# Patient Record
Sex: FEMALE | Race: WHITE | NOT HISPANIC OR LATINO | Employment: FULL TIME | URBAN - METROPOLITAN AREA
[De-identification: names, ages, dates, MRNs, and addresses within clinical notes are randomized per-mention and may not be internally consistent; named-entity substitution may affect disease eponyms.]

---

## 2017-01-20 ENCOUNTER — GENERIC CONVERSION - ENCOUNTER (OUTPATIENT)
Dept: OTHER | Facility: OTHER | Age: 42
End: 2017-01-20

## 2017-01-20 ENCOUNTER — LAB CONVERSION - ENCOUNTER (OUTPATIENT)
Dept: OTHER | Facility: OTHER | Age: 42
End: 2017-01-20

## 2017-01-20 LAB
INR PPP: 2.3
PROTHROMBIN TIME: 22.9 SEC (ref 9–11.5)

## 2017-01-26 ENCOUNTER — ALLSCRIPTS OFFICE VISIT (OUTPATIENT)
Dept: OTHER | Facility: OTHER | Age: 42
End: 2017-01-26

## 2017-01-26 DIAGNOSIS — I35.9 NONRHEUMATIC AORTIC VALVE DISORDER: ICD-10-CM

## 2017-02-04 ENCOUNTER — LAB CONVERSION - ENCOUNTER (OUTPATIENT)
Dept: OTHER | Facility: OTHER | Age: 42
End: 2017-02-04

## 2017-02-04 LAB
INR PPP: 2.9
PROTHROMBIN TIME: 29.4 SEC (ref 9–11.5)

## 2017-02-06 ENCOUNTER — GENERIC CONVERSION - ENCOUNTER (OUTPATIENT)
Dept: OTHER | Facility: OTHER | Age: 42
End: 2017-02-06

## 2017-02-24 ENCOUNTER — LAB CONVERSION - ENCOUNTER (OUTPATIENT)
Dept: OTHER | Facility: OTHER | Age: 42
End: 2017-02-24

## 2017-02-24 LAB
INR PPP: 2.4
PROTHROMBIN TIME: 24.7 SEC (ref 9–11.5)

## 2017-03-10 ENCOUNTER — GENERIC CONVERSION - ENCOUNTER (OUTPATIENT)
Dept: OTHER | Facility: OTHER | Age: 42
End: 2017-03-10

## 2017-03-10 ENCOUNTER — LAB CONVERSION - ENCOUNTER (OUTPATIENT)
Dept: OTHER | Facility: OTHER | Age: 42
End: 2017-03-10

## 2017-03-10 LAB
INR PPP: 2.9
PROTHROMBIN TIME: 28.8 SEC (ref 9–11.5)

## 2017-03-31 ENCOUNTER — LAB CONVERSION - ENCOUNTER (OUTPATIENT)
Dept: OTHER | Facility: OTHER | Age: 42
End: 2017-03-31

## 2017-03-31 ENCOUNTER — GENERIC CONVERSION - ENCOUNTER (OUTPATIENT)
Dept: OTHER | Facility: OTHER | Age: 42
End: 2017-03-31

## 2017-03-31 LAB
INR PPP: 2.8
PROTHROMBIN TIME: 28.5 SEC (ref 9–11.5)

## 2017-04-07 ENCOUNTER — ALLSCRIPTS OFFICE VISIT (OUTPATIENT)
Dept: OTHER | Facility: OTHER | Age: 42
End: 2017-04-07

## 2017-04-07 DIAGNOSIS — Q25.1 COARCTATION OF AORTA: ICD-10-CM

## 2017-04-07 DIAGNOSIS — I10 ESSENTIAL (PRIMARY) HYPERTENSION: ICD-10-CM

## 2017-04-07 DIAGNOSIS — R94.5 ABNORMAL RESULTS OF LIVER FUNCTION STUDIES: ICD-10-CM

## 2017-04-07 DIAGNOSIS — Z12.31 ENCOUNTER FOR SCREENING MAMMOGRAM FOR MALIGNANT NEOPLASM OF BREAST: ICD-10-CM

## 2017-04-07 DIAGNOSIS — F41.9 ANXIETY DISORDER: ICD-10-CM

## 2017-04-13 ENCOUNTER — HOSPITAL ENCOUNTER (OUTPATIENT)
Dept: NON INVASIVE DIAGNOSTICS | Facility: MEDICAL CENTER | Age: 42
Discharge: HOME | End: 2017-04-13
Payer: COMMERCIAL

## 2017-04-13 DIAGNOSIS — I35.9 NONRHEUMATIC AORTIC VALVE DISORDER: ICD-10-CM

## 2017-04-13 PROCEDURE — 93306 TTE W/DOPPLER COMPLETE: CPT

## 2017-04-21 ENCOUNTER — LAB CONVERSION - ENCOUNTER (OUTPATIENT)
Dept: OTHER | Facility: OTHER | Age: 42
End: 2017-04-21

## 2017-04-21 ENCOUNTER — GENERIC CONVERSION - ENCOUNTER (OUTPATIENT)
Dept: OTHER | Facility: OTHER | Age: 42
End: 2017-04-21

## 2017-04-21 LAB
INR PPP: 2.5
PROTHROMBIN TIME: 25.9 SEC (ref 9–11.5)

## 2017-05-02 ENCOUNTER — LAB CONVERSION - ENCOUNTER (OUTPATIENT)
Dept: OTHER | Facility: OTHER | Age: 42
End: 2017-05-02

## 2017-05-02 LAB
A/G RATIO (HISTORICAL): 1.6 (CALC) (ref 1–2.5)
ALBUMIN SERPL BCP-MCNC: 4 G/DL (ref 3.6–5.1)
ALP SERPL-CCNC: 51 U/L (ref 33–115)
ALT SERPL W P-5'-P-CCNC: 18 U/L (ref 6–29)
AST SERPL W P-5'-P-CCNC: 25 U/L (ref 10–30)
BASOPHILS # BLD AUTO: 0.4 %
BASOPHILS # BLD AUTO: 22 CELLS/UL (ref 0–200)
BILIRUB SERPL-MCNC: 1.3 MG/DL (ref 0.2–1.2)
BUN SERPL-MCNC: 15 MG/DL (ref 7–25)
BUN/CREA RATIO (HISTORICAL): ABNORMAL (CALC) (ref 6–22)
CALCIUM SERPL-MCNC: 8.4 MG/DL (ref 8.6–10.2)
CHLORIDE SERPL-SCNC: 105 MMOL/L (ref 98–110)
CHOLEST SERPL-MCNC: 129 MG/DL (ref 125–200)
CHOLEST/HDLC SERPL: 2.2 (CALC)
CO2 SERPL-SCNC: 27 MMOL/L (ref 20–31)
CREAT SERPL-MCNC: 0.68 MG/DL (ref 0.5–1.1)
DEPRECATED RDW RBC AUTO: 13.6 % (ref 11–15)
EGFR AFRICAN AMERICAN (HISTORICAL): 124 ML/MIN/1.73M2
EGFR-AMERICAN CALC (HISTORICAL): 107 ML/MIN/1.73M2
EOSINOPHIL # BLD AUTO: 151 CELLS/UL (ref 15–500)
EOSINOPHIL # BLD AUTO: 2.7 %
GAMMA GLOBULIN (HISTORICAL): 2.5 G/DL (CALC) (ref 1.9–3.7)
GLUCOSE (HISTORICAL): 78 MG/DL (ref 65–99)
HCT VFR BLD AUTO: 37 % (ref 35–45)
HDLC SERPL-MCNC: 59 MG/DL
HGB BLD-MCNC: 12.2 G/DL (ref 11.7–15.5)
LDL CHOLESTEROL (HISTORICAL): 56 MG/DL (CALC)
LYMPHOCYTES # BLD AUTO: 1333 CELLS/UL (ref 850–3900)
LYMPHOCYTES # BLD AUTO: 23.8 %
MCH RBC QN AUTO: 30 PG (ref 27–33)
MCHC RBC AUTO-ENTMCNC: 33 G/DL (ref 32–36)
MCV RBC AUTO: 90.8 FL (ref 80–100)
MONOCYTES # BLD AUTO: 336 CELLS/UL (ref 200–950)
MONOCYTES (HISTORICAL): 6 %
NEUTROPHILS # BLD AUTO: 3758 CELLS/UL (ref 1500–7800)
NEUTROPHILS # BLD AUTO: 67.1 %
NON-HDL-CHOL (CHOL-HDL) (HISTORICAL): 70 MG/DL (CALC)
PLATELET # BLD AUTO: 244 THOUSAND/UL (ref 140–400)
PMV BLD AUTO: 8.9 FL (ref 7.5–12.5)
POTASSIUM SERPL-SCNC: 3.7 MMOL/L (ref 3.5–5.3)
RBC # BLD AUTO: 4.08 MILLION/UL (ref 3.8–5.1)
SODIUM SERPL-SCNC: 139 MMOL/L (ref 135–146)
TOTAL PROTEIN (HISTORICAL): 6.5 G/DL (ref 6.1–8.1)
TRIGL SERPL-MCNC: 69 MG/DL
TSH SERPL DL<=0.05 MIU/L-ACNC: 1.21 MIU/L
WBC # BLD AUTO: 5.6 THOUSAND/UL (ref 3.8–10.8)

## 2017-05-04 ENCOUNTER — ALLSCRIPTS OFFICE VISIT (OUTPATIENT)
Dept: OTHER | Facility: OTHER | Age: 42
End: 2017-05-04

## 2017-05-09 ENCOUNTER — GENERIC CONVERSION - ENCOUNTER (OUTPATIENT)
Dept: OTHER | Facility: OTHER | Age: 42
End: 2017-05-09

## 2017-05-12 ENCOUNTER — HOSPITAL ENCOUNTER (OUTPATIENT)
Dept: RADIOLOGY | Facility: MEDICAL CENTER | Age: 42
Discharge: HOME | End: 2017-05-12
Payer: COMMERCIAL

## 2017-05-12 DIAGNOSIS — Q25.1 COARCTATION OF AORTA: ICD-10-CM

## 2017-05-12 PROCEDURE — 71275 CT ANGIOGRAPHY CHEST: CPT

## 2017-05-12 RX ADMIN — IOHEXOL 100 ML: 350 INJECTION, SOLUTION INTRAVENOUS at 14:04

## 2017-05-17 ENCOUNTER — ALLSCRIPTS OFFICE VISIT (OUTPATIENT)
Dept: OTHER | Facility: OTHER | Age: 42
End: 2017-05-17
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
## 2017-05-19 ENCOUNTER — GENERIC CONVERSION - ENCOUNTER (OUTPATIENT)
Dept: OTHER | Facility: OTHER | Age: 42
End: 2017-05-19

## 2017-05-19 ENCOUNTER — LAB CONVERSION - ENCOUNTER (OUTPATIENT)
Dept: OTHER | Facility: OTHER | Age: 42
End: 2017-05-19

## 2017-05-19 LAB
INR PPP: 2
PROTHROMBIN TIME: 20.9 SEC (ref 9–11.5)

## 2017-06-03 ENCOUNTER — GENERIC CONVERSION - ENCOUNTER (OUTPATIENT)
Dept: OTHER | Facility: OTHER | Age: 42
End: 2017-06-03

## 2017-06-03 ENCOUNTER — LAB CONVERSION - ENCOUNTER (OUTPATIENT)
Dept: OTHER | Facility: OTHER | Age: 42
End: 2017-06-03

## 2017-06-03 LAB
INR PPP: 2.1
PROTHROMBIN TIME: 21.1 SEC (ref 9–11.5)

## 2017-06-17 ENCOUNTER — LAB CONVERSION - ENCOUNTER (OUTPATIENT)
Dept: OTHER | Facility: OTHER | Age: 42
End: 2017-06-17

## 2017-06-17 ENCOUNTER — GENERIC CONVERSION - ENCOUNTER (OUTPATIENT)
Dept: OTHER | Facility: OTHER | Age: 42
End: 2017-06-17

## 2017-06-17 LAB
INR PPP: 2.5
PROTHROMBIN TIME: 25.8 SEC (ref 9–11.5)

## 2017-06-19 ENCOUNTER — LAB CONVERSION - ENCOUNTER (OUTPATIENT)
Dept: OTHER | Facility: OTHER | Age: 42
End: 2017-06-19

## 2017-06-19 LAB — LYME IGG/IGM AB (HISTORICAL): <0.9 INDEX

## 2017-06-23 ENCOUNTER — GENERIC CONVERSION - ENCOUNTER (OUTPATIENT)
Dept: OTHER | Facility: OTHER | Age: 42
End: 2017-06-23

## 2017-07-02 ENCOUNTER — HOSPITAL ENCOUNTER (EMERGENCY)
Facility: HOSPITAL | Age: 42
Discharge: HOME | End: 2017-07-02
Attending: EMERGENCY MEDICINE | Admitting: EMERGENCY MEDICINE
Payer: COMMERCIAL

## 2017-07-02 ENCOUNTER — APPOINTMENT (EMERGENCY)
Dept: RADIOLOGY | Facility: HOSPITAL | Age: 42
End: 2017-07-02
Payer: COMMERCIAL

## 2017-07-02 VITALS
HEART RATE: 68 BPM | SYSTOLIC BLOOD PRESSURE: 127 MMHG | WEIGHT: 110.45 LBS | OXYGEN SATURATION: 99 % | BODY MASS INDEX: 21.68 KG/M2 | RESPIRATION RATE: 18 BRPM | TEMPERATURE: 98.2 F | HEIGHT: 60 IN | DIASTOLIC BLOOD PRESSURE: 61 MMHG

## 2017-07-02 DIAGNOSIS — R06.00 DYSPNEA: Primary | ICD-10-CM

## 2017-07-02 LAB
ANION GAP SERPL CALCULATED.3IONS-SCNC: 8 MMOL/L (ref 4–13)
APTT PPP: 40 SECONDS (ref 23–35)
BASOPHILS # BLD AUTO: 0.04 THOUSANDS/ΜL (ref 0–0.1)
BASOPHILS NFR BLD AUTO: 1 % (ref 0–1)
BUN SERPL-MCNC: 10 MG/DL (ref 5–25)
CALCIUM SERPL-MCNC: 8.7 MG/DL (ref 8.3–10.1)
CHLORIDE SERPL-SCNC: 103 MMOL/L (ref 100–108)
CO2 SERPL-SCNC: 27 MMOL/L (ref 21–32)
CREAT SERPL-MCNC: 0.63 MG/DL (ref 0.6–1.3)
EOSINOPHIL # BLD AUTO: 0.11 THOUSAND/ΜL (ref 0–0.61)
EOSINOPHIL NFR BLD AUTO: 2 % (ref 0–6)
ERYTHROCYTE [DISTWIDTH] IN BLOOD BY AUTOMATED COUNT: 12.1 % (ref 11.6–15.1)
GFR SERPL CREATININE-BSD FRML MDRD: >60 ML/MIN/1.73SQ M
GLUCOSE SERPL-MCNC: 119 MG/DL (ref 65–140)
HCT VFR BLD AUTO: 37.1 % (ref 34.8–46.1)
HGB BLD-MCNC: 12.5 G/DL (ref 11.5–15.4)
INR PPP: 2.74 (ref 0.86–1.16)
LYMPHOCYTES # BLD AUTO: 1.03 THOUSANDS/ΜL (ref 0.6–4.47)
LYMPHOCYTES NFR BLD AUTO: 15 % (ref 14–44)
MCH RBC QN AUTO: 30.2 PG (ref 26.8–34.3)
MCHC RBC AUTO-ENTMCNC: 33.7 G/DL (ref 31.4–37.4)
MCV RBC AUTO: 90 FL (ref 82–98)
MONOCYTES # BLD AUTO: 0.5 THOUSAND/ΜL (ref 0.17–1.22)
MONOCYTES NFR BLD AUTO: 7 % (ref 4–12)
NEUTROPHILS # BLD AUTO: 5.23 THOUSANDS/ΜL (ref 1.85–7.62)
NEUTS SEG NFR BLD AUTO: 75 % (ref 43–75)
NRBC BLD AUTO-RTO: 0 /100 WBCS
NT-PROBNP SERPL-MCNC: 188 PG/ML
PLATELET # BLD AUTO: 201 THOUSANDS/UL (ref 149–390)
PMV BLD AUTO: 9.5 FL (ref 8.9–12.7)
POTASSIUM SERPL-SCNC: 3.5 MMOL/L (ref 3.5–5.3)
PROTHROMBIN TIME: 29.8 SECONDS (ref 12.1–14.4)
RBC # BLD AUTO: 4.14 MILLION/UL (ref 3.81–5.12)
SODIUM SERPL-SCNC: 138 MMOL/L (ref 136–145)
TROPONIN I SERPL-MCNC: <0.02 NG/ML
WBC # BLD AUTO: 6.93 THOUSAND/UL (ref 4.31–10.16)

## 2017-07-02 PROCEDURE — 83880 ASSAY OF NATRIURETIC PEPTIDE: CPT | Performed by: EMERGENCY MEDICINE

## 2017-07-02 PROCEDURE — 71020 HB CHEST X-RAY 2VW FRONTAL&LATL: CPT

## 2017-07-02 PROCEDURE — 80048 BASIC METABOLIC PNL TOTAL CA: CPT | Performed by: EMERGENCY MEDICINE

## 2017-07-02 PROCEDURE — 94640 AIRWAY INHALATION TREATMENT: CPT

## 2017-07-02 PROCEDURE — 84484 ASSAY OF TROPONIN QUANT: CPT | Performed by: EMERGENCY MEDICINE

## 2017-07-02 PROCEDURE — 36415 COLL VENOUS BLD VENIPUNCTURE: CPT | Performed by: EMERGENCY MEDICINE

## 2017-07-02 PROCEDURE — 99285 EMERGENCY DEPT VISIT HI MDM: CPT

## 2017-07-02 PROCEDURE — 85610 PROTHROMBIN TIME: CPT | Performed by: EMERGENCY MEDICINE

## 2017-07-02 PROCEDURE — 93005 ELECTROCARDIOGRAM TRACING: CPT | Performed by: EMERGENCY MEDICINE

## 2017-07-02 PROCEDURE — 85730 THROMBOPLASTIN TIME PARTIAL: CPT | Performed by: EMERGENCY MEDICINE

## 2017-07-02 PROCEDURE — 85025 COMPLETE CBC W/AUTO DIFF WBC: CPT | Performed by: EMERGENCY MEDICINE

## 2017-07-02 RX ORDER — ALBUTEROL SULFATE 90 UG/1
2 AEROSOL, METERED RESPIRATORY (INHALATION) ONCE
Status: COMPLETED | OUTPATIENT
Start: 2017-07-02 | End: 2017-07-02

## 2017-07-02 RX ORDER — IPRATROPIUM BROMIDE AND ALBUTEROL SULFATE 2.5; .5 MG/3ML; MG/3ML
3 SOLUTION RESPIRATORY (INHALATION)
Status: DISCONTINUED | OUTPATIENT
Start: 2017-07-02 | End: 2017-07-02

## 2017-07-02 RX ORDER — SODIUM CHLORIDE FOR INHALATION 0.9 %
VIAL, NEBULIZER (ML) INHALATION
Status: COMPLETED
Start: 2017-07-02 | End: 2017-07-02

## 2017-07-02 RX ORDER — ASPIRIN 81 MG/1
81 TABLET ORAL DAILY
COMMUNITY

## 2017-07-02 RX ORDER — WARFARIN SODIUM 5 MG/1
5 TABLET ORAL
COMMUNITY
End: 2018-05-21 | Stop reason: SDUPTHER

## 2017-07-02 RX ORDER — SODIUM CHLORIDE FOR INHALATION 0.9 %
3 VIAL, NEBULIZER (ML) INHALATION ONCE
Status: COMPLETED | OUTPATIENT
Start: 2017-07-02 | End: 2017-07-02

## 2017-07-02 RX ORDER — ALBUTEROL SULFATE 90 UG/1
2 AEROSOL, METERED RESPIRATORY (INHALATION) EVERY 6 HOURS PRN
Qty: 1 INHALER | Refills: 0 | Status: SHIPPED | OUTPATIENT
Start: 2017-07-02 | End: 2018-06-05

## 2017-07-02 RX ORDER — IPRATROPIUM BROMIDE AND ALBUTEROL SULFATE 2.5; .5 MG/3ML; MG/3ML
3 SOLUTION RESPIRATORY (INHALATION)
Status: DISCONTINUED | OUTPATIENT
Start: 2017-07-02 | End: 2017-07-02 | Stop reason: HOSPADM

## 2017-07-02 RX ADMIN — IPRATROPIUM BROMIDE AND ALBUTEROL SULFATE 3 ML: .5; 3 SOLUTION RESPIRATORY (INHALATION) at 12:35

## 2017-07-02 RX ADMIN — Medication 3 ML: at 12:35

## 2017-07-02 RX ADMIN — ALBUTEROL SULFATE 2 PUFF: 90 AEROSOL, METERED RESPIRATORY (INHALATION) at 13:33

## 2017-07-02 RX ADMIN — ISODIUM CHLORIDE 3 ML: 0.03 SOLUTION RESPIRATORY (INHALATION) at 12:35

## 2017-07-03 LAB
ATRIAL RATE: 65 BPM
P AXIS: 56 DEGREES
PR INTERVAL: 134 MS
QRS AXIS: 63 DEGREES
QRSD INTERVAL: 84 MS
QT INTERVAL: 400 MS
QTC INTERVAL: 416 MS
T WAVE AXIS: 66 DEGREES
VENTRICULAR RATE: 65 BPM

## 2017-07-11 ENCOUNTER — LAB CONVERSION - ENCOUNTER (OUTPATIENT)
Dept: OTHER | Facility: OTHER | Age: 42
End: 2017-07-11

## 2017-07-11 ENCOUNTER — GENERIC CONVERSION - ENCOUNTER (OUTPATIENT)
Dept: OTHER | Facility: OTHER | Age: 42
End: 2017-07-11

## 2017-07-11 LAB
INR PPP: 3.9
PROTHROMBIN TIME: 39.5 SEC (ref 9–11.5)

## 2017-08-11 ENCOUNTER — LAB CONVERSION - ENCOUNTER (OUTPATIENT)
Dept: OTHER | Facility: OTHER | Age: 42
End: 2017-08-11

## 2017-08-11 ENCOUNTER — GENERIC CONVERSION - ENCOUNTER (OUTPATIENT)
Dept: OTHER | Facility: OTHER | Age: 42
End: 2017-08-11

## 2017-08-11 LAB
INR PPP: 1.9
PROTHROMBIN TIME: 19.1 SEC (ref 9–11.5)

## 2017-08-25 ENCOUNTER — LAB CONVERSION - ENCOUNTER (OUTPATIENT)
Dept: OTHER | Facility: OTHER | Age: 42
End: 2017-08-25

## 2017-08-25 LAB
INR PPP: 3.2
PROTHROMBIN TIME: 32.4 SEC (ref 9–11.5)

## 2017-09-14 ENCOUNTER — GENERIC CONVERSION - ENCOUNTER (OUTPATIENT)
Dept: OTHER | Facility: OTHER | Age: 42
End: 2017-09-14

## 2017-09-15 ENCOUNTER — LAB CONVERSION - ENCOUNTER (OUTPATIENT)
Dept: OTHER | Facility: OTHER | Age: 42
End: 2017-09-15

## 2017-09-15 ENCOUNTER — GENERIC CONVERSION - ENCOUNTER (OUTPATIENT)
Dept: OTHER | Facility: OTHER | Age: 42
End: 2017-09-15

## 2017-09-15 LAB
INR PPP: 2.1
PROTHROMBIN TIME: 21.6 SEC (ref 9–11.5)

## 2017-09-18 ENCOUNTER — GENERIC CONVERSION - ENCOUNTER (OUTPATIENT)
Dept: OTHER | Facility: OTHER | Age: 42
End: 2017-09-18

## 2017-10-06 ENCOUNTER — LAB CONVERSION - ENCOUNTER (OUTPATIENT)
Dept: OTHER | Facility: OTHER | Age: 42
End: 2017-10-06

## 2017-10-06 LAB
INR PPP: 2.3
PROTHROMBIN TIME: 23.5 SEC (ref 9–11.5)

## 2017-10-13 ENCOUNTER — HOSPITAL ENCOUNTER (EMERGENCY)
Facility: HOSPITAL | Age: 42
Discharge: HOME | End: 2017-10-13
Attending: EMERGENCY MEDICINE | Admitting: EMERGENCY MEDICINE
Payer: COMMERCIAL

## 2017-10-13 VITALS
SYSTOLIC BLOOD PRESSURE: 178 MMHG | RESPIRATION RATE: 18 BRPM | HEART RATE: 78 BPM | WEIGHT: 113.76 LBS | BODY MASS INDEX: 22.22 KG/M2 | TEMPERATURE: 98 F | DIASTOLIC BLOOD PRESSURE: 90 MMHG

## 2017-10-13 DIAGNOSIS — T14.8XXA HEMATOMA: Primary | ICD-10-CM

## 2017-10-13 PROCEDURE — 99283 EMERGENCY DEPT VISIT LOW MDM: CPT

## 2017-10-13 RX ORDER — LIDOCAINE HYDROCHLORIDE 10 MG/ML
5 INJECTION, SOLUTION EPIDURAL; INFILTRATION; INTRACAUDAL; PERINEURAL ONCE
Status: COMPLETED | OUTPATIENT
Start: 2017-10-13 | End: 2017-10-13

## 2017-10-13 RX ADMIN — LIDOCAINE HYDROCHLORIDE 5 ML: 10 INJECTION, SOLUTION EPIDURAL; INFILTRATION; INTRACAUDAL; PERINEURAL at 22:07

## 2017-10-14 NOTE — DISCHARGE INSTRUCTIONS
No anti-inflammatories as your on Coumadin  Continue compression  Can remove dressing in 24 hours  Return for any redness swelling signs of infection  Knee Bursitis   WHAT YOU NEED TO KNOW:   Knee bursitis is inflammation of the bursa in your knee  The bursa is a fluid-filled sac that acts as a cushion between a bone and a tendon  A tendon is a cord of strong tissue that connects muscles to bones  DISCHARGE INSTRUCTIONS:   Medicines:   · NSAIDs:  These medicines decrease swelling, pain, and fever  NSAIDs are available without a doctor's order  Ask your healthcare provider which medicine is right for you  Ask how much to take and when to take it  Take as directed  NSAIDs can cause stomach bleeding and kidney problems if not taken correctly  · Antibiotics: These help fight an infection caused by bacteria  You may need antibiotics if your bursitis is caused by infection  · Take your medicine as directed  Contact your healthcare provider if you think your medicine is not helping or if you have side effects  Tell him of her if you are allergic to any medicine  Keep a list of the medicines, vitamins, and herbs you take  Include the amounts, and when and why you take them  Bring the list or the pill bottles to follow-up visits  Carry your medicine list with you in case of an emergency  Manage your symptoms:   · Rest:  Rest your knee as much as possible to decrease pain and swelling  Slowly start to do more each day  Return to your daily activities as directed  · Ice:  Ice helps decrease swelling and pain  Ice may also help prevent tissue damage  Use an ice pack, or put crushed ice in a plastic bag  Cover it with a towel and place it on your knee for 15 to 20 minutes, 3 to 4 times each day, as directed  · Heat:  Heat helps decrease pain and stiffness  Apply heat on the area for 15 to 20 minutes, 3 to 4 times each day, as directed      · Compression:  Healthcare providers may wrap your knee with tape or an elastic bandage to decrease swelling  Loosen the elastic bandage if you start to lose feeling in your toes  · Elevation:  Raise your knee above the level of your heart as often as you can  This will help decrease swelling and pain  Prop your knee on pillows or blankets to keep it elevated comfortably  Physical therapy:  A physical therapist teaches you exercises to help improve movement and strength, and to decrease pain  Prevent another knee injury:   · Stretch, warm up, and cool down:  Always stretch and do warmup and cool-down exercises before and after you exercise  This will help loosen your muscles and decrease stress on your knees  Rest between workouts  · Protect your knees:  Use kneepads when you kneel on a hard surface and when you play sports  Stand and walk around every 20 minutes if you have to kneel for a long period of time  Follow up with your healthcare provider as directed:  Write down your questions so you remember to ask them during your visits  Contact your healthcare provider if:   · Your pain and swelling increase  · Your symptoms do not improve with treatment  · You have a fever  · You have questions or concerns about your condition or care  © 2017 [address] [facility] Information is for End User's use only and may not be sold, redistributed or otherwise used for commercial purposes  All illustrations and images included in CareNotes® are the copyrighted property of [employer]  or [employer]  The above information is an  only  It is not intended as medical advice for individual conditions or treatments  Talk to your doctor, nurse or pharmacist before following any medical regimen to see if it is safe and effective for you

## 2017-10-14 NOTE — ED PROVIDER NOTES
History  Chief Complaint   Patient presents with    Knee Swelling     Pt presents to the ED with knee swelling over the right knee  HPI patient is a 15-year-old female, she is on Coumadin for a valve replacement, she reports pain in her knee 2 weeks ago and has a persistent right patellar hematoma  She reports swelling over the right patella  She denies any pain in the patella, she reports full range of motion and use of the knee  She reports difficult for her to kneel due to the large fluid collection in the hematoma  She denies any distal numbness or weakness  Past medical history of valve replacement  Family history noncontributory  Social history, nonsmoker no history of drug abuse, here with her daughter    Prior to Admission Medications   Prescriptions Last Dose Informant Patient Reported? Taking? albuterol (PROVENTIL HFA,VENTOLIN HFA) 90 mcg/act inhaler   No No   Sig: Inhale 2 puffs every 6 (six) hours as needed for wheezing or shortness of breath   aspirin (ECOTRIN LOW STRENGTH) 81 mg EC tablet   Yes No   Sig: Take 81 mg by mouth daily   metoprolol tartrate (LOPRESSOR) 25 mg tablet   Yes No   Sig: Take 25 mg by mouth every 12 (twelve) hours   warfarin (COUMADIN) 5 mg tablet   Yes No   Sig: Take 5 mg by mouth daily      Facility-Administered Medications: None       Past Medical History:   Diagnosis Date    Asthma     Cardiac disease        Past Surgical History:   Procedure Laterality Date    AORTIC VALVE REPLACEMENT         History reviewed  No pertinent family history  I have reviewed and agree with the history as documented  Social History   Substance Use Topics    Smoking status: Never Smoker    Smokeless tobacco: Not on file    Alcohol use Yes      Comment: socially        Review of Systems   Constitutional: Negative for fever  HENT: Negative for congestion  Eyes: Negative for pain and redness  Respiratory: Negative for cough and shortness of breath      Cardiovascular: Negative for chest pain  Gastrointestinal: Negative for abdominal pain and vomiting  Right knee swelling    Physical Exam  ED Triage Vitals [10/13/17 2151]   Temperature Pulse Respirations Blood Pressure SpO2   98 °F (36 7 °C) 78 18 (!) 178/90 --      Temp Source Heart Rate Source Patient Position - Orthostatic VS BP Location FiO2 (%)   Oral -- Sitting Right arm --      Pain Score       7           Physical Exam   Constitutional: She is oriented to person, place, and time  She appears well-developed and well-nourished  HENT:   Head: Normocephalic  Right Ear: External ear normal    Left Ear: External ear normal    Nose: Nose normal    Mouth/Throat: Oropharynx is clear and moist    Eyes: EOM and lids are normal  Pupils are equal, round, and reactive to light  Neck: Normal range of motion  Neck supple  Pulmonary/Chest: Effort normal  No respiratory distress  Musculoskeletal: Normal range of motion  She exhibits deformity  Right knee shows a large hematoma over the patella, suprapatellar bursa, there is no redness there is no warmth, essentially golf ball-sized  Neurological: She is alert and oriented to person, place, and time  Skin: Skin is warm and dry  Psychiatric: She has a normal mood and affect  Nursing note and vitals reviewed        ED Medications  Medications   lidocaine (PF) (XYLOCAINE-MPF) 1 % injection 5 mL (5 mL Infiltration Given 10/13/17 2207)       Diagnostic Studies  Labs Reviewed - No data to display    No orders to display       Procedures  Incision/Drainage  Date/Time: 10/13/2017 10:10 PM  Performed by: Vanessa Pallas by: Reji Bob     Patient location:  ED  Consent:     Consent obtained:  Verbal    Consent given by:  Patient    Risks discussed:  Bleeding    Alternatives discussed:  No treatment  Universal protocol:     Procedure explained and questions answered to patient or proxy's satisfaction: yes      Patient identity confirmed:  Verbally with patient  Location:     Type:  Hematoma    Size:  Golf ball    Location:  Lower extremity    Lower extremity location:  R leg  Pre-procedure details:     Skin preparation:  Betadine  Anesthesia (see MAR for exact dosages): Anesthesia method:  Local infiltration    Local anesthetic:  Lidocaine 1% w/o epi  Procedure details:     Complexity:  Simple    Incision types:  Stab incision    Approach:  Open    Drainage:  Bloody  Post-procedure details:     Patient tolerance of procedure: Tolerated well, no immediate complications  Comments:      Hematoma was aspirated with a 18 gauge needle, due to the patient being on Coumadin, decompressed and wrapped with an Ace bandage  Phone Contacts  ED Phone Contact    ED Course  ED Course                                MDM medical decision making 61-year-old female, on Coumadin, reports her level, INR is adequate, not excessively anticoagulated, reports that she has a hematoma right knee that has been persistent, discussed treatment options with patient, will resolve with time, she request drainage, anesthetized with 1% lidocaine, aspirated with a 18  Needle  Area was flat on discharge, wrapped with an Ace, discussed treatment and follow-up  CritCare Time    Disposition  Final diagnoses:   Hematoma - Right suprapatellar bursa     ED Disposition     ED Disposition Condition Comment    Discharge  Yinka Balderas discharge to home/self care      Condition at discharge: Good        Follow-up Information     Follow up With Specialties Details Why Katina Bean MD Internal Medicine   [address], [address] [facility] [address] [phone]          Discharge Medication List as of 10/13/2017 10:16 PM      CONTINUE these medications which have NOT CHANGED    Details   albuterol (PROVENTIL HFA,VENTOLIN HFA) 90 mcg/act inhaler Inhale 2 puffs every 6 (six) hours as needed for wheezing or shortness of breath, Starting Sun 7/2/2017, Print aspirin (ECOTRIN LOW STRENGTH) 81 mg EC tablet Take 81 mg by mouth daily, Historical Med      metoprolol tartrate (LOPRESSOR) 25 mg tablet Take 25 mg by mouth every 12 (twelve) hours, Historical Med      warfarin (COUMADIN) 5 mg tablet Take 5 mg by mouth daily, Historical Med           No discharge procedures on file      ED Provider  Electronically Signed by       Cassie Toledo MD  10/13/17 8111

## 2017-10-25 ENCOUNTER — HOSPITAL ENCOUNTER (EMERGENCY)
Facility: HOSPITAL | Age: 42
Discharge: HOME | End: 2017-10-26
Attending: EMERGENCY MEDICINE | Admitting: EMERGENCY MEDICINE
Payer: COMMERCIAL

## 2017-10-25 DIAGNOSIS — Z79.899 MEDICATION TAKEN AT HIGHER DOSE THAN RECOMMENDED: Primary | ICD-10-CM

## 2017-10-25 PROCEDURE — 85610 PROTHROMBIN TIME: CPT | Performed by: EMERGENCY MEDICINE

## 2017-10-25 PROCEDURE — 36415 COLL VENOUS BLD VENIPUNCTURE: CPT | Performed by: EMERGENCY MEDICINE

## 2017-10-26 VITALS
SYSTOLIC BLOOD PRESSURE: 136 MMHG | HEIGHT: 60 IN | RESPIRATION RATE: 18 BRPM | TEMPERATURE: 98 F | BODY MASS INDEX: 21.01 KG/M2 | WEIGHT: 107 LBS | HEART RATE: 71 BPM | DIASTOLIC BLOOD PRESSURE: 60 MMHG | OXYGEN SATURATION: 100 %

## 2017-10-26 LAB
INR PPP: 2.93 (ref 0.86–1.16)
PROTHROMBIN TIME: 31.5 SECONDS (ref 12.1–14.4)

## 2017-10-26 PROCEDURE — 99283 EMERGENCY DEPT VISIT LOW MDM: CPT

## 2017-10-26 NOTE — ED PROVIDER NOTES
History  Chief Complaint   Patient presents with    Medication Problem     Pt thinks she may have taken too much medication  Pt may have doubled her coumadin, aspirin, and metoprolol  Patient is a 75-year-old female with past medical history of asthma, mechanical aortic valve repair on Coumadin, presents to the emergency department for concern that she may have doubled up her home medications by accident  Patient states she went to take her nighttime medications which include Coumadin 5 mg, and metoprolol 25 mg and she noticed that her Thursday medications were missing so she thinks she may have taken a double dose of the metoprolol, Coumadin and possibly her aspirin  She states she normally takes metoprolol 25 mg twice daily, Coumadin 5-7 5 mg daily and aspirin 81 mg daily  She was concerned mostly about the Coumadin and its affect on her INR  She has no symptoms currently and no complaints  She denies any headache, dizziness, lightheadedness or near syncope, fever, chills, URI symptoms, cough, chest pain, palpitations, diaphoresis, shortness of breath, abdominal pain, nausea, vomiting, diarrhea, constipation, blood per rectum or melena, dysuria, frequency, hematuria, extremity weakness or paresthesia or other focal neurologic deficits  History provided by:  Patient   used: No        Prior to Admission Medications   Prescriptions Last Dose Informant Patient Reported? Taking?    albuterol (PROVENTIL HFA,VENTOLIN HFA) 90 mcg/act inhaler   No Yes   Sig: Inhale 2 puffs every 6 (six) hours as needed for wheezing or shortness of breath   aspirin (ECOTRIN LOW STRENGTH) 81 mg EC tablet   Yes Yes   Sig: Take 81 mg by mouth daily   metoprolol tartrate (LOPRESSOR) 25 mg tablet   Yes Yes   Sig: Take 25 mg by mouth every 12 (twelve) hours   warfarin (COUMADIN) 5 mg tablet   Yes Yes   Sig: Take 5 mg by mouth daily      Facility-Administered Medications: None       Past Medical History: Diagnosis Date    Asthma     Cardiac disease        Past Surgical History:   Procedure Laterality Date    AORTIC VALVE REPLACEMENT         History reviewed  No pertinent family history  I have reviewed and agree with the history as documented  Social History   Substance Use Topics    Smoking status: Never Smoker    Smokeless tobacco: Never Used    Alcohol use No        Review of Systems   Constitutional: Negative for chills and fever  HENT: Negative for congestion, ear pain, rhinorrhea and sore throat  Eyes: Negative for pain and visual disturbance  Respiratory: Negative for cough, chest tightness, shortness of breath and wheezing  Cardiovascular: Negative for chest pain and palpitations  Gastrointestinal: Negative for abdominal pain, blood in stool, constipation, diarrhea, nausea and vomiting  Genitourinary: Negative for dysuria, flank pain, frequency, hematuria and vaginal bleeding  Musculoskeletal: Negative for back pain and neck pain  Skin: Negative for color change, pallor, rash and wound  Allergic/Immunologic: Negative for immunocompromised state  Neurological: Negative for dizziness, weakness, light-headedness, numbness and headaches  Hematological: Negative for adenopathy  Bruises/bleeds easily  Psychiatric/Behavioral: Negative for confusion and decreased concentration  All other systems reviewed and are negative        Physical Exam  ED Triage Vitals   Temperature Pulse Respirations Blood Pressure SpO2   10/25/17 2248 10/25/17 2248 10/25/17 2248 10/25/17 2248 10/25/17 2248   98 °F (36 7 °C) 70 16 147/65 99 %      Temp Source Heart Rate Source Patient Position - Orthostatic VS BP Location FiO2 (%)   10/25/17 2248 10/25/17 2248 10/26/17 0021 10/26/17 0021 --   Oral Monitor Sitting Right arm       Pain Score       10/25/17 2248       No Pain         Vitals:    10/25/17 2248 10/26/17 0021   BP: 147/65 136/60   Pulse: 70 71   Resp: 16 18   Temp: 98 °F (36 7 °C)    TempSrc: Oral    SpO2: 99% 100%   Weight: 48 5 kg (107 lb)    Height: 5' (1 524 m)        Physical Exam   Constitutional: She is oriented to person, place, and time  She appears well-developed and well-nourished  No distress  HENT:   Head: Normocephalic and atraumatic  Mouth/Throat: Oropharynx is clear and moist    Eyes: Conjunctivae and EOM are normal  Pupils are equal, round, and reactive to light  Neck: Normal range of motion  Neck supple  No JVD present  Cardiovascular: Normal rate, regular rhythm, normal heart sounds and intact distal pulses  Exam reveals no gallop and no friction rub  No murmur heard  Pulmonary/Chest: Effort normal and breath sounds normal  No respiratory distress  She has no wheezes  She has no rales  Abdominal: Soft  She exhibits no distension  There is no tenderness  There is no rebound and no guarding  Musculoskeletal: Normal range of motion  She exhibits no edema or tenderness  Lymphadenopathy:     She has no cervical adenopathy  Neurological: She is alert and oriented to person, place, and time  No gross motor or sensory deficits  Skin: Skin is warm and dry  No rash noted  She is not diaphoretic  No pallor  Psychiatric: She has a normal mood and affect  Her behavior is normal    Nursing note and vitals reviewed  ED Medications  Medications - No data to display    Diagnostic Studies  Results Reviewed     Procedure Component Value Units Date/Time    Protime-INR [[id number]]  (Abnormal) Collected:  10/25/17 0436    Lab Status:  Final result Specimen:  Blood from Arm, Right Updated:  10/26/17 0017     Protime 31 5 (H) seconds      INR 2 93 (H)                 No orders to display              Procedures  Procedures       Phone Contacts  ED Phone Contact    ED Course  ED Course                                MDM  Number of Diagnoses or Management Options  Diagnosis management comments: Possible accidental medication over-administration    As far as the aspirin, I do not see any harm in taking a total of 162 mg of aspirin if she did double it up  As far as the metoprolol, she has no complaints of lightheadedness, dizziness, palpitations and has a normal stable blood pressure and heart rate  Will check PT/INR to gauge patient's Coumadin level  If significantly elevated, will have her skip her dose tomorrow but if within normal limits, will have her continue her medications as scheduled tomorrow  Amount and/or Complexity of Data Reviewed  Clinical lab tests: ordered and reviewed      CritCare Time    Disposition  Final diagnoses:   Medication taken at higher dose than recommended     Time reflects when diagnosis was documented in both MDM as applicable and the Disposition within this note     Time User Action Codes Description Comment    10/26/2017 12:10 AM Jodean Neighbors E Add [[id number]] Medication taken at higher dose than recommended       ED Disposition     ED Disposition Condition Comment    Discharge  Beatrizguillermina Mcdonoughuel discharge to home/self care  Condition at discharge: Stable        Follow-up Information     Follow up With Specialties Details Why Contact Info    Raffaele Dan MD Internal Medicine Schedule an appointment as soon as possible for a visit  [address] [place] [address]  [phone]          Patient's Medications   Discharge Prescriptions    No medications on file     No discharge procedures on file      ED Provider  Electronically Signed by           Antonio Johnson DO  10/26/17 0021

## 2017-10-26 NOTE — DISCHARGE INSTRUCTIONS
Warfarin (By mouth)   Warfarin (WAR-far-in)  Prevents and treats blood clots  May lower the risk of serious complications after a heart attack  This medicine is a blood thinner  Brand Name(s): Coumadin, Jantoven   There may be other brand names for this medicine  When This Medicine Should Not Be Used: This medicine is not right for everyone  Do not use it if you had an allergic reaction to warfarin, if you are pregnant, or if you have health problems that could cause bleeding  How to Use This Medicine:   Tablet  · Take your medicine as directed  Your dose may need to be changed several times to find what works best for you  · This medicine should come with a Medication Guide  Ask your pharmacist for a copy if you do not have one  · Missed dose: Take a dose as soon as you remember  If it is almost time for your next dose, wait until then and take a regular dose  Do not take extra medicine to make up for a missed dose  · Store the medicine in a closed container at room temperature, away from heat, moisture, and direct light  Drugs and Foods to Avoid:   Ask your doctor or pharmacist before using any other medicine, including over-the-counter medicines, vitamins, and herbal products  · Many medicines and foods can affect how warfarin works and may affect the PT/INR test results   Tell your doctor before you start or stop any medicine, especially the following:   ¨ Ginkgo, echinacea, goldenseal, or Jennifer's wort  ¨ Another blood thinner, including apixaban, argatroban, bivalirudin, cilostazol, clopidogrel, dabigatran, desirudin, dipyridamole, heparin, lepirudin, prasugrel, rivaroxaban, ticlopidine  ¨ Medicine to treat depression or anxiety, including citalopram, desvenlafaxine, duloxetine, escitalopram, fluoxetine, fluvoxamine, milnacipran, paroxetine, sertraline, venlafaxine, vilazodone  ¨ Medicine to treat an infection  ¨ NSAID pain or arthritis medicine, including aspirin, celecoxib, diclofenac, diflunisal, fenoprofen, ibuprofen, indomethacin, ketoprofen, ketorolac, mefenamic acid, naproxen, oxaprozin, piroxicam, sulindac  Check labels for over-the-counter medicines to find out if they contain an NSAID  ¨ Steroid medicine, including dexamethasone, hydrocortisone, methylprednisolone, prednisolone, prednisone  · Warfarin works best if you eat about the same amount of vitamin K every day  Foods high in vitamin K include asparagus, broccoli, brussels sprouts, cabbage, green leafy vegetables, plums, rhubarb, and canola oil  Talk to your doctor before you make changes to your normal diet  · Do not eat grapefruit or drink grapefruit juice while you are using this medicine  Warnings While Using This Medicine:   · It is not safe to take this medicine during pregnancy  It could harm an unborn baby  Tell your doctor right away if you become pregnant  Use an effective form of birth control to keep from getting pregnant during treatment and for at least 1 month after your last dose  · Tell your doctor if you are breastfeeding, or if you have kidney disease, liver disease, diabetes, heart disease, heart failure, high blood pressure, an infection, a stomach ulcer, or protein C deficiency  Also tell your doctor if you had recent surgery or an injury, or a history of stroke, anemia, severe bleeding or bruising, or problems caused by heparin use  · This medicine may cause the following problems:   ¨ Bleeding, which may be life-threatening  ¨ Gangrene (skin or tissue damage)  ¨ Calciphylaxis or calcium uremic arteriolopathy  ¨ Purple toes syndrome  · You must have a PT/INR blood test while you use this medicine to check how well your blood is clotting  Your doctor will use the test results to make sure the medicine is working properly  Keep all appointments for the PT/INR blood tests  · You may bleed or bruise more easily with warfarin   To prevent injury or cuts, do not play rough sports, be careful with sharp objects, and brush and floss your teeth gently  Juanna Polo your nose gently, and do not pick your nose  · Carry an ID card or wear a medical alert bracelet to let emergency caregivers know that you use warfarin  · Tell any doctor or dentist who treats you that you are using this medicine  You may need to stop using this medicine several days before you have surgery or medical tests  · Keep all medicine out of the reach of children  Never share your medicine with anyone  Possible Side Effects While Using This Medicine:   Call your doctor right away if you notice any of these side effects:  · Allergic reaction: Itching or hives, swelling in your face or hands, swelling or tingling in your mouth or throat, chest tightness, trouble breathing  · Bleeding from your gums or nose, coughing up blood, heavy monthly periods  · Bleeding that does not stop, bruising, or weakness  · Dizziness, fainting, lightheadedness  · Pain, brown or black skin, or skin that is cool to the touch  · Purple toes or feet, or new pain in your leg, foot, or toes  · Purplish red, net-like, blotchy spots on the skin  · Red or dark brown urine, or red or black, tarry stools  · Vomiting blood or material that looks like coffee grounds  If you notice other side effects that you think are caused by this medicine, tell your doctor  Call your doctor for medical advice about side effects  You may report side effects to FDA at 4-717-FDA-2577  © 2017 [address] [employer] Information is for End User's use only and may not be sold, redistributed or otherwise used for commercial purposes  The above information is an  only  It is not intended as medical advice for individual conditions or treatments  Talk to your doctor, nurse or pharmacist before following any medical regimen to see if it is safe and effective for you

## 2017-10-27 ENCOUNTER — LAB CONVERSION - ENCOUNTER (OUTPATIENT)
Dept: OTHER | Facility: OTHER | Age: 42
End: 2017-10-27

## 2017-10-27 ENCOUNTER — GENERIC CONVERSION - ENCOUNTER (OUTPATIENT)
Dept: OTHER | Facility: OTHER | Age: 42
End: 2017-10-27

## 2017-10-27 LAB
INR PPP: 2.5
PROTHROMBIN TIME: 25.8 SEC (ref 9–11.5)

## 2017-11-11 ENCOUNTER — LAB CONVERSION - ENCOUNTER (OUTPATIENT)
Dept: OTHER | Facility: OTHER | Age: 42
End: 2017-11-11

## 2017-11-11 ENCOUNTER — GENERIC CONVERSION - ENCOUNTER (OUTPATIENT)
Dept: OTHER | Facility: OTHER | Age: 42
End: 2017-11-11

## 2017-11-11 LAB
INR PPP: 1.6
PROTHROMBIN TIME: 16.3 SEC (ref 9–11.5)

## 2017-11-28 ENCOUNTER — LAB CONVERSION - ENCOUNTER (OUTPATIENT)
Dept: OTHER | Facility: OTHER | Age: 42
End: 2017-11-28

## 2017-11-28 ENCOUNTER — ALLSCRIPTS OFFICE VISIT (OUTPATIENT)
Dept: OTHER | Facility: OTHER | Age: 42
End: 2017-11-28

## 2017-11-28 ENCOUNTER — GENERIC CONVERSION - ENCOUNTER (OUTPATIENT)
Dept: OTHER | Facility: OTHER | Age: 42
End: 2017-11-28

## 2017-11-28 LAB
INR PPP: 2.3
PROTHROMBIN TIME: 23.9 SEC (ref 9–11.5)

## 2017-11-29 NOTE — PROGRESS NOTES
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)    Plan     · Follow Up in 2 Years Evaluation and Treatment  Follow-up  Status: Hold For - Scheduling Requested for: 28Nov2017    Discussion/Summary  Discussion Summary- [facility] Derm:  Assessment #1: Female pattern hair loss  Care Plan:  Possibility of this related to just normal genetic process also could be medication related but also definitely caused by sun damage to hair from repeated hair coloring  Assessment #2: Nevi  Care Plan:  Reviewed the concept of ABCDs and ugly duckling nothing atypical noted on exam   Assessment #3: Screening for dermatologic disorders  Care Plan:  Nothing else of concern noted on complete cutaneous exam followup yearly sun protection stressed  Chief Complaint  Chief Complaint Free Text Note Form: Patient here for a check up  History of Present Illness  HPI: 40-year-old female presents for follow-up secondary to previous history of lots of sun exposure and concerned regarding potential skin cancer  Patient also complaining of hair loss      Review of Systems  Complete Female Dermatology [facility] Patient:  Constitutional: Denies constitutional symptoms  Eyes: Denies eye symptoms  ENT:  denies ear symptoms, nasal symptoms, mouth or throat symptoms  Cardiovascular: Denies cardiovascular symptoms  Respiratory: Denies respiratory symptoms  Gastrointestinal: Denies gastrointestinal symptoms  Musculoskeletal: Denies musculoskeletal symptoms  Integumentary: Denies skin, hair and nail symptoms  Neurological: Denies neurologic symptoms  Psychiatric: Denies psychiatric symptoms  Endocrine: Denies endocrine symptoms  Hematologic/Lymphatic: Denies hematologic symptoms  Active Problems    1  Abdominal pain (789 00) (R10 9)   2  Allergic rhinitis (477 9) (J30 9)   3  Anemia (285 9) (D64 9)   4   Anticoagulant long-term use (V58 61) (Z79 01)   5  Anxiety (300 00) (F41 9)   6  Aortic valve disorder (424 1) (I35 9)   7  Benign familial tremor (333 1) (G25 0)   8  Coarctation of aorta (747 10) (Q25 1)   9  Counseling for sexually transmitted disease (V65 45) (Z70 8)   10  Depression with anxiety (300 4) (F41 8)   11  Dysfunction of Eustachian tube, unspecified laterality (381 81) (H69 80)   12  Elevated liver function tests (790 6) (R79 89)   13  Encounter for gynecological examination without abnormal finding (V72 31) (Z01 419)   14  Encounter for other general counseling or advice on contraception (V25 09) (Z30 09)   15  Encounter for other general counseling or advice on contraception (V25 09) (Z30 09)   16  Encounter for screening for human papillomavirus (HPV) (V73 81) (Z11 51)   17  Encounter for screening mammogram for malignant neoplasm of breast (V76 12)  (Z12 31)   18  Exposure to STD (V01 6) (Z20 2)   19  Gallstone (574 20) (K80 20)   20  Hair loss (704 00) (L65 9)   21  History of Heart Valve Replacement   22  Hemorrhoids, external (455 3) (K64 4)   23  History of aortic valve replacement (V43 3) (Z95 2)   24  Hypertension (401 9) (I10)   25  Leg pain, right (729 5) (M79 604)   26  Liver cyst (573 8) (K76 89)   27  Pigmented nevus (216 9) (D22 9)   28  Post-nasal drip (784 91) (R09 82)   29  Recurrent varicose veins (454 9) (I86 8)   30  Screening for skin condition (V82 0) (Z13 89)   31  Skin tags, anus or rectum (455 9) (K64 4)   32  Spider veins (448 1) (I78 1)   33  Tick bite (919 4,E906 4) (W57 XXXA)   34  Vaginal bleeding between periods (626 6) (N92 0)   35  Visit for screening mammogram (V76 12) (Z12 31)    Past Medical History    1  History of  (637 90) (O03 9)   2  History of Allergic Contact Dermatitis (692 9)   3  History of Anterior Uveal Cysts (364 60)   4  History of Contact dermatitis due to poison ivy (692 6) (L23 7)   5  History of Costochondritis (733 6) (M94 0)   6   History of Dermatitis due to skin contact with other agent (692 89) (L25 8)   7  History of Dermatophytosis, nail (110 1) (B35 1)   8  History of Difficulty breathing (786 09) (R06 89)   9  History of Encounter for screening mammogram for malignant neoplasm of breast (V76 12) (Z12 31)   10  History of Exposure to STD (V01 6) (Z20 2)   11  History of  6   12  History of acute pancreatitis (V12 79) (Z87 19)   13  History of acute pharyngitis (V12 69) (Z87 09)   14  History of anemia (V12 3) (Z86 2)   15  History of aortic valve disorder (V12 59) (Z86 79)   16  History of aortic valve insufficiency (V12 59) (Z86 79)   17  History of atopic dermatitis (V13 3) (Z87 2)   18  History of benign neoplasm of skin (V13 3) (Z87 2)   19  History of chest pain (V13 89) (Z87 898)   20  History of diarrhea (V12 79) (Z87 898)   21  History of head injury (V15 59) (Z87 828)   22  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 09) (Z86 19)   23  History of migraine (V12 49) (Z86 69)   24  History of otitis media (V12 49) (Z86 69)   25  History of pharyngitis (V12 69) (Z87 09)   26  History of strain (V13 59) (Z87 39)   27  History of vacuum extraction assisted delivery (V45 89) (Z98 890)   28  History of vaginal discharge (V13 29) (Z87 42)   29  History of Hyperinsulinism (251 1)   30  History of Inguinal lymphadenopathy (785 6) (R59 0)   31  History of Poison ivy (692 6) (L23 7)   32  History of  (spontaneous vaginal delivery) (650) (O80)   33  History of Varicose veins with inflammation, unspecified laterality (454 1) (I83 10)   34  History of Visit for screening mammogram (V76 12) (Z12 31)  Past Medical History Reviewed- Derm:   The past medical history was reviewed  Surgical History    1  History of Aortic Valve Complications   2  History of Breast Surgery Enlargement Procedure   3  History of Cervical Loop Electrosurgical Excision (LEEP)   4  History of Heart Valve Replacement   5  History of Heart Valve Replacement   6   History of Rhinoplasty   7  History of Surgically Induced  - By Dilation And Evacuation  Surgical History Reviewed [facility]- Derm:   Surgical History reviewed      Family History  Father    1  Family history of Asthma (V17 5)   2  Family history of Coronary Artery Disease (V17 49)   3  Family history of Hypertension (V17 49)  Sister    4  Family history of Crohn's disease with complication, unspecified gastrointestinal tract location  Maternal Grandmother    5  Family history of Breast Cancer (V16 3)  Family History Reviewed- Derm:   Family History was reviewed      Social History     · Denied: History of Coffee   · Contraceptive vaginal ring   · Currently sexually active   · Drinks hard liquor (V49 89) (Z78 9)   · Denied: History of Drug Use   · Lack of exercise (V69 0) (Z72 3)   · Never A Smoker   · Occupation: Homemaker   · Raped   · Social alcohol use (Z78 9)  Social History Reviewed [facility]- Derm: The social history was reviewed      Current Meds   1  Adult Low Dose Aspirin 81 MG TABS; Therapy: (Recorded:92Oce8459) to Recorded   2  Amoxicillin 500 MG Oral Capsule; TAKE 4 CAPSULES BY MOUTH 1 HOUR PRIOR TO PROCEDURE; Therapy: 19RBJ0326 to (Evaluate:96Xjp6588)  Requested for: 35Imp1946; Last Rx:06Zit3563 Ordered   3  Doxycycline Monohydrate 100 MG Oral Capsule; TAKE 1 CAPSULE TWICE DAILY; Therapy: 57WSK1676 to (Evaluate:15Qxb3269)  Requested for: 95PFI9427; Last Rx:18Syg4083 Ordered   4  Fluticasone Propionate 50 MCG/ACT Nasal Suspension; USE 2 SPRAYS IN EACH NOSTRIL ONCE DAILY; Therapy: 91VVY8375 to (Last XX:70WOQ9476)  Requested for: 44QPK9476 Ordered   5  Levocetirizine Dihydrochloride 5 MG Oral Tablet; TAKE 1 TABLET BY MOUTH AT  BEDTIME AS NEEDED; Therapy: 12XAJ0586 to (Last RJ:43WFO6359)  Requested for: 08EMI9123 Ordered   6  Metoprolol Tartrate 25 MG Oral Tablet; take 1 tablet by mouth twice a day; Therapy: 64IKQ5933 to [phone] [id number])  Requested for: 2017; Last Rx:2017 Ordered   7   ProAir HFA 108 (90 Base) MCG/ACT Inhalation Aerosol Solution; USE 2 PUFFS EVERY 6 HOURS AS DIRECTED; Therapy: 42BLT5081 to (Last Mohan Fleming)  Requested for: 30Jun2017 Ordered   8  Rizatriptan Benzoate 10 MG Oral Tablet; TAKE 1 TABLET AT ONSET OF HEADACHE  MAY REPEAT EVERY 2 HOURS AS NEEDED  MAXIMUM 3 TABLETS IN 24 HOURS; Therapy: 30OIV4042 to (Last Rx:93Eoq7609)  Requested for: 60XIS2028 Ordered   9  Warfarin Sodium 5 MG Oral Tablet; TAKE 1 TO 1 1/2 TABLETS ONCE DAILY AS DIRECTED; Therapy: 39QOK1298 to )  Requested for: 43UMA4392; Last Rx:16Xni3900 Ordered  Medication List Reviewed: The medication list was reviewed and updated today  Allergies  1  OxyCODONE HCl TABS    2  Animal dander - Dogs   3  Dust   4  Other   5  Pollen   6  Seasonal    Physical Exam   Constitutional  General appearance: Appears healthy and well developed  Lymphatic  No visible disturbance  Musculoskeletal  Digits and nails: No clubbing, cyanosis or edema  Cutaneous and nail exam normal    Skin  Scalp skin texture and hair distribution: Normal skin texture on scalp, normal hair distribution  Head: Normal turgor, no rashes, no lesions  Neck: Normal turgor, no rashes, no lesions  Chest: Normal turgor, no rashes, no lesions  Abdomen: Normal turgor, no rashes, no lesions  Back: Normal turgor, no rashes, no lesions  Right upper extremity: Normal turgor, no rashes, no lesions  Left upper extremity: Normal turgor, no rashes, no lesions  Right lower extremity: Normal turgor, no rashes, no lesions  Left lower extremity: Normal turgor, no rashes, no lesions  Neuro/Psych  Alert and oriented x 3  Displays comfort and cooperation during encounterl  Affect is normal    Finding normal pigmented lesion lots of sun damaged nothing markedly atypical noted on exam some hair breakage noted on hair pull diffuse thinning noted        Health Management  Health Maintenance   Digital Bilateral Screening Mammogram With CAD; every 1 year; Last 34Tmf5036; Next Due:53Hvg7727; Overdue    Future Appointments    Date/Time Provider Specialty Site   12/04/2017 01:00 PM JOAN Morgan  Obstetrics/Gynecology [facility]   12/26/2017 04:00 PM JOAN Jimenez   Dermatology [facility] [address]       Signatures   Electronically signed by : JOAN Chew ; Nov 28 2017  1:54PM EST                       (Author)

## 2017-12-20 ENCOUNTER — LAB CONVERSION - ENCOUNTER (OUTPATIENT)
Dept: OTHER | Facility: OTHER | Age: 42
End: 2017-12-20

## 2017-12-20 ENCOUNTER — GENERIC CONVERSION - ENCOUNTER (OUTPATIENT)
Dept: OTHER | Facility: OTHER | Age: 42
End: 2017-12-20

## 2017-12-20 LAB
INR PPP: 2.7
PROTHROMBIN TIME: 27.5 SEC (ref 9–11.5)

## 2017-12-30 ENCOUNTER — APPOINTMENT (EMERGENCY)
Dept: RADIOLOGY | Facility: HOSPITAL | Age: 42
End: 2017-12-30
Payer: COMMERCIAL

## 2017-12-30 ENCOUNTER — HOSPITAL ENCOUNTER (EMERGENCY)
Facility: HOSPITAL | Age: 42
Discharge: HOME | End: 2017-12-31
Attending: EMERGENCY MEDICINE | Admitting: EMERGENCY MEDICINE
Payer: COMMERCIAL

## 2017-12-30 VITALS
SYSTOLIC BLOOD PRESSURE: 140 MMHG | DIASTOLIC BLOOD PRESSURE: 64 MMHG | WEIGHT: 112.21 LBS | BODY MASS INDEX: 22.03 KG/M2 | OXYGEN SATURATION: 100 % | TEMPERATURE: 98.2 F | RESPIRATION RATE: 18 BRPM | HEIGHT: 60 IN | HEART RATE: 73 BPM

## 2017-12-30 DIAGNOSIS — M25.561 CHRONIC PAIN OF RIGHT KNEE: Primary | ICD-10-CM

## 2017-12-30 DIAGNOSIS — R79.1 ABNORMAL INR: ICD-10-CM

## 2017-12-30 DIAGNOSIS — G89.29 CHRONIC PAIN OF RIGHT KNEE: Primary | ICD-10-CM

## 2017-12-30 LAB
ALBUMIN SERPL BCP-MCNC: 4.2 G/DL (ref 3.5–5)
ALP SERPL-CCNC: 52 U/L (ref 46–116)
ALT SERPL W P-5'-P-CCNC: 23 U/L (ref 12–78)
ANION GAP SERPL CALCULATED.3IONS-SCNC: 8 MMOL/L (ref 4–13)
APTT PPP: 34 SECONDS (ref 23–35)
AST SERPL W P-5'-P-CCNC: 16 U/L (ref 5–45)
BASOPHILS # BLD AUTO: 0.06 THOUSANDS/ΜL (ref 0–0.1)
BASOPHILS NFR BLD AUTO: 1 % (ref 0–1)
BILIRUB SERPL-MCNC: 1.2 MG/DL (ref 0.2–1)
BUN SERPL-MCNC: 14 MG/DL (ref 5–25)
CALCIUM SERPL-MCNC: 9 MG/DL (ref 8.3–10.1)
CHLORIDE SERPL-SCNC: 102 MMOL/L (ref 100–108)
CLARITY, POC: CLEAR
CO2 SERPL-SCNC: 30 MMOL/L (ref 21–32)
COLOR, POC: YELLOW
CREAT SERPL-MCNC: 0.66 MG/DL (ref 0.6–1.3)
EOSINOPHIL # BLD AUTO: 0.21 THOUSAND/ΜL (ref 0–0.61)
EOSINOPHIL NFR BLD AUTO: 2 % (ref 0–6)
ERYTHROCYTE [DISTWIDTH] IN BLOOD BY AUTOMATED COUNT: 12.1 % (ref 11.6–15.1)
EXT BILIRUBIN, UA: NEGATIVE
EXT BLOOD URINE: NORMAL
EXT GLUCOSE, UA: NEGATIVE
EXT KETONES: NEGATIVE
EXT NITRITE, UA: NEGATIVE
EXT PH, UA: 6
EXT PROTEIN, UA: NEGATIVE
EXT SPECIFIC GRAVITY, UA: 1.01
EXT UROBILINOGEN: 0.2
GFR SERPL CREATININE-BSD FRML MDRD: 108 ML/MIN/1.73SQ M
GLUCOSE SERPL-MCNC: 109 MG/DL (ref 65–140)
GLUCOSE SERPL-MCNC: 91 MG/DL (ref 65–140)
HCT VFR BLD AUTO: 41.1 % (ref 34.8–46.1)
HGB BLD-MCNC: 13.5 G/DL (ref 11.5–15.4)
INR PPP: 1.82 (ref 0.86–1.16)
LYMPHOCYTES # BLD AUTO: 1.81 THOUSANDS/ΜL (ref 0.6–4.47)
LYMPHOCYTES NFR BLD AUTO: 20 % (ref 14–44)
MCH RBC QN AUTO: 29.7 PG (ref 26.8–34.3)
MCHC RBC AUTO-ENTMCNC: 32.8 G/DL (ref 31.4–37.4)
MCV RBC AUTO: 91 FL (ref 82–98)
MONOCYTES # BLD AUTO: 0.84 THOUSAND/ΜL (ref 0.17–1.22)
MONOCYTES NFR BLD AUTO: 9 % (ref 4–12)
NEUTROPHILS # BLD AUTO: 6.17 THOUSANDS/ΜL (ref 1.85–7.62)
NEUTS SEG NFR BLD AUTO: 68 % (ref 43–75)
NRBC BLD AUTO-RTO: 0 /100 WBCS
PLATELET # BLD AUTO: 248 THOUSANDS/UL (ref 149–390)
PMV BLD AUTO: 9.6 FL (ref 8.9–12.7)
POTASSIUM SERPL-SCNC: 4.1 MMOL/L (ref 3.5–5.3)
PROT SERPL-MCNC: 7.7 G/DL (ref 6.4–8.2)
PROTHROMBIN TIME: 21.5 SECONDS (ref 12.1–14.4)
RBC # BLD AUTO: 4.54 MILLION/UL (ref 3.81–5.12)
SODIUM SERPL-SCNC: 140 MMOL/L (ref 136–145)
WBC # BLD AUTO: 9.11 THOUSAND/UL (ref 4.31–10.16)
WBC # BLD EST: NEGATIVE 10*3/UL

## 2017-12-30 PROCEDURE — 73564 X-RAY EXAM KNEE 4 OR MORE: CPT

## 2017-12-30 PROCEDURE — 85730 THROMBOPLASTIN TIME PARTIAL: CPT | Performed by: EMERGENCY MEDICINE

## 2017-12-30 PROCEDURE — 85025 COMPLETE CBC W/AUTO DIFF WBC: CPT | Performed by: EMERGENCY MEDICINE

## 2017-12-30 PROCEDURE — 36415 COLL VENOUS BLD VENIPUNCTURE: CPT | Performed by: EMERGENCY MEDICINE

## 2017-12-30 PROCEDURE — 81002 URINALYSIS NONAUTO W/O SCOPE: CPT | Performed by: EMERGENCY MEDICINE

## 2017-12-30 PROCEDURE — 80053 COMPREHEN METABOLIC PANEL: CPT | Performed by: EMERGENCY MEDICINE

## 2017-12-30 PROCEDURE — 82948 REAGENT STRIP/BLOOD GLUCOSE: CPT

## 2017-12-30 PROCEDURE — 85610 PROTHROMBIN TIME: CPT | Performed by: EMERGENCY MEDICINE

## 2017-12-30 RX ORDER — WARFARIN SODIUM 2 MG/1
2 TABLET ORAL
Status: COMPLETED | OUTPATIENT
Start: 2017-12-30 | End: 2017-12-30

## 2017-12-30 RX ORDER — LEVOCETIRIZINE DIHYDROCHLORIDE 5 MG/1
TABLET, FILM COATED ORAL
COMMUNITY
Start: 2017-05-04 | End: 2018-07-10 | Stop reason: ALTCHOICE

## 2017-12-30 RX ORDER — FLUTICASONE PROPIONATE 50 MCG
SPRAY, SUSPENSION (ML) NASAL
COMMUNITY
Start: 2017-05-04 | End: 2018-06-05

## 2017-12-30 RX ORDER — RIZATRIPTAN BENZOATE 10 MG/1
TABLET ORAL
COMMUNITY
Start: 2014-01-23 | End: 2018-05-14 | Stop reason: SDUPTHER

## 2017-12-30 RX ADMIN — WARFARIN SODIUM 2 MG: 2 TABLET ORAL at 23:50

## 2017-12-31 PROCEDURE — 99283 EMERGENCY DEPT VISIT LOW MDM: CPT

## 2017-12-31 NOTE — DISCHARGE INSTRUCTIONS
Your INR today was 1 8  For given extra dose of 2 mg of Coumadin  Call your PCP on Tuesday for follow-up  Elevated INR   WHAT YOU NEED TO KNOW:   The INR, or International Normalized Ratio, is a measure of how long it takes your blood to clot  A prothrombin time (PT) is a another blood test done to help measure your INR  The higher your PT or INR, the longer your blood takes to clot  An elevated PT or INR means your blood is taking longer to clot than your healthcare provider believes is healthy for you  When your PT or INR is too high, you have an increased risk of bleeding  DISCHARGE INSTRUCTIONS:   Prevent an elevated INR:   · Have your INR measured regularly  You may need to have your INR measured every few days until it is stable, and then only once a month  You may have blood drawn at the office of either your healthcare provider or a specialist  Some people can test their blood at home  · If you take medicine, take it as directed  Contact your healthcare provider or specialist before you take other medicines or supplements, because they may elevate your INR  · Eat the same amount of vitamin K daily  to keep your INR stable  Vitamin K changes how your blood clots and affects your INR  Vitamin K is found in green leafy vegetables, broccoli, grapes, and other foods  Ask for more information about what to eat when you have an elevated INR  · Limit alcohol  Alcohol increases your INR  Ask your healthcare provider or specialist how much alcohol is safe for you  · Do not smoke  If you smoke, it is never too late to quit  Smoking can affect the way your blood clots  Ask for information if you need help quitting  Decrease your risk of bleeding:   · Avoid activities  that may cause bleeding or bruising  · Brush and shave gently  Use a soft toothbrush and an electric razor to avoid bleeding      · Tell your dentist and other healthcare providers  if you take anticoagulant medicine or have a bleeding disorder  Wear medical alert jewelry, or carry a card that gives this information  Ask where you can get these items  Follow up with your healthcare provider or specialist as directed: You may need to return to have more tests  Write down your questions so you remember to ask them during your visits  Contact your healthcare provider or specialist if:   · Your menstrual period is heavier than normal     · You see blood in your urine  · Your bowel movement is bloody or black  · You bruise or bleed more than normal, your gums bleed, or you have frequent nosebleeds  · You have pain or swelling in your joints  · Your fingers or toes turn dark purple  · You have more headaches than normal, or your headaches are different than before  · You have questions or concerns about your care  Seek care immediately or call 911 if:   · You throw up blood, or your vomit looks like coffee grounds  · You have any kind of bleeding that does not stop in 15 minutes  · Your leg feels warm, tender, and painful  It may look swollen and red  · You have any of the following signs of a stroke:      ¨ Numbness or drooping on one side of your face     ¨ Weakness in an arm or leg    ¨ Confusion or difficulty speaking    ¨ Dizziness, a severe headache, or vision loss  © 2017 [employer] Information is for End User's use only and may not be sold, redistributed or otherwise used for commercial purposes  All illustrations and images included in CareNotes® are the copyrighted property of [employer] M , [employer]  or [employer]  The above information is an  only  It is not intended as medical advice for individual conditions or treatments  Talk to your doctor, nurse or pharmacist before following any medical regimen to see if it is safe and effective for you  Knee Pain   WHAT YOU NEED TO KNOW:   Knee pain may start suddenly, or it may be a long-term problem   You may have pain on the side, front, or back of your knee  You may have knee stiffness and swelling  You may hear popping sounds or feel like your knee is giving way or locking up as you walk  You may feel pain when you sit, stand, walk, or climb up and down stairs  Knee pain can be caused by conditions such as obesity, inflammation, or strains or tears in ligaments or tendons  DISCHARGE INSTRUCTIONS:   Follow up with your healthcare provider within 24 hours or as directed: You may need follow-up treatments, such as steroid injections to decrease pain  Write down your questions so you remember to ask them during your visits  Self-care:   · Rest  your knee so it can heal  Limit activities that increase your pain  · Ice  can help reduce swelling  Wrap ice in a towel and put it on your knee for as long and as often as directed  · Compression  with a brace or bandage can help reduce swelling  Use a brace or bandage only as directed  · Elevation  helps decrease pain and swelling  Elevate your knee while you are sitting or lying down  Prop your leg on pillows to keep your knee above the level of your heart  Medicines:   · NSAIDs  help decrease swelling and pain or fever  This medicine is available with or without a doctor's order  NSAIDs can cause stomach bleeding or kidney problems in certain people  If you take blood thinner medicine, always ask your healthcare provider if NSAIDs are safe for you  Always read the medicine label and follow directions  · Acetaminophen  decreases pain and fever  It is available without a doctor's order  Ask how much to take and when to take it  Follow directions  Acetaminophen can cause liver damage if not taken correctly  · Take your medicine as directed  Contact your healthcare provider if you think your medicine is not helping or if you have side effects  Tell him or her if you are allergic to any medicine  Keep a list of the medicines, vitamins, and herbs you take   Include the amounts, and when and why you take them  Bring the list or the pill bottles to follow-up visits  Carry your medicine list with you in case of an emergency  Exercise as directed: You may need to see a physical therapist or do recommended exercises to improve movement and decrease your pain  You may be directed to walk, swim, or ride a bike  Follow your exercise plan exactly as directed to avoid further injury  Contact your healthcare provider if:   · You have questions or concerns about your condition or care  Return to the emergency department if:   · Your pain is worse, even after treatment  · You cannot bend or straighten your leg completely  · The swelling around your knee does not go down even with treatment  · Your knee is painful and hot to the touch  © 2017 [address] [employer]  Information is for End User's use only and may not be sold, redistributed or otherwise used for commercial purposes  All illustrations and images included in CareNotes® are the copyrighted property of A D A M , Inc  or [address]  The above information is an  only  It is not intended as medical advice for individual conditions or treatments  Talk to your doctor, nurse or pharmacist before following any medical regimen to see if it is safe and effective for you    Your INR was 1 82

## 2017-12-31 NOTE — ED PROVIDER NOTES
History  Chief Complaint   Patient presents with    Hypoglycemia - no symptoms     Pt feels she has a low blood sugar  She states that she feels shaky and "not herself", she typically takes a sugary drink when this happens but pt fells that it has been happening more frequently than normal  And "oh yea, and my knee hurts too "    Knee Pain     Patient is a 77-year-old female with a history of aortic valve replacement on Coumadin comes in today with multiple complaints  She is on Coumadin for her aortic valve replacement several years ago  Patient has been out of insurance and has not been able to follow up with PCP  Patient is concerned regarding her INR and wishes to be checked  Patient states she has been compliant with taking her Coumadin  Patient has no chest pain, shortness of breath, or recent falls  Patient did fall onto her right knee several months ago which she came into the ER for evaluation and states that she had drained  Patient has no other traumatic injuries or repeated injuries  And she states that there is something "just there on it  She is able to ambulate and has no pain it just looks body  Priya Jennifer She has no fevers, chills, back pain, urinary retention, loss of bowel or bladder  She has no lumbar radiculopathy  Patient also states she is having "episodes of my sugar going low"  Patient states this is been ongoing for several years and has seen her PCP about it  She states she has had multiple blood test for it and they told her "just to eat"  Patient states she does not eat typically" I eat maybe once a day"          History provided by:  Patient   used: No    Knee Pain   Location:  Knee  Injury: yes    Mechanism of injury: fall    Fall:     Point of impact:  Knees    Entrapped after fall: no    Knee location:  R knee  Pain details:     Quality:  Pressure    Radiates to:  Does not radiate    Severity:  Mild    Onset quality:  Gradual    Timing:  Intermittent Progression:  Waxing and waning  Chronicity:  Recurrent  Dislocation: no    Foreign body present:  No foreign bodies  Tetanus status:  Up to date  Prior injury to area:  No  Relieved by:  None tried  Exacerbated by: when leaning on it  Associated symptoms: no back pain, no decreased ROM, no fatigue, no fever, no itching, no muscle weakness, no neck pain, no numbness, no stiffness, no swelling and no tingling    Risk factors: no concern for non-accidental trauma, no frequent fractures, no known bone disorder and no obesity        Prior to Admission Medications   Prescriptions Last Dose Informant Patient Reported? Taking? albuterol (PROVENTIL HFA,VENTOLIN HFA) 90 mcg/act inhaler   No Yes   Sig: Inhale 2 puffs every 6 (six) hours as needed for wheezing or shortness of breath   aspirin (ECOTRIN LOW STRENGTH) 81 mg EC tablet   Yes Yes   Sig: Take 81 mg by mouth daily   aspirin 81 MG tablet   Yes Yes   Sig: Take by mouth   fluticasone (FLONASE) 50 mcg/act nasal spray   Yes Yes   Sig: into each nostril   levocetirizine (XYZAL) 5 MG tablet   Yes Yes   Sig: Take by mouth   metoprolol tartrate (LOPRESSOR) 25 mg tablet   Yes Yes   Sig: Take 25 mg by mouth every 12 (twelve) hours   rizatriptan (MAXALT) 10 MG tablet   Yes Yes   Sig: Take by mouth   warfarin (COUMADIN) 5 mg tablet   Yes Yes   Sig: Take 5 mg by mouth daily      Facility-Administered Medications: None       Past Medical History:   Diagnosis Date    Asthma     Cardiac disease        Past Surgical History:   Procedure Laterality Date    AORTIC VALVE REPLACEMENT         History reviewed  No pertinent family history  I have reviewed and agree with the history as documented  Social History   Substance Use Topics    Smoking status: Never Smoker    Smokeless tobacco: Never Used    Alcohol use No        Review of Systems   Constitutional: Negative for fatigue and fever  Respiratory: Negative for shortness of breath and wheezing      Cardiovascular: Negative for chest pain and palpitations  Gastrointestinal: Negative for abdominal distention  Musculoskeletal: Negative for back pain, neck pain and stiffness  Skin: Negative for itching  Neurological: Negative for dizziness, tremors, syncope, weakness, light-headedness, numbness and headaches  Physical Exam  ED Triage Vitals [12/30/17 2104]   Temperature Pulse Respirations Blood Pressure SpO2   98 2 °F (36 8 °C) 80 18 155/72 97 %      Temp Source Heart Rate Source Patient Position - Orthostatic VS BP Location FiO2 (%)   Oral Monitor Lying Right arm --      Pain Score       No Pain           Orthostatic Vital Signs  Vitals:    12/30/17 2104 12/30/17 2324   BP: 155/72 140/64   Pulse: 80 73   Patient Position - Orthostatic VS: Lying Lying       Physical Exam   Constitutional: She is oriented to person, place, and time  She appears well-developed and well-nourished  No distress  HENT:   Head: Normocephalic and atraumatic  Mouth/Throat: Oropharynx is clear and moist    Eyes: Conjunctivae and EOM are normal  Pupils are equal, round, and reactive to light  Neck: Normal range of motion  Neck supple  Cardiovascular: Normal rate, regular rhythm and intact distal pulses  No murmur heard  Mechanical valve auscultated   Pulmonary/Chest: Effort normal and breath sounds normal  No stridor  No respiratory distress  Abdominal: Soft  Bowel sounds are normal  She exhibits no distension  There is no tenderness  Musculoskeletal: Normal range of motion  She exhibits no edema  Legs: At the right knee:  Negative valgus and varus at 0 and 30°, negative anterior posterior drawer, negative Lachman's test   Patient is neurovascular intact with cap refill less than 2 seconds  There is no evidence of external trauma to the right lower extremity  Patient has full active range of motion of the right hip and ankle as well That is pain-free      There is noted small mobile circumferential soft mass that is non tender on her patella  Patella mobility is full and painfree  There is no surrounding erythema warmth tenderness or lesions   Neurological: She is alert and oriented to person, place, and time  No cranial nerve deficit  Skin: Skin is warm  Capillary refill takes less than 2 seconds  She is not diaphoretic  Nursing note and vitals reviewed  ED Medications  Medications   warfarin (COUMADIN) tablet 2 mg (2 mg Oral Given 12/30/17 2610)       Diagnostic Studies  Results Reviewed     Procedure Component Value Units Date/Time    POCT urinalysis dipstick [[id number]]  (Normal) Resulted:  12/30/17 2338    Lab Status:  Final result Specimen:  Urine Updated:  12/30/17 2339     Color, UA Yellow     Clarity, UA Clear     EXT Glucose, UA Negative     EXT Bilirubin, UA (Ref: Negative) Negative     EXT Ketones, UA (Ref: Negative) Negative     EXT Spec Grav, UA 1 010     EXT Blood, UA (Ref: Negative) Trace     EXT pH, UA 6 0     EXT Protein, UA (Ref: Negative) Negative     EXT Urobilinogen, UA (Ref: 0 2- 1 0) 0 2     EXT Leukocytes, UA (Ref: Negative) Negative     EXT Nitrite, UA (Ref: Negative) Negative    Comprehensive metabolic panel [[id number]]  (Abnormal) Collected:  12/30/17 2251    Lab Status:  Final result Specimen:  Blood from Arm, Left Updated:  12/30/17 2312     Sodium 140 mmol/L      Potassium 4 1 mmol/L      Chloride 102 mmol/L      CO2 30 mmol/L      Anion Gap 8 mmol/L      BUN 14 mg/dL      Creatinine 0 66 mg/dL      Glucose 91 mg/dL      Calcium 9 0 mg/dL      AST 16 U/L      ALT 23 U/L      Alkaline Phosphatase 52 U/L      Total Protein 7 7 g/dL      Albumin 4 2 g/dL      Total Bilirubin 1 20 (H) mg/dL      eGFR 108 ml/min/1 73sq m     Narrative:         National Kidney Disease Education Program recommendations are as follows:  GFR calculation is accurate only with a steady state creatinine  Chronic Kidney disease less than 60 ml/min/1 73 sq  meters  Kidney failure less than 15 ml/min/1 73 sq  meters  Protime-INR [[id number]]  (Abnormal) Collected:  12/30/17 2251    Lab Status:  Final result Specimen:  Blood from Arm, Left Updated:  12/30/17 2308     Protime 21 5 (H) seconds      INR 1 82 (H)    APTT [[id number]]  (Normal) Collected:  12/30/17 2251    Lab Status:  Final result Specimen:  Blood from Arm, Left Updated:  12/30/17 2308     PTT 34 seconds     Narrative: Therapeutic Heparin Range = 60-90 seconds    CBC and differential [[id number]]  (Normal) Collected:  12/30/17 2251    Lab Status:  Final result Specimen:  Blood from Arm, Left Updated:  12/30/17 2256     WBC 9 11 Thousand/uL      RBC 4 54 Million/uL      Hemoglobin 13 5 g/dL      Hematocrit 41 1 %      MCV 91 fL      MCH 29 7 pg      MCHC 32 8 g/dL      RDW 12 1 %      MPV 9 6 fL      Platelets 730 Thousands/uL      nRBC 0 /100 WBCs      Neutrophils Relative 68 %      Lymphocytes Relative 20 %      Monocytes Relative 9 %      Eosinophils Relative 2 %      Basophils Relative 1 %      Neutrophils Absolute 6 17 Thousands/µL      Lymphocytes Absolute 1 81 Thousands/µL      Monocytes Absolute 0 84 Thousand/µL      Eosinophils Absolute 0 21 Thousand/µL      Basophils Absolute 0 06 Thousands/µL     Fingerstick Glucose (POCT) [[id number]]  (Normal) Collected:  12/30/17 2103    Lab Status:  Final result Updated:  12/30/17 2105     POC Glucose 109 mg/dl                  XR knee 4+ vw right injury   Final Result by Arianna Mcpherson DO (12/31 1134)      No acute osseous abnormality  Workstation performed: [id number]                    Procedures  Procedures       Phone Contacts  ED Phone Contact    ED Course  ED Course                                MDM  Number of Diagnoses or Management Options  Abnormal INR:   Chronic pain of right knee:   Diagnosis management comments: 11:41 PM  Patient with INR of 1 point 8 and is been taking her 7 5 mg  Will give an extra dose of 2 here and need for follow-up with PCP  X-rays without acute pathology    Will refer to ortho   Concern for bursitis  However no infectious etiology or surrounding erythema  Patient updated on need for follow-up with PCP regarding subtherapeutic INR levels which she was given an extra dose here for as well as Orthopedics regarding her knee  Patient has full active range of motion of her knee  This knee pain and bump on her knee could be due to bursitis which is chronic  Amount and/or Complexity of Data Reviewed  Clinical lab tests: ordered and reviewed  Tests in the radiology section of CPT®: ordered and reviewed  Tests in the medicine section of CPT®: ordered and reviewed  Independent visualization of images, tracings, or specimens: (No acute fracture identified on my read)      CritCare Time    Disposition  Final diagnoses:   Chronic pain of right knee   Abnormal INR     Time reflects when diagnosis was documented in both MDM as applicable and the Disposition within this note     Time User Action Codes Description Comment    12/30/2017 11:42 PM Yifan Huang Add [S43 198,  G89 29] Chronic pain of right knee     12/30/2017 11:42 PM Rupinder Arriaga Add [R79 1] Abnormal INR       ED Disposition     ED Disposition Condition Comment    Discharge  Shravan Tatum discharge to home/self care      Condition at discharge: Stable        Follow-up Information     Follow up With Specialties Details Why Contact Info    Jose Jaramillo MD Internal Medicine Schedule an appointment as soon as possible for a visit in 3 days  [address] [place] [address], [address] [place] [address] Orthopedic Surgery   [facility]  [phone]        Discharge Medication List as of 12/30/2017 11:43 PM      CONTINUE these medications which have NOT CHANGED    Details   albuterol (PROVENTIL HFA,VENTOLIN HFA) 90 mcg/act inhaler Inhale 2 puffs every 6 (six) hours as needed for wheezing or shortness of breath, Starting Sun 7/2/2017, Print      aspirin (ECOTRIN LOW STRENGTH) 81 mg EC tablet Take 81 mg by mouth daily, Historical Med      aspirin 81 MG tablet Take by mouth, Historical Med      fluticasone (FLONASE) 50 mcg/act nasal spray into each nostril, Starting Thu 5/4/2017, Historical Med      levocetirizine (XYZAL) 5 MG tablet Take by mouth, Starting Thu 5/4/2017, Historical Med      metoprolol tartrate (LOPRESSOR) 25 mg tablet Take 25 mg by mouth every 12 (twelve) hours, Historical Med      rizatriptan (MAXALT) 10 MG tablet Take by mouth, Starting Thu 1/23/2014, Historical Med      warfarin (COUMADIN) 5 mg tablet Take 5 mg by mouth daily, Historical Med           No discharge procedures on file      ED Provider  Electronically Signed by           Calderon Chowdhury DO  12/31/17 9006

## 2018-01-03 ENCOUNTER — GENERIC CONVERSION - ENCOUNTER (OUTPATIENT)
Dept: OTHER | Facility: OTHER | Age: 43
End: 2018-01-03

## 2018-01-03 ENCOUNTER — LAB CONVERSION - ENCOUNTER (OUTPATIENT)
Dept: OTHER | Facility: OTHER | Age: 43
End: 2018-01-03

## 2018-01-03 ENCOUNTER — ALLSCRIPTS OFFICE VISIT (OUTPATIENT)
Dept: OTHER | Facility: OTHER | Age: 43
End: 2018-01-03

## 2018-01-03 ENCOUNTER — APPOINTMENT (OUTPATIENT)
Dept: LAB | Facility: CLINIC | Age: 43
End: 2018-01-03
Payer: COMMERCIAL

## 2018-01-03 DIAGNOSIS — I10 ESSENTIAL (PRIMARY) HYPERTENSION: ICD-10-CM

## 2018-01-03 DIAGNOSIS — D64.9 ANEMIA: ICD-10-CM

## 2018-01-03 DIAGNOSIS — D53.9 NUTRITIONAL ANEMIA: ICD-10-CM

## 2018-01-03 DIAGNOSIS — L65.9 NONSCARRING HAIR LOSS: ICD-10-CM

## 2018-01-03 DIAGNOSIS — I35.9 NONRHEUMATIC AORTIC VALVE DISORDER: ICD-10-CM

## 2018-01-03 LAB
ALBUMIN SERPL BCP-MCNC: 4 G/DL (ref 3.5–5)
ALP SERPL-CCNC: 46 U/L (ref 46–116)
ALT SERPL W P-5'-P-CCNC: 23 U/L (ref 12–78)
ANION GAP SERPL CALCULATED.3IONS-SCNC: 5 MMOL/L (ref 4–13)
AST SERPL W P-5'-P-CCNC: 22 U/L (ref 5–45)
BASOPHILS # BLD AUTO: 0.03 THOUSANDS/ΜL (ref 0–0.1)
BASOPHILS NFR BLD AUTO: 1 % (ref 0–1)
BILIRUB SERPL-MCNC: 1.39 MG/DL (ref 0.2–1)
BUN SERPL-MCNC: 15 MG/DL (ref 5–25)
CALCIUM SERPL-MCNC: 8.4 MG/DL (ref 8.3–10.1)
CHLORIDE SERPL-SCNC: 104 MMOL/L (ref 100–108)
CHOLEST SERPL-MCNC: 130 MG/DL (ref 50–200)
CO2 SERPL-SCNC: 30 MMOL/L (ref 21–32)
CREAT SERPL-MCNC: 0.61 MG/DL (ref 0.6–1.3)
EOSINOPHIL # BLD AUTO: 0.14 THOUSAND/ΜL (ref 0–0.61)
EOSINOPHIL NFR BLD AUTO: 2 % (ref 0–6)
ERYTHROCYTE [DISTWIDTH] IN BLOOD BY AUTOMATED COUNT: 12.4 % (ref 11.6–15.1)
FERRITIN SERPL-MCNC: 29 NG/ML (ref 8–388)
GFR SERPL CREATININE-BSD FRML MDRD: 111 ML/MIN/1.73SQ M
GLUCOSE SERPL-MCNC: 76 MG/DL (ref 65–140)
HCT VFR BLD AUTO: 35.7 % (ref 34.8–46.1)
HDLC SERPL-MCNC: 66 MG/DL (ref 40–60)
HGB BLD-MCNC: 12 G/DL (ref 11.5–15.4)
INR PPP: 2.2
IRON SERPL-MCNC: 122 UG/DL (ref 50–170)
LDLC SERPL CALC-MCNC: 43 MG/DL (ref 0–100)
LYMPHOCYTES # BLD AUTO: 1.22 THOUSANDS/ΜL (ref 0.6–4.47)
LYMPHOCYTES NFR BLD AUTO: 20 % (ref 14–44)
MCH RBC QN AUTO: 30 PG (ref 26.8–34.3)
MCHC RBC AUTO-ENTMCNC: 33.6 G/DL (ref 31.4–37.4)
MCV RBC AUTO: 89 FL (ref 82–98)
MONOCYTES # BLD AUTO: 0.53 THOUSAND/ΜL (ref 0.17–1.22)
MONOCYTES NFR BLD AUTO: 9 % (ref 4–12)
NEUTROPHILS # BLD AUTO: 4.05 THOUSANDS/ΜL (ref 1.85–7.62)
NEUTS SEG NFR BLD AUTO: 68 % (ref 43–75)
NRBC BLD AUTO-RTO: 0 /100 WBCS
PLATELET # BLD AUTO: 246 THOUSANDS/UL (ref 149–390)
PMV BLD AUTO: 10.3 FL (ref 8.9–12.7)
POTASSIUM SERPL-SCNC: 3.7 MMOL/L (ref 3.5–5.3)
PROT SERPL-MCNC: 7.1 G/DL (ref 6.4–8.2)
PROTHROMBIN TIME: 22.2 SEC (ref 9–11.5)
RBC # BLD AUTO: 4 MILLION/UL (ref 3.81–5.12)
SODIUM SERPL-SCNC: 139 MMOL/L (ref 136–145)
TRIGL SERPL-MCNC: 107 MG/DL
TSH SERPL DL<=0.05 MIU/L-ACNC: 0.74 UIU/ML (ref 0.36–3.74)
VIT B12 SERPL-MCNC: 1345 PG/ML (ref 100–900)
WBC # BLD AUTO: 5.99 THOUSAND/UL (ref 4.31–10.16)

## 2018-01-03 PROCEDURE — 82728 ASSAY OF FERRITIN: CPT

## 2018-01-03 PROCEDURE — 80053 COMPREHEN METABOLIC PANEL: CPT

## 2018-01-03 PROCEDURE — 82607 VITAMIN B-12: CPT

## 2018-01-03 PROCEDURE — 83540 ASSAY OF IRON: CPT

## 2018-01-03 PROCEDURE — 84443 ASSAY THYROID STIM HORMONE: CPT

## 2018-01-03 PROCEDURE — 80061 LIPID PANEL: CPT

## 2018-01-03 PROCEDURE — 85025 COMPLETE CBC W/AUTO DIFF WBC: CPT

## 2018-01-06 ENCOUNTER — GENERIC CONVERSION - ENCOUNTER (OUTPATIENT)
Dept: OTHER | Facility: OTHER | Age: 43
End: 2018-01-06

## 2018-01-09 NOTE — RESULT NOTES
Verified Results  (1) PT WITH INR [id number] 12:42PM Mary Marks     Test Name Result Flag Reference   INR 2 22 H 0 86-1 16   PT 24 3 seconds H 12 0-14 3

## 2018-01-10 NOTE — RESULT NOTES
Verified Results  (1) PT WITH INR [id number] 12:14PM Mary Marks     Test Name Result Flag Reference   INR 2 8 H    Reference Range                     0 9-1 1  Moderate-intensity Warfarin Therapy 2 0-3 0  Higher-intensity Warfarin Therapy   3 0-4 0   PT 28 6 sec H 9 0-11 5   For more information on this test, go to:  [email or web address]

## 2018-01-10 NOTE — RESULT NOTES
Verified Results  (1) PT WITH INR [id number] 01:12PM Mary Marks     Test Name Result Flag Reference   INR 2 5 H    Reference Range                     0 9-1 1  Moderate-intensity Warfarin Therapy 2 0-3 0  Higher-intensity Warfarin Therapy   3 0-4 0   PT 25 8 sec H 9 0-11 5   For more information on this test, go to:  [email or web address]

## 2018-01-10 NOTE — RESULT NOTES
Verified Results  (1) PT WITH INR [id number] 02:02PM Adalberto Gates Order Number: [id number]     Order Number: [id number]     Test Name Result Flag Reference   INR 2 74 H 0 86-1 16   PT 27 6 seconds H 11 8-14 1

## 2018-01-10 NOTE — RESULT NOTES
Verified Results  (1) PT WITH INR [id number] 12:03PM Mary Marks   REPORT COMMENT:  FASTING:UNKNOWN     Test Name Result Flag Reference   INR 1 4 H    Reference Range                     0 9-1 1  Moderate-intensity Warfarin Therapy 2 0-3 0  Higher-intensity Warfarin Therapy   3 0-4 0   PT 14 3 sec H 9 0-11 5   For more information on this test, go to:  [email or web address]

## 2018-01-10 NOTE — RESULT NOTES
Verified Results  (1) PT WITH INR [id number] 12:44PM Mary Marks     Test Name Result Flag Reference   INR 2 5 H    Reference Range                     0 9-1 1  Moderate-intensity Warfarin Therapy 2 0-3 0  Higher-intensity Warfarin Therapy   3 0-4 0   PT 25 8 sec H 9 0-11 5   For more information on this test, go to:  [email or web address]

## 2018-01-10 NOTE — RESULT NOTES
Verified Results  (1) PT WITH INR [id number] 12:19PM Mary Marks     Test Name Result Flag Reference   INR 1 6 H    Reference Range                     0 9-1 1  Moderate-intensity Warfarin Therapy 2 0-3 0  Higher-intensity Warfarin Therapy   3 0-4 0   PT 16 3 sec H 9 0-11 5   For more information on this test, go to:  [email or web address]

## 2018-01-10 NOTE — RESULT NOTES
Verified Results  (1) PT WITH INR [id number] 10:13AM Mary Marks   REPORT COMMENT:  FASTING:UNKNOWN     Test Name Result Flag Reference   INR 2 0 H    Reference Range                     0 9-1 1  Moderate-intensity Warfarin Therapy 2 0-3 0  Higher-intensity Warfarin Therapy   3 0-4 0   PT 20 7 sec H 9 0-11 5   For more information on this test, go to:  [email or web address]

## 2018-01-10 NOTE — RESULT NOTES
Verified Results  (1) PT WITH INR [id number] 12:47PM Mary Marks     Test Name Result Flag Reference   INR 2 1 H    Reference Range                     0 9-1 1  Moderate-intensity Warfarin Therapy 2 0-3 0  Higher-intensity Warfarin Therapy   3 0-4 0   PT 21 6 sec H 9 0-11 5   For more information on this test, go to:  [email or web address]

## 2018-01-11 NOTE — RESULT NOTES
Verified Results  (1) PT WITH INR [id number] 01:08PM Mary Marks   REPORT COMMENT:  FASTING:UNKNOWN     Test Name Result Flag Reference   INR 2 0 H    Reference Range                     0 9-1 1  Moderate-intensity Warfarin Therapy 2 0-3 0  Higher-intensity Warfarin Therapy   3 0-4 0   PT 20 9 sec H 9 0-11 5   For more information on this test, go to:  [email or web address]/[email or web address]

## 2018-01-11 NOTE — RESULT NOTES
Verified Results  (1) PT WITH INR [id number] 01:30PM Mary Marks     Test Name Result Flag Reference   INR 2 10 L 2 500 - 3 500   REPORT NAME: Progress Notes Report  VISIT DATE: 9/18/2017  VISIT TIME: 1:30 PM EDT  PATIENT NAME: Astrid Rosales   MEDICAL RECORD NUMBER: [id number]  YOB: 1975  AGE: 38  REFERRING PHYSICIAN: Mary Marks  SUPERVISING CLINICIAN: Mary Marks  HEALTH CARE PROVIDER: Martin Cyr  PATIENT HOME ADDRESS: [address]  PATIENT HOME PHONE: [phone]  SOCIAL SECURITY NUMBER:   DIAGNOSIS 1: Aortic Valve Replacement / 424 1  DIAGNOSIS 2: Long term (current) use of anticoagulants / Z79 01  INR RANGE: 2 5 - 3 5  INR GOAL: 3  TREATMENT START DATE:   TREATMENT END DATE:   NEXT VISIT:     VISIT RESULTS  ENCOUNTER NUMBER:   TEST LOCATION: [facility]  TEST TYPE: Outside Lab (Venipuncture)  VISIT TYPE:   CURRENT INR: 2 1 PROTIME:   SPECIMEN COL AND RPT DATE: 9/18/2017 1:30 PM  EDT  VITAL SIGNS  PULSE:  BP: / WEIGHT:  HEIGHT:  TEMP:   CURRENT ANTICOAGULANT DOSING SCHEDULE  DOSE SIZE: 5mg    ANTICOAGULANT TYPE: COUMADIN  DOSING REGIMEN  Sun       Mon       Tues      Wed       Thurs     Fri       Sat  Total/Wk  5         5         5         7 5       5         5         5         37 5  PATIENT MEDICATION INSTRUCTION: Yes  PATIENT NUTRITIONAL COUNSELING: No  PATIENT BRUISING INSTRUCTION: No  LAST EDUCATION DATE:   PREVIOUS VISIT INFORMATION  VISITDATE  INRGoal INR   Sun    Mon    Tues   Wed    Thurs  Fri    Sat  Total/wk  9/18/2017   3       2 1   5      5      5      7 5    5      5      5  37 5  8/31/2017   3       3 2   5      5      5      7 5    5      5      5  37 5  8/11/2017   3       1 9   5      5      5      7 5    5      5      5  37 5  7/11/2017   3       3 9   5      5      5      7 5    5      5      5  37 5  ADDITIONAL PREVIOUS VISIT INFORMATION  VISITDATE   PRIMARY RX               DOSE      CrCl  9/18/2017 COUMADIN                 5mg  8/31/2017   COUMADIN                 5mg  8/11/2017   COUMADIN                 5mg  7/11/2017   COUMADIN                 5mg  OTHER CURRENT MEDICATIONS:  COUMADIN  PROGRESS NOTES: Same dose rechk 2 weeks  PATIENT INSTRUCTIONS: Spoke with pt  TEST LOCATION: [facility], , ,   INBOUND LAB DATA:  Lab       Lab Value Col Date                 Rpt Date                 Lab  Reference Range  Electronically signed by: Danelle Mejia on 9/18/2017 1:30 PM EDT

## 2018-01-11 NOTE — PROGRESS NOTES
REPORT NAME: Patient Visit Summary Report   VISIT DATE: 1/11/2016  VISIT TIME: 8:40 AM EST  PATIENT NAME: Geno Cowan   MEDICAL RECORD NUMBER: [id number]  SOCIAL SECURITY NUMBER:   YOB: 1975  AGE: 41  REFERRING PHYSICIAN: Mary Marks  SUPERVISING CLINICIAN: Mary Marks  HEALTH CARE PROFESSIONAL: Kody Barnett   PATIENT HOME ADDRESS: [address], [address] [place] [address]  PATIENT HOME  PHONE: [phone]  DIAGNOSIS 1: Aortic Valve Replacement / 424 1  DIAGNOSIS 2: Long term (current) use of anticoagulants / Z79 01  DIAGNOSIS 3:   DIAGNOSIS 4:   INR RANGE: 2 5 - 3 5  INR GOAL: 3  TREATMENT START DATE:   TREATMENT  END DATE:   NEXT VISIT: 1/18/2016  [facility]    VISIT RESULTS   ENCOUNTER NUMBER:   TEST LOCATION: [facility]  TEST TYPE: Outside Lab (Venipuncture)  VISIT TYPE:   CURRENT INR: 1 05 PROTIME:   SPECIMEN COL AND RPT DATE:  1/11/2016 8:40 AM  EST    VITAL SIGNS  PULSE:  B/P:  WEIGHT:  HEIGHT:  TEMP:     CURRENT ANTICOAGULANT DOSING SCHEDULE  DOSE SIZE: 5mg    ANTICOAGULANT TYPE: COUMADIN  DOSING REGIMEN  Sun       Mon       Tues      Wed       Thurs      Fri       Sat  Total/Wk  7 5       5         5         7 5       5         5         7 5       42 5    PATIENT MEDICATION INSTRUCTION: Yes  PATIENT NUTRITIONAL COUNSELING: No  PATIENT BRUISING INSTRUCTION: No      LAST EDUCATION  DATE:       PREVIOUS VISIT INFORMATION  VISIT DATE  PRIMARY RX               DOSE   INR GOAL  CrCl    INR  1/11/2016   COUMADIN                 5mg        3              1 05     REGIMEN S:7 5   M:5     T:5     W:7 5   T:5     F:5      S:7 5   WK:42 5   1/8/2016    COUMADIN                 5mg        3              11 9     REGIMEN S:0     M:0     T:0     W:0     T:0     F:0     S:0     WK:0      12/21/2015  COUMADIN                 5mg        3              3 1      REGIMEN  S:2 5   M:5     T:5     W:2 5   T:5     F:5     S:2 5   WK:27 5   11/23/2015  COUMADIN 5mg        3              2 2      REGIMEN S:5     M:5     T:5     W:5     T:5     F:5     S:5     WK:35         OTHER CURRENT MEDICATIONS:  COUMADIN      PROGRESS NOTES: Take 10mg tonight  after that 7 5/5/5 schedule  recheck 1  week - per     PATIENT INSTRUCTIONS: Dr Ramiro Mauricio spoke with pt 1/11/2016    TEST LOCATION: [facility]    Electronically signed  by: Irene Martinez  on 1/12/2016 at 8:40 AM EST              Electronically signed by:Mary KNOWLES    Jan 12 2016  9:41AM EST

## 2018-01-11 NOTE — PROGRESS NOTES
REPORT NAME: Patient Visit Summary Report   VISIT DATE: 1/18/2016  VISIT TIME: 4:04 PM EST  PATIENT NAME: Darren Ahuja RECORD NUMBER: [id number]  SOCIAL SECURITY NUMBER:   YOB: 1975  AGE: 41  REFERRING PHYSICIAN: Mary Marks  SUPERVISING CLINICIAN: Mary Marks  HEALTH CARE PROFESSIONAL: Shelly Wyatt   PATIENT HOME ADDRESS: [address]  PATIENT HOME PHONE: [phone]  DIAGNOSIS 1: Aortic Valve Replacement / 424 1  DIAGNOSIS 2: Long term (current) use of anticoagulants / Z79 01  DIAGNOSIS 3:   DIAGNOSIS 4:   INR RANGE: 2 5 - 3 5  INR GOAL: 3  TREATMENT START DATE:   TREATMENT END DATE:   NEXT VISIT: 1/25/2016  [facility]    VISIT RESULTS   ENCOUNTER NUMBER:   TEST LOCATION: [facility]  TEST TYPE: Outside Lab (Venipuncture)  VISIT TYPE:   CURRENT INR: 2 74 PROTIME:   SPECIMEN COL AND RPT DATE: 1/18/2016 4:04 PM  EST    VITAL SIGNS  PULSE:  B/P:  WEIGHT:  HEIGHT:  TEMP:     CURRENT ANTICOAGULANT DOSING SCHEDULE  DOSE SIZE: 5mg    ANTICOAGULANT TYPE: COUMADIN  DOSING REGIMEN  Sun       Mon Tues Wed Thurs Fri       Sat  Total/Wk  7 5       5         5         7 5       5         5         7 5       42 5    PATIENT MEDICATION INSTRUCTION: Yes  PATIENT NUTRITIONAL COUNSELING: No  PATIENT BRUISING INSTRUCTION: No      LAST EDUCATION DATE:       PREVIOUS VISIT INFORMATION  VISITDATE   INR Goal  INR   Sun     Mon     Tues    Wed     Thurs   Fri  Sat     Total/wk  1/18/2016   3         2 74  7 5     5       5       7 5     5       5  7 5     42 5  1/11/2016   3         1 05  7 5     5       5       7 5     5       5  7 5     42 5  1/8/2016    3         11 9  0       0       0       0       0       0  0       0  12/21/2015  3         3 1   2 5     5       5       2 5     5       5  2 5     27 5    ADDITIONAL PREVIOUS VISIT INFORMATION  VISITDATE   PRIMARY RX               DOSE      CrCl  1/18/2016   COUMADIN 5mg                 1/11/2016   COUMADIN                 5mg                 1/8/2016    COUMADIN                 5mg                 12/21/2015  COUMADIN                 5mg                     OTHER CURRENT MEDICATIONS: COUMADIN      PROGRESS NOTES: same dose   recheck 1 week - per     PATIENT INSTRUCTIONS: spoke with patients     TEST LOCATION: [facility]    Electronically signed by: Irma Scott  on 1/18/2016 at 4:04 PM EST

## 2018-01-11 NOTE — RESULT NOTES
Verified Results  (1) PT WITH INR [id number] 10:56AM Mary Marks   REPORT COMMENT:  FASTING:UNKNOWN     Test Name Result Flag Reference   INR 2 2 H    Reference Range                     0 9-1 1  Moderate-intensity Warfarin Therapy 2 0-3 0  Higher-intensity Warfarin Therapy   3 0-4 0   PT 22 2 sec H 9 0-11 5   For more information on this test, go to:  [email or web address]

## 2018-01-12 VITALS
WEIGHT: 109.38 LBS | HEART RATE: 68 BPM | HEIGHT: 60 IN | SYSTOLIC BLOOD PRESSURE: 112 MMHG | TEMPERATURE: 98.5 F | OXYGEN SATURATION: 96 % | BODY MASS INDEX: 21.47 KG/M2 | DIASTOLIC BLOOD PRESSURE: 56 MMHG

## 2018-01-12 NOTE — RESULT NOTES
Verified Results  (1) PT WITH INR [id number] 04:12PM Mary Marks     Test Name Result Flag Reference   INR 1 05  0 86-1 16   PT 13 5 seconds  11 8-14 1

## 2018-01-12 NOTE — RESULT NOTES
Verified Results  (1) PT WITH INR [id number] 01:58PM Mary Marks   REPORT COMMENT:  FASTING:UNKNOWN     Test Name Result Flag Reference   INR 2 4 H    Reference Range                     0 9-1 1  Moderate-intensity Warfarin Therapy 2 0-3 0  Higher-intensity Warfarin Therapy   3 0-4 0   PT 24 7 sec H 9 0-11 5   For more information on this test, go to:  http://[employer]/faq/PNM976

## 2018-01-12 NOTE — MISCELLANEOUS
Message   Recorded as Task   Date: 12/16/2016 01:13 PM, Created By: Cruzito Vargas   Task Name: Care Coordination   Assigned To: Raj Morrow   Regarding Patient: Geno Cowan, Status: Complete   Comment:    Cruzito Vargas - 16 Dec 2016 1:13 PM     TASK CREATED  Please send this patient a certified letter stating her Mirena is in the office and to call and schedule insertion  I am unable to reach her and called multiple times  She never called with her period and it's important she calls with her next one  Task in there in regards to this patient  Thanks  Daisy Chambers - 16 Dec 2016 1:23 PM     TASK IN PROGRESS   Daisy Fiore - 16 Dec 2016 1:24 PM     TASK EDITED  spoke with , but not listed on hippa form    gave me pts new cell number which is [phone]    lm re Karen Brown and for pt tcb for f/u   Lian Gill - 20 Dec 2016 11:18 AM     TASK EDITED  cerified letter sent   Lian Gill - 20 Dec 2016 11:18 AM     TASK COMPLETED        Active Problems    1  Anemia (285 9) (D64 9)   2  Anticoagulant long-term use (V58 61) (Z79 01)   3  Anxiety (300 00) (F41 9)   4  Aortic valve disorder (424 1) (I35 9)   5  Benign familial tremor (333 1) (G25 0)   6  Counseling for sexually transmitted disease (V65 45) (Z70 8)   7  Depression with anxiety (300 4) (F41 8)   8  Dysfunction of eustachian tube, unspecified laterality (381 81) (H69 80)   9  Elevated liver function tests (790 6) (R94 5)   10  Encounter for gynecological examination without abnormal finding (V72 31) (Z01 419)   11  Encounter for other general counseling or advice on contraception (V25 09) (Z30 09)   12  Encounter for other general counseling or advice on contraception (V25 09) (Z30 09)   13  Encounter for screening for human papillomavirus (HPV) (V73 81) (Z11 51)   14  Encounter for screening mammogram for malignant neoplasm of breast (V76 12)    (Z12 31)   15  Exposure to STD (V01 6) (Z20 2)   16  Gallstone (574 20) (K80 20)   17   Hair loss (704 00) (L65 9)   18  History of Heart Valve Replacement   19  Hemorrhoids, external (455 3) (K64 4)   20  History of aortic valve replacement (V43 3) (Z95 2)   21  Hypertension (401 9) (I10)   22  Leg pain, right (729 5) (M79 604)   23  Liver cyst (573 8) (K76 89)   24  Pigmented nevus (216 9) (D22 9)   25  Recurrent varicose veins (454 9) (I86 8)   26  Screening for skin condition (V82 0) (Z13 89)   27  Skin tags, anus or rectum (455 9) (K64 4)   28  Spider veins (448 1) (I78 1)   29  Vaginal bleeding between periods (626 6) (N92 0)   30  Visit for screening mammogram (V76 12) (Z12 31)    Current Meds   1  Adult Low Dose Aspirin 81 MG TABS; Therapy: (Recorded:10Uve3368) to Recorded   2  Enoxaparin Sodium 40 MG/0 4ML Subcutaneous Solution (Lovenox); subcutaneous   every 12 hours; Therapy: 15XGH6720 to (Last Kendell Sprague)  Requested for: 25Oct2016 Ordered   3  Metoprolol Tartrate 25 MG Oral Tablet; Take 1 tablet twice daily; Therapy: 56ODW3909 to (Last Rx:74Yaw2530)  Requested for: 34VGX6717 Ordered   4  ProAir  (90 Base) MCG/ACT Inhalation Aerosol Solution; USE 2 PUFFS EVERY   6 HOURS AS DIRECTED; Therapy: 58QWF1472 to (Last Srini Bonilla)  Requested for: 72AYB8677 Ordered   5  Rizatriptan Benzoate 10 MG Oral Tablet; TAKE 1 TABLET AT ONSET OF HEADACHE  MAY REPEAT EVERY 2 HOURS AS NEEDED  MAXIMUM 3 TABLETS IN 24 HOURS; Therapy: 61NQO2810 to (Last Rx:73Cui1490)  Requested for: 32APM3967 Ordered   6  Warfarin Sodium 5 MG Oral Tablet (Coumadin); take 1 to 1 1/2 tablets by mouth once   daily as directed; Therapy: 83UHU3336 to (Evaluate:57Hdr8007)  Requested for: 86Cxw5366; Last   Rx:31Gjc7414 Ordered    Allergies    1  OxyCODONE HCl TABS    2  Animal dander - Dogs   3  Dust   4  Other   5  Pollen   6  Seasonal    Signatures   Electronically signed by :  Ruy Mcmahon, ; Dec 22 2016 10:33AM EST                       (Author)

## 2018-01-12 NOTE — MISCELLANEOUS
Message   Date: 23 Jan 2016 9:24 AM EST, Recorded By: Diana Cabral For: Mary Marks, Self   Phone: [phone] (Home)   Reason: Medical Complaint   Answering service 1/23/2016 9:24 am   Pt awakened with blood in mouth and gums bleeding, bloody nose  Pt requested not to page the on-call Dr Abiel Vu feels comfortable with Dr Gillis Oppenheim  Message Free Text Note Form: patient has been instructed to do PT/INR right away   May need ENT evaluation for epistaxis        Signatures   Electronically signed by : JOAN Doll ; Jan 25 2016 10:22AM EST                       (Author)

## 2018-01-12 NOTE — RESULT NOTES
Verified Results  (1) PT WITH INR [id number] 12:44PM Mary Marks   REPORT COMMENT:  FASTING:UNKNOWN     Test Name Result Flag Reference   INR 2 8 H    Reference Range                     0 9-1 1  Moderate-intensity Warfarin Therapy 2 0-3 0  Higher-intensity Warfarin Therapy   3 0-4 0   PT 28 5 sec H 9 0-11 5   For more information on this test, go to:  http://[employer]Archives/faq/KID053

## 2018-01-12 NOTE — RESULT NOTES
Verified Results  (1) PT WITH INR [id number] 12:54PM Mary Marks   REPORT COMMENT:  FASTING:UNKNOWN     Test Name Result Flag Reference   INR 2 9 H    Reference Range                     0 9-1 1  Moderate-intensity Warfarin Therapy 2 0-3 0  Higher-intensity Warfarin Therapy   3 0-4 0   PT 28 8 sec H 9 0-11 5   For more information on this test, go to:  http://[employer]/faq/MMW488

## 2018-01-13 VITALS
SYSTOLIC BLOOD PRESSURE: 124 MMHG | TEMPERATURE: 98.6 F | HEART RATE: 72 BPM | WEIGHT: 108.25 LBS | HEIGHT: 60 IN | OXYGEN SATURATION: 97 % | DIASTOLIC BLOOD PRESSURE: 60 MMHG | BODY MASS INDEX: 21.25 KG/M2

## 2018-01-13 VITALS
SYSTOLIC BLOOD PRESSURE: 126 MMHG | DIASTOLIC BLOOD PRESSURE: 72 MMHG | HEIGHT: 60 IN | BODY MASS INDEX: 21.5 KG/M2 | WEIGHT: 109.5 LBS | HEART RATE: 77 BPM

## 2018-01-13 VITALS
SYSTOLIC BLOOD PRESSURE: 120 MMHG | HEART RATE: 78 BPM | OXYGEN SATURATION: 98 % | WEIGHT: 110 LBS | DIASTOLIC BLOOD PRESSURE: 62 MMHG | HEIGHT: 60 IN | BODY MASS INDEX: 21.6 KG/M2

## 2018-01-13 NOTE — RESULT NOTES
Verified Results  (1) PT WITH INR [id number] 01:08PM Mary Marks   REPORT COMMENT:  FASTING:UNKNOWN     Test Name Result Flag Reference   INR 2 5 H    Reference Range                     0 9-1 1  Moderate-intensity Warfarin Therapy 2 0-3 0  Higher-intensity Warfarin Therapy   3 0-4 0   PT 25 9 sec H 9 0-11 5   For more information on this test, go to:  http://[employer]/faq/YGN028

## 2018-01-13 NOTE — RESULT NOTES
Verified Results  (1) PT WITH INR 10Aug2017 12:24PM Mary Marks   REPORT COMMENT:  FASTING:UNKNOWN     Test Name Result Flag Reference   INR 1 9 H    Reference Range                     0 9-1 1  Moderate-intensity Warfarin Therapy 2 0-3 0  Higher-intensity Warfarin Therapy   3 0-4 0   PT 19 1 sec H 9 0-11 5   For more information on this test, go to:  http://[employer]/faq/HBN622

## 2018-01-13 NOTE — RESULT NOTES
Verified Results  (1) PT WITH INR [id number] 03:22PM Mary Marks     Test Name Result Flag Reference   INR 2 30 L 2 500 - 3 500   REPORT NAME: Progress Notes Report  VISIT DATE: 11/28/2017  VISIT TIME: 3:22 PM EST  PATIENT NAME: Yanni Pimentel   MEDICAL RECORD NUMBER: [id number]  YOB: 1975  AGE: 38  REFERRING PHYSICIAN: Mary Marks  SUPERVISING CLINICIAN: Mary Marks  HEALTH CARE PROVIDER: Steven Lombardi  PATIENT HOME ADDRESS: [address]  PATIENT HOME PHONE: [phone]  SOCIAL SECURITY NUMBER:   DIAGNOSIS 1: Aortic Valve Replacement / 424 1  DIAGNOSIS 2: Long term (current) use of anticoagulants / Z79 01  INR RANGE: 2 5 - 3 5  INR GOAL: 3  TREATMENT START DATE:   TREATMENT END DATE:   NEXT VISIT:     VISIT RESULTS  ENCOUNTER NUMBER:   TEST LOCATION: [facility]  TEST TYPE: Outside Lab (Venipuncture)  VISIT TYPE:   CURRENT INR: 2 3 PROTIME:   SPECIMEN COL AND RPT DATE: 11/28/2017 3:22 PM  EST  VITAL SIGNS  PULSE:  BP: / WEIGHT:  HEIGHT:  TEMP:   CURRENT ANTICOAGULANT DOSING SCHEDULE  DOSE SIZE: 5mg    ANTICOAGULANT TYPE: COUMADIN  DOSING REGIMEN  Sun       Mon       Tues      Wed       Thurs     Fri       Sat  Total/Wk  5         5         5         7 5       5         5         5         37 5  PATIENT MEDICATION INSTRUCTION: Yes  PATIENT NUTRITIONAL COUNSELING: No  PATIENT BRUISING INSTRUCTION: No  LAST EDUCATION DATE:   PREVIOUS VISIT INFORMATION  VISITDATE  INRGoal INR   Sun    Mon    Tues   Wed    Thurs  Fri    Sat  Total/wk  11/28/2017  3       2 3   5      5      5      7 5    5      5      5  37 5  11/13/2017  3       1 6   5      5      5      7 5    5      5      5  37 5  10/27/2017  3       2 5   5      5      5      7 5    5      5      5  37 5  9/18/2017   3       2 1   5      5      5      7 5    5      5      5  37 5  ADDITIONAL PREVIOUS VISIT INFORMATION  VISITDATE   PRIMARY RX               DOSE      CrCl  11/28/2017 COUMADIN                 5mg  11/13/2017  COUMADIN                 5mg  10/27/2017  COUMADIN                 5mg  9/18/2017   COUMADIN                 5mg  OTHER CURRENT MEDICATIONS:  COUMADIN  PROGRESS NOTES: Same dose rechk 2 weeks  PATIENT INSTRUCTIONS: LMOM  [place] [facility] [place], , ,   INBOUND LAB DATA:  Lab       Lab Value Col Date                 Rpt Date                 Lab  Reference Range  Electronically signed by: Yina Madsen on 11/28/2017 3:22 PM EST

## 2018-01-14 NOTE — MISCELLANEOUS
Message   Recorded as Task   Date: 10/07/2016 11:41 AM, Created By: System   Task Name: Padmaja Tom Note   Assigned To: Shandra Sosa   Regarding Patient: Alexandria Curtis, Status: Active   Comment:    System - 07 Oct 2016 11:41 AM     Note needs more information       Signatures   Electronically signed by : Kelly Ponce, ; Oct  7 2016 11:42AM EST                       (Author)

## 2018-01-15 NOTE — PROGRESS NOTES
Assessment   1  Gallstone (574 20) (K80 20)  2  Liver cyst (573 8) (I86 40)    Discussion/Summary  Discussion Summary:   1  Liver cyst- check MRI but will check cardiology first if not able to do MRI b/c of mechanical valve- we will follow up u/s in 4 months  2  gallstones- she would like to persue cholecystectomy, I discussed that we wait until after her further imaging  Chief Complaint  Chief Complaint Free Text Note Form: go over labs and u/s      History of Present Illness  HPI: she is feeling well no new physical complaints      Review of Systems  Complete-Female:   Constitutional: No fever, no chills, feels well, no tiredness, no recent weight gain or weight loss  Eyes: No complaints of eye pain, no red eyes, no eyesight problems, no discharge, no dry eyes, no itching of eyes  ENT: no complaints of earache, no loss of hearing, no nose bleeds, no nasal discharge, no sore throat, no hoarseness  Cardiovascular: No complaints of slow heart rate, no fast heart rate, no chest pain, no palpitations, no leg claudication, no lower extremity edema  Respiratory: No complaints of shortness of breath, no wheezing, no cough, no SOB on exertion, no orthopnea, no PND  Gastrointestinal: No complaints of abdominal pain, no constipation, no nausea or vomiting, no diarrhea, no bloody stools  Genitourinary: No complaints of dysuria, no incontinence, no pelvic pain, no dysmenorrhea, no vaginal discharge or bleeding  Musculoskeletal: No complaints of arthralgias, no myalgias, no joint swelling or stiffness, no limb pain or swelling  Integumentary: No complaints of skin rash or lesions, no itching, no skin wounds, no breast pain or lump  Neurological: No complaints of headache, no confusion, no convulsions, no numbness, no dizziness or fainting, no tingling, no limb weakness, no difficulty walking     Psychiatric: Not suicidal, no sleep disturbance, no anxiety or depression, no change in personality, no emotional problems  Endocrine: No complaints of proptosis, no hot flashes, no muscle weakness, no deepening of the voice, no feelings of weakness  Hematologic/Lymphatic: No complaints of swollen glands, no swollen glands in the neck, does not bleed easily, does not bruise easily  Active Problems   1  Anemia (285 9) (D64 9)  2  Anticoagulant long-term use (V58 61) (Z79 01)  3  Anxiety (300 00) (F41 9)  4  Aortic valve disorder (424 1) (I35 9)  5  Benign familial tremor (333 1) (G25 0)  6  Depression with anxiety (300 4) (F41 8)  7  Dysfunction of eustachian tube, unspecified laterality (381 81) (H69 80)  8  Elevated liver function tests (790 6) (R79 89)  9  Hair loss (704 00) (L65 9)  10  History of Heart Valve Replacement  11  Hemorrhoids, external (455 3) (K64 4)  12  History of aortic valve replacement (V43 3) (Z95 2)  13  Hypertension (401 9) (I10)  14  Leg pain, right (729 5) (M79 604)  15  Pigmented nevus (216 9) (D22 9)  16  Recurrent varicose veins (454 9) (I86 8)  17  Screening for skin condition (V82 0) (Z13 89)  18  Skin tags, anus or rectum (455 9) (K64 4)  19  Spider veins (448 1) (I78 1)  20  Visit for screening mammogram (V76 12) (Z12 31)    Past Medical History   1  History of Allergic Contact Dermatitis (692 9)  2  History of Anterior Uveal Cysts (364 60)  3  History of Contact dermatitis due to poison ivy (692 6) (L23 7)  4  History of Costochondritis (733 6) (M94 0)  5  History of Dermatitis due to skin contact with other agent (692 89) (L25 8)  6  History of Dermatophytosis, nail (110 1) (B35 1)  7  History of Difficulty breathing (786 09) (R06 89)  8  History of Encounter for screening mammogram for malignant neoplasm of breast (V76 12) (Z12 31)  9  History of Exposure to STD (V01 6) (Z20 2)  10  History of acute pancreatitis (V12 79) (Z87 19)  11  History of acute pharyngitis (V12 69) (Z87 09)  12  History of allergic rhinitis (V12 69) (Z87 09)  13   History of anemia (V12 3) (Z86 2)  14  History of aortic valve disorder (V12 59) (Z86 79)  15  History of aortic valve insufficiency (V12 59) (Z86 79)  16  History of atopic dermatitis (V13 3) (Z87 2)  17  History of benign neoplasm of skin (V13 3) (Z87 2)  18  History of chest pain (V13 89) (Z87 898)  19  History of diarrhea (V12 79) (Z87 898)  20  History of head injury (V15 59) (Z87 828)  21  History of migraine (V12 49) (Z86 69)  22  History of otitis media (V12 49) (Z86 69)  23  History of pharyngitis (V12 69) (Z87 09)  24  History of pregnancy (V13 29)  25  History of strain (V13 59) (Z87 39)  26  History of vaginal discharge (V13 29) (Z87 42)  27  History of Hyperinsulinism (251 1)  28  History of Inguinal lymphadenopathy (785 6) (R59 9)  29  Normal delivery (650) (O80,Z37 9)  30  History of Poison ivy (692 6) (L23 7)  31  History of Varicose veins with inflammation, unspecified laterality (454 1) (I83 10)  32  History of Visit for screening mammogram (M31 35) (Z12 31)  Active Problems And Past Medical History Reviewed: The active problems and past medical history were reviewed and updated today  Surgical History   1  History of Aortic Valve Complications  2  History of Breast Surgery Enlargement Procedure  3  History of Heart Valve Replacement  4  History of Heart Valve Replacement  5  History of Rhinoplasty  Surgical History Reviewed: The surgical history was reviewed and updated today  Family History   1  Family history of Asthma (V17 5)  2  Family history of Coronary Artery Disease (V17 49)  3  Family history of Hypertension (V17 49)   4  Family history of Breast Cancer (V16 3)  Family History Reviewed: The family history was reviewed and updated today  Social History    · Denied: History of Alcohol Use (History)   · Denied: History of Drug Use   · Living Independently With Spouse   · Never A Smoker   · Occupation: Homemaker   · Denied: History of Smoking Cigarettes  Social History Reviewed:  The social history was reviewed and updated today  Current Meds  1  Amoxicillin 500 MG Oral Capsule; TAKE 4 CAPSULES 1 HOUR PRIOR TO PROCEDURE; Therapy: 04FAV4142 to (Evaluate:57Bni2801)  Requested for: 46Uik6292; Last Rx:61Ayp4310   Ordered  2  Coumadin TABS; Therapy: (Lesley Adrian) to Recorded  3  Hair Vitamins Oral Tablet; TAKE 1 TABLET DAILY; Therapy: 11AST4060 to Recorded  4  Hydrocortisone 2 5 % External Ointment; APPLY SPARINGLY TO THE AFFECTED AREA(S) TWICE   DAILY; Therapy: 85Oul7346 to (Last Rx:26Wjc7516)  Requested for: 58Abm5303 Ordered  5  Hydrocortisone 2 5 % Rectal Cream; APPLY ONCE DAILY AS NEEDED; Therapy: 89Izh1716 to (Last Rx:72Njp1388)  Requested for: 83Jyl5248 Ordered  6  Metoprolol Tartrate 25 MG Oral Tablet; Take 1 tablet twice daily; Therapy: 83WPG3904 to (Last Rx:43Mdw9517)  Requested for: 74Eol5964 Ordered  7  NuvaRing 0 12-0 015 MG/24HR Vaginal Ring; Sig 1 ring in vagina as directed each month; Therapy: 54INQ9816 to (Evaluate:21Apr2016)  Requested for: 33CBL9848; Last Rx:52Chz2588   Ordered  8  ProAir  (90 Base) MCG/ACT Inhalation Aerosol Solution; USE 2 PUFFS EVERY 6 HOURS AS   DIRECTED; Therapy: 17UAD9995 to (Last Caleen Shay)  Requested for: 06AJA7484 Ordered  9  Rizatriptan Benzoate 10 MG Oral Tablet; TAKE 1 TABLET AT ONSET OF HEADACHE  MAY REPEAT   EVERY 2 HOURS AS NEEDED  MAXIMUM 3 TABLETS IN 24 HOURS; Therapy: 40NFW1136 to (Last Rx:17Nov2015)  Requested for: 01RXO0016 Ordered  10  Warfarin Sodium 5 MG Oral Tablet; take 1 to 1 1/2 tablets by mouth once daily as directed; Therapy: 69FIM6448 to (Allean Monday)  Requested for: 24ARJ6327; Last Rx:11Jan2016    Ordered  Medication List Reviewed: The medication list was reviewed and updated today  Allergies   1  OxyCODONE HCl TABS   2  Animal dander - Dogs  3  Dust  4  Other  5  Pollen  6   Seasonal    Vitals  Vital Signs [Data Includes: Current Encounter]    Recorded: 20RLV6869 09:52AM   Heart Rate 78   Systolic 130   Diastolic 70   Height 5 ft    Weight 111 lb    BMI Calculated 21 68   BSA Calculated 1 45   O2 Saturation 98     Physical Exam    Constitutional   General appearance: No acute distress, well appearing and well nourished  Pulmonary   Auscultation of lungs: Clear to auscultation  Cardiovascular   Auscultation of heart: Abnormal   audible click  Results/Data  Encounter Results   (1) PAP SMEAR CONVENTIONAL 58NEO8305 12:00AM      Test Name Result Flag Reference   PAP SMEAR CONV COMMENT 05/21/2015         Health Management  Health Maintenance   Digital Bilateral Screening Mammogram With CAD; every 1 year; Last 91Wmv8049; Next Due:  25Tmm0618; Overdue    Future Appointments    Date/Time Provider Specialty Site   01/21/2016 09:30 AM JOAN Simmons  Cardiology  [facility]   10/11/2016 09:15 AM JOAN Luther   Dermatology [address] [facility]     Signatures   Electronically signed by : Demetri Johnson, [address] [facility] [address]; Jan 18 2016 10:34AM EST                       (Author)    Electronically signed by : Madai Myers DO; Jan 18 2016  1:00PM EST                       (Co-author)    Electronically signed by : Madai Myers DO; Jan 18 2016  1:00PM EST                       (Co-author)

## 2018-01-15 NOTE — RESULT NOTES
Verified Results  (1) PT WITH INR 97Mgo6763 01:31PJOAN Mary Marks     Test Name Result Flag Reference   INR 4 00 H 2 500 - 3 500   REPORT NAME: Patient Visit Summary Report   VISIT DATE: 2/24/2016  VISIT TIME: 1:31 PM EST  PATIENT NAME: Jessica Bates   MEDICAL RECORD NUMBER: [id number]  SOCIAL SECURITY NUMBER:   YOB: 1975  AGE: 41  REFERRING PHYSICIAN: Mary Marks  SUPERVISING CLINICIAN: Mary Marks  HEALTH CARE PROFESSIONAL: Christine Baez   PATIENT HOME ADDRESS: [address]  PATIENT HOME PHONE: [phone]  DIAGNOSIS 1: Aortic Valve Replacement / 424 1  DIAGNOSIS 2: Long term (current) use of anticoagulants / Z79 01  DIAGNOSIS 3:   DIAGNOSIS 4:   INR RANGE: 2 5 - 3 5  INR GOAL: 3  TREATMENT START DATE:   TREATMENT END DATE:   NEXT VISIT: 3/9/2016  [facility]    VISIT RESULTS   ENCOUNTER NUMBER:   TEST LOCATION: [facility] [place]  TEST TYPE: Outside Lab (Venipuncture)  VISIT TYPE:   CURRENT INR: 4 PROTIME:   SPECIMEN COL AND RPT DATE: 2/24/2016 1:31 PM EST    VITAL SIGNS  PULSE:  B/P:  WEIGHT:  HEIGHT:  TEMP:     CURRENT ANTICOAGULANT DOSING SCHEDULE  DOSE SIZE: 5mg    ANTICOAGULANT TYPE: COUMADIN  DOSING REGIMEN  Sun       Mon Tues Wed Thurs Fri       Sat  Total/Wk  5         5         5         5         5         5         5         35    PATIENT MEDICATION INSTRUCTION: Yes  PATIENT NUTRITIONAL COUNSELING: No  PATIENT BRUISING INSTRUCTION: No      LAST EDUCATION DATE:       PREVIOUS VISIT INFORMATION  VISITDATE   INR Goal  INR   Sun     Mon     Tues    Wed     Thurs   Fri  Sat     Total/wk  2/24/2016   3         4     5       5       5       5       5       5  5       35  2/9/2016    3         2 6   5       5       5       7 5     5       5  5       37 5  1/26/2016   3         3 6   5       5       5       7 5     5       5  5       37 5  1/18/2016   3         2 72  7 5     5       5       7 5     5       5  7 5 42  5    ADDITIONAL PREVIOUS VISIT INFORMATION  VISITDATE   PRIMARY RX               DOSE      CrCl  2/24/2016   COUMADIN                 5mg                 2/9/2016    COUMADIN                 5mg                 1/26/2016   COUMADIN                 5mg                 1/18/2016   COUMADIN                 5mg                     OTHER CURRENT MEDICATIONS: COUMADIN      PROGRESS NOTES: hold coumadin for 2 days   5mg after that  recheck 2 weeks -  per     PATIENT INSTRUCTIONS: spoke with pt    TEST LOCATION: [facility]    Electronically signed by: Yvrose Monge  on 2/24/2016 at 1:31 PM EST

## 2018-01-15 NOTE — RESULT NOTES
Verified Results  (1) PT WITH INR [id number] 01:14PM Mary Marks     Test Name Result Flag Reference   INR 2 3 H    Reference Range                     0 9-1 1  Moderate-intensity Warfarin Therapy 2 0-3 0  Higher-intensity Warfarin Therapy   3 0-4 0   PT 22 9 sec H 9 0-11 5   For more information on this test, go to:  http://Kogeto/faq/CDY378

## 2018-01-16 NOTE — RESULT NOTES
Verified Results  (1) PT WITH INR 05Oct2017 01:15PM Mary Marks     Test Name Result Flag Reference   INR 2 3 H    Reference Range                     0 9-1 1  Moderate-intensity Warfarin Therapy 2 0-3 0  Higher-intensity Warfarin Therapy   3 0-4 0   PT 23 5 sec H 9 0-11 5   For more information on this test, go to:  http://Shopow/faq/JIS842

## 2018-01-16 NOTE — RESULT NOTES
Verified Results  (1) PT WITH INR [id number] 12:12PM Mary Marks   REPORT COMMENT:  FASTING:UNKNOWN     Test Name Result Flag Reference   INR 2 3 H    Reference Range                     0 9-1 1  Moderate-intensity Warfarin Therapy 2 0-3 0  Higher-intensity Warfarin Therapy   3 0-4 0   PT 23 9 sec H 9 0-11 5   For more information on this test, go to:  http://QUIQ/faq/UAP764

## 2018-01-16 NOTE — RESULT NOTES
Message   Recorded as Task   Date: 08/04/2016 12:55 PM, Created By: Rashad Gonzalez   Task Name: Medical Complaint Callback   Assigned To: Ayana Champion   Regarding Patient: Eric Lawrence, Status: In Progress   Comment:    Daysi Landin - 04 Aug 2016 12:55 PM     TASK CREATED  Caller: Self; [phone] (Mobile Phone)  [facility]   pt noticed this morning white mucus discharge   pt also has some itching and burning please advise rx [facility] pt @ [address] [place] [address] Aug 2016 1:18 PM     TASK IN PROGRESS   Jc Alejandra - 91 Aug 2016 1:20 PM     TASK EDITED   lmtcb   [facility] Aug 2016 2:41 PM     TASK EDITED   Pt complaining of white dsch and itch  Rx diflucan 150, 2 tabs, sig 1 po stat and 1 in 5 days to r/a  Per pharmacist - ok with coumadin        Signatures   Electronically signed by :  Nettie Mcmahon, ; Aug  4 2016  2:41PM EST                       (Author)

## 2018-01-16 NOTE — RESULT NOTES
Verified Results  (1) PT WITH INR [id number] 01:40PM Mary Marks     Test Name Result Flag Reference   INR 3 9 H    Reference Range                     0 9-1 1  Moderate-intensity Warfarin Therapy 2 0-3 0  Higher-intensity Warfarin Therapy   3 0-4 0   PT 39 5 sec H 9 0-11 5   For more information on this test, go to:  http://Synthonics/faq/[email or web address]

## 2018-01-16 NOTE — RESULT NOTES
Verified Results  (1) PT WITH INR [id number] 01:05PM Mary Marks   REPORT COMMENT:  FASTING:UNKNOWN     Test Name Result Flag Reference   INR 2 9 H    Reference Range                     0 9-1 1  Moderate-intensity Warfarin Therapy 2 0-3 0  Higher-intensity Warfarin Therapy   3 0-4 0   PT 28 8 sec H 9 0-11 5   For more information on this test, go to:  http://Viddler/faq/[email or web address]

## 2018-01-16 NOTE — RESULT NOTES
Verified Results  (1) PT WITH INR [id number] 01:52PM Mary Marks     Test Name Result Flag Reference   INR 2 7 H    Reference Range                     0 9-1 1  Moderate-intensity Warfarin Therapy 2 0-3 0  Higher-intensity Warfarin Therapy   3 0-4 0   PT 26 8 sec H 9 0-11 5   For more information on this test, go to:  [email or web address]

## 2018-01-17 ENCOUNTER — GENERIC CONVERSION - ENCOUNTER (OUTPATIENT)
Dept: OTHER | Facility: OTHER | Age: 43
End: 2018-01-17

## 2018-01-17 NOTE — RESULT NOTES
Verified Results  (1) PT WITH INR [id number] 12:41PM Mary Marks   REPORT COMMENT:  FASTING:UNKNOWN     Test Name Result Flag Reference   INR 2 1 H    Reference Range                     0 9-1 1  Moderate-intensity Warfarin Therapy 2 0-3 0  Higher-intensity Warfarin Therapy   3 0-4 0   PT 21 4 sec H 9 0-11 5   For more information on this test, go to:  http://[employer]/faq/BKN752

## 2018-01-18 ENCOUNTER — GENERIC CONVERSION - ENCOUNTER (OUTPATIENT)
Dept: OTHER | Facility: OTHER | Age: 43
End: 2018-01-18

## 2018-01-18 NOTE — MISCELLANEOUS
Message   Recorded as Task   Date: 12/29/2016 03:10 PM, Created By: Elena Huynh   Task Name: Call Back   Assigned To: [address]   Regarding Patient: Astrid Rosales, Status: In Progress   Comment:    Elena Huynh - 29 Dec 2016 3:10 PM     TASK CREATED  I did reach pt  She will call with her next menses to have the Mirena inserted  I also just noticed on front of her chart - needs abics, etc for med procedures??   Elena Huynh - 29 Dec 2016 3:12 PM     TASK EDITED  She is aware Mirena is in office and is expensive   Shahid,Deepthi - 29 Dec 2016 5:00 PM     TASK IN [address] - 29 Dec 2016 5:02 PM     TASK REASSIGNED: Previously Assigned To Viviana Quinn-    Who would put this patient on antibiotics prior to insertion? Who would order this for her? Just want to make sure she is prepared before I schedule her  Thanks  Shaan Bonnie - 15 Dec 2016 12:29 PM     TASK REPLIED TO: Previously Assigned To [facility],Team  Please ask patient what she usually takes before dental procedures  I would ERx that  If she is unsure, she should call her cardiologist for his/her recommendations  Thanks!!   Daisy Fiore - 30 Dec 2016 4:09 PM     TASK EDITED  pt states she is given amoxicillin prior to procedures        Active Problems    1  Anemia (285 9) (D64 9)   2  Anticoagulant long-term use (V58 61) (Z79 01)   3  Anxiety (300 00) (F41 9)   4  Aortic valve disorder (424 1) (I35 9)   5  Benign familial tremor (333 1) (G25 0)   6  Counseling for sexually transmitted disease (V65 45) (Z70 8)   7  Depression with anxiety (300 4) (F41 8)   8  Dysfunction of eustachian tube, unspecified laterality (381 81) (H69 80)   9  Elevated liver function tests (790 6) (R94 5)   10  Encounter for gynecological examination without abnormal finding (V72 31) (Z01 419)   11  Encounter for other general counseling or advice on contraception (V25 09) (Z30 09)   12   Encounter for other general counseling or advice on contraception (V25 09) (Z30 09)   13  Encounter for screening for human papillomavirus (HPV) (V73 81) (Z11 51)   14  Encounter for screening mammogram for malignant neoplasm of breast (V76 12)    (Z12 31)   15  Exposure to STD (V01 6) (Z20 2)   16  Gallstone (574 20) (K80 20)   17  Hair loss (704 00) (L65 9)   18  History of Heart Valve Replacement   19  Hemorrhoids, external (455 3) (K64 4)   20  History of aortic valve replacement (V43 3) (Z95 2)   21  Hypertension (401 9) (I10)   22  Leg pain, right (729 5) (M79 604)   23  Liver cyst (573 8) (K76 89)   24  Pigmented nevus (216 9) (D22 9)   25  Recurrent varicose veins (454 9) (I86 8)   26  Screening for skin condition (V82 0) (Z13 89)   27  Skin tags, anus or rectum (455 9) (K64 4)   28  Spider veins (448 1) (I78 1)   29  Vaginal bleeding between periods (626 6) (N92 0)   30  Visit for screening mammogram (V76 12) (Z12 31)    Current Meds   1  Adult Low Dose Aspirin 81 MG TABS; Therapy: (Recorded:43Fuh3094) to Recorded   2  Enoxaparin Sodium 40 MG/0 4ML Subcutaneous Solution (Lovenox); subcutaneous   every 12 hours; Therapy: 31JVE6472 to (Last Nick Montse)  Requested for: 25Oct2016 Ordered   3  Metoprolol Tartrate 25 MG Oral Tablet; Take 1 tablet twice daily; Therapy: 32PDR8274 to (Last Rx:45Bwq0154)  Requested for: 72YJK7384 Ordered   4  ProAir  (90 Base) MCG/ACT Inhalation Aerosol Solution; USE 2 PUFFS EVERY   6 HOURS AS DIRECTED; Therapy: 71HBB4611 to (Last Taye Harmony)  Requested for: 09RFY3495 Ordered   5  Rizatriptan Benzoate 10 MG Oral Tablet; TAKE 1 TABLET AT ONSET OF HEADACHE  MAY REPEAT EVERY 2 HOURS AS NEEDED  MAXIMUM 3 TABLETS IN 24 HOURS; Therapy: 76OZE5955 to (Last Rx:17Nov2015)  Requested for: 56BED3067 Ordered   6  Warfarin Sodium 5 MG Oral Tablet (Coumadin); take 1 to 1 1/2 tablets by mouth once   daily as directed;    Therapy: 82STI6256 to (Evaluate:12Ygi3009)  Requested for: 99NKY8936; Last Rx:43Uxy0872 Ordered    Allergies    1  OxyCODONE HCl TABS    2  Animal dander - Dogs   3  Dust   4  Other   5  Pollen   6   Seasonal    Signatures   Electronically signed by : Juli Meredith, ; Dec 30 2016  4:09PM EST                       (Author)

## 2018-01-20 ENCOUNTER — GENERIC CONVERSION - ENCOUNTER (OUTPATIENT)
Dept: OTHER | Facility: OTHER | Age: 43
End: 2018-01-20

## 2018-01-20 ENCOUNTER — LAB CONVERSION - ENCOUNTER (OUTPATIENT)
Dept: OTHER | Facility: OTHER | Age: 43
End: 2018-01-20

## 2018-01-20 LAB
INR PPP: 2.1
PROTHROMBIN TIME: 21.6 SEC (ref 9–11.5)

## 2018-01-23 VITALS
SYSTOLIC BLOOD PRESSURE: 118 MMHG | HEART RATE: 71 BPM | WEIGHT: 111.13 LBS | BODY MASS INDEX: 21.82 KG/M2 | HEIGHT: 60 IN | OXYGEN SATURATION: 98 % | DIASTOLIC BLOOD PRESSURE: 62 MMHG

## 2018-01-23 NOTE — RESULT NOTES
Verified Results  (1) PT WITH INR 02Jan2018 12:41PM Mary Mraks     Test Name Result Flag Reference   INR 2 2 H    Reference Range                     0 9-1 1  Moderate-intensity Warfarin Therapy 2 0-3 0  Higher-intensity Warfarin Therapy   3 0-4 0   PT 22 2 sec H 9 0-11 5   For more information on this test, go to:  http://[employer]/faq/YSX174

## 2018-01-23 NOTE — MISCELLANEOUS
To Whom It may concern:      I am writing this letter on behalf of my patient Jose Armando Patrickaming has a right knee prepatella bursitis  She may continue to work full duty but i would avoid kneeling directly on the knee  If I can be of any further assistance please do not hesitate to contact me           Jayro DRIVER      Electronically signed by:Luiza Hernandez  Jan 8 2018 12:44PM EST

## 2018-01-23 NOTE — MISCELLANEOUS
Message   Recorded as Task   Date: 01/17/2018 02:24 PM, Created By: Andrew Stoddard   Task Name: Care Coordination   Assigned To: Yoli Allan   Regarding Patient: Reny Cruz, Status: Active   CommentLorgarcia Loredo - 17 Jan 2018 2:24 PM     TASK CREATED  this pt wanted a mirena about a year and a half ago  so we did the whole process and got her mirena  it apparently was here in the office  she lost her insurance so never got the IUD inserted and now that she has insurance again is considering it  it is not up here in the [place] office   can you please check if it is in [facility] [place] [place] and if not can we maybe find out what happened to it  it was shipped to our office and she was told it was here  thanks   Yoli Allan - 17 Jan 2018 4:47 PM     TASK EDITED  It is here in [facility] [place] in the med room! Do you need it sent to [place]? Caty Cabral - 18 Jan 2018 7:02 AM     TASK EDITED  Yes please   I will call the pt and let her know it is here  thank for checking for me   Andrew Stoddard - 18 Jan 2018 7:02 AM     TASK REASSIGNED: Previously Assigned To Shantell Royal - 18 Jan 2018 9:30 AM     TASK EDITED  I called the pt to let her know that her mirena will be in the [place] [facility]  pt is to c/b when she would like the insertion to be done        Active Problems    1  Anemia (285 9) (D64 9)   2  Anticoagulant long-term use (V58 61) (Z79 01)   3  Anxiety (300 00) (F41 9)   4  Aortic valve disorder (424 1) (I35 9)   5  Benign familial tremor (333 1) (G25 0)   6  Coarctation of aorta (747 10) (Q25 1)   7  Depression with anxiety (300 4) (F41 8)   8  Dysfunction of Eustachian tube, unspecified laterality (381 81) (H69 80)   9  Elevated liver function tests (790 6) (R79 89)   10  Encounter for gynecological examination without abnormal finding (V72 31) (Z01 419)   11  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)   12  Gallstone (574 20) (K80 20)   13   General counseling and advice for contraceptive management (V25 09) (Z30 09)   14  Hair loss (704 00) (L65 9)   15  History of Heart Valve Replacement   16  Hemorrhoids, external (455 3) (K64 4)   17  History of aortic valve replacement (V43 3) (Z95 2)   18  Hypertension (401 9) (I10)   19  Leg pain, right (729 5) (M79 604)   20  Liver cyst (573 8) (K76 89)   21  Other deficiency anemia (281 8) (D53 9)   22  Pigmented nevus (216 9) (D22 9)   23  Post-nasal drip (784 91) (R09 82)   24  Recurrent varicose veins (454 9) (I86 8)   25  Screening for skin condition (V82 0) (Z13 89)   26  Skin tags, anus or rectum (455 9) (K64 4)   27  Spider veins (448 1) (I78 1)   28  Tick bite (919 4,E906 4) (W57 XXXA)   29  Tuberculosis screening (V74 1) (Z11 1)    Current Meds   1  Adult Low Dose Aspirin 81 MG TABS; Therapy: (Recorded:56Lmj2191) to Recorded   2  Fluticasone Propionate 50 MCG/ACT Nasal Suspension; USE 2 SPRAYS IN EACH   NOSTRIL ONCE DAILY; Therapy: 19XOZ6695 to (Last YH:33QSG4107)  Requested for: 13HQK1733 Ordered   3  Levocetirizine Dihydrochloride 5 MG Oral Tablet (Xyzal); TAKE 1 TABLET BY MOUTH AT    BEDTIME AS NEEDED; Therapy: 02DOX1070 to (Last HF:75AUF9475)  Requested for: 06AVH7960 Ordered   4  Metoprolol Tartrate 25 MG Oral Tablet; take 1 tablet by mouth twice a day; Therapy: 27JNR9856 to (944 12 109)  Requested for: 31Oct2017; Last   Rx:31Oct2017 Ordered   5  Prenatal Plus 27-1 MG Oral Tablet; TAKE 1 TABLET DAILY; Therapy: 88AJV9206 to (Evaluate:12Jan2019)  Requested for: 74XMY8441; Last   Rx:17Jan2018 Ordered   6  ProAir  (90 Base) MCG/ACT Inhalation Aerosol Solution; USE 2 PUFFS EVERY   6 HOURS AS DIRECTED; Therapy: 71AUI6930 to (Last [facility])  Requested for: 30Jun2017 Ordered   7  Warfarin Sodium 5 MG Oral Tablet (Coumadin); TAKE 1 TO 1 1/2 TABLETS ONCE DAILY   AS DIRECTED; Therapy: 31TAT8815 to (CLEXFRRN:24ADE5863)  Requested for: 25QMF2191; Last   Rx:03Oct2017 Ordered    Allergies    1  OxyCODONE HCl TABS    2   Animal dander - Dogs   3  Dust   4  Other   5  Pollen   6   Seasonal    Signatures   Electronically signed by : Denis Favre, ; Jan 18 2018  9:31AM EST                       (Author)

## 2018-01-23 NOTE — RESULT NOTES
Verified Results  (1) PT WITH INR [id number] 12:48PM Mary Marks     Test Name Result Flag Reference   INR 2 7 H    Reference Range                     0 9-1 1  Moderate-intensity Warfarin Therapy 2 0-3 0  Higher-intensity Warfarin Therapy   3 0-4 0   PT 27 5 sec H 9 0-11 5   For more information on this test, go to:  http://[employer]/faq/ERS004
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
## 2018-01-24 VITALS
SYSTOLIC BLOOD PRESSURE: 106 MMHG | BODY MASS INDEX: 21.4 KG/M2 | DIASTOLIC BLOOD PRESSURE: 62 MMHG | WEIGHT: 109 LBS | HEIGHT: 60 IN

## 2018-01-24 NOTE — RESULT NOTES
Verified Results  (1) PT WITH INR [id number] 12:46PM Mary Marks     Test Name Result Flag Reference   INR 2 1 H    Reference Range                     0 9-1 1  Moderate-intensity Warfarin Therapy 2 0-3 0  Higher-intensity Warfarin Therapy   3 0-4 0   PT 21 6 sec H 9 0-11 5   For more information on this test, go to:  http://[employer]/faq/VZO657

## 2018-01-30 ENCOUNTER — TELEPHONE (OUTPATIENT)
Dept: CARDIOLOGY CLINIC | Facility: CLINIC | Age: 43
End: 2018-01-30

## 2018-02-05 ENCOUNTER — TELEPHONE (OUTPATIENT)
Dept: CARDIOLOGY CLINIC | Facility: CLINIC | Age: 43
End: 2018-02-05

## 2018-02-05 DIAGNOSIS — I35.9 AORTIC VALVE DISORDER: Primary | ICD-10-CM

## 2018-02-05 DIAGNOSIS — Z95.2 H/O MECHANICAL AORTIC VALVE REPLACEMENT: Primary | ICD-10-CM

## 2018-02-05 RX ORDER — AMOXICILLIN 500 MG/1
500 TABLET, FILM COATED ORAL
Qty: 4 TABLET | Refills: 5 | Status: SHIPPED | OUTPATIENT
Start: 2018-02-05 | End: 2018-02-06

## 2018-02-05 NOTE — TELEPHONE ENCOUNTER
I see a prev encounter on this that was not send back to me on friday, I set the amoxicillin for pended fill to Dr Shannan Singh

## 2018-02-06 ENCOUNTER — TELEPHONE (OUTPATIENT)
Dept: CARDIOLOGY CLINIC | Facility: CLINIC | Age: 43
End: 2018-02-06

## 2018-02-06 NOTE — TELEPHONE ENCOUNTER
PT NEEDS CALL BACK ASAP  DIRECTIONS READ TO TAKE ONE AMOXICILLIN BUT PT USUALLY TAKES ALL FOUR  PT IS LEAVING IN LESS THEN AN HOUR FOR DENTIST   PLEASE CALL BACK ASAP

## 2018-02-06 NOTE — TELEPHONE ENCOUNTER
Spoke with patient letting her know to take all four tabs one hour prior, that the script was mistaken

## 2018-02-09 ENCOUNTER — OFFICE VISIT (OUTPATIENT)
Dept: CARDIOLOGY CLINIC | Facility: CLINIC | Age: 43
End: 2018-02-09
Payer: COMMERCIAL

## 2018-02-09 VITALS
SYSTOLIC BLOOD PRESSURE: 110 MMHG | HEART RATE: 76 BPM | DIASTOLIC BLOOD PRESSURE: 64 MMHG | BODY MASS INDEX: 21.48 KG/M2 | HEIGHT: 60 IN | WEIGHT: 109.4 LBS | OXYGEN SATURATION: 98 %

## 2018-02-09 DIAGNOSIS — I35.9 AORTIC VALVE DISORDER: Primary | ICD-10-CM

## 2018-02-09 DIAGNOSIS — Z79.01 ANTICOAGULATED: ICD-10-CM

## 2018-02-09 PROCEDURE — 99213 OFFICE O/P EST LOW 20 MIN: CPT | Performed by: INTERNAL MEDICINE

## 2018-02-09 RX ORDER — AMOXICILLIN 500 MG/1
TABLET, FILM COATED ORAL
Qty: 4 TABLET | Refills: 5 | Status: SHIPPED | OUTPATIENT
Start: 2018-02-09 | End: 2018-07-09

## 2018-02-09 NOTE — PROGRESS NOTES
Cardiology Follow Up    Yesika Son  1975  [id number]  [id number]  [facility] [place] [id number]    1  Aortic valve disorder     2  Anticoagulated       Chief Complaint   Patient presents with    Follow-up     Chief Complaint   Patient presents with    Follow-up       Interval History:  Patient presents for follow-up visit  Patient denies any history of chest pain  No history of shortness of breath out of the ordinary  No history of leg edema orthopnea PND no history of presyncope syncope no history of bleeding issues  Patient is on anticoagulation for history of prosthetic mechanical aortic valve replacement  Patient Active Problem List   Diagnosis    Aortic valve disorder    Anticoagulated     Past Medical History:   Diagnosis Date    Asthma     Cardiac disease      Social History     Social History    Marital status: /Civil Union     Spouse name: N/A    Number of children: N/A    Years of education: N/A     Occupational History    Not on file  Social History Main Topics    Smoking status: Never Smoker    Smokeless tobacco: Never Used    Alcohol use No    Drug use: No    Sexual activity: Not on file     Other Topics Concern    Not on file     Social History Narrative    No narrative on file      No family history on file    Past Surgical History:   Procedure Laterality Date    AORTIC VALVE REPLACEMENT         Current Outpatient Prescriptions:     aspirin (ECOTRIN LOW STRENGTH) 81 mg EC tablet, Take 81 mg by mouth daily, Disp: , Rfl:     metoprolol tartrate (LOPRESSOR) 25 mg tablet, Take 25 mg by mouth every 12 (twelve) hours, Disp: , Rfl:     warfarin (COUMADIN) 5 mg tablet, Take 5 mg by mouth daily, Disp: , Rfl:     albuterol (PROVENTIL HFA,VENTOLIN HFA) 90 mcg/act inhaler, Inhale 2 puffs every 6 (six) hours as needed for wheezing or shortness of breath, Disp: 1 Inhaler, Rfl: 0    amoxicillin (AMOXIL) 500 MG tablet, 4 tabs 1 hour prior to dental procedures, Disp: 4 tablet, Rfl: 5    aspirin 81 MG tablet, Take by mouth, Disp: , Rfl:     fluticasone (FLONASE) 50 mcg/act nasal spray, into each nostril, Disp: , Rfl:     levocetirizine (XYZAL) 5 MG tablet, Take by mouth, Disp: , Rfl:     rizatriptan (MAXALT) 10 MG tablet, Take by mouth, Disp: , Rfl:   Allergies   Allergen Reactions    Dog Epithelium Allergy Skin Test     Oxycodone Vomiting    Pollen Extract        Labs:  Lab Conversion - Encounter on 01/20/2018   Component Date Value    INR 01/19/2018 2 1*    Protime 01/19/2018 21 6*   Lab Conversion - Encounter on 01/03/2018   Component Date Value    INR 01/02/2018 2 2*    Protime 01/02/2018 22 2*   Appointment on 01/03/2018   Component Date Value    WBC 01/03/2018 5 99     RBC 01/03/2018 4 00     Hemoglobin 01/03/2018 12 0     Hematocrit 01/03/2018 35 7     MCV 01/03/2018 89     MCH 01/03/2018 30 0     MCHC 01/03/2018 33 6     RDW 01/03/2018 12 4     MPV 01/03/2018 10 3     Platelets 52/00/5155 246     nRBC 01/03/2018 0     Neutrophils Relative 01/03/2018 68     Lymphocytes Relative 01/03/2018 20     Monocytes Relative 01/03/2018 9     Eosinophils Relative 01/03/2018 2     Basophils Relative 01/03/2018 1     Neutrophils Absolute 01/03/2018 4 05     Lymphocytes Absolute 01/03/2018 1 22     Monocytes Absolute 01/03/2018 0 53     Eosinophils Absolute 01/03/2018 0 14     Basophils Absolute 01/03/2018 0 03     Sodium 01/03/2018 139     Potassium 01/03/2018 3 7     Chloride 01/03/2018 104     CO2 01/03/2018 30     Anion Gap 01/03/2018 5     BUN 01/03/2018 15     Creatinine 01/03/2018 0 61     Glucose 01/03/2018 76     Calcium 01/03/2018 8 4     AST 01/03/2018 22     ALT 01/03/2018 23     Alkaline Phosphatase 01/03/2018 46     Total Protein 01/03/2018 7 1     Albumin 01/03/2018 4 0     Total Bilirubin 01/03/2018 1 39*    eGFR 01/03/2018 111  Cholesterol 01/03/2018 130     Triglycerides 01/03/2018 107     HDL, Direct 01/03/2018 66*    LDL Calculated 01/03/2018 43     TSH 3RD GENERATON 01/03/2018 0 740     Iron 01/03/2018 122     Ferritin 01/03/2018 29     Vitamin B-12 01/03/2018 1345*   Admission on 12/30/2017, Discharged on 12/31/2017   Component Date Value    POC Glucose 12/30/2017 109     WBC 12/30/2017 9 11     RBC 12/30/2017 4 54     Hemoglobin 12/30/2017 13 5     Hematocrit 12/30/2017 41 1     MCV 12/30/2017 91     MCH 12/30/2017 29 7     MCHC 12/30/2017 32 8     RDW 12/30/2017 12 1     MPV 12/30/2017 9 6     Platelets 67/85/2451 248     nRBC 12/30/2017 0     Neutrophils Relative 12/30/2017 68     Lymphocytes Relative 12/30/2017 20     Monocytes Relative 12/30/2017 9     Eosinophils Relative 12/30/2017 2     Basophils Relative 12/30/2017 1     Neutrophils Absolute 12/30/2017 6 17     Lymphocytes Absolute 12/30/2017 1 81     Monocytes Absolute 12/30/2017 0 84     Eosinophils Absolute 12/30/2017 0 21     Basophils Absolute 12/30/2017 0 06     Protime 12/30/2017 21 5*    INR 12/30/2017 1 82*    PTT 12/30/2017 34     Sodium 12/30/2017 140     Potassium 12/30/2017 4 1     Chloride 12/30/2017 102     CO2 12/30/2017 30     Anion Gap 12/30/2017 8     BUN 12/30/2017 14     Creatinine 12/30/2017 0 66     Glucose 12/30/2017 91     Calcium 12/30/2017 9 0     AST 12/30/2017 16     ALT 12/30/2017 23     Alkaline Phosphatase 12/30/2017 52     Total Protein 12/30/2017 7 7     Albumin 12/30/2017 4 2     Total Bilirubin 12/30/2017 1 20*    eGFR 12/30/2017 108     Color, UA 12/30/2017 Yellow     Clarity, UA 12/30/2017 Clear     EXT Glucose, UA 12/30/2017 Negative     EXT Bilirubin, UA (Ref: * 12/30/2017 Negative     EXT Ketones, UA (Ref: Ne* 12/30/2017 Negative     EXT Spec Grav, UA 12/30/2017 1 010     EXT Blood, UA (Ref: Nega* 12/30/2017 Trace     EXT pH, UA 12/30/2017 6 0     EXT Protein, UA (Ref: Ne* 12/30/2017 Negative     EXT Urobilinogen, UA (Re* 12/30/2017 0 2     EXT Leukocytes, UA (Ref:* 12/30/2017 Negative     EXT Nitrite, UA (Ref: Ne* 12/30/2017 Negative    Lab Conversion - Encounter on 12/20/2017   Component Date Value    INR 12/19/2017 2 7*    Protime 12/19/2017 27 5*     Imaging: No results found  Review of Systems:  Review of Systems   REVIEW OF SYSTEMS:  Constitutional:  Denies fever or chills   Eyes:  Denies change in visual acuity   HENT:  Denies nasal congestion or sore throat   Respiratory:  Denies cough or shortness of breath   Cardiovascular:  Denies chest pain or edema   GI:  Denies abdominal pain, nausea, vomiting, bloody stools or diarrhea   :  Denies dysuria, frequency, difficulty in micturition and nocturia  Musculoskeletal:  Denies back pain or joint pain   Neurologic:  Denies headache, focal weakness or sensory changes   Endocrine:  Denies polyuria or polydipsia   Lymphatic:  Denies swollen glands   Psychiatric:   anxiety     Physical Exam:  Physical Exam   PHYSICAL EXAM:  General:  Patient is not in acute distress   Head: Normocephalic, Atraumatic  HEENT:  Both pupils normal-size atraumatic, normocephalic, nonicteric  Neck:  JVP not raised  Trachea central  No carotid bruit  Respiratory:  normal breath sounds no crackles  no rhonchi  Cardiovascular:  Regular rate and rhythm no S3 no murmurs  Heart sounds consistent with prosthetic mechanical  valve  GI:  Abdomen soft nontender  No organomegaly  Lymphatic:  No cervical or inguinal lymphadenopathy  Neurologic:  Patient is awake alert, oriented   Grossly nonfocal    Discussion/Summary:  Patient overall doing well from a cardiovascular standpoint  Continue to follow PT INR for anticoagulation  Symptoms to watch out from cardiac standpoint which would indicate the need for further cardiac evaluation discussed with patient  Previous cardiovascular studies reviewed with the patient  Antibiotic prophylaxis to continue  Patient had a few questions which were answered  Follow-up in 6 months

## 2018-02-12 RX ORDER — AMOXICILLIN 500 MG/1
CAPSULE ORAL
Qty: 4 CAPSULE | Refills: 0 | Status: SHIPPED | OUTPATIENT
Start: 2018-02-12 | End: 2018-02-28

## 2018-02-14 ENCOUNTER — TELEPHONE (OUTPATIENT)
Dept: CARDIOLOGY CLINIC | Facility: CLINIC | Age: 43
End: 2018-02-14

## 2018-02-14 LAB
INR PPP: 2.3
PROTHROMBIN TIME: 24 SEC (ref 9–11.5)

## 2018-02-23 ENCOUNTER — TELEPHONE (OUTPATIENT)
Dept: CARDIOLOGY CLINIC | Facility: CLINIC | Age: 43
End: 2018-02-23

## 2018-02-23 DIAGNOSIS — I35.9 AORTIC VALVE DISORDER: Primary | ICD-10-CM

## 2018-02-23 NOTE — TELEPHONE ENCOUNTER
PT NEEDS AN ORDER FOR BW INR SENT TO [facility]  FAX#[phone]   PT IS ON HER LUNCH CAN YOU PLEASE SEND ASA

## 2018-02-24 LAB
INR PPP: 1.6
PROTHROMBIN TIME: 16.5 SEC (ref 9–11.5)

## 2018-02-26 ENCOUNTER — ANTICOAG VISIT (OUTPATIENT)
Dept: CARDIOLOGY CLINIC | Facility: CLINIC | Age: 43
End: 2018-02-26

## 2018-02-26 DIAGNOSIS — I35.9 AORTIC VALVE DISORDER: Primary | ICD-10-CM

## 2018-03-05 ENCOUNTER — TELEPHONE (OUTPATIENT)
Dept: CARDIOLOGY CLINIC | Facility: CLINIC | Age: 43
End: 2018-03-05

## 2018-03-05 DIAGNOSIS — I35.9 AORTIC VALVE DISORDER: Primary | ICD-10-CM

## 2018-03-05 DIAGNOSIS — I35.9 AORTIC VALVULAR DISORDER: ICD-10-CM

## 2018-03-05 NOTE — TELEPHONE ENCOUNTER
Spoke with pt and explained below  Message, she said she'll get it done today and would like to see if alere will be covered with her insurance   I informed her i'll fill out the form today and they'll contact her, she stated her understanding-MS

## 2018-03-05 NOTE — TELEPHONE ENCOUNTER
PT WANTS INFO ON HOME INR TESTING AND ALSO PT HAS BEEN BLEEDING FROM HER GUMS FOR THE PAST 4 OR 5 DAYS  PT ALSO WANTS TO KNOW IF AN ORDER WAS SENT TO [facility] FOR HER INR TESTING  PT IS GOING TO [facility] TODAY  AND WANTS TO KNOW IF YOU CAN PUT AN ORDER In  PLEASE CALL PT TO DISCUSS ABOVE   Ian Vega

## 2018-03-05 NOTE — TELEPHONE ENCOUNTER
Lm that I did send the order to [facility] on 2/23 when she requested it, order put in today for our lab as a standing order   Please advise about bleeding in gums, ty-MS

## 2018-03-05 NOTE — TELEPHONE ENCOUNTER
PT CALLED BACK AND SAID SHE IS NOT COMING TO LAB    PT WILL GO TO [facility] TODAY AT [facility]

## 2018-03-05 NOTE — TELEPHONE ENCOUNTER
Order has already been sent to [address] [place] [address] when she requested it last time-MS

## 2018-03-06 ENCOUNTER — ANTICOAG VISIT (OUTPATIENT)
Dept: CARDIOLOGY CLINIC | Facility: CLINIC | Age: 43
End: 2018-03-06

## 2018-03-06 LAB
INR PPP: 1.8
PROTHROMBIN TIME: 18 SEC (ref 9–11.5)

## 2018-03-06 NOTE — PROGRESS NOTES
Spoke with patient telling her to take 7 5 mg for 3 days, then start 5/5/7 5 and recheck INR in 2 weeks

## 2018-03-14 ENCOUNTER — TELEPHONE (OUTPATIENT)
Dept: CARDIOLOGY CLINIC | Facility: CLINIC | Age: 43
End: 2018-03-14

## 2018-03-14 NOTE — TELEPHONE ENCOUNTER
Spoke with pt and went over her coumadin instructions, she was a little confused when she received instructions last week because she was driving and did not write it down   Said she'll get her inr checked today to be sure it's ok-MS

## 2018-03-15 ENCOUNTER — APPOINTMENT (OUTPATIENT)
Dept: LAB | Facility: CLINIC | Age: 43
End: 2018-03-15
Payer: COMMERCIAL

## 2018-03-15 LAB
INR PPP: 2.84 (ref 0.86–1.16)
PROTHROMBIN TIME: 30.2 SECONDS (ref 12.1–14.4)

## 2018-03-15 PROCEDURE — 36415 COLL VENOUS BLD VENIPUNCTURE: CPT

## 2018-03-15 PROCEDURE — 85610 PROTHROMBIN TIME: CPT

## 2018-03-16 ENCOUNTER — TELEPHONE (OUTPATIENT)
Dept: CARDIOLOGY CLINIC | Facility: CLINIC | Age: 43
End: 2018-03-16

## 2018-03-16 ENCOUNTER — ANTICOAG VISIT (OUTPATIENT)
Dept: CARDIOLOGY CLINIC | Facility: CLINIC | Age: 43
End: 2018-03-16

## 2018-03-20 ENCOUNTER — TELEPHONE (OUTPATIENT)
Dept: CARDIOLOGY CLINIC | Facility: CLINIC | Age: 43
End: 2018-03-20

## 2018-03-20 NOTE — TELEPHONE ENCOUNTER
Form received requesting pts last note and 3-6 mo inr results, faxed over requested documents to Yanet Garces at [phone] with a confirmation-MS

## 2018-03-20 NOTE — TELEPHONE ENCOUNTER
ELLY MOJICA [facility] SAID PT INSURANCE IS REQUESTING 3 TO 6 MONTHS OF INR'S, PROGRESS NOTES AND HISTORY AND PHYSICAL  [address] FAXED THIS REQUEST 03/15   PLEASE CALL ELLY TO CONFIRM YOU RECEIVED REQUEST @ [phone] EXT [phone]

## 2018-03-23 ENCOUNTER — TELEPHONE (OUTPATIENT)
Dept: CARDIOLOGY CLINIC | Facility: CLINIC | Age: 43
End: 2018-03-23

## 2018-03-23 NOTE — TELEPHONE ENCOUNTER
It depends on her range  Her Inr range has to be between 2 and 3   If she has nose bleeds which are recurrent and INR is in range, she should see ENT

## 2018-03-23 NOTE — TELEPHONE ENCOUNTER
Patient called and wanted a call back, patient stated every since her dosage changed for her coumadin she has been waking up with a bloody nose or a mouth full of blood  PT would like a cb # on file ok to call

## 2018-04-04 ENCOUNTER — ANTICOAG VISIT (OUTPATIENT)
Dept: CARDIOLOGY CLINIC | Facility: CLINIC | Age: 43
End: 2018-04-04

## 2018-04-04 LAB
INR PPP: 2.3
PROTHROMBIN TIME: 23.6 SEC (ref 9–11.5)

## 2018-04-24 ENCOUNTER — TELEPHONE (OUTPATIENT)
Dept: CARDIOLOGY CLINIC | Facility: CLINIC | Age: 43
End: 2018-04-24

## 2018-04-24 ENCOUNTER — ANTICOAG VISIT (OUTPATIENT)
Dept: CARDIOLOGY CLINIC | Facility: CLINIC | Age: 43
End: 2018-04-24

## 2018-04-24 LAB
INR PPP: 2
PROTHROMBIN TIME: 20.6 SEC (ref 9–11.5)

## 2018-04-24 NOTE — TELEPHONE ENCOUNTER
PT WANTS TO KNOW IF SHE CAN SEE A DR FOR HER VEINS  PT SAID HER LEGS REALLY HURT AND SHE IS ON HER FEET ALL DAY AT WORK   PLEASE CALL PT TO DISCUSS

## 2018-05-04 ENCOUNTER — ANTICOAG VISIT (OUTPATIENT)
Dept: CARDIOLOGY CLINIC | Facility: CLINIC | Age: 43
End: 2018-05-04

## 2018-05-04 DIAGNOSIS — I35.9 AORTIC VALVE DISORDER: ICD-10-CM

## 2018-05-04 LAB
INR PPP: 2.1
INR PPP: 2.1 (ref 0.86–1.16)
PROTHROMBIN TIME: 21.8 SEC (ref 9–11.5)

## 2018-05-09 DIAGNOSIS — Z92.89 PERSONAL HISTORY OF OTHER MEDICAL TREATMENT (CODE): ICD-10-CM

## 2018-05-10 ENCOUNTER — HOSPITAL ENCOUNTER (OUTPATIENT)
Dept: NON INVASIVE DIAGNOSTICS | Facility: MEDICAL CENTER | Age: 43
Discharge: HOME | End: 2018-05-10
Payer: COMMERCIAL

## 2018-05-10 DIAGNOSIS — I35.9 AORTIC VALVE DISORDER: ICD-10-CM

## 2018-05-10 PROCEDURE — 93306 TTE W/DOPPLER COMPLETE: CPT | Performed by: INTERNAL MEDICINE

## 2018-05-10 PROCEDURE — 93306 TTE W/DOPPLER COMPLETE: CPT

## 2018-05-11 ENCOUNTER — TELEPHONE (OUTPATIENT)
Dept: CARDIOLOGY CLINIC | Facility: CLINIC | Age: 43
End: 2018-05-11

## 2018-05-11 NOTE — TELEPHONE ENCOUNTER
----- Message from Conchita Nicole MD sent at 5/10/2018  7:32 PM EDT -----  Call echo overall good   Normally functioning prosthetic mechanical AV

## 2018-05-14 DIAGNOSIS — G43.809 OTHER MIGRAINE WITHOUT STATUS MIGRAINOSUS, NOT INTRACTABLE: Primary | ICD-10-CM

## 2018-05-14 DIAGNOSIS — G43.909 MIGRAINE WITHOUT STATUS MIGRAINOSUS, NOT INTRACTABLE, UNSPECIFIED MIGRAINE TYPE: Primary | ICD-10-CM

## 2018-05-14 RX ORDER — RIZATRIPTAN BENZOATE 10 MG/1
10 TABLET ORAL ONCE AS NEEDED
Qty: 30 TABLET | Refills: 0 | Status: SHIPPED | OUTPATIENT
Start: 2018-05-14 | End: 2018-06-05

## 2018-05-14 RX ORDER — RIZATRIPTAN BENZOATE 10 MG/1
TABLET ORAL
Qty: 6 TABLET | Refills: 0 | Status: SHIPPED | OUTPATIENT
Start: 2018-05-14 | End: 2018-07-10 | Stop reason: ALTCHOICE

## 2018-05-14 NOTE — TELEPHONE ENCOUNTER
Pt cld asking to have her RIZATRIPTAN called in to [facility] in [place]  She is totally out and feels a migraine coming on

## 2018-05-21 DIAGNOSIS — I35.9 AORTIC VALVE DISORDER: Primary | ICD-10-CM

## 2018-05-21 RX ORDER — WARFARIN SODIUM 5 MG/1
TABLET ORAL
Qty: 90 TABLET | Refills: 3 | Status: SHIPPED | OUTPATIENT
Start: 2018-05-21 | End: 2019-01-31 | Stop reason: SDUPTHER

## 2018-05-25 ENCOUNTER — ANTICOAG VISIT (OUTPATIENT)
Dept: CARDIOLOGY CLINIC | Facility: CLINIC | Age: 43
End: 2018-05-25

## 2018-05-25 DIAGNOSIS — I35.9 AORTIC VALVE DISORDER: ICD-10-CM

## 2018-05-25 LAB
INR PPP: 2.5
PROTHROMBIN TIME: 25.9 SEC (ref 9–11.5)

## 2018-06-04 ENCOUNTER — TELEPHONE (OUTPATIENT)
Dept: INTERNAL MEDICINE CLINIC | Facility: CLINIC | Age: 43
End: 2018-06-04

## 2018-06-04 NOTE — TELEPHONE ENCOUNTER
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

## 2018-06-05 ENCOUNTER — OFFICE VISIT (OUTPATIENT)
Dept: INTERNAL MEDICINE CLINIC | Facility: CLINIC | Age: 43
End: 2018-06-05
Payer: COMMERCIAL

## 2018-06-05 ENCOUNTER — TELEPHONE (OUTPATIENT)
Dept: INTERNAL MEDICINE CLINIC | Facility: CLINIC | Age: 43
End: 2018-06-05

## 2018-06-05 VITALS
OXYGEN SATURATION: 98 % | HEIGHT: 60 IN | WEIGHT: 110 LBS | SYSTOLIC BLOOD PRESSURE: 122 MMHG | HEART RATE: 79 BPM | BODY MASS INDEX: 21.6 KG/M2 | DIASTOLIC BLOOD PRESSURE: 62 MMHG

## 2018-06-05 DIAGNOSIS — I35.9 AORTIC VALVE DISORDER: Primary | ICD-10-CM

## 2018-06-05 DIAGNOSIS — L23.7 POISON IVY: ICD-10-CM

## 2018-06-05 PROCEDURE — 99213 OFFICE O/P EST LOW 20 MIN: CPT | Performed by: INTERNAL MEDICINE

## 2018-06-05 RX ORDER — PREDNISONE 10 MG/1
TABLET ORAL
Qty: 50 TABLET | Refills: 0 | Status: SHIPPED | OUTPATIENT
Start: 2018-06-05 | End: 2018-07-10 | Stop reason: ALTCHOICE

## 2018-06-05 NOTE — PROGRESS NOTES
Assessment/Plan:    There are no Patient Instructions on file for this visit  Diagnoses and all orders for this visit:    Aortic valve disorder    Poison ivy    Other orders  -     Prenatal Vit-Fe Fumarate-FA (PRENATAL VITAMIN AND MINERAL PO); Take by mouth         Subjective:      Patient ID: Sharol Bosworth is a 38 y o  female    Patient was gardening and was exposed to poison ivy last week and the poison ivy continues to get worse on bilateral arms and flank  She has responded well to prednisone in the past           Current Outpatient Prescriptions:     amoxicillin (AMOXIL) 500 MG tablet, 4 tabs 1 hour prior to dental procedures, Disp: 4 tablet, Rfl: 5    aspirin (ECOTRIN LOW STRENGTH) 81 mg EC tablet, Take 81 mg by mouth daily, Disp: , Rfl:     levocetirizine (XYZAL) 5 MG tablet, Take by mouth, Disp: , Rfl:     metoprolol tartrate (LOPRESSOR) 25 mg tablet, Take 25 mg by mouth every 12 (twelve) hours, Disp: , Rfl:     Prenatal Vit-Fe Fumarate-FA (PRENATAL VITAMIN AND MINERAL PO), Take by mouth, Disp: , Rfl:     rizatriptan (MAXALT) 10 MG tablet, take 1 tablet by mouth AT ONSET OF HEADACHE  MAY REPEAT EVERY 2 HOURS AS NEEDED   MAXIMUM 3 TABLETS IN 24 HOURS, Disp: 6 tablet, Rfl: 0    warfarin (COUMADIN) 5 mg tablet, take 1 to 1 1/2 tablets by mouth once daily as directed, Disp: 90 tablet, Rfl: 3    Recent Results (from the past 1008 hour(s))   Protime-INR    Collection Time: 05/03/18 10:59 AM   Result Value Ref Range    INR 2 1 (H)     SL AMB PROTHROMBIN TIME 21 8 (H) 9 0 - 11 5 sec   Protime-INR    Collection Time: 05/04/18 12:00 AM   Result Value Ref Range    INR 2 10 (A) 0 86 - 1 16   Protime-INR    Collection Time: 05/24/18  1:56 PM   Result Value Ref Range    INR 2 5 (H)     SL AMB PROTHROMBIN TIME 25 9 (H) 9 0 - 11 5 sec       The following portions of the patient's history were reviewed and updated as appropriate: allergies, current medications, past family history, past medical history, past social history, past surgical history and problem list      Review of Systems   Skin: Positive for rash  Objective:      Vitals:    06/05/18 1422   BP: 122/62   Pulse: 79   SpO2: 98%          Physical Exam   Constitutional: She appears well-developed and well-nourished  Skin: Rash noted     Erythematous base papular rash with small vesicles consistent with contact dermatitis

## 2018-06-05 NOTE — LETTER
I started patient on a prednisone taper for poison ivy today and advised that she check her INR later this week  She is due at that time anyway just so you are aware if there is a bump in her  INR the prednisone may be the culprit

## 2018-06-05 NOTE — PATIENT INSTRUCTIONS
Rash consistent with poison ivy -risks, benefits and side effects of prednisone discussed including increasing INR, increased risk for DVT, bone fracture and sepsis within 1 month following this course  Patient was started 50 mg daily until rash improves and then taper by 10 mg each day, 40, 30, 20, 10 mg then stop  She will to check her INR in 3-4 days and I will contact her cardiologist letting him know of this medication change

## 2018-06-06 ENCOUNTER — TELEPHONE (OUTPATIENT)
Dept: CARDIOLOGY CLINIC | Facility: CLINIC | Age: 43
End: 2018-06-06

## 2018-06-06 ENCOUNTER — ANTICOAG VISIT (OUTPATIENT)
Dept: CARDIOLOGY CLINIC | Facility: CLINIC | Age: 43
End: 2018-06-06

## 2018-06-06 DIAGNOSIS — I35.9 AORTIC VALVE DISORDER: ICD-10-CM

## 2018-06-06 LAB — INR PPP: 3 (ref 0.86–1.17)

## 2018-06-06 NOTE — TELEPHONE ENCOUNTER
S/w pt, sd recheck in 2 weeks (6-20-18) verbally understood  Pt wanted you to be aware that she is currently on Prednisone for Poison ivy

## 2018-06-12 ENCOUNTER — TELEPHONE (OUTPATIENT)
Dept: CARDIOLOGY CLINIC | Facility: CLINIC | Age: 43
End: 2018-06-12

## 2018-06-26 ENCOUNTER — TELEPHONE (OUTPATIENT)
Dept: CARDIOLOGY CLINIC | Facility: CLINIC | Age: 43
End: 2018-06-26

## 2018-06-26 ENCOUNTER — ANTICOAG VISIT (OUTPATIENT)
Dept: CARDIOLOGY CLINIC | Facility: CLINIC | Age: 43
End: 2018-06-26

## 2018-06-26 DIAGNOSIS — I35.9 AORTIC VALVE DISORDER: ICD-10-CM

## 2018-06-26 LAB — INR PPP: 2 (ref 0.86–1.17)

## 2018-07-05 ENCOUNTER — OFFICE VISIT (OUTPATIENT)
Dept: INTERNAL MEDICINE CLINIC | Facility: CLINIC | Age: 43
End: 2018-07-05
Payer: COMMERCIAL

## 2018-07-05 VITALS
RESPIRATION RATE: 12 BRPM | SYSTOLIC BLOOD PRESSURE: 124 MMHG | DIASTOLIC BLOOD PRESSURE: 64 MMHG | TEMPERATURE: 98 F | WEIGHT: 111.6 LBS | HEART RATE: 60 BPM | BODY MASS INDEX: 21.91 KG/M2 | HEIGHT: 60 IN

## 2018-07-05 DIAGNOSIS — B07.0 PLANTAR WART: Primary | ICD-10-CM

## 2018-07-05 PROCEDURE — 99213 OFFICE O/P EST LOW 20 MIN: CPT | Performed by: PHYSICIAN ASSISTANT

## 2018-07-05 RX ORDER — PNV,CALCIUM 72/IRON/FOLIC ACID 27 MG-1 MG
TABLET ORAL
Refills: 0 | COMMUNITY
Start: 2018-06-17 | End: 2018-07-05 | Stop reason: CLARIF

## 2018-07-05 NOTE — PROGRESS NOTES
Assessment/Plan:    Lesion c/w plantar's wart/right foot-  Referral to podiatry  Advised xray of foot to r/o FB, but pt refused saying it was a waste of time and money  No problem-specific Assessment & Plan notes found for this encounter  Diagnoses and all orders for this visit:    Plantar wart  -     Ambulatory referral to Podiatry; Future    Other orders  -     Discontinue: Prenatal Vit-Fe Fumarate-FA (PREPLUS) 27-1 MG TABS;           Subjective:      Patient ID: Wagner Aggarwal is a 37 y o  female  Pt here with foot pain on the bottom, right foot  She's had it for a few months  She says it feels like she stepped on something and something is "in there", but she is pretty sure that's not what happened  It is over the ball of the foot in line with the 3rd-4th toe  It hurts to walk on it due to the pressure on the ball of her foot  She says she rarely goes anywhere barefooted  No sore or wound  No drainage  The following portions of the patient's history were reviewed and updated as appropriate: allergies, current medications, past family history, past medical history, past social history, past surgical history and problem list     Review of Systems   Constitutional: Negative for chills and fever  Skin: Positive for rash and wound  Objective:      /64 (BP Location: Left arm, Patient Position: Sitting)   Pulse 60   Temp 98 °F (36 7 °C) (Oral)   Resp 12   Ht 5' (1 524 m)   Wt 50 6 kg (111 lb 9 6 oz)   BMI 21 80 kg/m²          Physical Exam   Constitutional: She appears well-developed and well-nourished  Cardiovascular: Normal rate and regular rhythm      Musculoskeletal:        Feet:    Where indicated is a very small ulceration, flesh colored, slightly hard to touch, non erythematous, no drainage, c/w plantars wart

## 2018-07-09 ENCOUNTER — TELEPHONE (OUTPATIENT)
Dept: CARDIOLOGY CLINIC | Facility: CLINIC | Age: 43
End: 2018-07-09

## 2018-07-09 NOTE — TELEPHONE ENCOUNTER
Pt called and stated her INR was at a 4 5 today  She states that her nose has begun to bleed and has not stopped  Please advise

## 2018-07-09 NOTE — TELEPHONE ENCOUNTER
S/w pt   She understood she is to stop coumadin for 3 days and recheck labs on Thursday per Dr Jorge Mary

## 2018-07-10 ENCOUNTER — CONSULT (OUTPATIENT)
Dept: VASCULAR SURGERY | Facility: CLINIC | Age: 43
End: 2018-07-10
Payer: COMMERCIAL

## 2018-07-10 VITALS
HEIGHT: 60 IN | HEART RATE: 70 BPM | DIASTOLIC BLOOD PRESSURE: 72 MMHG | BODY MASS INDEX: 21.79 KG/M2 | WEIGHT: 111 LBS | SYSTOLIC BLOOD PRESSURE: 136 MMHG | RESPIRATION RATE: 16 BRPM | TEMPERATURE: 98.4 F

## 2018-07-10 DIAGNOSIS — I83.813 VARICOSE VEINS OF LOWER EXTREMITY WITH PAIN, BILATERAL: Primary | ICD-10-CM

## 2018-07-10 PROCEDURE — 99244 OFF/OP CNSLTJ NEW/EST MOD 40: CPT | Performed by: NURSE PRACTITIONER

## 2018-07-10 NOTE — PATIENT INSTRUCTIONS
Symptomatic varicose veins to bilateral lower extremities    History of vein stripping/lasering  -We will check a venous reflux assessment to further evaluate your pain  -Wear knee high compression stockings daily during waking hours to help manage your symptoms  -Other things we discussed that may help your symptoms:  Periodic elevation, regular physical activity and maintenance of a healthy weight  -Followup after ultrasound with Dr Chris Prader for further recommendations
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
## 2018-07-12 ENCOUNTER — TELEPHONE (OUTPATIENT)
Dept: CARDIOLOGY CLINIC | Facility: CLINIC | Age: 43
End: 2018-07-12

## 2018-07-12 ENCOUNTER — ANTICOAG VISIT (OUTPATIENT)
Dept: CARDIOLOGY CLINIC | Facility: CLINIC | Age: 43
End: 2018-07-12

## 2018-07-12 DIAGNOSIS — I35.9 AORTIC VALVE DISORDER: ICD-10-CM

## 2018-07-12 LAB — INR PPP: 1.1 (ref 0.86–1.17)

## 2018-07-12 NOTE — TELEPHONE ENCOUNTER
S/w pt she verbally understood per Dr  [facility] she is to take 10mg today, 7 5mg for 2 days then 5/5/7 5mgs and recheck in 10 days

## 2018-07-12 NOTE — PROGRESS NOTES
S/w pt she verbally understood per Dr Lazaro Melissa she is to take 10mg today, 7 5mg for 2 days then 5/5/7 5mgs and recheck in 10 days

## 2018-07-12 NOTE — TELEPHONE ENCOUNTER
todays INR 1 1  Last INR 2 0  Aortic valve disorder  5 mg, then 5 mg, then 7 5 mg repeating every 3 days

## 2018-07-23 ENCOUNTER — TELEPHONE (OUTPATIENT)
Dept: CARDIOLOGY CLINIC | Facility: CLINIC | Age: 43
End: 2018-07-23

## 2018-07-23 ENCOUNTER — ANTICOAG VISIT (OUTPATIENT)
Dept: CARDIOLOGY CLINIC | Facility: CLINIC | Age: 43
End: 2018-07-23

## 2018-07-23 DIAGNOSIS — I35.9 AORTIC VALVE DISORDER: ICD-10-CM

## 2018-07-23 LAB — INR PPP: 2.3 (ref 0.86–1.17)

## 2018-08-06 ENCOUNTER — TELEPHONE (OUTPATIENT)
Dept: CARDIOLOGY CLINIC | Facility: CLINIC | Age: 43
End: 2018-08-06

## 2018-08-06 ENCOUNTER — ANTICOAG VISIT (OUTPATIENT)
Dept: CARDIOLOGY CLINIC | Facility: CLINIC | Age: 43
End: 2018-08-06

## 2018-08-06 DIAGNOSIS — I35.9 AORTIC VALVE DISORDER: ICD-10-CM

## 2018-08-06 LAB — INR PPP: 4.7 (ref 0.86–1.17)

## 2018-08-15 ENCOUNTER — ANTICOAG VISIT (OUTPATIENT)
Dept: CARDIOLOGY CLINIC | Facility: CLINIC | Age: 43
End: 2018-08-15

## 2018-08-15 DIAGNOSIS — I35.9 AORTIC VALVE DISORDER: ICD-10-CM

## 2018-08-15 LAB — INR PPP: 1.9 (ref 0.86–1.17)

## 2018-08-21 ENCOUNTER — TELEPHONE (OUTPATIENT)
Dept: OBGYN CLINIC | Age: 43
End: 2018-08-21

## 2018-08-21 ENCOUNTER — TELEPHONE (OUTPATIENT)
Dept: CARDIOLOGY CLINIC | Facility: CLINIC | Age: 43
End: 2018-08-21

## 2018-08-21 ENCOUNTER — HOSPITAL ENCOUNTER (EMERGENCY)
Facility: HOSPITAL | Age: 43
Discharge: HOME | End: 2018-08-21
Attending: EMERGENCY MEDICINE | Admitting: EMERGENCY MEDICINE
Payer: COMMERCIAL

## 2018-08-21 ENCOUNTER — APPOINTMENT (EMERGENCY)
Dept: RADIOLOGY | Facility: HOSPITAL | Age: 43
End: 2018-08-21
Payer: COMMERCIAL

## 2018-08-21 ENCOUNTER — ANTICOAG VISIT (OUTPATIENT)
Dept: CARDIOLOGY CLINIC | Facility: CLINIC | Age: 43
End: 2018-08-21

## 2018-08-21 VITALS
HEART RATE: 63 BPM | WEIGHT: 236.33 LBS | TEMPERATURE: 98.2 F | SYSTOLIC BLOOD PRESSURE: 146 MMHG | DIASTOLIC BLOOD PRESSURE: 70 MMHG | BODY MASS INDEX: 46.16 KG/M2 | OXYGEN SATURATION: 99 % | RESPIRATION RATE: 18 BRPM

## 2018-08-21 DIAGNOSIS — R07.9 CHEST PAIN: Primary | ICD-10-CM

## 2018-08-21 DIAGNOSIS — I35.9 AORTIC VALVE DISORDER: ICD-10-CM

## 2018-08-21 LAB
ALBUMIN SERPL BCP-MCNC: 3.9 G/DL (ref 3.5–5)
ALP SERPL-CCNC: 38 U/L (ref 46–116)
ALT SERPL W P-5'-P-CCNC: 26 U/L (ref 12–78)
ANION GAP SERPL CALCULATED.3IONS-SCNC: 4 MMOL/L (ref 4–13)
AST SERPL W P-5'-P-CCNC: 21 U/L (ref 5–45)
ATRIAL RATE: 52 BPM
ATRIAL RATE: 60 BPM
BASOPHILS # BLD AUTO: 0.05 THOUSANDS/ΜL (ref 0–0.1)
BASOPHILS NFR BLD AUTO: 1 % (ref 0–1)
BILIRUB SERPL-MCNC: 1.9 MG/DL (ref 0.2–1)
BUN SERPL-MCNC: 12 MG/DL (ref 5–25)
CALCIUM SERPL-MCNC: 8.6 MG/DL (ref 8.3–10.1)
CHLORIDE SERPL-SCNC: 103 MMOL/L (ref 100–108)
CO2 SERPL-SCNC: 30 MMOL/L (ref 21–32)
CREAT SERPL-MCNC: 0.66 MG/DL (ref 0.6–1.3)
DEPRECATED D DIMER PPP: <270 NG/ML (FEU) (ref 0–424)
EOSINOPHIL # BLD AUTO: 0.08 THOUSAND/ΜL (ref 0–0.61)
EOSINOPHIL NFR BLD AUTO: 2 % (ref 0–6)
ERYTHROCYTE [DISTWIDTH] IN BLOOD BY AUTOMATED COUNT: 11.8 % (ref 11.6–15.1)
EXT PREG TEST URINE: NEGATIVE
GFR SERPL CREATININE-BSD FRML MDRD: 107 ML/MIN/1.73SQ M
GLUCOSE SERPL-MCNC: 62 MG/DL (ref 65–140)
HCT VFR BLD AUTO: 37.1 % (ref 34.8–46.1)
HGB BLD-MCNC: 12.7 G/DL (ref 11.5–15.4)
HOLD SPECIMEN: NORMAL
IMM GRANULOCYTES # BLD AUTO: 0.01 THOUSAND/UL (ref 0–0.2)
IMM GRANULOCYTES NFR BLD AUTO: 0 % (ref 0–2)
INR PPP: 2.75 (ref 0.86–1.17)
INR PPP: 2.8 (ref 0.86–1.17)
LYMPHOCYTES # BLD AUTO: 0.98 THOUSANDS/ΜL (ref 0.6–4.47)
LYMPHOCYTES NFR BLD AUTO: 19 % (ref 14–44)
MCH RBC QN AUTO: 31.4 PG (ref 26.8–34.3)
MCHC RBC AUTO-ENTMCNC: 34.2 G/DL (ref 31.4–37.4)
MCV RBC AUTO: 92 FL (ref 82–98)
MONOCYTES # BLD AUTO: 0.54 THOUSAND/ΜL (ref 0.17–1.22)
MONOCYTES NFR BLD AUTO: 10 % (ref 4–12)
NEUTROPHILS # BLD AUTO: 3.65 THOUSANDS/ΜL (ref 1.85–7.62)
NEUTS SEG NFR BLD AUTO: 68 % (ref 43–75)
NRBC BLD AUTO-RTO: 0 /100 WBCS
NT-PROBNP SERPL-MCNC: 307 PG/ML
P AXIS: 78 DEGREES
P AXIS: 83 DEGREES
PLATELET # BLD AUTO: 214 THOUSANDS/UL (ref 149–390)
PMV BLD AUTO: 9.8 FL (ref 8.9–12.7)
POTASSIUM SERPL-SCNC: 3.2 MMOL/L (ref 3.5–5.3)
PR INTERVAL: 136 MS
PR INTERVAL: 140 MS
PROT SERPL-MCNC: 7.3 G/DL (ref 6.4–8.2)
PROTHROMBIN TIME: 28.7 SECONDS (ref 11.8–14.2)
QRS AXIS: 84 DEGREES
QRS AXIS: 84 DEGREES
QRSD INTERVAL: 90 MS
QRSD INTERVAL: 92 MS
QT INTERVAL: 426 MS
QT INTERVAL: 460 MS
QTC INTERVAL: 426 MS
QTC INTERVAL: 427 MS
RBC # BLD AUTO: 4.05 MILLION/UL (ref 3.81–5.12)
SODIUM SERPL-SCNC: 137 MMOL/L (ref 136–145)
T WAVE AXIS: 72 DEGREES
T WAVE AXIS: 79 DEGREES
TROPONIN I SERPL-MCNC: <0.02 NG/ML
TROPONIN I SERPL-MCNC: <0.02 NG/ML
TSH SERPL DL<=0.05 MIU/L-ACNC: 0.54 UIU/ML (ref 0.36–3.74)
VENTRICULAR RATE: 52 BPM
VENTRICULAR RATE: 60 BPM
WBC # BLD AUTO: 5.31 THOUSAND/UL (ref 4.31–10.16)

## 2018-08-21 PROCEDURE — 96374 THER/PROPH/DIAG INJ IV PUSH: CPT

## 2018-08-21 PROCEDURE — 93005 ELECTROCARDIOGRAM TRACING: CPT

## 2018-08-21 PROCEDURE — 36415 COLL VENOUS BLD VENIPUNCTURE: CPT | Performed by: EMERGENCY MEDICINE

## 2018-08-21 PROCEDURE — 71046 X-RAY EXAM CHEST 2 VIEWS: CPT

## 2018-08-21 PROCEDURE — 80053 COMPREHEN METABOLIC PANEL: CPT | Performed by: EMERGENCY MEDICINE

## 2018-08-21 PROCEDURE — 84443 ASSAY THYROID STIM HORMONE: CPT | Performed by: EMERGENCY MEDICINE

## 2018-08-21 PROCEDURE — 85379 FIBRIN DEGRADATION QUANT: CPT | Performed by: EMERGENCY MEDICINE

## 2018-08-21 PROCEDURE — 85610 PROTHROMBIN TIME: CPT | Performed by: EMERGENCY MEDICINE

## 2018-08-21 PROCEDURE — 85025 COMPLETE CBC W/AUTO DIFF WBC: CPT | Performed by: EMERGENCY MEDICINE

## 2018-08-21 PROCEDURE — 84484 ASSAY OF TROPONIN QUANT: CPT | Performed by: EMERGENCY MEDICINE

## 2018-08-21 PROCEDURE — 99285 EMERGENCY DEPT VISIT HI MDM: CPT

## 2018-08-21 PROCEDURE — 81025 URINE PREGNANCY TEST: CPT | Performed by: EMERGENCY MEDICINE

## 2018-08-21 PROCEDURE — 93010 ELECTROCARDIOGRAM REPORT: CPT | Performed by: INTERNAL MEDICINE

## 2018-08-21 PROCEDURE — 83880 ASSAY OF NATRIURETIC PEPTIDE: CPT | Performed by: EMERGENCY MEDICINE

## 2018-08-21 RX ORDER — MULTIVIT-MIN/IRON FUM/FOLIC AC 7.5 MG-4
1 TABLET ORAL DAILY
COMMUNITY
End: 2020-04-16 | Stop reason: SDUPTHER

## 2018-08-21 RX ORDER — KETOROLAC TROMETHAMINE 30 MG/ML
30 INJECTION, SOLUTION INTRAMUSCULAR; INTRAVENOUS ONCE
Status: COMPLETED | OUTPATIENT
Start: 2018-08-21 | End: 2018-08-21

## 2018-08-21 RX ADMIN — KETOROLAC TROMETHAMINE 30 MG: 30 INJECTION, SOLUTION INTRAMUSCULAR at 10:03

## 2018-08-21 NOTE — ED PROVIDER NOTES
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  Currently, the patient is lying in bed, in distress only during the time of IV being started but otherwise in no pain, not diaphoretic, has skin which is warm and dry, is otherwise well-appearing  The patient's physical exam is normal   The patient will be evaluated with a differential diagnosis to include but not be limited to acute coronary syndrome, STEMI, pneumonia  Currently the patient is perc negative  History provided by:  Patient   used: No    Chest Pain   Pain location:  L chest  Pain radiates to:  Does not radiate  Pain radiates to the back: no    Pain severity:  Mild  Duration:  12 hours  Timing:  Constant  Progression:  Unchanged  Context: not breathing, not eating, no intercourse, not lifting, no stress and no trauma        Prior to Admission Medications   Prescriptions Last Dose Informant Patient Reported? Taking?    BIOTIN 5000 PO   Yes Yes   Sig: Take 20,000 mcg by mouth daily   Multiple Vitamins-Minerals (MULTIVITAMIN WITH MINERALS) tablet   Yes Yes   Sig: Take 1 tablet by mouth daily   aspirin (ECOTRIN LOW STRENGTH) 81 mg EC tablet   Yes No   Sig: Take 81 mg by mouth daily   metoprolol tartrate (LOPRESSOR) 25 mg tablet   Yes No   Sig: Take 25 mg by mouth every 12 (twelve) hours   warfarin (COUMADIN) 5 mg tablet   No No   Sig: take 1 to 1 1/2 tablets by mouth once daily as directed      Facility-Administered Medications: None       Past Medical History:   Diagnosis Date    Allergic contact dermatitis     Allergic rhinitis     resolved 2018    Anemia     Aortic valve disorder     Aortic valve insufficiency     Asthma     Benign neoplasm of skin     Cardiac disease     Costochondritis     Hyperinsulinism     Inguinal lymphadenopathy     resolved 2015    Migraine     resolved 2015    Varicose 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     unspecified laterality       Past Surgical History:   Procedure Laterality Date    AORTIC VALVE REPLACEMENT      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, replaced with mechanical aortic valve  and root replacement at [facility] dr sawyer   [place]      [address]   [address]  [address]    bovine, with redo and mechanical valve replacement and root 2012 dr sawyer at [facility] last assessed 08/15/2016    CERVICAL BIOPSY  W/ LOOP ELECTRODE EXCISION      INDUCED       surgically by dilation and evacuation    RHINOPLASTY         Family History   Problem Relation Age of Onset    Asthma Father     Coronary artery disease Father     Hypertension Father     Crohn's disease Sister         with compliction unspecified gastrointestinal tract location    Breast cancer Maternal Grandmother      I have reviewed and agree with the history as documented  Social History   Substance Use Topics    Smoking status: Never Smoker    Smokeless tobacco: Never Used    Alcohol use No      Comment: drinks hard liquor, social per allscripts        Review of Systems   Cardiovascular: Positive for chest pain  All other systems reviewed and are negative  Physical Exam  Physical Exam   Constitutional: She is oriented to person, place, and time  She appears well-developed and well-nourished  HENT:   Head: Normocephalic and atraumatic     Right Ear: External ear normal    Left Ear: External ear normal    Eyes: Conjunctivae and EOM are normal    Neck: No JVD present  No tracheal deviation present  No thyromegaly present  Cardiovascular: Normal rate  Pulmonary/Chest: Effort normal and breath sounds normal  No stridor  Abdominal: Soft  She exhibits no distension and no mass  There is no tenderness  There is no guarding  No hernia  Musculoskeletal: Normal range of motion  She exhibits no edema, tenderness or deformity  Lymphadenopathy:     She has no cervical adenopathy  Neurological: She is alert and oriented to person, place, and time  Skin: Skin is warm  No rash noted  No erythema  No pallor  Psychiatric: She has a normal mood and affect  Her behavior is normal    Nursing note and vitals reviewed        Vital Signs  ED Triage Vitals   Temperature Pulse Respirations Blood Pressure SpO2   08/21/18 0923 08/21/18 0923 08/21/18 0923 08/21/18 0923 08/21/18 0923   98 2 °F (36 8 °C) 63 18 141/69 99 %      Temp Source Heart Rate Source Patient Position - Orthostatic VS BP Location FiO2 (%)   08/21/18 0923 08/21/18 0923 08/21/18 0923 08/21/18 0923 --   Oral Monitor Lying Right arm       Pain Score       08/21/18 0947       2           Vitals:    08/21/18 1215 08/21/18 1230 08/21/18 1300 08/21/18 1336   BP: 124/60 126/59  146/70   Pulse: 66 60 55 63   Patient Position - Orthostatic VS:           Visual Acuity      ED Medications  Medications   ketorolac (TORADOL) injection 30 mg (30 mg Intravenous Given 8/21/18 1003)       Diagnostic Studies  Results Reviewed     Procedure Component Value Units Date/Time    Troponin I [[id number]]  (Normal) Collected:  08/21/18 1246    Lab Status:  Final result Specimen:  Blood from Arm, Right Updated:  08/21/18 1311     Troponin I <0 02 ng/mL     Troponin I [[id number]]  (Normal) Collected:  08/21/18 0936    Lab Status:  Final result Specimen:  Blood Updated:  08/21/18 1225     Troponin I <0 02 ng/mL     TSH, 3rd generation with Free T4 reflex [[id number]]  (Normal) Collected:  08/21/18 0936    Lab Status:  Final result Specimen:  Blood from Arm, Left Updated:  08/21/18 1012     TSH 3RD GENERATON 0 540 uIU/mL     Narrative:         Patients undergoing fluorescein dye angiography may retain small amounts of fluorescein in the body for 48-72 hours post procedure  Samples containing fluorescein can produce falsely depressed TSH values  If the patient had this procedure,a specimen should be resubmitted post fluorescein clearance  The recommended reference ranges for TSH during pregnancy are as follows:  First trimester 0 1 to 2 5 uIU/mL  Second trimester  0 2 to 3 0 uIU/mL  Third trimester 0 3 to 3 0 uIU/m      BNP [[id number]]  (Abnormal) Collected:  08/21/18 0936    Lab Status:  Final result Specimen:  Blood from Arm, Left Updated:  08/21/18 1012     NT-proBNP 307 (H) pg/mL     Comprehensive metabolic panel [[id number]]  (Abnormal) Collected:  08/21/18 0936    Lab Status:  Final result Specimen:  Blood from Arm, Left Updated:  08/21/18 1006     Sodium 137 mmol/L      Potassium 3 2 (L) mmol/L      Chloride 103 mmol/L      CO2 30 mmol/L      Anion Gap 4 mmol/L      BUN 12 mg/dL      Creatinine 0 66 mg/dL      Glucose 62 (L) mg/dL      Calcium 8 6 mg/dL      AST 21 U/L      ALT 26 U/L      Alkaline Phosphatase 38 (L) U/L      Total Protein 7 3 g/dL      Albumin 3 9 g/dL      Total Bilirubin 1 90 (H) mg/dL      eGFR 107 ml/min/1 73sq m     Narrative:         National Kidney Disease Education Program recommendations are as follows:  GFR calculation is accurate only with a steady state creatinine  Chronic Kidney disease less than 60 ml/min/1 73 sq  meters  Kidney failure less than 15 ml/min/1 73 sq  meters      D-dimer, quantitative [[id number]]  (Normal) Collected:  08/21/18 0936    Lab Status:  Final result Specimen:  Blood from Arm, Left Updated:  08/21/18 1001     D-Dimer, Quant <270 ng/ml (FEU)     Protime-INR [[id number]]  (Abnormal) Collected:  08/21/18 0936    Lab Status:  Final result Specimen:  Blood from Arm, Left Updated:  08/21/18 0958     Protime 28 7 (H) seconds      INR 2 75 (H)    CBC and differential [[id number]] Collected:  08/21/18 0936    Lab Status:  Final result Specimen:  Blood from Arm, Left Updated:  08/21/18 0945     WBC 5 31 Thousand/uL      RBC 4 05 Million/uL      Hemoglobin 12 7 g/dL      Hematocrit 37 1 %      MCV 92 fL      MCH 31 4 pg      MCHC 34 2 g/dL      RDW 11 8 %      MPV 9 8 fL      Platelets 249 Thousands/uL      nRBC 0 /100 WBCs      Neutrophils Relative 68 %      Immat GRANS % 0 %      Lymphocytes Relative 19 %      Monocytes Relative 10 %      Eosinophils Relative 2 %      Basophils Relative 1 %      Neutrophils Absolute 3 65 Thousands/µL      Immature Grans Absolute 0 01 Thousand/uL      Lymphocytes Absolute 0 98 Thousands/µL      Monocytes Absolute 0 54 Thousand/µL      Eosinophils Absolute 0 08 Thousand/µL      Basophils Absolute 0 05 Thousands/µL     POCT pregnancy, urine [[id number]]  (Normal) Resulted:  08/21/18 0945    Lab Status:  Final result Updated:  08/21/18 0945     EXT PREG TEST UR (Ref: Negative) negative                 XR chest 2 views   Final Result by Dev Muniz MD (08/21 1038)      No acute cardiopulmonary disease              Workstation performed: [id number]                    Procedures  Procedures       Phone Contacts  ED Phone Contact    ED Course                               MDM  Number of Diagnoses or Management Options  Chest pain: new and requires workup     Amount and/or Complexity of Data Reviewed  Clinical lab tests: ordered and reviewed  Tests in the radiology section of CPT®: ordered and reviewed  Decide to obtain previous medical records or to obtain history from someone other than the patient: yes  Review and summarize past medical records: yes    Patient Progress  Patient progress: stable    CritCare Time    Disposition  Final diagnoses:   Chest pain     Time reflects when diagnosis was documented in both MDM as applicable and the Disposition within this note     Time User Action Codes Description Comment    8/21/2018  1:24 PM Danica Richmond Add [R07 9] Chest pain       ED Disposition     ED Disposition Condition Comment    Discharge  Rui Churchill discharge to home/self care  Condition at discharge: Good        Follow-up Information     Follow up With Specialties Details Why Katina Obrien 124, MD Internal Medicine, [address] [facility]   [address]  [place], [place] [address]  [phone]            Discharge Medication List as of 8/21/2018  1:25 PM      CONTINUE these medications which have NOT CHANGED    Details   aspirin (ECOTRIN LOW STRENGTH) 81 mg EC tablet Take 81 mg by mouth daily, Historical Med      BIOTIN 5000 PO Take 20,000 mcg by mouth daily, Historical Med      metoprolol tartrate (LOPRESSOR) 25 mg tablet Take 25 mg by mouth every 12 (twelve) hours, Historical Med      Multiple Vitamins-Minerals (MULTIVITAMIN WITH MINERALS) tablet Take 1 tablet by mouth daily, Historical Med      warfarin (COUMADIN) 5 mg tablet take 1 to 1 1/2 tablets by mouth once daily as directed, Normal           No discharge procedures on file      ED Provider  Electronically Signed by           Shahrzad Hernandez DO  08/22/18 2540

## 2018-08-21 NOTE — DISCHARGE INSTRUCTIONS

## 2018-08-21 NOTE — TELEPHONE ENCOUNTER
Call transferred to me, pt is complaining of left side chest tightness, no SOB, spontaneous pain with taking deep breaths  Pt stated that her eyes are bloodshot so she took her INR this morning  Pt asked to be seen by a physician today to at least listen to her heart  Dr Raymond Espinal advised that pt go to the ER, s/w pt verbally understood

## 2018-08-27 ENCOUNTER — APPOINTMENT (EMERGENCY)
Dept: CT IMAGING | Facility: HOSPITAL | Age: 43
End: 2018-08-27
Payer: COMMERCIAL

## 2018-08-27 ENCOUNTER — HOSPITAL ENCOUNTER (EMERGENCY)
Facility: HOSPITAL | Age: 43
Discharge: HOME | End: 2018-08-27
Attending: EMERGENCY MEDICINE | Admitting: EMERGENCY MEDICINE
Payer: COMMERCIAL

## 2018-08-27 ENCOUNTER — APPOINTMENT (EMERGENCY)
Dept: RADIOLOGY | Facility: HOSPITAL | Age: 43
End: 2018-08-27
Payer: COMMERCIAL

## 2018-08-27 VITALS
OXYGEN SATURATION: 100 % | HEART RATE: 72 BPM | DIASTOLIC BLOOD PRESSURE: 60 MMHG | SYSTOLIC BLOOD PRESSURE: 132 MMHG | TEMPERATURE: 98.5 F | WEIGHT: 110.67 LBS | RESPIRATION RATE: 18 BRPM | HEIGHT: 60 IN | BODY MASS INDEX: 21.73 KG/M2

## 2018-08-27 DIAGNOSIS — M25.512 LEFT SHOULDER PAIN: ICD-10-CM

## 2018-08-27 DIAGNOSIS — R07.89 CHEST TIGHTNESS: Primary | ICD-10-CM

## 2018-08-27 DIAGNOSIS — M54.6 LEFT-SIDED THORACIC BACK PAIN: ICD-10-CM

## 2018-08-27 LAB
ALBUMIN SERPL BCP-MCNC: 3.8 G/DL (ref 3.5–5)
ALP SERPL-CCNC: 35 U/L (ref 46–116)
ALT SERPL W P-5'-P-CCNC: 29 U/L (ref 12–78)
ANION GAP SERPL CALCULATED.3IONS-SCNC: 6 MMOL/L (ref 4–13)
APTT PPP: 40 SECONDS (ref 24–36)
AST SERPL W P-5'-P-CCNC: 23 U/L (ref 5–45)
ATRIAL RATE: 61 BPM
ATRIAL RATE: 64 BPM
BASOPHILS # BLD AUTO: 0.03 THOUSANDS/ΜL (ref 0–0.1)
BASOPHILS NFR BLD AUTO: 1 % (ref 0–1)
BILIRUB SERPL-MCNC: 1.8 MG/DL (ref 0.2–1)
BILIRUB UR QL STRIP: NEGATIVE
BUN SERPL-MCNC: 8 MG/DL (ref 5–25)
CALCIUM SERPL-MCNC: 8.6 MG/DL (ref 8.3–10.1)
CHLORIDE SERPL-SCNC: 106 MMOL/L (ref 100–108)
CLARITY UR: CLEAR
CO2 SERPL-SCNC: 31 MMOL/L (ref 21–32)
COLOR UR: YELLOW
CREAT SERPL-MCNC: 0.62 MG/DL (ref 0.6–1.3)
EOSINOPHIL # BLD AUTO: 0.1 THOUSAND/ΜL (ref 0–0.61)
EOSINOPHIL NFR BLD AUTO: 2 % (ref 0–6)
ERYTHROCYTE [DISTWIDTH] IN BLOOD BY AUTOMATED COUNT: 11.7 % (ref 11.6–15.1)
EXT PREG TEST URINE: NEGATIVE
GFR SERPL CREATININE-BSD FRML MDRD: 109 ML/MIN/1.73SQ M
GLUCOSE SERPL-MCNC: 81 MG/DL (ref 65–140)
GLUCOSE UR STRIP-MCNC: NEGATIVE MG/DL
HCT VFR BLD AUTO: 34.4 % (ref 34.8–46.1)
HGB BLD-MCNC: 11.8 G/DL (ref 11.5–15.4)
HGB UR QL STRIP.AUTO: NEGATIVE
IMM GRANULOCYTES # BLD AUTO: 0.02 THOUSAND/UL (ref 0–0.2)
IMM GRANULOCYTES NFR BLD AUTO: 0 % (ref 0–2)
INR PPP: 2.43 (ref 0.86–1.17)
KETONES UR STRIP-MCNC: NEGATIVE MG/DL
LEUKOCYTE ESTERASE UR QL STRIP: NEGATIVE
LYMPHOCYTES # BLD AUTO: 1.55 THOUSANDS/ΜL (ref 0.6–4.47)
LYMPHOCYTES NFR BLD AUTO: 25 % (ref 14–44)
MAGNESIUM SERPL-MCNC: 2.1 MG/DL (ref 1.6–2.6)
MCH RBC QN AUTO: 31.2 PG (ref 26.8–34.3)
MCHC RBC AUTO-ENTMCNC: 34.3 G/DL (ref 31.4–37.4)
MCV RBC AUTO: 91 FL (ref 82–98)
MONOCYTES # BLD AUTO: 0.52 THOUSAND/ΜL (ref 0.17–1.22)
MONOCYTES NFR BLD AUTO: 8 % (ref 4–12)
NEUTROPHILS # BLD AUTO: 3.99 THOUSANDS/ΜL (ref 1.85–7.62)
NEUTS SEG NFR BLD AUTO: 64 % (ref 43–75)
NITRITE UR QL STRIP: NEGATIVE
NRBC BLD AUTO-RTO: 0 /100 WBCS
P AXIS: 63 DEGREES
P AXIS: 72 DEGREES
PH UR STRIP.AUTO: 6 [PH] (ref 4.5–8)
PLATELET # BLD AUTO: 195 THOUSANDS/UL (ref 149–390)
PMV BLD AUTO: 9.4 FL (ref 8.9–12.7)
POTASSIUM SERPL-SCNC: 3.2 MMOL/L (ref 3.5–5.3)
PR INTERVAL: 112 MS
PR INTERVAL: 142 MS
PROT SERPL-MCNC: 6.6 G/DL (ref 6.4–8.2)
PROT UR STRIP-MCNC: NEGATIVE MG/DL
PROTHROMBIN TIME: 26.1 SECONDS (ref 11.8–14.2)
QRS AXIS: 81 DEGREES
QRS AXIS: 82 DEGREES
QRSD INTERVAL: 84 MS
QRSD INTERVAL: 86 MS
QT INTERVAL: 440 MS
QT INTERVAL: 446 MS
QTC INTERVAL: 448 MS
QTC INTERVAL: 453 MS
RBC # BLD AUTO: 3.78 MILLION/UL (ref 3.81–5.12)
SODIUM SERPL-SCNC: 143 MMOL/L (ref 136–145)
SP GR UR STRIP.AUTO: 1.01 (ref 1–1.03)
T WAVE AXIS: 74 DEGREES
T WAVE AXIS: 76 DEGREES
TROPONIN I SERPL-MCNC: <0.02 NG/ML
UROBILINOGEN UR QL STRIP.AUTO: 0.2 E.U./DL
VENTRICULAR RATE: 61 BPM
VENTRICULAR RATE: 64 BPM
WBC # BLD AUTO: 6.21 THOUSAND/UL (ref 4.31–10.16)

## 2018-08-27 PROCEDURE — 85610 PROTHROMBIN TIME: CPT | Performed by: EMERGENCY MEDICINE

## 2018-08-27 PROCEDURE — 96374 THER/PROPH/DIAG INJ IV PUSH: CPT

## 2018-08-27 PROCEDURE — 80053 COMPREHEN METABOLIC PANEL: CPT | Performed by: EMERGENCY MEDICINE

## 2018-08-27 PROCEDURE — 84484 ASSAY OF TROPONIN QUANT: CPT | Performed by: EMERGENCY MEDICINE

## 2018-08-27 PROCEDURE — 81025 URINE PREGNANCY TEST: CPT | Performed by: EMERGENCY MEDICINE

## 2018-08-27 PROCEDURE — 73030 X-RAY EXAM OF SHOULDER: CPT

## 2018-08-27 PROCEDURE — 83735 ASSAY OF MAGNESIUM: CPT | Performed by: EMERGENCY MEDICINE

## 2018-08-27 PROCEDURE — 85730 THROMBOPLASTIN TIME PARTIAL: CPT | Performed by: EMERGENCY MEDICINE

## 2018-08-27 PROCEDURE — 93010 ELECTROCARDIOGRAM REPORT: CPT | Performed by: INTERNAL MEDICINE

## 2018-08-27 PROCEDURE — 99284 EMERGENCY DEPT VISIT MOD MDM: CPT

## 2018-08-27 PROCEDURE — 81003 URINALYSIS AUTO W/O SCOPE: CPT | Performed by: EMERGENCY MEDICINE

## 2018-08-27 PROCEDURE — 71275 CT ANGIOGRAPHY CHEST: CPT

## 2018-08-27 PROCEDURE — 36415 COLL VENOUS BLD VENIPUNCTURE: CPT | Performed by: EMERGENCY MEDICINE

## 2018-08-27 PROCEDURE — 93005 ELECTROCARDIOGRAM TRACING: CPT

## 2018-08-27 PROCEDURE — 85025 COMPLETE CBC W/AUTO DIFF WBC: CPT | Performed by: EMERGENCY MEDICINE

## 2018-08-27 RX ORDER — LIDOCAINE 50 MG/G
1 PATCH TOPICAL DAILY PRN
Qty: 6 PATCH | Refills: 0 | Status: SHIPPED | OUTPATIENT
Start: 2018-08-27 | End: 2019-02-14

## 2018-08-27 RX ORDER — LIDOCAINE 50 MG/G
1 PATCH TOPICAL ONCE
Status: DISCONTINUED | OUTPATIENT
Start: 2018-08-27 | End: 2018-08-27 | Stop reason: HOSPADM

## 2018-08-27 RX ORDER — KETOROLAC TROMETHAMINE 30 MG/ML
15 INJECTION, SOLUTION INTRAMUSCULAR; INTRAVENOUS ONCE
Status: COMPLETED | OUTPATIENT
Start: 2018-08-27 | End: 2018-08-27

## 2018-08-27 RX ORDER — POTASSIUM CHLORIDE 20 MEQ/1
40 TABLET, EXTENDED RELEASE ORAL ONCE
Status: COMPLETED | OUTPATIENT
Start: 2018-08-27 | End: 2018-08-27

## 2018-08-27 RX ADMIN — KETOROLAC TROMETHAMINE 15 MG: 30 INJECTION, SOLUTION INTRAMUSCULAR at 01:53

## 2018-08-27 RX ADMIN — LIDOCAINE 1 PATCH: 50 PATCH TOPICAL at 01:53

## 2018-08-27 RX ADMIN — POTASSIUM CHLORIDE 40 MEQ: 1500 TABLET, EXTENDED RELEASE ORAL at 03:28

## 2018-08-27 RX ADMIN — IOHEXOL 85 ML: 350 INJECTION, SOLUTION INTRAVENOUS at 02:13

## 2018-08-27 NOTE — DISCHARGE INSTRUCTIONS
Chest Pain   WHAT YOU NEED TO KNOW:   Chest pain can be caused by a range of conditions, from not serious to life-threatening  Chest pain can be a symptom of a digestive problem, such as acid reflux or a stomach ulcer  An anxiety attack or a strong emotion, such as anger, can also cause chest pain  Infection, inflammation, or a fracture in the bones or cartilage in your chest can cause pain or discomfort  Sometimes chest pain or pressure is caused by poor blood flow to your heart (angina)  Chest pain may also be caused by life-threatening conditions such as a heart attack or blood clot in your lungs  DISCHARGE INSTRUCTIONS:   Call 911 if:   · You have any of the following signs of a heart attack:      ¨ Squeezing, pressure, or pain in your chest that lasts longer than 5 minutes or returns    ¨ Discomfort or pain in your back, neck, jaw, stomach, or arm     ¨ Trouble breathing    ¨ Nausea or vomiting    ¨ Lightheadedness or a sudden cold sweat, especially with chest pain or trouble breathing    Seek care immediately if:   · You have chest discomfort that gets worse, even with medicine  · You cough or vomit blood  · Your bowel movements are black or bloody  · You cannot stop vomiting, or it hurts to swallow  Contact your healthcare provider if:   · You have questions or concerns about your condition or care  Medicines:   · Medicines  may be given to treat the cause of your chest pain  Examples include pain medicine, anxiety medicine, or medicines to increase blood flow to your heart  · Do not take certain medicines without asking your healthcare provider first   These include NSAIDs, herbal or vitamin supplements, or hormones (estrogen or progestin)  · Take your medicine as directed  Contact your healthcare provider if you think your medicine is not helping or if you have side effects  Tell him or her if you are allergic to any medicine   Keep a list of the medicines, vitamins, and herbs you take  Include the amounts, and when and why you take them  Bring the list or the pill bottles to follow-up visits  Carry your medicine list with you in case of an emergency  Follow up with your healthcare provider within 72 hours, or as directed: You may need to return for more tests to find the cause of your chest pain  You may be referred to a specialist, such as a cardiologist or gastroenterologist  Write down your questions so you remember to ask them during your visits  Healthy living tips: The following are general healthy guidelines  If your chest pain is caused by a heart problem, your healthcare provider will give you specific guidelines to follow  · Do not smoke  Nicotine and other chemicals in cigarettes and cigars can cause lung and heart damage  Ask your healthcare provider for information if you currently smoke and need help to quit  E-cigarettes or smokeless tobacco still contain nicotine  Talk to your healthcare provider before you use these products  · Eat a variety of healthy, low-fat foods  Healthy foods include fruits, vegetables, whole-grain breads, low-fat dairy products, beans, lean meats, and fish  Ask for more information about a heart healthy diet  · Ask about activity  Your healthcare provider will tell you which activities to limit or avoid  Ask when you can drive, return to work, and have sex  Ask about the best exercise plan for you  · Maintain a healthy weight  Ask your healthcare provider how much you should weigh  Ask him or her to help you create a weight loss plan if you are overweight  © 2017 [employer] Information is for End User's use only and may not be sold, redistributed or otherwise used for commercial purposes  All illustrations and images included in CareNotes® are the copyrighted property of [employer]  or [employer]  The above information is an  only   It is not intended as medical advice for individual conditions or treatments  Talk to your doctor, nurse or pharmacist before following any medical regimen to see if it is safe and effective for you  Noncardiac Chest Pain   WHAT YOU NEED TO KNOW:   Noncardiac chest pain is pain or discomfort in your chest not caused by a heart problem  It may be caused by acid reflux, nerve or muscle problems within your esophagus, or chest wall, muscle, or rib pain  It may also be caused by panic attacks, anxiety, or depression  DISCHARGE INSTRUCTIONS:   Seek care immediately if:   · You have severe chest pain  Contact your healthcare provider if:   · Your chest pain does not get better, even with treatment  · You have questions or concerns about your condition or care  Medicines:   · Medicines  may be given to treat the cause of your chest pain  You may be given medicines to decrease pain, relieve anxiety, decrease acid reflux, or relax muscles in your esophagus  · Take your medicine as directed  Contact your healthcare provider if you think your medicine is not helping or if you have side effects  Tell him or her if you are allergic to any medicine  Keep a list of the medicines, vitamins, and herbs you take  Include the amounts, and when and why you take them  Bring the list or the pill bottles to follow-up visits  Carry your medicine list with you in case of an emergency  Cognitive therapy:  Cognitive therapy may be helpful if you have panic attacks, anxiety, or depression  It can help you change how you react to situations that tend to trigger your chest pain  Follow up with your healthcare provider as directed:  Write down your questions so you remember to ask them during your visits  © 2017 [employer]  Information is for End User's use only and may not be sold, redistributed or otherwise used for commercial purposes   All illustrations and images included in CareNotes® are the copyrighted property of A D A M [employer]  or [employer]  The above information is an  only  It is not intended as medical advice for individual conditions or treatments  Talk to your doctor, nurse or pharmacist before following any medical regimen to see if it is safe and effective for you  Thoracic Pain   WHAT YOU NEED TO KNOW:   Thoracic pain is discomfort in any area between your neck and your abdomen  Thoracic pain may be caused by health conditions that affect your gastrointestinal system, lungs, bones, or muscles  It can also be caused by trauma, panic attacks, or anxiety related to stress  DISCHARGE INSTRUCTIONS:   Return to the emergency department if:   · You have a period of thoracic pain that lasts longer than 5 minutes  · Your thoracic pain gets worse  · You have a history of angina (pressure or squeezing chest pain) and your usual medicine does not work  · You have thoracic pain with shortness of breath, sweating, dizziness, vomiting, or nausea  · You have thoracic pain that spreads to your arm, neck, back, jaw, or stomach  Contact your healthcare provider or specialist if:   · Your thoracic pain is not relieved by resting, heat, or medicines  · You have questions or concerns about your condition or care  Medicines: You may need any of the following:  · Acetaminophen  decreases pain  It is available without a prescription  Ask how much to take and how often to take it  Follow directions  Acetaminophen can cause liver damage if not taken correctly  · NSAIDs , such as ibuprofen, help decrease swelling, pain, and fever  This medicine is available with or without a doctor's order  NSAIDs can cause stomach bleeding or kidney problems in certain people  If you take blood thinner medicine, always ask if NSAIDs are safe for you  Always read the medicine label and follow directions  Do not give these medicines to children under 10months of age without direction from your child's healthcare provider  · Take your medicine as directed  Contact your healthcare provider if you think your medicine is not helping or if you have side effects  Tell him of her if you are allergic to any medicine  Keep a list of the medicines, vitamins, and herbs you take  Include the amounts, and when and why you take them  Bring the list or the pill bottles to follow-up visits  Carry your medicine list with you in case of an emergency  Follow up with your healthcare provider or specialist as directed:  Write down your questions so you remember to ask them during your visits  Self-care:   · Apply heat  to the area  Heat helps decrease pain and muscle spasms  Apply heat on the area for 20 to 30 minutes every 2 hours for as many days as directed  · Limit physical activity that causes pain  Rest as needed  Ask your healthcare provider how long you should limit activity  © 2017 [employer] Information is for End User's use only and may not be sold, redistributed or otherwise used for commercial purposes  All illustrations and images included in CareNotes® are the copyrighted property of A D A M , Inc  or [employer]  The above information is an  only  It is not intended as medical advice for individual conditions or treatments  Talk to your doctor, nurse or pharmacist before following any medical regimen to see if it is safe and effective for you  Shoulder Pain   WHAT YOU NEED TO KNOW:   Shoulder pain is a common problem and can affect your daily activities  Pain can be caused by a problem within your shoulder  Shoulder pain may also be caused by pain that spreads to your shoulder from another part of your body  DISCHARGE INSTRUCTIONS:   Return to the emergency department if:   · You have severe pain  · You cannot move your arm or shoulder  · You have numbness or tingling in your shoulder or arm    Contact your healthcare provider if:   · Your pain gets worse or does not go away with treatment  · You have trouble moving your arm or shoulder  · You have questions or concerns about your condition or care  Medicines: You may need any of the following:  · Acetaminophen  decreases pain and fever  It is available without a doctor's order  Ask how much to take and how often to take it  Follow directions  Acetaminophen can cause liver damage if not taken correctly  · NSAIDs , such as ibuprofen, help decrease swelling, pain, and fever  This medicine is available with or without a doctor's order  NSAIDs can cause stomach bleeding or kidney problems in certain people  If you take blood thinner medicine, always ask your healthcare provider if NSAIDs are safe for you  Always read the medicine label and follow directions  · Take your medicine as directed  Contact your healthcare provider if you think your medicine is not helping or if you have side effects  Tell him of her if you are allergic to any medicine  Keep a list of the medicines, vitamins, and herbs you take  Include the amounts, and when and why you take them  Bring the list or the pill bottles to follow-up visits  Carry your medicine list with you in case of an emergency  Follow up with your healthcare provider or orthopedist as directed:  Write down your questions so you remember to ask them during your visits  Manage your symptoms:   · Apply ice  on your shoulder for 20 to 30 minutes every 2 hours or as directed  Use an ice pack, or put crushed ice in a plastic bag  Cover it with a towel  Ice helps prevent tissue damage and decreases swelling and pain  · Apply heat if ice does not help your symptoms  Apply heat on your shoulder for 20 to 30 minutes every 2 hours for as many days as directed  Heat helps decrease pain and muscle spasms  · Go to physical or occupational therapy as directed  A physical therapist teaches you exercises to help improve movement and strength, and to decrease pain   An occupational therapist teaches you skills to help with your daily activities  Prevent shoulder pain:   · Stretch and strengthen your shoulder  Use proper technique during exercises and sports  · Limit activities as directed  Try to avoid repeated overhead movements  © 2017 [employer] Information is for End User's use only and may not be sold, redistributed or otherwise used for commercial purposes  All illustrations and images included in CareNotes® are the copyrighted property of A D A M [employer]  or [employer]  The above information is an  only  It is not intended as medical advice for individual conditions or treatments  Talk to your doctor, nurse or pharmacist before following any medical regimen to see if it is safe and effective for you

## 2018-08-27 NOTE — ED PROVIDER NOTES
History  Chief Complaint   Patient presents with    Shoulder Pain     Pt c/o shoulder pain that radiates in her back that alternates with chest tighness that started last tuesday and was seen here, pt states pain has not gotten better     Patient is a 43-year-old female with past medical and surgical history significant for aortic valve insufficiency status post aortic valve replacement currently on Coumadin, asthma, presents to the emergency department for left upper chest tightness as well as pain in the left posterior shoulder/upper back  Patient states for the past 1 week she has had this pain intermittently  She was evaluated here on 08/21 for this and had a negative cardiac workup including 2-troponins  She states her pain has never really improved and has been getting progressively worse  She states she feels like a tightness as well as an ache in her back  She states it is worse when she takes a deep breath as well as when she moves her left arm or shoulder  She denies any decreased range of motion of the left shoulder or arm  Denies any paresthesias or weakness in the upper extremities  Denies any recent injury, strenuous activity or heavy lifting  She denies associated fever, chills, headache, dizziness or near syncope, recent URI symptoms, cough, hemoptysis, palpitations, dyspnea, abdominal pain, nausea, vomiting, diarrhea, constipation, blood per rectum or melena, dysuria, change in urinary frequency, hematuria, flank pain, skin rash or color change, extremity swelling or pain, lateralizing extremity weakness or paresthesia or other focal neurologic deficits  She does have a cardiologist and PCP however has yet to follow up with them in regards to this pain          History provided by:  Patient and medical records   used: No    Shoulder Pain   Associated symptoms: back pain    Associated symptoms: no fever and no neck pain        Prior to Admission Medications Prescriptions Last Dose Informant Patient Reported? Taking? BIOTIN 5000 PO   Yes No   Sig: Take 20,000 mcg by mouth daily   Multiple Vitamins-Minerals (MULTIVITAMIN WITH MINERALS) tablet   Yes No   Sig: Take 1 tablet by mouth daily   aspirin (ECOTRIN LOW STRENGTH) 81 mg EC tablet   Yes No   Sig: Take 81 mg by mouth daily   metoprolol tartrate (LOPRESSOR) 25 mg tablet   Yes No   Sig: Take 25 mg by mouth every 12 (twelve) hours   warfarin (COUMADIN) 5 mg tablet   No No   Sig: take 1 to 1 1/2 tablets by mouth once daily as directed      Facility-Administered Medications: None       Past Medical History:   Diagnosis Date    Allergic contact dermatitis     Allergic rhinitis     resolved 2018    Anemia     Aortic valve disorder     Aortic valve insufficiency     Asthma     Benign neoplasm of skin     Cardiac disease     Costochondritis     Hyperinsulinism     Inguinal lymphadenopathy     resolved 2015    Migraine     resolved 2015    Varicose 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     unspecified laterality       Past Surgical History:   Procedure Laterality Date    AORTIC VALVE REPLACEMENT      complications 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, replaced with mechanical aortic valve  and root replacement at [facility] dr singer Andrews [facility]      enlargement   [facility]  2011    bovine, with redo and mechanical valve replacement and root 2012 dr sawyer at [facility] last assessed 08/15/2016    CERVICAL BIOPSY  W/ LOOP ELECTRODE EXCISION      INDUCED       surgically by dilation and evacuation    RHINOPLASTY         Family History   Problem Relation Age of Onset    Asthma Father     Coronary artery disease Father     Hypertension Father     Crohn's disease Sister         with compliction unspecified gastrointestinal tract location    Breast cancer Maternal Grandmother      I have reviewed and agree with the history as documented      Social History Substance Use Topics    Smoking status: Never Smoker    Smokeless tobacco: Never Used    Alcohol use No      Comment: drinks hard liquor, social per allscripts        Review of Systems   Constitutional: Negative for chills, diaphoresis and fever  HENT: Negative for congestion, ear pain, rhinorrhea and sore throat  Eyes: Negative for pain and visual disturbance  Respiratory: Negative for cough, chest tightness, shortness of breath and wheezing  Cardiovascular: Positive for chest pain  Negative for palpitations and leg swelling  Gastrointestinal: Negative for abdominal pain, blood in stool, constipation, diarrhea, nausea and vomiting  Genitourinary: Negative for dysuria, flank pain, frequency, hematuria and vaginal discharge  Musculoskeletal: Positive for arthralgias and back pain  Negative for neck pain and neck stiffness  + Left posterior shoulder pain  Skin: Negative for color change, pallor and rash  Allergic/Immunologic: Negative for immunocompromised state  Neurological: Negative for dizziness, syncope, weakness, light-headedness, numbness and headaches  Hematological: Negative for adenopathy  Bruises/bleeds easily  Psychiatric/Behavioral: Negative for confusion and decreased concentration  All other systems reviewed and are negative  Physical Exam  Physical Exam   Constitutional: She is oriented to person, place, and time  She appears well-developed and well-nourished  No distress  HENT:   Head: Normocephalic and atraumatic  Mouth/Throat: Oropharynx is clear and moist    Eyes: Conjunctivae and EOM are normal  Pupils are equal, round, and reactive to light  Neck: Normal range of motion  Neck supple  No JVD present  Cardiovascular: Normal rate, regular rhythm and intact distal pulses  Exam reveals no gallop and no friction rub  Murmur heard  Pulmonary/Chest: Effort normal and breath sounds normal  No respiratory distress  She has no wheezes   She has no rales  She exhibits no tenderness  Abdominal: Soft  Bowel sounds are normal  She exhibits no distension  There is no tenderness  There is no rebound and no guarding  Musculoskeletal: Normal range of motion  She exhibits tenderness  She exhibits no edema  No midline C/T/L-spine tenderness  Mild left posterior shoulder/upper back tenderness  No significant muscle hypertonicity  Full range of motion of left shoulder and upper extremity  No gross motor or sensory deficits left upper extremity  Lymphadenopathy:     She has no cervical adenopathy  Neurological: She is alert and oriented to person, place, and time  No cranial nerve deficit  No gross motor or sensory deficits  5/5 strength throughout  Skin: Skin is warm and dry  No rash noted  She is not diaphoretic  No erythema  No pallor  Psychiatric: She has a normal mood and affect  Her behavior is normal    Nursing note and vitals reviewed        Vital Signs  ED Triage Vitals [08/27/18 0035]   Temperature Pulse Respirations Blood Pressure SpO2   98 5 °F (36 9 °C) 64 18 132/67 99 %      Temp Source Heart Rate Source Patient Position - Orthostatic VS BP Location FiO2 (%)   Oral Monitor Sitting Right arm --      Pain Score       9         Vitals:    08/27/18 0035 08/27/18 0330   BP: 132/67 132/60   BP Location: Right arm Right arm   Pulse: 64 72   Resp: 18 18   Temp: 98 5 °F (36 9 °C)    TempSrc: Oral    SpO2: 99% 100%   Weight: 50 2 kg (110 lb 10 7 oz)    Height: 5' (1 524 m)      Visual Acuity      ED Medications  Medications   lidocaine (LIDODERM) 5 % patch 1 patch (1 patch Transdermal Medication Applied 8/27/18 0153)   ketorolac (TORADOL) injection 15 mg (15 mg Intravenous Given 8/27/18 0153)   iohexol (OMNIPAQUE) 350 MG/ML injection (MULTI-DOSE) 85 mL (85 mL Intravenous Given 8/27/18 0213)   potassium chloride (K-DUR,KLOR-CON) CR tablet 40 mEq (40 mEq Oral Given 8/27/18 0328)       Diagnostic Studies  Results Reviewed     Procedure Component Value Units Date/Time    UA w Reflex to Microscopic w Reflex to Culture [[id number]] Collected:  08/27/18 0108    Lab Status:  Final result Specimen:  Urine from Urine, Clean Catch Updated:  08/27/18 0155     Color, UA Yellow     Clarity, UA Clear     Specific Gravity, UA 1 010     pH, UA 6 0     Leukocytes, UA Negative     Nitrite, UA Negative     Protein, UA Negative mg/dl      Glucose, UA Negative mg/dl      Ketones, UA Negative mg/dl      Urobilinogen, UA 0 2 E U /dl      Bilirubin, UA Negative     Blood, UA Negative    Troponin I [[id number]]  (Normal) Collected:  08/27/18 0126    Lab Status:  Final result Specimen:  Blood from Arm, Left Updated:  08/27/18 0154     Troponin I <0 02 ng/mL     Comprehensive metabolic panel [[id number]]  (Abnormal) Collected:  08/27/18 0126    Lab Status:  Final result Specimen:  Blood from Arm, Left Updated:  08/27/18 0149     Sodium 143 mmol/L      Potassium 3 2 (L) mmol/L      Chloride 106 mmol/L      CO2 31 mmol/L      Anion Gap 6 mmol/L      BUN 8 mg/dL      Creatinine 0 62 mg/dL      Glucose 81 mg/dL      Calcium 8 6 mg/dL      AST 23 U/L      ALT 29 U/L      Alkaline Phosphatase 35 (L) U/L      Total Protein 6 6 g/dL      Albumin 3 8 g/dL      Total Bilirubin 1 80 (H) mg/dL      eGFR 109 ml/min/1 73sq m     Narrative:         National Kidney Disease Education Program recommendations are as follows:  GFR calculation is accurate only with a steady state creatinine  Chronic Kidney disease less than 60 ml/min/1 73 sq  meters  Kidney failure less than 15 ml/min/1 73 sq  meters      Magnesium [[id number]]  (Normal) Collected:  08/27/18 0126    Lab Status:  Final result Specimen:  Blood from Arm, Left Updated:  08/27/18 0149     Magnesium 2 1 mg/dL     Protime-INR [[id number]]  (Abnormal) Collected:  08/27/18 0126    Lab Status:  Final result Specimen:  Blood from Arm, Left Updated:  08/27/18 0149     Protime 26 1 (H) seconds      INR 2 43 (H)    APTT [[id number]]  (Abnormal) Collected:  08/27/18 0126    Lab Status:  Final result Specimen:  Blood from Arm, Left Updated:  08/27/18 0149     PTT 40 (H) seconds     CBC and differential [[id number]]  (Abnormal) Collected:  08/27/18 0126    Lab Status:  Final result Specimen:  Blood from Arm, Left Updated:  08/27/18 0133     WBC 6 21 Thousand/uL      RBC 3 78 (L) Million/uL      Hemoglobin 11 8 g/dL      Hematocrit 34 4 (L) %      MCV 91 fL      MCH 31 2 pg      MCHC 34 3 g/dL      RDW 11 7 %      MPV 9 4 fL      Platelets 827 Thousands/uL      nRBC 0 /100 WBCs      Neutrophils Relative 64 %      Immat GRANS % 0 %      Lymphocytes Relative 25 %      Monocytes Relative 8 %      Eosinophils Relative 2 %      Basophils Relative 1 %      Neutrophils Absolute 3 99 Thousands/µL      Immature Grans Absolute 0 02 Thousand/uL      Lymphocytes Absolute 1 55 Thousands/µL      Monocytes Absolute 0 52 Thousand/µL      Eosinophils Absolute 0 10 Thousand/µL      Basophils Absolute 0 03 Thousands/µL     POCT pregnancy, urine [[id number]]  (Normal) Resulted:  08/27/18 0115    Lab Status:  Final result Updated:  08/27/18 0115     EXT PREG TEST UR (Ref: Negative) negative                 CTA ED chest PE study   Final Result by Amberly Smart MD (08/27 0301)      1  Motion limited examination of the upper lung fields  No evidence of pulmonary embolism  2   Stable ascending thoracic aortic graft repair and findings of aortic coarctation  3   Partially visualized cholelithiasis  Workstation performed: [id number]         XR shoulder 2+ views LEFT   ED Interpretation by Barbara Bermudez DO (08/27 0256)   No acute osseous abnormality                   Procedures  ECG 12 Lead Documentation  Date/Time: 8/27/2018 3:04 AM  Performed by: Danielle Myrick  Authorized by: Danielle Myrick     ECG reviewed by me, the ED Provider: yes    Patient location:  ED  Previous ECG:     Previous ECG:  Compared to current    Comparison ECG info:  8-21-18    Similarity:  No change  Interpretation: Interpretation: normal    Rate:     ECG rate:  61    ECG rate assessment: normal    Rhythm:     Rhythm: sinus rhythm    Ectopy:     Ectopy: none    QRS:     QRS axis:  Normal    QRS intervals:  Normal  Conduction:     Conduction: normal    ST segments:     ST segments:  Normal  T waves:     T waves: normal             Phone Contacts  ED Phone Contact    ED Course  ED Course as of Aug 27 0335   Mon Aug 27, 2018   2538 Patient updated of normal workup thus far  Patient was sleeping comfortably and she thinks the pain has improved  Advised her that she should follow up with her doctors  Discussed ED return parameters  MDM  Number of Diagnoses or Management Options  Diagnosis management comments: 56-year-old female presents with left upper chest pain described as a tightness as well as pain in the left posterior shoulder/upper back for the past 1 week  She had a negative cardiac workup on 08/21  Most likely this is musculoskeletal in origin, possible rotator cuff tendinitis or strain versus trapezius strain  Differential also includes angina, acute coronary syndrome, pulmonary embolism, pleurisy secondary to pneumonia  Will repeat cardiac workup however I do not feel she needs 2 troponins in the ED  Will obtain a CTA of the chest to rule out PE  Will give Toradol and Lidoderm patch for symptomatic relief  If workup unremarkable, will discharge to follow up with PCP and Cardiology         Amount and/or Complexity of Data Reviewed  Clinical lab tests: ordered and reviewed  Tests in the radiology section of CPT®: ordered and reviewed  Tests in the medicine section of CPT®: ordered and reviewed  Independent visualization of images, tracings, or specimens: yes      CritCare Time    Disposition  Final diagnoses:   Chest tightness   Left-sided thoracic back pain   Left shoulder pain     Time reflects when diagnosis was documented in both MDM as applicable and the Disposition within this note     Time User Action Codes Description Comment    8/27/2018  3:33 AM Carole Lópezjen Add [R07 89] Chest tightness     8/27/2018  3:34 AM Abimael BOLAÑOS Add [M54 6] Left-sided thoracic back pain     8/27/2018  3:34 AM Abimael BOLAÑOS Add [M25 512] Left shoulder pain       ED Disposition     ED Disposition Condition Comment    Discharge  Jennifer Stock discharge to home/self care  Condition at discharge: Stable        Follow-up Information     Follow up With Specialties Details Why Contact Info Additional Information    Yonatan Hunter MD Internal Medicine, Palliative Care Schedule an appointment as soon as possible for a visit  [address]       Cardiologist  Schedule an appointment as soon as possible for a visit       [address] [facility] Emergency Department Emergency Medicine Go to If symptoms worsen [address] [facility] [address] ED, [address] [place], [place], [address]          Patient's Medications   Discharge Prescriptions    LIDOCAINE (LIDODERM) 5 %    Place 1 patch on the skin daily as needed for mild pain or moderate pain Remove & Discard patch within 12 hours or as directed by MD       Start Date: 8/27/2018 End Date: --       Order Dose: 1 patch       Quantity: 6 patch    Refills: 0     No discharge procedures on file      ED Provider  Electronically Signed by           Trina Brooks DO  08/27/18 1489

## 2018-08-28 DIAGNOSIS — I10 HYPERTENSION, ESSENTIAL: Primary | ICD-10-CM

## 2018-09-03 ENCOUNTER — APPOINTMENT (EMERGENCY)
Dept: RADIOLOGY | Facility: HOSPITAL | Age: 43
End: 2018-09-03
Payer: COMMERCIAL

## 2018-09-03 ENCOUNTER — HOSPITAL ENCOUNTER (EMERGENCY)
Facility: HOSPITAL | Age: 43
Discharge: HOME | End: 2018-09-03
Attending: EMERGENCY MEDICINE | Admitting: EMERGENCY MEDICINE
Payer: COMMERCIAL

## 2018-09-03 VITALS
HEART RATE: 68 BPM | BODY MASS INDEX: 21.44 KG/M2 | DIASTOLIC BLOOD PRESSURE: 70 MMHG | RESPIRATION RATE: 16 BRPM | OXYGEN SATURATION: 99 % | TEMPERATURE: 98.6 F | WEIGHT: 109.79 LBS | SYSTOLIC BLOOD PRESSURE: 157 MMHG

## 2018-09-03 DIAGNOSIS — R04.2 SPITTING UP BLOOD: Primary | ICD-10-CM

## 2018-09-03 LAB
APTT PPP: 40 SECONDS (ref 24–36)
BASOPHILS # BLD AUTO: 0.04 THOUSANDS/ΜL (ref 0–0.1)
BASOPHILS NFR BLD AUTO: 1 % (ref 0–1)
EOSINOPHIL # BLD AUTO: 0.06 THOUSAND/ΜL (ref 0–0.61)
EOSINOPHIL NFR BLD AUTO: 1 % (ref 0–6)
ERYTHROCYTE [DISTWIDTH] IN BLOOD BY AUTOMATED COUNT: 11.9 % (ref 11.6–15.1)
HCT VFR BLD AUTO: 34.4 % (ref 34.8–46.1)
HGB BLD-MCNC: 11.9 G/DL (ref 11.5–15.4)
IMM GRANULOCYTES # BLD AUTO: 0.02 THOUSAND/UL (ref 0–0.2)
IMM GRANULOCYTES NFR BLD AUTO: 0 % (ref 0–2)
INR PPP: 3.06 (ref 0.86–1.17)
LYMPHOCYTES # BLD AUTO: 1.3 THOUSANDS/ΜL (ref 0.6–4.47)
LYMPHOCYTES NFR BLD AUTO: 21 % (ref 14–44)
MCH RBC QN AUTO: 31.6 PG (ref 26.8–34.3)
MCHC RBC AUTO-ENTMCNC: 34.6 G/DL (ref 31.4–37.4)
MCV RBC AUTO: 91 FL (ref 82–98)
MONOCYTES # BLD AUTO: 0.51 THOUSAND/ΜL (ref 0.17–1.22)
MONOCYTES NFR BLD AUTO: 8 % (ref 4–12)
NEUTROPHILS # BLD AUTO: 4.23 THOUSANDS/ΜL (ref 1.85–7.62)
NEUTS SEG NFR BLD AUTO: 69 % (ref 43–75)
NRBC BLD AUTO-RTO: 0 /100 WBCS
PLATELET # BLD AUTO: 203 THOUSANDS/UL (ref 149–390)
PMV BLD AUTO: 9.6 FL (ref 8.9–12.7)
PROTHROMBIN TIME: 31.2 SECONDS (ref 11.8–14.2)
RBC # BLD AUTO: 3.77 MILLION/UL (ref 3.81–5.12)
WBC # BLD AUTO: 6.16 THOUSAND/UL (ref 4.31–10.16)

## 2018-09-03 PROCEDURE — 71046 X-RAY EXAM CHEST 2 VIEWS: CPT

## 2018-09-03 PROCEDURE — 99283 EMERGENCY DEPT VISIT LOW MDM: CPT

## 2018-09-03 PROCEDURE — 36415 COLL VENOUS BLD VENIPUNCTURE: CPT | Performed by: EMERGENCY MEDICINE

## 2018-09-03 PROCEDURE — 85730 THROMBOPLASTIN TIME PARTIAL: CPT | Performed by: EMERGENCY MEDICINE

## 2018-09-03 PROCEDURE — 85610 PROTHROMBIN TIME: CPT | Performed by: EMERGENCY MEDICINE

## 2018-09-03 PROCEDURE — 85025 COMPLETE CBC W/AUTO DIFF WBC: CPT | Performed by: EMERGENCY MEDICINE

## 2018-09-04 ENCOUNTER — TELEPHONE (OUTPATIENT)
Dept: CARDIOLOGY CLINIC | Facility: CLINIC | Age: 43
End: 2018-09-04

## 2018-09-04 NOTE — TELEPHONE ENCOUNTER
PT' CALLED TO REPORT HER PT INR TAKEN AT [facility] 3599 AT 3 06  PT WOULD LIKE A CB WITH RESULTS

## 2018-09-04 NOTE — DISCHARGE INSTRUCTIONS
Follow up with primary doctor and pulmonology as needed  Return to ED for severe bleeding or trouble breathing or swallowing

## 2018-09-04 NOTE — ED PROVIDER NOTES
History  Chief Complaint   Patient presents with    Bleeding/Bruising     Patient reports blood coming up into mouth  Patient is on coumadin  64-year-old female with past medical history of his aortic valve replacement on Coumadin presents with spitting up blood  She states that she works as a  and was working out this evening when she started feeling a sensation in her throat and then started spitting up small amounts of bright red blood  Denies new chest pain, shortness of breath  She was seen in the ED 8/27 for chest pain and had a negative workup including negative stuck CTA PE  She denies any leg swelling, history of DVT/PE, estrogen use, smoking  Denies any nausea or vomiting  No dark stools  Has not had similar symptoms in the past   No pain  Prior to Admission Medications   Prescriptions Last Dose Informant Patient Reported? Taking?    BIOTIN 5000 PO   Yes No   Sig: Take 20,000 mcg by mouth daily   Multiple Vitamins-Minerals (MULTIVITAMIN WITH MINERALS) tablet   Yes No   Sig: Take 1 tablet by mouth daily   aspirin (ECOTRIN LOW STRENGTH) 81 mg EC tablet   Yes No   Sig: Take 81 mg by mouth daily   lidocaine (LIDODERM) 5 %   No No   Sig: Place 1 patch on the skin daily as needed for mild pain or moderate pain Remove & Discard patch within 12 hours or as directed by MD   metoprolol tartrate (LOPRESSOR) 25 mg tablet   No No   Sig: take 1 tablet by mouth twice a day   warfarin (COUMADIN) 5 mg tablet   No No   Sig: take 1 to 1 1/2 tablets by mouth once daily as directed      Facility-Administered Medications: None       Past Medical History:   Diagnosis Date    Allergic contact dermatitis     Allergic rhinitis     resolved 01/17/2018    Anemia     Aortic valve disorder     Aortic valve insufficiency     Asthma     Benign neoplasm of skin     Cardiac disease     Costochondritis     Hyperinsulinism     Inguinal lymphadenopathy     resolved 08/27/2015    Migraine resolved 2015    Varicose 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     unspecified laterality       Past Surgical History:   Procedure Laterality Date    AORTIC VALVE REPLACEMENT      complications 7203 failure of bovine valve, replaced with mechanical aortic valve  and root [facility] at [facility] dr sawyer   Neil Biscoe      enlargement    CARDIAC VALVE REPLACEMENT  2011    bovine, with redo and mechanical valve replacement and root 2012 dr sawyer at [facility] last assessed 08/15/2016    CERVICAL BIOPSY  W/ LOOP ELECTRODE EXCISION      INDUCED       surgically by dilation and evacuation    RHINOPLASTY         Family History   Problem Relation Age of Onset    Asthma Father     Coronary artery disease Father     Hypertension Father     Crohn's disease Sister         with compliction unspecified gastrointestinal tract location    Breast cancer Maternal Grandmother      I have reviewed and agree with the history as documented  Social History   Substance Use Topics    Smoking status: Never Smoker    Smokeless tobacco: Never Used    Alcohol use No      Comment: drinks hard liquor, social per allscripts        Review of Systems   Constitutional: Negative for chills and fever  HENT: Negative for dental problem and ear pain  Eyes: Negative for pain and redness  Respiratory: Negative for cough and shortness of breath  Cardiovascular: Negative for chest pain and palpitations  Gastrointestinal: Negative for abdominal pain and nausea  Endocrine: Negative for polydipsia and polyphagia  Genitourinary: Negative for dysuria and frequency  Musculoskeletal: Negative for arthralgias and joint swelling  Skin: Negative for color change and rash  Neurological: Negative for dizziness and headaches  Hematological: Bruises/bleeds easily  Spitting up blood   Psychiatric/Behavioral: Negative for behavioral problems and confusion     All other systems reviewed and are negative  Physical Exam  Physical Exam   Constitutional: She is oriented to person, place, and time  She appears well-developed and well-nourished  No distress  HENT:   Head: Atraumatic  Right Ear: External ear normal    Left Ear: External ear normal    Nose: Nose normal    Eyes: Conjunctivae and EOM are normal  Pupils are equal, round, and reactive to light  Neck: Normal range of motion  Neck supple  No JVD present  Cardiovascular: Normal rate, regular rhythm and normal heart sounds  No murmur heard  Pulmonary/Chest: Effort normal and breath sounds normal  No respiratory distress  She has no wheezes  Abdominal: Soft  Bowel sounds are normal  She exhibits no distension  There is no tenderness  Musculoskeletal: Normal range of motion  She exhibits no edema  Neurological: She is alert and oriented to person, place, and time  No cranial nerve deficit  Skin: Skin is warm and dry  Capillary refill takes less than 2 seconds  She is not diaphoretic  Psychiatric: She has a normal mood and affect  Her behavior is normal    Nursing note and vitals reviewed        Vital Signs  ED Triage Vitals [09/03/18 1955]   Temperature Pulse Respirations Blood Pressure SpO2   98 6 °F (37 °C) 84 16 153/70 97 %      Temp Source Heart Rate Source Patient Position - Orthostatic VS BP Location FiO2 (%)   Oral Monitor Sitting Right arm --      Pain Score       4           Vitals:    09/03/18 1955   BP: 153/70   Pulse: 84   Patient Position - Orthostatic VS: Sitting       Visual Acuity      ED Medications  Medications - No data to display    Diagnostic Studies  Results Reviewed     Procedure Component Value Units Date/Time    Protime-INR [[id number]]  (Abnormal) Collected:  09/03/18 2032    Lab Status:  Final result Specimen:  Blood from Arm, Left Updated:  09/03/18 2107     Protime 31 2 (H) seconds      INR 3 06 (H)    APTT [[id number]]  (Abnormal) Collected:  09/03/18 2032    Lab Status:  Final result Specimen:  Blood from Arm, Left Updated:  09/03/18 2107     PTT 40 (H) seconds     CBC and differential [[id number]]  (Abnormal) Collected:  09/03/18 2032    Lab Status:  Final result Specimen:  Blood from Arm, Left Updated:  09/03/18 2044     WBC 6 16 Thousand/uL      RBC 3 77 (L) Million/uL      Hemoglobin 11 9 g/dL      Hematocrit 34 4 (L) %      MCV 91 fL      MCH 31 6 pg      MCHC 34 6 g/dL      RDW 11 9 %      MPV 9 6 fL      Platelets 243 Thousands/uL      nRBC 0 /100 WBCs      Neutrophils Relative 69 %      Immat GRANS % 0 %      Lymphocytes Relative 21 %      Monocytes Relative 8 %      Eosinophils Relative 1 %      Basophils Relative 1 %      Neutrophils Absolute 4 23 Thousands/µL      Immature Grans Absolute 0 02 Thousand/uL      Lymphocytes Absolute 1 30 Thousands/µL      Monocytes Absolute 0 51 Thousand/µL      Eosinophils Absolute 0 06 Thousand/µL      Basophils Absolute 0 04 Thousands/µL                  XR chest 2 views    (Results Pending)              Procedures  Procedures       Phone Contacts  ED Phone Contact    ED Course                               MDM  Number of Diagnoses or Management Options  Spitting up blood:   Diagnosis management comments: 38 yo F presents with spitting up small amount of blood after eating dorito chip tonight  Bleeding resolved while in ED  INR 3, no anemia, CXR unremarkable  HD stable  Appears well  No SOB  Recent CTA PE negative, doubt PE  Pulmonology follow up for possible bronch if continues, if severe return to ED  CritCare Time    Disposition  Final diagnoses:   Spitting up blood     Time reflects when diagnosis was documented in both MDM as applicable and the Disposition within this note     Time User Action Codes Description Comment    9/3/2018  9:26 PM Caleb Negro Add [R04 2] Spitting up blood       ED Disposition     ED Disposition Condition Comment    Discharge  Rui Churchill discharge to home/self care      Condition at discharge: Good        Follow-up Information     Follow up With Specialties Details Why Contact Info    Kady Quijano MD Internal Medicine, Palliative Care  As needed [address], [address] [facility] Pulmonology Call as needed for continued symptoms, may need bronchoscopy [place] [address] [id number] [phone]          Patient's Medications   Discharge Prescriptions    No medications on file     No discharge procedures on file      ED Provider  Electronically Signed by           Anastasia Penn MD  09/03/18 6459

## 2018-09-05 ENCOUNTER — ANTICOAG VISIT (OUTPATIENT)
Dept: CARDIOLOGY CLINIC | Facility: CLINIC | Age: 43
End: 2018-09-05

## 2018-09-05 DIAGNOSIS — I35.9 AORTIC VALVE DISORDER: ICD-10-CM

## 2018-09-08 ENCOUNTER — OFFICE VISIT (OUTPATIENT)
Dept: INTERNAL MEDICINE CLINIC | Facility: CLINIC | Age: 43
End: 2018-09-08
Payer: COMMERCIAL

## 2018-09-08 VITALS
SYSTOLIC BLOOD PRESSURE: 120 MMHG | HEART RATE: 56 BPM | DIASTOLIC BLOOD PRESSURE: 78 MMHG | OXYGEN SATURATION: 98 % | TEMPERATURE: 98.3 F | BODY MASS INDEX: 20.58 KG/M2 | WEIGHT: 105.4 LBS

## 2018-09-08 DIAGNOSIS — I35.9 AORTIC VALVE DISORDER: Primary | ICD-10-CM

## 2018-09-08 DIAGNOSIS — M79.671 RIGHT FOOT PAIN: ICD-10-CM

## 2018-09-08 PROCEDURE — 99214 OFFICE O/P EST MOD 30 MIN: CPT | Performed by: PHYSICIAN ASSISTANT

## 2018-09-08 NOTE — PROGRESS NOTES
Assessment/Plan:   I reviewed all of her ER data labs and x-ray  Funny taste in her mouth  She will see a dentist I recommend Pepcid AC  Intermittent pain in her right 5th 4th and 3rd toes  She will see a foot doctor  I reviewed all of her recent labs her potassium was slightly low she will eat bananas  No more hemoptysis or coughing she was reassured her physical exam is unremarkable continue regular follow-up       Diagnoses and all orders for this visit:    Aortic valve disorder    Right foot pain        No problem-specific Assessment & Plan notes found for this encounter  Subjective:      Patient ID: Lonnie Oppenheim is a 37 y o  female  She is here for follow-up is seen in the emergency room twice in the past 3 weeks  The 1st for shoulder and chest pain the 2nd for coughing and hemoptysis lab testing chest x-ray CTA chest were all negative  Her coughing hemoptysis have resolved  However she complains of a funny taste in her mouth and intermittent shooting pain in her right foot 3rd 4th and 5th toes she is currently asymptomatic  No shortness of breath chest pain palpitations dizziness fever or cough      Cough   Pertinent negatives include no chest pain, chills, ear pain, eye redness, fever, headaches, myalgias, postnasal drip, rash, rhinorrhea, sore throat, shortness of breath or wheezing  Toe Pain    Pertinent negatives include no numbness  The following portions of the patient's history were reviewed and updated as appropriate:   She has a past medical history of Allergic contact dermatitis; Allergic rhinitis; Anemia; Aortic valve disorder; Aortic valve insufficiency; Asthma; Benign neoplasm of skin; Cardiac disease; Costochondritis; Hyperinsulinism; Inguinal lymphadenopathy; Migraine; and Varicose 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  ,   does not have any pertinent problems on file  ,   has a past surgical history that includes Aortic valve replacement; AUGMENTATION BREAST; Cervical biopsy w/ loop electrode excision; Cardiac valve replacement (2011); Rhinoplasty; and Induced   ,  family history includes Asthma in her father; Breast cancer in her maternal grandmother; Coronary artery disease in her father; Crohn's disease in her sister; Hypertension in her father  ,   reports that she has never smoked  She has never used smokeless tobacco  She reports that she does not drink alcohol or use drugs  ,  is allergic to dog epithelium allergy skin test; oxycodone; and pollen extract     Current Outpatient Prescriptions   Medication Sig Dispense Refill    aspirin (ECOTRIN LOW STRENGTH) 81 mg EC tablet Take 81 mg by mouth daily      BIOTIN 5000 PO Take 20,000 mcg by mouth daily      metoprolol tartrate (LOPRESSOR) 25 mg tablet take 1 tablet by mouth twice a day 180 tablet 3    Multiple Vitamins-Minerals (MULTIVITAMIN WITH MINERALS) tablet Take 1 tablet by mouth daily      warfarin (COUMADIN) 5 mg tablet take 1 to 1 1/2 tablets by mouth once daily as directed 90 tablet 3    lidocaine (LIDODERM) 5 % Place 1 patch on the skin daily as needed for mild pain or moderate pain Remove & Discard patch within 12 hours or as directed by MD (Patient not taking: Reported on 2018 ) 6 patch 0     No current facility-administered medications for this visit  Review of Systems   Constitutional: Negative for activity change, appetite change, chills, diaphoresis, fatigue, fever and unexpected weight change  HENT: Negative for congestion, dental problem, drooling, ear discharge, ear pain, facial swelling, hearing loss, nosebleeds, postnasal drip, rhinorrhea, sinus pain, sinus pressure, sneezing, sore throat, tinnitus, trouble swallowing and voice change  Eyes: Negative for photophobia, pain, discharge, redness, itching and visual disturbance  Respiratory: Positive for cough  Negative for apnea, choking, chest tightness, shortness of breath, wheezing and stridor  Cardiovascular: Negative for chest pain, palpitations and leg swelling  Gastrointestinal: Negative for abdominal distention, abdominal pain, anal bleeding, blood in stool, constipation, diarrhea, nausea, rectal pain and vomiting  Endocrine: Negative for cold intolerance, heat intolerance, polydipsia, polyphagia and polyuria  Genitourinary: Negative for decreased urine volume, difficulty urinating, dysuria, enuresis, flank pain, frequency, genital sores, hematuria and urgency  Musculoskeletal: Positive for arthralgias  Negative for back pain, gait problem, joint swelling, myalgias, neck pain and neck stiffness  Skin: Negative for color change, pallor, rash and wound  Neurological: Negative for dizziness, tremors, seizures, syncope, facial asymmetry, speech difficulty, weakness, light-headedness, numbness and headaches  Hematological: Negative for adenopathy  Does not bruise/bleed easily  Psychiatric/Behavioral: Negative for agitation, behavioral problems, confusion, decreased concentration, dysphoric mood, hallucinations, self-injury, sleep disturbance and suicidal ideas  The patient is not nervous/anxious and is not hyperactive  Objective:  Vitals:    09/08/18 1051   BP: 120/78   BP Location: Left arm   Patient Position: Sitting   Cuff Size: Standard   Pulse: 56   Temp: 98 3 °F (36 8 °C)   SpO2: 98%   Weight: 47 8 kg (105 lb 6 4 oz)     Body mass index is 20 58 kg/m²  Physical Exam   Constitutional: She is oriented to person, place, and time  She appears well-developed  HENT:   Head: Normocephalic  Right Ear: External ear normal    Left Ear: External ear normal    Mouth/Throat: Oropharynx is clear and moist    Eyes: Conjunctivae are normal  Pupils are equal, round, and reactive to light  Neck: Neck supple  No thyromegaly present  Cardiovascular: Normal rate, regular rhythm and intact distal pulses  Murmur (Prosthetic valve sounds) heard    Pulmonary/Chest: Effort normal and breath sounds normal    Abdominal: Soft  Bowel sounds are normal    Musculoskeletal: Normal range of motion  Neurological: She is alert and oriented to person, place, and time  She has normal reflexes  Skin: Skin is warm and dry

## 2018-09-11 ENCOUNTER — TELEPHONE (OUTPATIENT)
Dept: INTERNAL MEDICINE CLINIC | Facility: CLINIC | Age: 43
End: 2018-09-11

## 2018-09-11 DIAGNOSIS — I35.9 AORTIC VALVE DISORDER: ICD-10-CM

## 2018-09-11 DIAGNOSIS — Z95.2 MITRAL VALVE REPLACED: Primary | ICD-10-CM

## 2018-09-11 RX ORDER — AMOXICILLIN 500 MG/1
TABLET, FILM COATED ORAL
Qty: 4 TABLET | Refills: 0 | Status: SHIPPED | OUTPATIENT
Start: 2018-09-11 | End: 2018-09-12

## 2018-09-11 NOTE — TELEPHONE ENCOUNTER
PT  [facility]   HAS DENTAL  APPT  ON  Thursday  PT  SAID  SHE  NEEDS  ANTIBODIC BEFORE  HER  VISIT [address] PT  [phone]

## 2018-09-14 ENCOUNTER — TELEPHONE (OUTPATIENT)
Dept: OBGYN CLINIC | Facility: CLINIC | Age: 43
End: 2018-09-14

## 2018-09-14 ENCOUNTER — OFFICE VISIT (OUTPATIENT)
Dept: INTERNAL MEDICINE CLINIC | Facility: CLINIC | Age: 43
End: 2018-09-14
Payer: COMMERCIAL

## 2018-09-14 VITALS
DIASTOLIC BLOOD PRESSURE: 60 MMHG | HEIGHT: 60 IN | HEART RATE: 76 BPM | RESPIRATION RATE: 14 BRPM | SYSTOLIC BLOOD PRESSURE: 120 MMHG | WEIGHT: 103.4 LBS | BODY MASS INDEX: 20.3 KG/M2

## 2018-09-14 DIAGNOSIS — S92.909S CLOSED FRACTURE OF FOOT, UNSPECIFIED LATERALITY, SEQUELA: ICD-10-CM

## 2018-09-14 DIAGNOSIS — Z00.00 HEALTH CARE MAINTENANCE: Primary | ICD-10-CM

## 2018-09-14 DIAGNOSIS — R05.9 COUGH: Primary | ICD-10-CM

## 2018-09-14 PROCEDURE — 3008F BODY MASS INDEX DOCD: CPT | Performed by: NURSE PRACTITIONER

## 2018-09-14 PROCEDURE — 99213 OFFICE O/P EST LOW 20 MIN: CPT | Performed by: NURSE PRACTITIONER

## 2018-09-14 RX ORDER — VITAMIN A ACETATE, BETA CAROTENE, ASCORBIC ACID, CHOLECALCIFEROL, .ALPHA.-TOCOPHEROL ACETATE, DL-, THIAMINE MONONITRATE, RIBOFLAVIN, NIACINAMIDE, PYRIDOXINE HYDROCHLORIDE, FOLIC ACID, CYANOCOBALAMIN, CALCIUM CARBONATE, FERROUS FUMARATE, ZINC OXIDE, CUPRIC OXIDE 3080; 12; 120; 400; 1; 1.84; 3; 20; 22; 920; 25; 200; 27; 10; 2 [IU]/1; UG/1; MG/1; [IU]/1; MG/1; MG/1; MG/1; MG/1; MG/1; [IU]/1; MG/1; MG/1; MG/1; MG/1; MG/1
TABLET, FILM COATED ORAL
Qty: 90 EACH | Refills: 1 | Status: SHIPPED | OUTPATIENT
Start: 2018-09-14 | End: 2019-02-14

## 2018-09-14 NOTE — PROGRESS NOTES
Assessment/Plan:    Patient Instructions   Bad taste in mouth likely post nasal drip recommend claritin daily  Likely allergy in nature       Foot fracture following with podiatry    Gallstones asymptomatic continue to monitor         Diagnoses and all orders for this visit:    Cough    Closed fracture of foot, unspecified laterality, sequela         Subjective:      Patient ID: Trip Veronica is a 37 y o  female    HPI      Current Outpatient Prescriptions:     aspirin (ECOTRIN LOW STRENGTH) 81 mg EC tablet, Take 81 mg by mouth daily, Disp: , Rfl:     BIOTIN 5000 PO, Take 20,000 mcg by mouth daily, Disp: , Rfl:     metoprolol tartrate (LOPRESSOR) 25 mg tablet, take 1 tablet by mouth twice a day, Disp: 180 tablet, Rfl: 3    Multiple Vitamins-Minerals (MULTIVITAMIN WITH MINERALS) tablet, Take 1 tablet by mouth daily, Disp: , Rfl:     warfarin (COUMADIN) 5 mg tablet, take 1 to 1 1/2 tablets by mouth once daily as directed, Disp: 90 tablet, Rfl: 3    lidocaine (LIDODERM) 5 %, Place 1 patch on the skin daily as needed for mild pain or moderate pain Remove & Discard patch within 12 hours or as directed by MD (Patient not taking: Reported on 9/8/2018 ), Disp: 6 patch, Rfl: 0    Recent Results (from the past 1008 hour(s))   Protime-INR    Collection Time: 08/06/18 12:00 AM   Result Value Ref Range    INR 4 70 (A) 0 86 - 1 17   Protime-INR    Collection Time: 08/15/18 12:00 AM   Result Value Ref Range    INR 1 90 (A) 0 86 - 1 17   Protime-INR    Collection Time: 08/21/18 12:00 AM   Result Value Ref Range    INR 2 80 (A) 0 86 - 1 17   ECG 12 lead    Collection Time: 08/21/18  9:30 AM   Result Value Ref Range    Ventricular Rate 60 BPM    Atrial Rate 60 BPM    ND Interval 140 ms    QRSD Interval 92 ms    QT Interval 426 ms    QTC Interval 426 ms    P Axis 78 degrees    QRS Axis 84 degrees    T Wave Axis 72 degrees   CBC and differential    Collection Time: 08/21/18  9:36 AM   Result Value Ref Range    WBC 5 31 4 31 - 10 16 Thousand/uL    RBC 4 05 3 81 - 5 12 Million/uL    Hemoglobin 12 7 11 5 - 15 4 g/dL    Hematocrit 37 1 34 8 - 46 1 %    MCV 92 82 - 98 fL    MCH 31 4 26 8 - 34 3 pg    MCHC 34 2 31 4 - 37 4 g/dL    RDW 11 8 11 6 - 15 1 %    MPV 9 8 8 9 - 12 7 fL    Platelets 313 758 - 568 Thousands/uL    nRBC 0 /100 WBCs    Neutrophils Relative 68 43 - 75 %    Immat GRANS % 0 0 - 2 %    Lymphocytes Relative 19 14 - 44 %    Monocytes Relative 10 4 - 12 %    Eosinophils Relative 2 0 - 6 %    Basophils Relative 1 0 - 1 %    Neutrophils Absolute 3 65 1 85 - 7 62 Thousands/µL    Immature Grans Absolute 0 01 0 00 - 0 20 Thousand/uL    Lymphocytes Absolute 0 98 0 60 - 4 47 Thousands/µL    Monocytes Absolute 0 54 0 17 - 1 22 Thousand/µL    Eosinophils Absolute 0 08 0 00 - 0 61 Thousand/µL    Basophils Absolute 0 05 0 00 - 0 10 Thousands/µL   Comprehensive metabolic panel    Collection Time: 08/21/18  9:36 AM   Result Value Ref Range    Sodium 137 136 - 145 mmol/L    Potassium 3 2 (L) 3 5 - 5 3 mmol/L    Chloride 103 100 - 108 mmol/L    CO2 30 21 - 32 mmol/L    ANION GAP 4 4 - 13 mmol/L    BUN 12 5 - 25 mg/dL    Creatinine 0 66 0 60 - 1 30 mg/dL    Glucose 62 (L) 65 - 140 mg/dL    Calcium 8 6 8 3 - 10 1 mg/dL    AST 21 5 - 45 U/L    ALT 26 12 - 78 U/L    Alkaline Phosphatase 38 (L) 46 - 116 U/L    Total Protein 7 3 6 4 - 8 2 g/dL    Albumin 3 9 3 5 - 5 0 g/dL    Total Bilirubin 1 90 (H) 0 20 - 1 00 mg/dL    eGFR 107 ml/min/1 73sq m   Protime-INR    Collection Time: 08/21/18  9:36 AM   Result Value Ref Range    Protime 28 7 (H) 11 8 - 14 2 seconds    INR 2 75 (H) 0 86 - 1 17   TSH, 3rd generation with Free T4 reflex    Collection Time: 08/21/18  9:36 AM   Result Value Ref Range    TSH 3RD GENERATON 0 540 0 358 - 3 740 uIU/mL   D-dimer, quantitative    Collection Time: 08/21/18  9:36 AM   Result Value Ref Range    D-Dimer, Quant <270 0 - 424 ng/ml (FEU)   BNP    Collection Time: 08/21/18  9:36 AM   Result Value Ref Range    NT-proBNP 307 (H) <125 pg/mL   Troponin I    Collection Time: 08/21/18  9:36 AM   Result Value Ref Range    Troponin I <0 02 <=0 04 ng/mL   Green Top 4 ml on hold    Collection Time: 08/21/18  9:40 AM   Result Value Ref Range    Extra Tube hold for addons    POCT pregnancy, urine    Collection Time: 08/21/18  9:45 AM   Result Value Ref Range    EXT PREG TEST UR (Ref: Negative) negative    ECG 12 lead    Collection Time: 08/21/18 12:37 PM   Result Value Ref Range    Ventricular Rate 52 BPM    Atrial Rate 52 BPM    AL Interval 136 ms    QRSD Interval 90 ms    QT Interval 460 ms    QTC Interval 427 ms    P Axis 83 degrees    QRS Axis 84 degrees    T Wave Axis 79 degrees   Troponin I    Collection Time: 08/21/18 12:46 PM   Result Value Ref Range    Troponin I <0 02 <=0 04 ng/mL   UA w Reflex to Microscopic w Reflex to Culture    Collection Time: 08/27/18  1:08 AM   Result Value Ref Range    Color, UA Yellow     Clarity, UA Clear     Specific Plover, UA 1 010 1 003 - 1 030    pH, UA 6 0 4 5 - 8 0    Leukocytes, UA Negative Negative    Nitrite, UA Negative Negative    Protein, UA Negative Negative mg/dl    Glucose, UA Negative Negative mg/dl    Ketones, UA Negative Negative mg/dl    Urobilinogen, UA 0 2 0 2, 1 0 E U /dl E U /dl    Bilirubin, UA Negative Negative    Blood, UA Negative Negative   POCT pregnancy, urine    Collection Time: 08/27/18  1:15 AM   Result Value Ref Range    EXT PREG TEST UR (Ref: Negative) negative    ECG 12 lead    Collection Time: 08/27/18  1:17 AM   Result Value Ref Range    Ventricular Rate 64 BPM    Atrial Rate 64 BPM    AL Interval 142 ms    QRSD Interval 84 ms    QT Interval 440 ms    QTC Interval 453 ms    P [place] 72 degrees    QRS Axis 81 degrees    T Wave Axis 74 degrees   ECG 12 lead    Collection Time: 08/27/18  1:20 AM   Result Value Ref Range    Ventricular Rate 61 BPM    Atrial Rate 61 BPM    AL Interval 112 ms    QRSD Interval 86 ms    QT Interval 446 ms    QTC Interval 448 ms    P Axis 63 degrees QRS Axis 82 degrees    T Wave Axis 76 degrees   Comprehensive metabolic panel    Collection Time: 08/27/18  1:26 AM   Result Value Ref Range    Sodium 143 136 - 145 mmol/L    Potassium 3 2 (L) 3 5 - 5 3 mmol/L    Chloride 106 100 - 108 mmol/L    CO2 31 21 - 32 mmol/L    ANION GAP 6 4 - 13 mmol/L    BUN 8 5 - 25 mg/dL    Creatinine 0 62 0 60 - 1 30 mg/dL    Glucose 81 65 - 140 mg/dL    Calcium 8 6 8 3 - 10 1 mg/dL    AST 23 5 - 45 U/L    ALT 29 12 - 78 U/L    Alkaline Phosphatase 35 (L) 46 - 116 U/L    Total Protein 6 6 6 4 - 8 2 g/dL    Albumin 3 8 3 5 - 5 0 g/dL    Total Bilirubin 1 80 (H) 0 20 - 1 00 mg/dL    eGFR 109 ml/min/1 73sq m   CBC and differential    Collection Time: 08/27/18  1:26 AM   Result Value Ref Range    WBC 6 21 4 31 - 10 16 Thousand/uL    RBC 3 78 (L) 3 81 - 5 12 Million/uL    Hemoglobin 11 8 11 5 - 15 4 g/dL    Hematocrit 34 4 (L) 34 8 - 46 1 %    MCV 91 82 - 98 fL    MCH 31 2 26 8 - 34 3 pg    MCHC 34 3 31 4 - 37 4 g/dL    RDW 11 7 11 6 - 15 1 %    MPV 9 4 8 9 - 12 7 fL    Platelets 864 161 - 833 Thousands/uL    nRBC 0 /100 WBCs    Neutrophils Relative 64 43 - 75 %    Immat GRANS % 0 0 - 2 %    Lymphocytes Relative 25 14 - 44 %    Monocytes Relative 8 4 - 12 %    Eosinophils Relative 2 0 - 6 %    Basophils Relative 1 0 - 1 %    Neutrophils Absolute 3 99 1 85 - 7 62 Thousands/µL    Immature Grans Absolute 0 02 0 00 - 0 20 Thousand/uL    Lymphocytes Absolute 1 55 0 60 - 4 47 Thousands/µL    Monocytes Absolute 0 52 0 17 - 1 22 Thousand/µL    Eosinophils Absolute 0 10 0 00 - 0 61 Thousand/µL    Basophils Absolute 0 03 0 00 - 0 10 Thousands/µL   Protime-INR    Collection Time: 08/27/18  1:26 AM   Result Value Ref Range    Protime 26 1 (H) 11 8 - 14 2 seconds    INR 2 43 (H) 0 86 - 1 17   APTT    Collection Time: 08/27/18  1:26 AM   Result Value Ref Range    PTT 40 (H) 24 - 36 seconds   Magnesium    Collection Time: 08/27/18  1:26 AM   Result Value Ref Range    Magnesium 2 1 1 6 - 2 6 mg/dL Troponin I    Collection Time: 08/27/18  1:26 AM   Result Value Ref Range    Troponin I <0 02 <=0 04 ng/mL   CBC and differential    Collection Time: 09/03/18  8:32 PM   Result Value Ref Range    WBC 6 16 4 31 - 10 16 Thousand/uL    RBC 3 77 (L) 3 81 - 5 12 Million/uL    Hemoglobin 11 9 11 5 - 15 4 g/dL    Hematocrit 34 4 (L) 34 8 - 46 1 %    MCV 91 82 - 98 fL    MCH 31 6 26 8 - 34 3 pg    MCHC 34 6 31 4 - 37 4 g/dL    RDW 11 9 11 6 - 15 1 %    MPV 9 6 8 9 - 12 7 fL    Platelets 261 580 - 432 Thousands/uL    nRBC 0 /100 WBCs    Neutrophils Relative 69 43 - 75 %    Immat GRANS % 0 0 - 2 %    Lymphocytes Relative 21 14 - 44 %    Monocytes Relative 8 4 - 12 %    Eosinophils Relative 1 0 - 6 %    Basophils Relative 1 0 - 1 %    Neutrophils Absolute 4 23 1 85 - 7 62 Thousands/µL    Immature Grans Absolute 0 02 0 00 - 0 20 Thousand/uL    Lymphocytes Absolute 1 30 0 60 - 4 47 Thousands/µL    Monocytes Absolute 0 51 0 17 - 1 22 Thousand/µL    Eosinophils Absolute 0 06 0 00 - 0 61 Thousand/µL    Basophils Absolute 0 04 0 00 - 0 10 Thousands/µL   Protime-INR    Collection Time: 09/03/18  8:32 PM   Result Value Ref Range    Protime 31 2 (H) 11 8 - 14 2 seconds    INR 3 06 (H) 0 86 - 1 17   APTT    Collection Time: 09/03/18  8:32 PM   Result Value Ref Range    PTT 40 (H) 24 - 36 seconds       The following portions of the patient's history were reviewed and updated as appropriate: allergies, current medications, past family history, past medical history, past social history, past surgical history and problem list      Review of Systems   Constitutional: Negative for appetite change, chills, diaphoresis, fatigue, fever and unexpected weight change  HENT: Positive for postnasal drip  Negative for sneezing  Eyes: Negative for visual disturbance  Respiratory: Negative for chest tightness and shortness of breath  Cardiovascular: Negative for chest pain, palpitations and leg swelling     Gastrointestinal: Negative for abdominal pain and blood in stool  Endocrine: Negative for cold intolerance, heat intolerance, polydipsia, polyphagia and polyuria  Genitourinary: Negative for difficulty urinating, dysuria, frequency and urgency  Musculoskeletal: Negative for arthralgias and myalgias  Foot pain   Skin: Negative for rash and wound  Neurological: Negative for dizziness, weakness, light-headedness and headaches  Hematological: Negative for adenopathy  Psychiatric/Behavioral: Negative for confusion, dysphoric mood and sleep disturbance  The patient is not nervous/anxious  Objective:      /60   Pulse 76   Resp 14   Ht 5' (1 524 m)   Wt 46 9 kg (103 lb 6 4 oz)   LMP 08/21/2018   BMI 20 19 kg/m²        Physical Exam   Constitutional: She is oriented to person, place, and time  She appears well-developed  No distress  HENT:   Head: Normocephalic and atraumatic  Nose: Nose normal    Mouth/Throat: Oropharynx is clear and moist    cobblestoning   Eyes: Conjunctivae and EOM are normal  Pupils are equal, round, and reactive to light  Neck: Normal range of motion  Neck supple  No JVD present  No tracheal deviation present  No thyromegaly present  Cardiovascular: Normal rate, regular rhythm and intact distal pulses  Exam reveals no gallop and no friction rub  Murmur heard  click   Pulmonary/Chest: Effort normal and breath sounds normal  No respiratory distress  She has no wheezes  She has no rales  Abdominal: Soft  Bowel sounds are normal  She exhibits no distension  There is no tenderness  Musculoskeletal: Normal range of motion  She exhibits no edema  Boot to foot   Lymphadenopathy:     She has no cervical adenopathy  Neurological: She is alert and oriented to person, place, and time  No cranial nerve deficit  Skin: Skin is warm and dry  No rash noted  She is not diaphoretic  Psychiatric: She has a normal mood and affect   Her behavior is normal  Judgment and thought content normal

## 2018-09-14 NOTE — PATIENT INSTRUCTIONS
Bad taste in mouth likely post nasal drip recommend claritin daily  Likely allergy in nature       Foot fracture following with podiatry    Gallstones asymptomatic continue to monitor

## 2018-09-18 ENCOUNTER — TELEPHONE (OUTPATIENT)
Dept: CARDIOLOGY CLINIC | Facility: CLINIC | Age: 43
End: 2018-09-18

## 2018-09-18 ENCOUNTER — ANTICOAG VISIT (OUTPATIENT)
Dept: CARDIOLOGY CLINIC | Facility: CLINIC | Age: 43
End: 2018-09-18

## 2018-09-18 DIAGNOSIS — I35.9 AORTIC VALVE DISORDER: ICD-10-CM

## 2018-09-18 LAB — INR PPP: 3 (ref 0.86–1.17)

## 2018-10-03 ENCOUNTER — TELEPHONE (OUTPATIENT)
Dept: CARDIOLOGY CLINIC | Facility: CLINIC | Age: 43
End: 2018-10-03

## 2018-10-03 ENCOUNTER — ANTICOAG VISIT (OUTPATIENT)
Dept: CARDIOLOGY CLINIC | Facility: CLINIC | Age: 43
End: 2018-10-03

## 2018-10-03 DIAGNOSIS — I35.9 AORTIC VALVE DISORDER: ICD-10-CM

## 2018-10-03 LAB — INR PPP: 2.6 (ref 0.86–1.17)

## 2018-10-04 ENCOUNTER — OFFICE VISIT (OUTPATIENT)
Dept: INTERNAL MEDICINE CLINIC | Facility: CLINIC | Age: 43
End: 2018-10-04
Payer: COMMERCIAL

## 2018-10-04 VITALS
DIASTOLIC BLOOD PRESSURE: 76 MMHG | OXYGEN SATURATION: 96 % | WEIGHT: 104.2 LBS | HEIGHT: 60 IN | BODY MASS INDEX: 20.46 KG/M2 | HEART RATE: 66 BPM | SYSTOLIC BLOOD PRESSURE: 124 MMHG

## 2018-10-04 DIAGNOSIS — T85.49XS: ICD-10-CM

## 2018-10-04 DIAGNOSIS — R22.0 LIP SWELLING: Primary | ICD-10-CM

## 2018-10-04 PROCEDURE — 99213 OFFICE O/P EST LOW 20 MIN: CPT | Performed by: NURSE PRACTITIONER

## 2018-10-04 NOTE — PROGRESS NOTES
Assessment/Plan:    Patient Instructions   intemittent swelling to upper lip- question food allergy- we will check food allergy  symptoms in relationship to what you are eating    Change in right breast implant concerned for leak or rupture- check MRI and refer to plastics         Diagnoses and all orders for this visit:    Lip swelling  -     Food Allergy Profile; Future  -     Basic metabolic panel; Future    Deflation of breast implant, sequela  -     MRI breast right w wo contrast; Future  -     Ambulatory referral to Plastic Surgery; Future         Subjective:      Patient ID: Yesika Solares is a 37 y o  female    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  And the  have been making a variety of foods, every so often her right upper lip will swell but only last 1-2 hours  Over the last several weeks she has notices a shift in her right implant, it is laying lower than left, no known trauma   No fevers, chills or pain            Current Outpatient Prescriptions:     aspirin (ECOTRIN LOW STRENGTH) 81 mg EC tablet, Take 81 mg by mouth daily, Disp: , Rfl:     BIOTIN 5000 PO, Take 20,000 mcg by mouth daily, Disp: , Rfl:     metoprolol tartrate (LOPRESSOR) 25 mg tablet, take 1 tablet by mouth twice a day, Disp: 180 tablet, Rfl: 3    Multiple Vitamins-Minerals (MULTIVITAMIN WITH MINERALS) tablet, Take 1 tablet by mouth daily, Disp: , Rfl:     Prenatal Vit-Fe Fumarate-FA (PRENATAL PLUS) 27-1 MG TABS, Take one tab daily, Disp: 90 each, Rfl: 1    warfarin (COUMADIN) 5 mg tablet, take 1 to 1 1/2 tablets by mouth once daily as directed, Disp: 90 tablet, Rfl: 3    lidocaine (LIDODERM) 5 %, Place 1 patch on the skin daily as needed for mild pain or moderate pain Remove & Discard patch within 12 hours or as directed by MD (Patient not taking: Reported on 9/8/2018 ), Disp: 6 patch, Rfl: 0    Recent Results (from the past 1008 hour(s))   UA w Reflex to Microscopic w Reflex to Culture    Collection Time: 08/27/18  1:08 AM   Result Value Ref Range    Color, UA Yellow     Clarity, UA Clear     Specific [place], UA 1 010 1 003 - 1 030    pH, UA 6 0 4 5 - 8 0    Leukocytes, UA Negative Negative    Nitrite, UA Negative Negative    Protein, UA Negative Negative mg/dl    Glucose, UA Negative Negative mg/dl    Ketones, UA Negative Negative mg/dl    Urobilinogen, UA 0 2 0 2, 1 0 E U /dl E U /dl    Bilirubin, UA Negative Negative    Blood, UA Negative Negative   POCT pregnancy, urine    Collection Time: 08/27/18  1:15 AM   Result Value Ref Range    EXT PREG TEST UR (Ref: Negative) negative    ECG 12 lead    Collection Time: 08/27/18  1:17 AM   Result Value Ref Range    Ventricular Rate 64 BPM    Atrial Rate 64 BPM    NC Interval 142 ms    QRSD Interval 84 ms    QT Interval 440 ms    QTC Interval 453 ms    P [place] 72 degrees    QRS Axis 81 degrees    T Wave Axis 74 degrees   ECG 12 lead    Collection Time: 08/27/18  1:20 AM   Result Value Ref Range    Ventricular Rate 61 BPM    Atrial Rate 61 BPM    NC Interval 112 ms    QRSD Interval 86 ms    QT Interval 446 ms    QTC Interval 448 ms    P [place] 63 degrees    QRS Axis 82 degrees    T Wave Axis 76 degrees   Comprehensive metabolic panel    Collection Time: 08/27/18  1:26 AM   Result Value Ref Range    Sodium 143 136 - 145 mmol/L    Potassium 3 2 (L) 3 5 - 5 3 mmol/L    Chloride 106 100 - 108 mmol/L    CO2 31 21 - 32 mmol/L    ANION GAP 6 4 - 13 mmol/L    BUN 8 5 - 25 mg/dL    Creatinine 0 62 0 60 - 1 30 mg/dL    Glucose 81 65 - 140 mg/dL    Calcium 8 6 8 3 - 10 1 mg/dL    AST 23 5 - 45 U/L    ALT 29 12 - 78 U/L    Alkaline Phosphatase 35 (L) 46 - 116 U/L    Total Protein 6 6 6 4 - 8 2 g/dL    Albumin 3 8 3 5 - 5 0 g/dL    Total Bilirubin 1 80 (H) 0 20 - 1 00 mg/dL    eGFR 109 ml/min/1 73sq m   CBC and differential    Collection Time: 08/27/18  1:26 AM   Result Value Ref Range    WBC 6 21 4 31 - 10 16 Thousand/uL    RBC 3 78 (L) 3 81 - 5 12 Million/uL    Hemoglobin 11 8 11 5 - 15 4 g/dL    Hematocrit 34 4 (L) 34 8 - 46 1 %    MCV 91 82 - 98 fL    MCH 31 2 26 8 - 34 3 pg    MCHC 34 3 31 4 - 37 4 g/dL    RDW 11 7 11 6 - 15 1 %    MPV 9 4 8 9 - 12 7 fL    Platelets 151 309 - 675 Thousands/uL    nRBC 0 /100 WBCs    Neutrophils Relative 64 43 - 75 %    Immat GRANS % 0 0 - 2 %    Lymphocytes Relative 25 14 - 44 %    Monocytes Relative 8 4 - 12 %    Eosinophils Relative 2 0 - 6 %    Basophils Relative 1 0 - 1 %    Neutrophils Absolute 3 99 1 85 - 7 62 Thousands/µL    Immature Grans Absolute 0 02 0 00 - 0 20 Thousand/uL    Lymphocytes Absolute 1 55 0 60 - 4 47 Thousands/µL    Monocytes Absolute 0 52 0 17 - 1 22 Thousand/µL    Eosinophils Absolute 0 10 0 00 - 0 61 Thousand/µL    Basophils Absolute 0 03 0 00 - 0 10 Thousands/µL   Protime-INR    Collection Time: 08/27/18  1:26 AM   Result Value Ref Range    Protime 26 1 (H) 11 8 - 14 2 seconds    INR 2 43 (H) 0 86 - 1 17   APTT    Collection Time: 08/27/18  1:26 AM   Result Value Ref Range    PTT 40 (H) 24 - 36 seconds   Magnesium    Collection Time: 08/27/18  1:26 AM   Result Value Ref Range    Magnesium 2 1 1 6 - 2 6 mg/dL   Troponin I    Collection Time: 08/27/18  1:26 AM   Result Value Ref Range    Troponin I <0 02 <=0 04 ng/mL   CBC and differential    Collection Time: 09/03/18  8:32 PM   Result Value Ref Range    WBC 6 16 4 31 - 10 16 Thousand/uL    RBC 3 77 (L) 3 81 - 5 12 Million/uL    Hemoglobin 11 9 11 5 - 15 4 g/dL    Hematocrit 34 4 (L) 34 8 - 46 1 %    MCV 91 82 - 98 fL    MCH 31 6 26 8 - 34 3 pg    MCHC 34 6 31 4 - 37 4 g/dL    RDW 11 9 11 6 - 15 1 %    MPV 9 6 8 9 - 12 7 fL    Platelets 941 894 - 922 Thousands/uL    nRBC 0 /100 WBCs    Neutrophils Relative 69 43 - 75 %    Immat GRANS % 0 0 - 2 %    Lymphocytes Relative 21 14 - 44 %    Monocytes Relative 8 4 - 12 %    Eosinophils Relative 1 0 - 6 %    Basophils Relative 1 0 - 1 %    Neutrophils Absolute 4 23 1 85 - 7 62 Thousands/µL    Immature Grans Absolute 0 02 0 00 - 0 20 Thousand/uL    Lymphocytes Absolute 1 30 0 60 - 4 47 Thousands/µL    Monocytes Absolute 0 51 0 17 - 1 22 Thousand/µL    Eosinophils Absolute 0 06 0 00 - 0 61 Thousand/µL    Basophils Absolute 0 04 0 00 - 0 10 Thousands/µL   Protime-INR    Collection Time: 09/03/18  8:32 PM   Result Value Ref Range    Protime 31 2 (H) 11 8 - 14 2 seconds    INR 3 06 (H) 0 86 - 1 17   APTT    Collection Time: 09/03/18  8:32 PM   Result Value Ref Range    PTT 40 (H) 24 - 36 seconds   Protime-INR    Collection Time: 09/18/18 12:00 AM   Result Value Ref Range    INR 3 00 (A) 0 86 - 1 17   Protime-INR    Collection Time: 10/03/18 12:00 AM   Result Value Ref Range    INR 2 60 (A) 0 86 - 1 17       The following portions of the patient's history were reviewed and updated as appropriate: allergies, current medications, past family history, past medical history, past social history, past surgical history and problem list      Review of Systems   Constitutional: Negative for appetite change, chills, diaphoresis, fatigue, fever and unexpected weight change  HENT: Negative for postnasal drip and sneezing  Eyes: Negative for visual disturbance  Respiratory: Negative for chest tightness and shortness of breath  Cardiovascular: Negative for chest pain, palpitations and leg swelling  Gastrointestinal: Negative for abdominal pain and blood in stool  Endocrine: Negative for cold intolerance, heat intolerance, polydipsia, polyphagia and polyuria  Genitourinary: Negative for difficulty urinating, dysuria, frequency and urgency  Musculoskeletal: Negative for arthralgias and myalgias  Skin: Negative for rash and wound  Change in right breast   Neurological: Negative for dizziness, weakness, light-headedness and headaches  Hematological: Negative for adenopathy  Psychiatric/Behavioral: Negative for confusion, dysphoric mood and sleep disturbance  The patient is not nervous/anxious            Objective:      /76 (BP Location: Left arm, Patient Position: Sitting)   Pulse 66   Ht 5' (1 524 m)   Wt 47 3 kg (104 lb 3 2 oz)   SpO2 96%   BMI 20 35 kg/m²        Physical Exam   Constitutional: She is oriented to person, place, and time  She appears well-developed  No distress  HENT:   Head: Normocephalic and atraumatic  Nose: Nose normal    Mouth/Throat: Oropharynx is clear and moist    Eyes: Pupils are equal, round, and reactive to light  Conjunctivae and EOM are normal    Neck: Normal range of motion  Neck supple  No JVD present  No tracheal deviation present  No thyromegaly present  Cardiovascular: Normal rate, regular rhythm and intact distal pulses  Exam reveals no gallop and no friction rub  Murmur heard  click   Pulmonary/Chest: Effort normal and breath sounds normal  No respiratory distress  She has no wheezes  She has no rales  Right breast lay ~ 1 5 cm lower than left and appears less full   Abdominal: Soft  Bowel sounds are normal  She exhibits no distension  There is no tenderness  Musculoskeletal: Normal range of motion  She exhibits no edema  Boot to foot   Lymphadenopathy:     She has no cervical adenopathy  Neurological: She is alert and oriented to person, place, and time  No cranial nerve deficit  Skin: Skin is warm and dry  No rash noted  She is not diaphoretic  Psychiatric: She has a normal mood and affect   Her behavior is normal  Judgment and thought content normal

## 2018-10-04 NOTE — PATIENT INSTRUCTIONS
intemittent swelling to upper lip- question food allergy- we will check food allergy  symptoms in relationship to what you are eating    Change in right breast implant concerned for leak or rupture- check MRI and refer to plastics

## 2018-10-05 ENCOUNTER — TELEPHONE (OUTPATIENT)
Dept: CARDIOLOGY CLINIC | Facility: CLINIC | Age: 43
End: 2018-10-05

## 2018-10-05 ENCOUNTER — TELEPHONE (OUTPATIENT)
Dept: INTERNAL MEDICINE CLINIC | Facility: CLINIC | Age: 43
End: 2018-10-05

## 2018-10-05 PROBLEM — Z00.00 ENCOUNTER FOR PREVENTIVE HEALTH EXAMINATION: Status: ACTIVE | Noted: 2018-10-05

## 2018-10-05 LAB
ALMOND IGE QN: <0.1 KU/L
BUN SERPL-MCNC: 14 MG/DL (ref 7–25)
BUN/CREAT SERPL: NORMAL (CALC) (ref 6–22)
CALCIUM SERPL-MCNC: 9 MG/DL (ref 8.6–10.2)
CASHEW NUT IGE QN: <0.1 KU/L
CHLORIDE SERPL-SCNC: 102 MMOL/L (ref 98–110)
CO2 SERPL-SCNC: 31 MMOL/L (ref 20–32)
CODFISH IGE QN: <0.1 KU/L
CREAT SERPL-MCNC: 0.7 MG/DL (ref 0.5–1.1)
DEPRECATED ALMOND IGE RAST QL: 0
DEPRECATED CASHEW NUT IGE RAST QL: 0
DEPRECATED CODFISH IGE RAST QL: 0
DEPRECATED EGG WHITE IGE RAST QL: 1
DEPRECATED HAZELNUT IGE RAST QL: 2
DEPRECATED MILK IGE RAST QL: 3
DEPRECATED PEANUT IGE RAST QL: 0
DEPRECATED SALMON IGE RAST QL: 0
DEPRECATED SCALLOP IGE RAST QL: 0
DEPRECATED SESAME SEED IGE RAST QL: 0
DEPRECATED SHRIMP IGE RAST QL: ABNORMAL
DEPRECATED SOYBEAN IGE RAST QL: 0
DEPRECATED TUNA IGE RAST QL: 0
DEPRECATED WALNUT IGE RAST QL: 0
DEPRECATED WHEAT IGE RAST QL: ABNORMAL
EGG WHITE IGE QN: 0.48 KU/L
GLUCOSE SERPL-MCNC: 82 MG/DL (ref 65–99)
HAZELNUT IGE QN: 3.38 KU/L
MILK IGE QN: 4.13 KU/L
PEANUT IGE QN: <0.1 KU/L
POTASSIUM SERPL-SCNC: 4 MMOL/L (ref 3.5–5.3)
SALMON IGE QN: <0.1 KU/L
SCALLOP IGE QN: <0.1 KU/L
SESAME SEED IGE QN: <0.1 KU/L
SHRIMP IGE QN: 0.12 KU/L
SL AMB EGFR AFRICAN AMERICAN: 121 ML/MIN/1.73M2
SL AMB EGFR NON AFRICAN AMERICAN: 105 ML/MIN/1.73M2
SODIUM SERPL-SCNC: 137 MMOL/L (ref 135–146)
SOYBEAN IGE QN: <0.1 KU/L
TUNA IGE QN: <0.1 KU/L
WALNUT IGE QN: <0.1 KU/L
WHEAT IGE QN: 0.17 KU/L

## 2018-10-05 NOTE — TELEPHONE ENCOUNTER
Called pt's home # and no answer  Phone kept ringing and unable to leave message  Will call again later

## 2018-10-05 NOTE — TELEPHONE ENCOUNTER
----- Message from Zenia Umana sent at 10/5/2018  5:06 PM EDT -----  Please go thru all the red results with pt, these are things she is allergic too,, she should avoid milk and hazelnuts products that seems to be the worst

## 2018-10-05 NOTE — TELEPHONE ENCOUNTER
S/w pt and she is unsure if she missed a day of her medications or if she did take them  Pt takes Metoprolol tartrate 25 mg, Coumadin 5 mg, and aspirin 81 mg  Pt would like to know should she have her blood taken again for inr or continue present regimen of 5mg/7 5mg/5mg alternating? If she did miss a day and how to resume medication?

## 2018-10-05 NOTE — TELEPHONE ENCOUNTER
Do not double up on medications if she missed  Stay with the current regimen  Check INR early next week

## 2018-10-05 NOTE — TELEPHONE ENCOUNTER
PT FORGOT TO TAKE ALL OF HER MEDS YESTERDAY, INCLUDING COUMADIN  PT WANTS TO KNOW WHAT SHE SHOULD DO   PLEASE CALL PT

## 2018-10-05 NOTE — PROGRESS NOTES
Please go thru all the red results with pt, these are things she is allergic too,, she should avoid milk and hazelnuts products that seems to be the worst

## 2018-10-17 ENCOUNTER — ANTICOAG VISIT (OUTPATIENT)
Dept: CARDIOLOGY CLINIC | Facility: CLINIC | Age: 43
End: 2018-10-17

## 2018-10-17 ENCOUNTER — TELEPHONE (OUTPATIENT)
Dept: CARDIOLOGY CLINIC | Facility: CLINIC | Age: 43
End: 2018-10-17

## 2018-10-17 DIAGNOSIS — I35.9 AORTIC VALVE DISORDER: ICD-10-CM

## 2018-10-17 LAB — INR PPP: 2.3 (ref 0.86–1.17)

## 2018-10-22 ENCOUNTER — CONVERSION ENCOUNTER (OUTPATIENT)
Dept: ULTRASOUND IMAGING | Facility: HOSPITAL | Age: 43
End: 2018-10-22

## 2018-10-22 ENCOUNTER — HOSPITAL ENCOUNTER (OUTPATIENT)
Dept: MRI IMAGING | Facility: HOSPITAL | Age: 43
Discharge: HOME | End: 2018-10-22

## 2018-10-22 DIAGNOSIS — T85.49XS: ICD-10-CM

## 2018-10-25 ENCOUNTER — TELEPHONE (OUTPATIENT)
Dept: INTERNAL MEDICINE CLINIC | Facility: CLINIC | Age: 43
End: 2018-10-25

## 2018-10-25 ENCOUNTER — APPOINTMENT (OUTPATIENT)
Dept: PLASTIC SURGERY | Facility: CLINIC | Age: 43
End: 2018-10-25
Payer: SELF-PAY

## 2018-10-25 VITALS
BODY MASS INDEX: 20.03 KG/M2 | HEIGHT: 60 IN | OXYGEN SATURATION: 100 % | DIASTOLIC BLOOD PRESSURE: 66 MMHG | HEART RATE: 67 BPM | TEMPERATURE: 98.3 F | RESPIRATION RATE: 20 BRPM | SYSTOLIC BLOOD PRESSURE: 117 MMHG | WEIGHT: 102 LBS

## 2018-10-25 PROCEDURE — 99201 OFFICE OUTPATIENT NEW 10 MINUTES: CPT | Mod: NC

## 2018-10-25 RX ORDER — METOPROLOL SUCCINATE 25 MG/1
25 TABLET, EXTENDED RELEASE ORAL
Refills: 0 | Status: ACTIVE | COMMUNITY

## 2018-10-25 RX ORDER — WARFARIN SODIUM 5 MG/1
5 TABLET ORAL
Refills: 0 | Status: ACTIVE | COMMUNITY

## 2018-10-25 RX ORDER — ASPIRIN 81 MG/1
81 TABLET, COATED ORAL
Refills: 0 | Status: ACTIVE | COMMUNITY

## 2018-10-25 RX ORDER — PRENATAL VIT 40/IRON/FOLIC/DHA 27-0.8-25
27-0.8-25 CAPSULE ORAL
Refills: 0 | Status: ACTIVE | COMMUNITY

## 2018-10-25 NOTE — TELEPHONE ENCOUNTER
Pt would like like call back from vivek does not want to speak with a nurse she wanted to talk to her regarding her recent visit with [address] [place] [address] surgeon and he said due to her meidcal history he does not want to do sx on her but wants to speak with vivek

## 2018-11-06 ENCOUNTER — TELEPHONE (OUTPATIENT)
Dept: CARDIOLOGY CLINIC | Facility: CLINIC | Age: 43
End: 2018-11-06

## 2018-11-06 ENCOUNTER — ANTICOAG VISIT (OUTPATIENT)
Dept: CARDIOLOGY CLINIC | Facility: CLINIC | Age: 43
End: 2018-11-06

## 2018-11-06 DIAGNOSIS — I35.9 AORTIC VALVE DISORDER: ICD-10-CM

## 2018-11-06 LAB — INR PPP: 1.9 (ref 0.86–1.17)

## 2018-11-27 ENCOUNTER — OFFICE VISIT (OUTPATIENT)
Dept: PLASTIC SURGERY | Facility: CLINIC | Age: 43
End: 2018-11-27
Payer: COMMERCIAL

## 2018-11-27 ENCOUNTER — ANTICOAG VISIT (OUTPATIENT)
Dept: CARDIOLOGY CLINIC | Facility: CLINIC | Age: 43
End: 2018-11-27

## 2018-11-27 ENCOUNTER — TELEPHONE (OUTPATIENT)
Dept: CARDIOLOGY CLINIC | Facility: CLINIC | Age: 43
End: 2018-11-27

## 2018-11-27 VITALS — BODY MASS INDEX: 20.62 KG/M2 | WEIGHT: 105 LBS | HEIGHT: 60 IN

## 2018-11-27 DIAGNOSIS — I35.9 AORTIC VALVE DISORDER: ICD-10-CM

## 2018-11-27 DIAGNOSIS — T85.44XD CAPSULAR CONTRACTURE OF BREAST IMPLANT, SUBSEQUENT ENCOUNTER: Primary | ICD-10-CM

## 2018-11-27 DIAGNOSIS — T85.49XS: ICD-10-CM

## 2018-11-27 PROBLEM — T85.44XA CAPSULAR CONTRACTURE OF BREAST IMPLANT: Status: ACTIVE | Noted: 2018-11-27

## 2018-11-27 LAB — INR PPP: 1.8 (ref 0.86–1.17)

## 2018-11-27 PROCEDURE — 99204 OFFICE O/P NEW MOD 45 MIN: CPT | Performed by: PLASTIC SURGERY

## 2018-11-27 NOTE — PROGRESS NOTES
Assessment/Plan   Diagnoses and all orders for this visit:    Capsular contracture of breast implant, subsequent encounter    Deflation of breast implant, sequela  -     Ambulatory referral to Plastic Surgery      Ms Justen Stack is a 37 y o  F with symptomatic left capsular contracture  Patient also has asymmetric breast with right breast descent and slightly less volume, no clear evidence of implant deflation but a possibility  I believe she would benefit from bilateral removal of intact implants, left partial vs total capsulectomy, exchange to silicone implants and possible lower pole reinforcement with ADM  The procedure was discussed at length with the patient during her visit today  The operative details and expected post-operative course were outlined  I also explained that potential complications included, but were not limited to: change or loss in sensation of the nipple and surrounding nipple/areola complex that may be transient or permanent, scars that will be visible on cut surfaces of the breast, wound separation, infection, implant infection, capsular contracture recurrence, breast asymmetry, increased or decreased pigmentation of the skin and/or nipple-areola complex, pain, blood loss, hematoma, seroma, contour deformity, and fat necrosis  The patient expressed a reasonable understanding of the procedure and possible complications  Photos were taken at today's visit  We will plan to see Ms Justen Stack back for a pre-operative visit once her surgery is scheduled to review the details of her operation, answer further questions, and place operative markings  Patient has history of heart valve replacement and on anticoagulation and will need her cardiologist of PCP to clear for surgery and to hold anticoagulation for procedure  I spent 45 minutes in face to face time with this patient, and over half of this time was spent in discussing surgical options and educating the patient        Ken Loredo Chey [address]   [address]   [facility] [place] [address]   Office: [phone]          Subjective   Patient ID: Michael Dejesus is a 37 y o  female  Vitals:     Mrs Yobani Negro is a very pleasant 37years old female who presents for evaluation of her breast  As per the patient she had bilateral breast augmentation in 2006 with [place] 420 ml high profile implants  She describes that did not feel right from the beginning  Currently she complaints that feels the right implant is deflated and more "droppy" and the left is feels hard  Patient would like to see options to improve her symptoms and would like to be same size  She wears 32 DDD   Last mammogram was in earlier 2018 with no malignancy concerns  She has history of heart valve replacement in 2011 and then exchanged in 2012 to a mechanical valve for which she is on aspirin and coumadin  The following portions of the patient's history were reviewed and updated as appropriate: allergies, current medications, past family history, past medical history, past social history, past surgical history and problem list     Review of Systems   Constitutional: Negative  HENT: Negative  Eyes: Negative  Respiratory: Negative  Cardiovascular: Negative  Gastrointestinal: Negative  Endocrine: Negative  Genitourinary: Negative  Musculoskeletal: Negative  Allergic/Immunologic: Negative  Neurological: Negative  Hematological: Bruises/bleeds easily  Psychiatric/Behavioral: Negative  Objective   Physical Exam   Constitutional  She appears well-developed  Eyes  Pupils are equal, round, and reactive to light  Cardiovascular: Normal rate  Pulmonary/Chest  Effort normal      Psychiatric  She has a normal mood and affect   Her behavior is normal      Breast  Breast Measurements:   Right Left   A: Sternal notch to nipple distance (cm) 18 5 18   B: Midsternum to nipple distance (cm)     C: Midclavicle to nipple distance (cm)     D: Nipple to inframammary fold (cm) 12 5 11   E: Base width (cm) 10 10 5   F: Inframammary distance (cm) 17     Right Left   Areolar diameter (cm) 4 5 4   Nipple diameter (cm)     Superior tissue pinch test (cm)     Breast ptosis (grade 0-3) 0 0             Her breasts are asymmetrical  Her left breast is larger than the right  Her breasts have tight envelope compliance  Her breasts have compromised tissue coverage

## 2018-12-17 ENCOUNTER — TELEPHONE (OUTPATIENT)
Dept: INTERNAL MEDICINE CLINIC | Facility: CLINIC | Age: 43
End: 2018-12-17

## 2018-12-17 NOTE — TELEPHONE ENCOUNTER
I wouldn't eat shrimp b/c she can have a major allergic reaction but I can't say if she will every be able to eat it again  She should see an allergist he can answer that   Dr Jennifer Tompkins is up the road

## 2018-12-17 NOTE — TELEPHONE ENCOUNTER
Was told she was allergic to shrimp  Does this mean she can never have shrimp again or any other seafood  Please advise? ???

## 2018-12-20 ENCOUNTER — TELEPHONE (OUTPATIENT)
Dept: INTERNAL MEDICINE CLINIC | Facility: CLINIC | Age: 43
End: 2018-12-20

## 2018-12-20 ENCOUNTER — OFFICE VISIT (OUTPATIENT)
Dept: INTERNAL MEDICINE CLINIC | Facility: CLINIC | Age: 43
End: 2018-12-20
Payer: COMMERCIAL

## 2018-12-20 ENCOUNTER — LAB (OUTPATIENT)
Dept: LAB | Facility: CLINIC | Age: 43
End: 2018-12-20
Payer: COMMERCIAL

## 2018-12-20 VITALS
DIASTOLIC BLOOD PRESSURE: 70 MMHG | HEIGHT: 60 IN | HEART RATE: 71 BPM | OXYGEN SATURATION: 99 % | SYSTOLIC BLOOD PRESSURE: 124 MMHG | WEIGHT: 111.6 LBS | BODY MASS INDEX: 21.91 KG/M2

## 2018-12-20 DIAGNOSIS — R53.83 OTHER FATIGUE: ICD-10-CM

## 2018-12-20 DIAGNOSIS — R53.83 OTHER FATIGUE: Primary | ICD-10-CM

## 2018-12-20 DIAGNOSIS — R22.0 LIP SWELLING: ICD-10-CM

## 2018-12-20 DIAGNOSIS — N91.2 AMENORRHEA: ICD-10-CM

## 2018-12-20 DIAGNOSIS — Z79.01 ANTICOAGULATED: ICD-10-CM

## 2018-12-20 DIAGNOSIS — E16.2 HYPOGLYCEMIA: ICD-10-CM

## 2018-12-20 LAB
ALBUMIN SERPL BCP-MCNC: 4 G/DL (ref 3.5–5)
ALP SERPL-CCNC: 39 U/L (ref 46–116)
ALT SERPL W P-5'-P-CCNC: 34 U/L (ref 12–78)
ANION GAP SERPL CALCULATED.3IONS-SCNC: 7 MMOL/L (ref 4–13)
AST SERPL W P-5'-P-CCNC: 21 U/L (ref 5–45)
BASOPHILS # BLD AUTO: 0.05 THOUSANDS/ΜL (ref 0–0.1)
BASOPHILS NFR BLD AUTO: 1 % (ref 0–1)
BILIRUB DIRECT SERPL-MCNC: 0.3 MG/DL (ref 0–0.2)
BILIRUB SERPL-MCNC: 1.16 MG/DL (ref 0.2–1)
BUN SERPL-MCNC: 20 MG/DL (ref 5–25)
CALCIUM SERPL-MCNC: 8.7 MG/DL (ref 8.3–10.1)
CHLORIDE SERPL-SCNC: 103 MMOL/L (ref 100–108)
CO2 SERPL-SCNC: 25 MMOL/L (ref 21–32)
CREAT SERPL-MCNC: 0.88 MG/DL (ref 0.6–1.3)
EOSINOPHIL # BLD AUTO: 0.14 THOUSAND/ΜL (ref 0–0.61)
EOSINOPHIL NFR BLD AUTO: 2 % (ref 0–6)
ERYTHROCYTE [DISTWIDTH] IN BLOOD BY AUTOMATED COUNT: 11.9 % (ref 11.6–15.1)
GFR SERPL CREATININE-BSD FRML MDRD: 80 ML/MIN/1.73SQ M
GLUCOSE SERPL-MCNC: 77 MG/DL (ref 65–140)
HCG SERPL QL: NEGATIVE
HCT VFR BLD AUTO: 42.7 % (ref 34.8–46.1)
HGB BLD-MCNC: 14.1 G/DL (ref 11.5–15.4)
IMM GRANULOCYTES # BLD AUTO: 0.02 THOUSAND/UL (ref 0–0.2)
IMM GRANULOCYTES NFR BLD AUTO: 0 % (ref 0–2)
LYMPHOCYTES # BLD AUTO: 1.35 THOUSANDS/ΜL (ref 0.6–4.47)
LYMPHOCYTES NFR BLD AUTO: 16 % (ref 14–44)
MCH RBC QN AUTO: 31.3 PG (ref 26.8–34.3)
MCHC RBC AUTO-ENTMCNC: 33 G/DL (ref 31.4–37.4)
MCV RBC AUTO: 95 FL (ref 82–98)
MONOCYTES # BLD AUTO: 0.54 THOUSAND/ΜL (ref 0.17–1.22)
MONOCYTES NFR BLD AUTO: 7 % (ref 4–12)
NEUTROPHILS # BLD AUTO: 6.14 THOUSANDS/ΜL (ref 1.85–7.62)
NEUTS SEG NFR BLD AUTO: 74 % (ref 43–75)
NRBC BLD AUTO-RTO: 0 /100 WBCS
PLATELET # BLD AUTO: 227 THOUSANDS/UL (ref 149–390)
PMV BLD AUTO: 10.6 FL (ref 8.9–12.7)
POTASSIUM SERPL-SCNC: 4.1 MMOL/L (ref 3.5–5.3)
PROT SERPL-MCNC: 7.4 G/DL (ref 6.4–8.2)
RBC # BLD AUTO: 4.51 MILLION/UL (ref 3.81–5.12)
SODIUM SERPL-SCNC: 135 MMOL/L (ref 136–145)
WBC # BLD AUTO: 8.24 THOUSAND/UL (ref 4.31–10.16)

## 2018-12-20 PROCEDURE — 99214 OFFICE O/P EST MOD 30 MIN: CPT | Performed by: NURSE PRACTITIONER

## 2018-12-20 PROCEDURE — 83036 HEMOGLOBIN GLYCOSYLATED A1C: CPT

## 2018-12-20 PROCEDURE — 84703 CHORIONIC GONADOTROPIN ASSAY: CPT

## 2018-12-20 PROCEDURE — 80048 BASIC METABOLIC PNL TOTAL CA: CPT

## 2018-12-20 PROCEDURE — 1036F TOBACCO NON-USER: CPT | Performed by: NURSE PRACTITIONER

## 2018-12-20 PROCEDURE — 3008F BODY MASS INDEX DOCD: CPT | Performed by: NURSE PRACTITIONER

## 2018-12-20 PROCEDURE — 36415 COLL VENOUS BLD VENIPUNCTURE: CPT

## 2018-12-20 PROCEDURE — 85025 COMPLETE CBC W/AUTO DIFF WBC: CPT

## 2018-12-20 PROCEDURE — 80076 HEPATIC FUNCTION PANEL: CPT

## 2018-12-20 NOTE — PROGRESS NOTES
Assessment/Plan:    Patient Instructions   Intermittent shakes and feeling lightheaded she has concern for low sugar check labs as noted eat small frequent meals including carbohydrates and proteins    Multiple food allergies refer to allergist    Vaginal spotting she has been sexually active without condoms periods have been normal she is anticoagulated we will check PT INR today in addition to pregnancy test and recommend she follow up with gynecology         Diagnoses and all orders for this visit:    Other fatigue  -     CBC and differential; Future  -     Basic metabolic panel; Future  -     Hepatic function panel; Future    Hypoglycemia  -     Hemoglobin A1C; Future    Anticoagulated  -     Protime-INR; Future    Amenorrhea  -     Pregnancy Test (HCG Qualitative); Future         Subjective:      Patient ID: Jenifer Galvin is a 37 y o  female    For about 2 weeks patient has been having intermittent episodes of shakiness and feeling lightheaded if she eats she feels better  She states she generally eats small frequent meals including carbs and proteins    She has been sexually active without condoms she had some vaginal spotting she has concern for pregnancy  Last PT INR was 2 weeks ago          Current Outpatient Prescriptions:     aspirin (ECOTRIN LOW STRENGTH) 81 mg EC tablet, Take 81 mg by mouth daily, Disp: , Rfl:     BIOTIN 5000 PO, Take 20,000 mcg by mouth daily, Disp: , Rfl:     metoprolol tartrate (LOPRESSOR) 25 mg tablet, take 1 tablet by mouth twice a day, Disp: 180 tablet, Rfl: 3    Multiple Vitamins-Minerals (MULTIVITAMIN WITH MINERALS) tablet, Take 1 tablet by mouth daily, Disp: , Rfl:     Prenatal Vit-Fe Fumarate-FA (PRENATAL PLUS) 27-1 MG TABS, Take one tab daily, Disp: 90 each, Rfl: 1    warfarin (COUMADIN) 5 mg tablet, take 1 to 1 1/2 tablets by mouth once daily as directed, Disp: 90 tablet, Rfl: 3    lidocaine (LIDODERM) 5 %, Place 1 patch on the skin daily as needed for mild pain or moderate pain Remove & Discard patch within 12 hours or as directed by MD (Patient not taking: Reported on 12/20/2018 ), Disp: 6 patch, Rfl: 0    Recent Results (from the past 1008 hour(s))   Protime-INR    Collection Time: 11/27/18 12:00 AM   Result Value Ref Range    INR 1 80 (A) 0 86 - 1 17       The following portions of the patient's history were reviewed and updated as appropriate: allergies, current medications, past family history, past medical history, past social history, past surgical history and problem list      Review of Systems   Constitutional: Negative for appetite change, chills, diaphoresis, fatigue, fever and unexpected weight change  HENT: Negative for postnasal drip and sneezing  Eyes: Negative for visual disturbance  Respiratory: Negative for chest tightness and shortness of breath  Cardiovascular: Negative for chest pain, palpitations and leg swelling  Gastrointestinal: Negative for abdominal pain and blood in stool  Endocrine: Negative for cold intolerance, heat intolerance, polydipsia, polyphagia and polyuria  Genitourinary: Positive for vaginal bleeding  Negative for difficulty urinating, dysuria, frequency and urgency  Musculoskeletal: Negative for arthralgias and myalgias  Skin: Negative for rash and wound  Neurological: Positive for tremors  Negative for weakness, light-headedness and headaches  Hematological: Negative for adenopathy  Psychiatric/Behavioral: Negative for confusion, dysphoric mood and sleep disturbance  The patient is not nervous/anxious  Objective:      /70 (BP Location: Left arm, Patient Position: Sitting)   Pulse 71   Ht 5' (1 524 m)   Wt 50 6 kg (111 lb 9 6 oz)   SpO2 99%   BMI 21 80 kg/m²        Physical Exam   Constitutional: She is oriented to person, place, and time  She appears well-developed  No distress  HENT:   Head: Normocephalic and atraumatic     Nose: Nose normal    Mouth/Throat: Oropharynx is clear and moist    Eyes: Pupils are equal, round, and reactive to light  Conjunctivae and EOM are normal    Neck: Normal range of motion  Neck supple  No JVD present  No tracheal deviation present  No thyromegaly present  Cardiovascular: Normal rate, regular rhythm and intact distal pulses  Exam reveals no gallop and no friction rub  Murmur heard  Pulmonary/Chest: Effort normal and breath sounds normal  No respiratory distress  She has no wheezes  She has no rales  Abdominal: Soft  Bowel sounds are normal  She exhibits no distension  There is no tenderness  Musculoskeletal: Normal range of motion  She exhibits no edema  Lymphadenopathy:     She has no cervical adenopathy  Neurological: She is alert and oriented to person, place, and time  No cranial nerve deficit  Skin: Skin is warm and dry  No rash noted  She is not diaphoretic  Psychiatric: She has a normal mood and affect   Her behavior is normal  Judgment and thought content normal

## 2018-12-20 NOTE — PATIENT INSTRUCTIONS
Intermittent shakes and feeling lightheaded she has concern for low sugar check labs as noted eat small frequent meals including carbohydrates and proteins    Multiple food allergies refer to allergist    Vaginal spotting she has been sexually active without condoms periods have been normal she is anticoagulated we will check PT INR today in addition to pregnancy test and recommend she follow up with gynecology

## 2018-12-20 NOTE — TELEPHONE ENCOUNTER
Pt is coming in at 4:00 to see Christiano Murillo she wants to get a pregnancy test she is going to have her daughter with her and doesn't want her to say anyhting

## 2018-12-21 ENCOUNTER — TELEPHONE (OUTPATIENT)
Dept: INTERNAL MEDICINE CLINIC | Facility: CLINIC | Age: 43
End: 2018-12-21

## 2018-12-21 ENCOUNTER — ANTICOAG VISIT (OUTPATIENT)
Dept: CARDIOLOGY CLINIC | Facility: CLINIC | Age: 43
End: 2018-12-21

## 2018-12-21 DIAGNOSIS — I35.9 AORTIC VALVE DISORDER: ICD-10-CM

## 2018-12-21 LAB
EST. AVERAGE GLUCOSE BLD GHB EST-MCNC: 103 MG/DL
HBA1C MFR BLD: 5.2 % (ref 4.2–6.3)

## 2018-12-21 NOTE — TELEPHONE ENCOUNTER
----- Message from Jayro Bernstein, [address] sent at 12/21/2018  8:11 AM EST -----  All her labs normal  Sugar normal and pregnancy was negative   Her inr was low I sent it to dr Renard Bowser

## 2018-12-21 NOTE — PROGRESS NOTES
All her labs normal  Sugar normal and pregnancy was negative   Her inr was low I sent it to dr Ebenezer Marcus

## 2018-12-26 ENCOUNTER — TELEPHONE (OUTPATIENT)
Dept: CARDIOLOGY CLINIC | Facility: CLINIC | Age: 43
End: 2018-12-26

## 2018-12-26 LAB — INR PPP: 2.4 (ref 0.86–1.17)

## 2018-12-27 ENCOUNTER — ANTICOAG VISIT (OUTPATIENT)
Dept: CARDIOLOGY CLINIC | Facility: CLINIC | Age: 43
End: 2018-12-27

## 2018-12-27 DIAGNOSIS — I35.9 AORTIC VALVE DISORDER: ICD-10-CM

## 2019-01-09 ENCOUNTER — TELEPHONE (OUTPATIENT)
Dept: CARDIOLOGY CLINIC | Facility: CLINIC | Age: 44
End: 2019-01-09

## 2019-01-09 ENCOUNTER — ANTICOAG VISIT (OUTPATIENT)
Dept: CARDIOLOGY CLINIC | Facility: CLINIC | Age: 44
End: 2019-01-09

## 2019-01-09 DIAGNOSIS — I35.9 AORTIC VALVE DISORDER: ICD-10-CM

## 2019-01-09 LAB — INR PPP: 2.4 (ref 0.86–1.17)

## 2019-01-24 ENCOUNTER — ANTICOAG VISIT (OUTPATIENT)
Dept: CARDIOLOGY CLINIC | Facility: CLINIC | Age: 44
End: 2019-01-24

## 2019-01-24 DIAGNOSIS — I35.9 AORTIC VALVE DISORDER: ICD-10-CM

## 2019-01-24 LAB — INR PPP: 2.9 (ref 0.86–1.17)

## 2019-01-29 ENCOUNTER — ANTICOAG VISIT (OUTPATIENT)
Dept: CARDIOLOGY CLINIC | Facility: CLINIC | Age: 44
End: 2019-01-29

## 2019-01-29 DIAGNOSIS — I35.9 AORTIC VALVE DISORDER: ICD-10-CM

## 2019-01-29 LAB — INR PPP: 2.7 (ref 0.86–1.17)

## 2019-01-31 DIAGNOSIS — I35.9 AORTIC VALVE DISORDER: ICD-10-CM

## 2019-01-31 RX ORDER — WARFARIN SODIUM 5 MG/1
5 TABLET ORAL
Qty: 90 TABLET | Refills: 3 | Status: SHIPPED | OUTPATIENT
Start: 2019-01-31 | End: 2019-11-14 | Stop reason: SDUPTHER

## 2019-02-04 ENCOUNTER — APPOINTMENT (OUTPATIENT)
Dept: LAB | Facility: CLINIC | Age: 44
End: 2019-02-04
Payer: COMMERCIAL

## 2019-02-04 ENCOUNTER — ANTICOAG VISIT (OUTPATIENT)
Dept: CARDIOLOGY CLINIC | Facility: CLINIC | Age: 44
End: 2019-02-04

## 2019-02-04 ENCOUNTER — TELEPHONE (OUTPATIENT)
Dept: OBGYN CLINIC | Facility: MEDICAL CENTER | Age: 44
End: 2019-02-04

## 2019-02-04 DIAGNOSIS — I35.9 AORTIC VALVE DISORDER: ICD-10-CM

## 2019-02-04 DIAGNOSIS — R39.9 UTI SYMPTOMS: Primary | ICD-10-CM

## 2019-02-04 NOTE — TELEPHONE ENCOUNTER
Dear Kole Fill:    Pt called office today c/o urinary symptoms  Pt denies UTI history  Pt reports that she has been having urinary frequency and vulvar irritation since condom broke while having sex approximately 1 week ago  Pt further reports vulvar soreness and irritation  Pt denies fever, burning during urination, cloudy/blood in urine, lower pelvic pressure, vaginal discharge, vaginal bleeding, and vaginal itching  Pt states she knows she is not pregnant at this time  Pt states she is allergic to oxycodone  Pt is aware that she may receive response from you by tomorrow  Pt saw you at office visit on 1/17/18 (yearly)  Does Pt need an office visit and/or Rx? Should UA & UC tests be ordered? Please advise  Thank you!     Fran Ayon MA

## 2019-02-05 ENCOUNTER — APPOINTMENT (OUTPATIENT)
Dept: LAB | Facility: HOSPITAL | Age: 44
End: 2019-02-05
Payer: COMMERCIAL

## 2019-02-05 ENCOUNTER — TELEPHONE (OUTPATIENT)
Dept: CARDIOLOGY CLINIC | Facility: CLINIC | Age: 44
End: 2019-02-05

## 2019-02-05 DIAGNOSIS — R35.0 URINARY FREQUENCY: Primary | ICD-10-CM

## 2019-02-05 DIAGNOSIS — R39.9 UTI SYMPTOMS: ICD-10-CM

## 2019-02-05 LAB
BACTERIA UR QL AUTO: ABNORMAL /HPF
BILIRUB UR QL STRIP: NEGATIVE
CLARITY UR: CLEAR
COLOR UR: YELLOW
GLUCOSE UR STRIP-MCNC: NEGATIVE MG/DL
HGB UR QL STRIP.AUTO: ABNORMAL
KETONES UR STRIP-MCNC: NEGATIVE MG/DL
LEUKOCYTE ESTERASE UR QL STRIP: NEGATIVE
NITRITE UR QL STRIP: NEGATIVE
NON-SQ EPI CELLS URNS QL MICRO: ABNORMAL /HPF
PH UR STRIP.AUTO: 6 [PH] (ref 4.5–8)
PROT UR STRIP-MCNC: NEGATIVE MG/DL
RBC #/AREA URNS AUTO: ABNORMAL /HPF
SP GR UR STRIP.AUTO: 1.02 (ref 1–1.03)
UROBILINOGEN UR QL STRIP.AUTO: 0.2 E.U./DL
WBC #/AREA URNS AUTO: ABNORMAL /HPF

## 2019-02-05 PROCEDURE — 87147 CULTURE TYPE IMMUNOLOGIC: CPT

## 2019-02-05 PROCEDURE — 81001 URINALYSIS AUTO W/SCOPE: CPT

## 2019-02-05 PROCEDURE — 87086 URINE CULTURE/COLONY COUNT: CPT

## 2019-02-05 RX ORDER — NITROFURANTOIN 25; 75 MG/1; MG/1
100 CAPSULE ORAL 2 TIMES DAILY
Qty: 14 CAPSULE | Refills: 0 | Status: SHIPPED | OUTPATIENT
Start: 2019-02-05 | End: 2019-02-06 | Stop reason: ALTCHOICE

## 2019-02-05 NOTE — TELEPHONE ENCOUNTER
Please advise that she submits a urine specimen for UA/C&S at the outpatient lab then starts Cipro and pushes fluids  I will send the rx to her pharmacy  If she has any concerns about STI exposure given broken condom please offer gc/chl testing also off urine if she wishes  Please offer an appt for routine annual gyn exam for any time

## 2019-02-05 NOTE — TELEPHONE ENCOUNTER
LMOM today @ [phone] for Pt to call back office regarding UTI symptoms addressed by Esta Kanner  UA & UC lab orders completed in EPIC

## 2019-02-05 NOTE — TELEPHONE ENCOUNTER
Pt would like to know if she can start Folic Acid? She said her  said it would promote hair growth  If she can start it, what dose should she start out with? Thanks!

## 2019-02-05 NOTE — TELEPHONE ENCOUNTER
LINDAART     Spoke with Pt today via phone call  Pt informed that her reported symptoms were addressed by [facility]  Pt instructed to complete UA & UC lab tests (Pt states she uses [facility] lab facilities) before taking prescribed medication so as not to affect urine specimen  Pt further informed that  Rx for Macrobid 100 mg capsule 14 capsules 0 refills (take 1 capsule PO BID for 7 days) was electronically forwarded to pharmacy in EHR by [facility]  Pt instructed to finish prescribed medication completely and to push fluids by mouth in order to "flush out" urinary system  Pt states "she is not interested in STI testing at this time, she trusts current partner "  Pt scheduled for yearly exam with NEISHA Velázquez on 2/14/19 @ 1:40 pm ([place] office), instructed to arrive by 1:25 pm   Reiterated to Pt that if her symptoms worsen or do not improve to contact office

## 2019-02-06 ENCOUNTER — TELEPHONE (OUTPATIENT)
Dept: OBGYN CLINIC | Facility: CLINIC | Age: 44
End: 2019-02-06

## 2019-02-06 DIAGNOSIS — R30.0 DYSURIA: Primary | ICD-10-CM

## 2019-02-06 LAB
BACTERIA UR CULT: ABNORMAL
BACTERIA UR CULT: ABNORMAL

## 2019-02-06 RX ORDER — AMOXICILLIN 500 MG/1
500 TABLET, FILM COATED ORAL 2 TIMES DAILY
Qty: 14 TABLET | Refills: 0 | Status: SHIPPED | OUTPATIENT
Start: 2019-02-06 | End: 2019-02-14 | Stop reason: ALTCHOICE

## 2019-02-06 NOTE — TELEPHONE ENCOUNTER
----- Message from Tanner Daugherty, [address] sent at 2/6/2019  4:38 PM EST -----  Urine culture with moderate count of GBS   I would recommend that she is treated given symptomatic   The macrobid will not address this so I will eRx amoxicillin for her   F/u as needed if sx are not improved

## 2019-02-14 ENCOUNTER — ANNUAL EXAM (OUTPATIENT)
Dept: OBGYN CLINIC | Facility: MEDICAL CENTER | Age: 44
End: 2019-02-14
Payer: COMMERCIAL

## 2019-02-14 ENCOUNTER — TELEPHONE (OUTPATIENT)
Dept: CARDIOLOGY CLINIC | Facility: CLINIC | Age: 44
End: 2019-02-14

## 2019-02-14 VITALS
HEIGHT: 60 IN | BODY MASS INDEX: 21.13 KG/M2 | SYSTOLIC BLOOD PRESSURE: 108 MMHG | DIASTOLIC BLOOD PRESSURE: 64 MMHG | WEIGHT: 107.6 LBS

## 2019-02-14 DIAGNOSIS — Z12.39 ENCOUNTER FOR SCREENING FOR MALIGNANT NEOPLASM OF BREAST: ICD-10-CM

## 2019-02-14 DIAGNOSIS — Z30.09 ENCOUNTER FOR GENERAL COUNSELING AND ADVICE ON CONTRACEPTIVE MANAGEMENT: ICD-10-CM

## 2019-02-14 DIAGNOSIS — Z11.51 ENCOUNTER FOR SCREENING FOR HUMAN PAPILLOMAVIRUS (HPV): ICD-10-CM

## 2019-02-14 DIAGNOSIS — Z12.4 CERVICAL CANCER SCREENING: ICD-10-CM

## 2019-02-14 DIAGNOSIS — Z01.419 ENCOUNTER FOR GYNECOLOGICAL EXAMINATION (GENERAL) (ROUTINE) WITHOUT ABNORMAL FINDINGS: Primary | ICD-10-CM

## 2019-02-14 PROCEDURE — G0145 SCR C/V CYTO,THINLAYER,RESCR: HCPCS | Performed by: NURSE PRACTITIONER

## 2019-02-14 PROCEDURE — 99396 PREV VISIT EST AGE 40-64: CPT | Performed by: NURSE PRACTITIONER

## 2019-02-14 PROCEDURE — 87624 HPV HI-RISK TYP POOLED RSLT: CPT | Performed by: NURSE PRACTITIONER

## 2019-02-14 RX ORDER — RIZATRIPTAN BENZOATE 10 MG/1
TABLET ORAL
Refills: 0 | COMMUNITY
Start: 2019-02-13 | End: 2019-07-20 | Stop reason: SDUPTHER

## 2019-02-14 NOTE — ASSESSMENT & PLAN NOTE
Use of reliable contraception was strongly encouraged, as previously discussed at last year's annual  We reviewed risks of preg for both the patient and potential fetus in the context of her medical hx  Discussed progesterone only or hormone free options; reviewed risks/benefits of each  The patient expresses interest in depo provera  She will contact her Cardiologist to confirm that they are in agreement with plan, then she will call for order and to schedule injection appt

## 2019-02-14 NOTE — TELEPHONE ENCOUNTER
PT WANTS TO KNOW IF ITS SAFE FOR HER TO TAKE DEPO-PROVERA? PT IS AWARE THAT DR DEMPSEY IS OUT OF THE OFFICE UNTIL Tuesday [address] 69

## 2019-02-14 NOTE — ASSESSMENT & PLAN NOTE
Normal findings on routine gyn exam  Advised monthly SBE, annual CBE and annual screening mammo  Reviewed ASCCP guidelines and recommended pap with cotesting q3 yrs for this low risk patient; this was collected today  STI testing was offered and the patient does agree to gc/chl; she declines serum studies  Diet/activity recommendations reviewed  RTO in one year or sooner PRN

## 2019-02-15 LAB
HPV HR 12 DNA CVX QL NAA+PROBE: NEGATIVE
HPV16 DNA CVX QL NAA+PROBE: NEGATIVE
HPV18 DNA CVX QL NAA+PROBE: NEGATIVE

## 2019-02-15 NOTE — PROGRESS NOTES
Assessment/Plan:    Encounter for gynecological examination (general) (routine) without abnormal findings  Normal findings on routine gyn exam  Advised monthly SBE, annual CBE and annual screening mammo  Reviewed ASCCP guidelines and recommended pap with cotesting q3 yrs for this low risk patient; this was collected today  STI testing was offered and the patient does agree to gc/chl; she declines serum studies  Diet/activity recommendations reviewed  RTO in one year or sooner PRN  Encounter for general counseling and advice on contraceptive management  Use of reliable contraception was strongly encouraged, as previously discussed at last year's annual  We reviewed risks of preg for both the patient and potential fetus in the context of her medical hx  Discussed progesterone only or hormone free options; reviewed risks/benefits of each  The patient expresses interest in depo provera  She will contact her Cardiologist to confirm that they are in agreement with plan, then she will call for order and to schedule injection appt  Diagnoses and all orders for this visit:    Encounter for gynecological examination (general) (routine) without abnormal findings    Encounter for screening for malignant neoplasm of breast  -     Mammo screening bilateral w 3d & cad; Future    Cervical cancer screening  -     Liquid-based pap, screening  -     HPV High Risk; Future  -     HPV High Risk    Encounter for screening for human papillomavirus (HPV)  -     Liquid-based pap, screening  -     HPV High Risk; Future  -     HPV High Risk    Encounter for general counseling and advice on contraceptive management    Other orders  -     rizatriptan (MAXALT) 10 MG tablet; take 1 tablet by mouth ONCE AS NEEDED FOR MIGRAINE FOR UP TO 1 DOSE          Subjective:      Patient ID: Brenda Marrero is a 37 y o  female  This patient presents for routine annual gyn exam    New relationship  Not using contra   Previously on Nuvaring and cannot recall why this was d/c'd  Extensive cardiac hx  Maintained on Coumadin  She denies acute gyn complaints  Menses are regular and light to mod  She denies pelvic pain, breast concerns, abnormal discharge, bowel/bladder dysfunction, depression/anxiety  Requesting STI testing           The following portions of the patient's history were reviewed and updated as appropriate: allergies, current medications, past family history, past medical history, past social history, past surgical history and problem list     Review of Systems   Constitutional: Negative  HENT: Negative  Eyes: Negative  Respiratory: Negative  Cardiovascular: Negative  Gastrointestinal: Negative  Endocrine: Negative  Genitourinary: Negative  Musculoskeletal: Negative  Skin: Negative  Allergic/Immunologic: Negative  Neurological: Negative  Hematological: Negative  Psychiatric/Behavioral: Negative  Objective:      /64 (BP Location: Left arm, Cuff Size: Standard)   Ht 5' (1 524 m)   Wt 48 8 kg (107 lb 9 6 oz)   LMP 02/10/2019   BMI 21 01 kg/m²          Physical Exam   Constitutional: She is oriented to person, place, and time  She appears well-developed and well-nourished  HENT:   Head: Normocephalic and atraumatic  Eyes: Pupils are equal, round, and reactive to light  EOM are normal    Neck: Normal range of motion  Neck supple  No thyromegaly present  Cardiovascular: Normal rate, regular rhythm and normal heart sounds  Pulmonary/Chest: Effort normal and breath sounds normal  No respiratory distress  She has no wheezes  She has no rales  She exhibits no mass, no tenderness and no deformity  Right breast exhibits no inverted nipple, no mass, no nipple discharge, no skin change and no tenderness  Left breast exhibits no inverted nipple, no mass, no nipple discharge, no skin change and no tenderness  No breast tenderness or discharge  Breasts are symmetrical        Abdominal: Soft   She exhibits no distension and no mass  There is no splenomegaly or hepatomegaly  There is no tenderness  There is no rebound and no guarding  Genitourinary: Rectum normal, vagina normal and uterus normal  No breast tenderness or discharge  No labial fusion  There is no rash, tenderness, lesion or injury on the right labia  There is no rash, tenderness, lesion or injury on the left labia  Cervix exhibits no motion tenderness, no discharge and no friability  Right adnexum displays no mass, no tenderness and no fullness  Left adnexum displays no mass, no tenderness and no fullness  No erythema, tenderness or bleeding in the vagina  No foreign body in the vagina  No vaginal discharge found  Musculoskeletal: Normal range of motion  Lymphadenopathy:     She has no cervical adenopathy  She has no axillary adenopathy  Neurological: She is alert and oriented to person, place, and time  No cranial nerve deficit  Skin: Skin is warm and dry  No rash noted  No cyanosis  Nails show no clubbing  Psychiatric: She has a normal mood and affect   Her speech is normal and behavior is normal  Judgment and thought content normal  Cognition and memory are normal

## 2019-02-18 LAB
LAB AP GYN PRIMARY INTERPRETATION: NORMAL
LAB AP LMP: NORMAL
Lab: NORMAL

## 2019-02-19 ENCOUNTER — TELEPHONE (OUTPATIENT)
Dept: OBGYN CLINIC | Facility: CLINIC | Age: 44
End: 2019-02-19

## 2019-02-19 DIAGNOSIS — Z30.42 DEPO-PROVERA CONTRACEPTIVE STATUS: Primary | ICD-10-CM

## 2019-02-19 RX ORDER — MEDROXYPROGESTERONE ACETATE 150 MG/ML
150 INJECTION, SUSPENSION INTRAMUSCULAR
Qty: 1 ML | Refills: 4 | Status: SHIPPED | OUTPATIENT
Start: 2019-02-19 | End: 2019-03-13 | Stop reason: CLARIF

## 2019-02-19 NOTE — TELEPHONE ENCOUNTER
Rx sent for Depo provera  Please contact patient to schedule appt for injection during next menses   Advised she will have UPT prior to injection so she should come with full bladder   Recommend condoms for contraception until then

## 2019-02-19 NOTE — TELEPHONE ENCOUNTER
Called pt back - explained to her that the depo was sent to her pharmacy  To call back when she has her next menses to schedule an appointment  Explained to her that she will have an UPT done at that appointment and to use condoms for contraception till then  Patient understands

## 2019-02-20 ENCOUNTER — TELEPHONE (OUTPATIENT)
Dept: OBGYN CLINIC | Facility: CLINIC | Age: 44
End: 2019-02-20

## 2019-02-20 NOTE — TELEPHONE ENCOUNTER
----- Message from NEISHA Mccord sent at 2/20/2019  1:02 PM EST -----  Normal pap and neg HPV   Please notify patient

## 2019-02-21 ENCOUNTER — TELEPHONE (OUTPATIENT)
Dept: CARDIOLOGY CLINIC | Facility: CLINIC | Age: 44
End: 2019-02-21

## 2019-02-21 DIAGNOSIS — Z79.01 LONG TERM (CURRENT) USE OF ANTICOAGULANTS: Primary | ICD-10-CM

## 2019-02-21 DIAGNOSIS — I35.9 AORTIC VALVE DISORDER: ICD-10-CM

## 2019-02-21 NOTE — TELEPHONE ENCOUNTER
PT WANTS TO KNOW IF SHE IS DUE FOR HER INR BW  PT ALSO WOULD LIKE FOR YOU TO FAX INR ORDER TO [facility] IN [place]  PT DIDN'T HAVE A NUMBER   Ian Vega

## 2019-02-22 ENCOUNTER — ANTICOAG VISIT (OUTPATIENT)
Dept: CARDIOLOGY CLINIC | Facility: CLINIC | Age: 44
End: 2019-02-22

## 2019-02-22 ENCOUNTER — APPOINTMENT (OUTPATIENT)
Dept: LAB | Facility: HOSPITAL | Age: 44
End: 2019-02-22
Attending: INTERNAL MEDICINE
Payer: COMMERCIAL

## 2019-02-22 DIAGNOSIS — I35.9 AORTIC VALVE DISORDER: ICD-10-CM

## 2019-02-22 DIAGNOSIS — I35.9 AORTIC VALVE DISEASE: Primary | ICD-10-CM

## 2019-02-22 LAB
INR PPP: 2.9 (ref 0.86–1.17)
PROTHROMBIN TIME: 29.9 SECONDS (ref 11.8–14.2)

## 2019-02-22 PROCEDURE — 36415 COLL VENOUS BLD VENIPUNCTURE: CPT

## 2019-02-22 PROCEDURE — 85610 PROTHROMBIN TIME: CPT

## 2019-02-28 ENCOUNTER — TELEPHONE (OUTPATIENT)
Dept: INTERNAL MEDICINE CLINIC | Facility: CLINIC | Age: 44
End: 2019-02-28

## 2019-02-28 ENCOUNTER — TELEPHONE (OUTPATIENT)
Dept: CARDIOLOGY CLINIC | Facility: CLINIC | Age: 44
End: 2019-02-28

## 2019-02-28 ENCOUNTER — OFFICE VISIT (OUTPATIENT)
Dept: INTERNAL MEDICINE CLINIC | Facility: CLINIC | Age: 44
End: 2019-02-28
Payer: COMMERCIAL

## 2019-02-28 ENCOUNTER — ANTICOAG VISIT (OUTPATIENT)
Dept: CARDIOLOGY CLINIC | Facility: CLINIC | Age: 44
End: 2019-02-28

## 2019-02-28 ENCOUNTER — HOSPITAL ENCOUNTER (EMERGENCY)
Facility: HOSPITAL | Age: 44
Discharge: HOME | End: 2019-02-28
Attending: EMERGENCY MEDICINE | Admitting: EMERGENCY MEDICINE
Payer: COMMERCIAL

## 2019-02-28 VITALS
BODY MASS INDEX: 21.64 KG/M2 | SYSTOLIC BLOOD PRESSURE: 118 MMHG | HEIGHT: 60 IN | HEART RATE: 68 BPM | RESPIRATION RATE: 16 BRPM | WEIGHT: 110.2 LBS | DIASTOLIC BLOOD PRESSURE: 78 MMHG

## 2019-02-28 VITALS
HEART RATE: 61 BPM | OXYGEN SATURATION: 100 % | WEIGHT: 110 LBS | DIASTOLIC BLOOD PRESSURE: 63 MMHG | BODY MASS INDEX: 21.6 KG/M2 | SYSTOLIC BLOOD PRESSURE: 138 MMHG | HEIGHT: 60 IN | RESPIRATION RATE: 18 BRPM | TEMPERATURE: 97.6 F

## 2019-02-28 DIAGNOSIS — M79.661 RIGHT CALF PAIN: Primary | ICD-10-CM

## 2019-02-28 DIAGNOSIS — Z79.01 ANTICOAGULATED: Primary | ICD-10-CM

## 2019-02-28 DIAGNOSIS — R04.0 BLEEDING NOSE: ICD-10-CM

## 2019-02-28 DIAGNOSIS — Z79.01 ANTICOAGULATED: ICD-10-CM

## 2019-02-28 DIAGNOSIS — I35.9 AORTIC VALVE DISORDER: ICD-10-CM

## 2019-02-28 DIAGNOSIS — R04.0 ACUTE ANTERIOR EPISTAXIS: Primary | ICD-10-CM

## 2019-02-28 LAB
BASOPHILS # BLD AUTO: 0.04 THOUSANDS/ΜL (ref 0–0.1)
BASOPHILS NFR BLD AUTO: 1 % (ref 0–1)
EOSINOPHIL # BLD AUTO: 0.11 THOUSAND/ΜL (ref 0–0.61)
EOSINOPHIL NFR BLD AUTO: 2 % (ref 0–6)
ERYTHROCYTE [DISTWIDTH] IN BLOOD BY AUTOMATED COUNT: 12 % (ref 11.6–15.1)
HCT VFR BLD AUTO: 38.4 % (ref 34.8–46.1)
HGB BLD-MCNC: 12.8 G/DL (ref 11.5–15.4)
IMM GRANULOCYTES # BLD AUTO: 0.01 THOUSAND/UL (ref 0–0.2)
IMM GRANULOCYTES NFR BLD AUTO: 0 % (ref 0–2)
INR PPP: 3.24 (ref 0.86–1.17)
LYMPHOCYTES # BLD AUTO: 0.99 THOUSANDS/ΜL (ref 0.6–4.47)
LYMPHOCYTES NFR BLD AUTO: 17 % (ref 14–44)
MCH RBC QN AUTO: 31.1 PG (ref 26.8–34.3)
MCHC RBC AUTO-ENTMCNC: 33.3 G/DL (ref 31.4–37.4)
MCV RBC AUTO: 93 FL (ref 82–98)
MONOCYTES # BLD AUTO: 0.4 THOUSAND/ΜL (ref 0.17–1.22)
MONOCYTES NFR BLD AUTO: 7 % (ref 4–12)
NEUTROPHILS # BLD AUTO: 4.18 THOUSANDS/ΜL (ref 1.85–7.62)
NEUTS SEG NFR BLD AUTO: 73 % (ref 43–75)
NRBC BLD AUTO-RTO: 0 /100 WBCS
PLATELET # BLD AUTO: 203 THOUSANDS/UL (ref 149–390)
PMV BLD AUTO: 9.7 FL (ref 8.9–12.7)
PROTHROMBIN TIME: 32.6 SECONDS (ref 11.8–14.2)
RBC # BLD AUTO: 4.12 MILLION/UL (ref 3.81–5.12)
WBC # BLD AUTO: 5.73 THOUSAND/UL (ref 4.31–10.16)

## 2019-02-28 PROCEDURE — 99213 OFFICE O/P EST LOW 20 MIN: CPT | Performed by: NURSE PRACTITIONER

## 2019-02-28 PROCEDURE — 36415 COLL VENOUS BLD VENIPUNCTURE: CPT | Performed by: EMERGENCY MEDICINE

## 2019-02-28 PROCEDURE — 3008F BODY MASS INDEX DOCD: CPT | Performed by: NURSE PRACTITIONER

## 2019-02-28 PROCEDURE — 85025 COMPLETE CBC W/AUTO DIFF WBC: CPT | Performed by: EMERGENCY MEDICINE

## 2019-02-28 PROCEDURE — 85610 PROTHROMBIN TIME: CPT | Performed by: EMERGENCY MEDICINE

## 2019-02-28 PROCEDURE — 99283 EMERGENCY DEPT VISIT LOW MDM: CPT

## 2019-02-28 RX ADMIN — SILVER NITRATE APPLICATORS 1 APPLICATOR: 25; 75 STICK TOPICAL at 13:41

## 2019-02-28 RX ADMIN — PHENYLEPHRINE HYDROCHLORIDE 2 SPRAY: 0.25 SPRAY NASAL at 13:14

## 2019-02-28 NOTE — PROGRESS NOTES
Assessment/Plan:    Patient Instructions   Epistaxis, patient cannot recall the last time she had a Coumadin check  She has constant oozing I recommend she go to the emergency room for cauterization    Valve replacement on Coumadin I stressed that she needs needs to tell the ER that she is on Coumadin to check INR and have it for to Dr Pinon             Diagnoses and all orders for this visit:    Anticoagulated    Bleeding nose         Subjective:      Patient ID: Morena Dillon is a 37 y o  female    Patient woke up this morning with nose bleed on the left  It has just been losing since  She does not know when or what her last PT INR were  She also complains of sore throat, postnasal drip and hoarseness of voice    She also states that she cut her finger this morning and blood more than usual        Current Outpatient Medications:     aspirin (ECOTRIN LOW STRENGTH) 81 mg EC tablet, Take 81 mg by mouth daily, Disp: , Rfl:     BIOTIN 5000 PO, Take 20,000 mcg by mouth daily, Disp: , Rfl:     metoprolol tartrate (LOPRESSOR) 25 mg tablet, take 1 tablet by mouth twice a day, Disp: 180 tablet, Rfl: 3    Multiple Vitamins-Minerals (MULTIVITAMIN WITH MINERALS) tablet, Take 1 tablet by mouth daily, Disp: , Rfl:     rizatriptan (MAXALT) 10 MG tablet, take 1 tablet by mouth ONCE AS NEEDED FOR MIGRAINE FOR UP TO 1 DOSE, Disp: , Rfl: 0    warfarin (COUMADIN) 5 mg tablet, Take 1 tablet (5 mg total) by mouth daily As directed based on INR, Disp: 90 tablet, Rfl: 3    medroxyPROGESTERone (DEPO-PROVERA) 150 mg/mL injection, Inject 1 mL (150 mg total) into a muscle every 3 (three) months (Patient not taking: Reported on 2/28/2019), Disp: 1 mL, Rfl: 4    Recent Results (from the past 1008 hour(s))   Protime-INR    Collection Time: 01/24/19 12:00 AM   Result Value Ref Range    INR 2 90 (A) 0 86 - 1 17   Protime-INR    Collection Time: 01/29/19 12:00 AM   Result Value Ref Range    INR 2 70 (A) 0 86 - 1 17   Protime-INR    Collection Time: 02/04/19 12:03 PM   Result Value Ref Range    Protime 30 3 (H) 11 8 - 14 2 seconds    INR 2 89 (H) 0 86 - 1 17   Urinalysis with microscopic    Collection Time: 02/05/19  9:54 AM   Result Value Ref Range    Clarity, UA Clear     Color, UA Yellow     Specific [place], UA 1 020 1 003 - 1 030    pH, UA 6 0 4 5 - 8 0    Glucose, UA Negative Negative mg/dl    Ketones, UA Negative Negative mg/dl    Blood, UA Trace-Intact (A) Negative    Protein, UA Negative Negative mg/dl    Nitrite, UA Negative Negative    Bilirubin, UA Negative Negative    Urobilinogen, UA 0 2 0 2, 1 0 E U /dl E U /dl    Leukocytes, UA Negative Negative    WBC, UA None Seen None Seen, 0-5, 5-55, 5-65 /hpf    RBC, UA 1-2 (A) None Seen, 0-5 /hpf    Bacteria, UA Occasional None Seen, Occasional /hpf    Epithelial Cells Occasional None Seen, Occasional /hpf   Urine culture    Collection Time: 02/05/19  9:54 AM   Result Value Ref Range    Urine Culture (A)      40,000-49,000 cfu/ml Beta Hemolytic Streptococcus Group B    Urine Culture [id number] cfu/ml     Liquid-based pap, screening    Collection Time: 02/14/19  2:39 PM   Result Value Ref Range    Case Report       Gynecologic Cytology Report                       Case: [id number]                                  Authorizing Provider:  NEISHA Ortiz       Collected:           02/14/2019 1439              Ordering Location:     [facility]      Received:            02/14/2019 [facility]                                     [facility]                                                                          First Screen:          RAY Perdue                                                       Specimen:    LIQUID-BASED PAP, SCREENING, Cervix, Endocervical                                          Primary Interpretation Negative for intraepithelial lesion or malignancy     Specimen Adequacy Satisfactory for evaluation  Endocervical/transformation zone component present  Additional Information       [facility] FDA approved ,  and ThinPrep Imaging Duo System are utilized with strict adherence to the 's instruction manual to prepare gynecologic and non-gynecologic cytology specimens for the production of ThinPrep slides as well as for gynecologic ThinPrep imaging  These processes have been validated by our laboratory and/or by the   The Pap test is not a diagnostic procedure and should not be used as the sole means to detect cervical cancer  It is only a screening procedure to aid in the detection of cervical cancer and its precursors  Both false-negative and false-positive results have been experienced  Your patient's test result should be interpreted in this context together with the history and clinical findings  LMP 2/10/2019    HPV High Risk    Collection Time: 02/14/19  2:39 PM   Result Value Ref Range    HPV Other HR Negative Negative    HPV16 Negative Negative    HPV18 Negative Negative   Protime-INR    Collection Time: 02/22/19 10:55 AM   Result Value Ref Range    Protime 29 9 (H) 11 8 - 14 2 seconds    INR 2 90 (H) 0 86 - 1 17       The following portions of the patient's history were reviewed and updated as appropriate: allergies, current medications, past family history, past medical history, past social history, past surgical history and problem list      Review of Systems   Constitutional: Negative for appetite change, chills, diaphoresis, fatigue, fever and unexpected weight change  HENT: Positive for postnasal drip  Negative for sneezing  Nose bleed   Eyes: Negative for visual disturbance  Respiratory: Negative for chest tightness and shortness of breath  Cardiovascular: Negative for chest pain, palpitations and leg swelling  Gastrointestinal: Negative for abdominal pain and blood in stool     Endocrine: Negative for cold intolerance, heat intolerance, polydipsia, polyphagia and polyuria  Genitourinary: Negative for difficulty urinating, dysuria, frequency and urgency  Musculoskeletal: Negative for arthralgias and myalgias  Skin: Negative for rash and wound  Neurological: Negative for dizziness, weakness, light-headedness and headaches  Hematological: Negative for adenopathy  Psychiatric/Behavioral: Negative for confusion, dysphoric mood and sleep disturbance  The patient is not nervous/anxious  Objective:      /78 (BP Location: Left arm, Patient Position: Sitting, Cuff Size: Standard)   Pulse 68   Resp 16   Ht 5' (1 524 m)   Wt 50 kg (110 lb 3 2 oz)   LMP 02/10/2019   BMI 21 52 kg/m²        Physical Exam   Constitutional: She is oriented to person, place, and time  She appears well-developed  No distress  HENT:   Head: Normocephalic and atraumatic  Oozing noted from left [place]   Eyes: Pupils are equal, round, and reactive to light  Conjunctivae and EOM are normal    Neck: Normal range of motion  Neck supple  No JVD present  No tracheal deviation present  No thyromegaly present  Cardiovascular: Normal rate, regular rhythm and intact distal pulses  Exam reveals no gallop and no friction rub  Murmur heard  Pulmonary/Chest: Effort normal and breath sounds normal  No respiratory distress  She has no wheezes  She has no rales  Abdominal: Soft  Bowel sounds are normal  She exhibits no distension  There is no tenderness  Musculoskeletal: Normal range of motion  She exhibits no edema  Lymphadenopathy:     She has no cervical adenopathy  Neurological: She is alert and oriented to person, place, and time  No cranial nerve deficit  Skin: Skin is warm and dry  No rash noted  She is not diaphoretic  Psychiatric: She has a normal mood and affect   Her behavior is normal  Judgment and thought content normal

## 2019-02-28 NOTE — PATIENT INSTRUCTIONS
Epistaxis, patient cannot recall the last time she had a Coumadin check    She has constant oozing I recommend she go to the emergency room for cauterization    Valve replacement on Coumadin I stressed that she needs needs to tell the ER that she is on Coumadin to check INR and have it for to Dr Gilberto Warner

## 2019-02-28 NOTE — DISCHARGE INSTRUCTIONS
Can hold Coumadin for one day  Lean forward and pinch for 5 full minutes if you start to bleed again  Follow up with your  provider

## 2019-02-28 NOTE — ED PROVIDER NOTES
History  Chief Complaint   Patient presents with    Nose Bleed     Pt reports spontaneous nose bleed that began yesterday, stopped, started again today  Pt is currently on Coumadin  PCP suggesting coming here to check INR  HPI 18-year-old female with sudden onset of spontaneous bleeding from her left nostril yesterday, reports intermittent bleeding since then  Reports also cut of finger apparently had some excessive bleeding  Seen by her provider referred to the emergency department  Patient was advised to have an INR to determine if her Coumadin level was high  Patient was told she may need cautery  Patient has no bleeding on arrival here to the emergency department  She reports occasionally there is some oozing when she blows that left nostril  She denies any trauma  Past medical history mechanical aortic valve on Coumadin  Family history noncontributory  Social history, employed, nonsmoker    Prior to Admission Medications   Prescriptions Last Dose Informant Patient Reported? Taking?    BIOTIN 5000 PO  Self Yes No   Sig: Take 20,000 mcg by mouth daily   Multiple Vitamins-Minerals (MULTIVITAMIN WITH MINERALS) tablet  Self Yes No   Sig: Take 1 tablet by mouth daily   aspirin (ECOTRIN LOW STRENGTH) 81 mg EC tablet  Self Yes No   Sig: Take 81 mg by mouth daily   medroxyPROGESTERone (DEPO-PROVERA) 150 mg/mL injection  Self No No   Sig: Inject 1 mL (150 mg total) into a muscle every 3 (three) months   Patient not taking: Reported on 2/28/2019   metoprolol tartrate (LOPRESSOR) 25 mg tablet  Self No No   Sig: take 1 tablet by mouth twice a day   rizatriptan (MAXALT) 10 MG tablet  Self Yes No   Sig: take 1 tablet by mouth ONCE AS NEEDED FOR MIGRAINE FOR UP TO 1 DOSE   warfarin (COUMADIN) 5 mg tablet  Self No No   Sig: Take 1 tablet (5 mg total) by mouth daily As directed based on INR      Facility-Administered Medications: None       Past Medical History:   Diagnosis Date    Abnormal Pap smear of cervix     Allergic contact dermatitis     Allergic rhinitis     resolved 2018    Aortic valve disorder     Aortic valve insufficiency     Asthma     Benign neoplasm of skin     Cardiac disease     Costochondritis     Hyperinsulinism     Inguinal lymphadenopathy     resolved 2015    Migraine     resolved 2015    Varicose 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     unspecified laterality       Past Surgical History:   Procedure Laterality Date    AORTIC VALVE REPLACEMENT      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, replaced with mechanical aortic valve  and root replacement at [facility] dr sawyer   [address] [facility]      enlargement    CARDIAC VALVE REPLACEMENT  2011    bovine, with redo and mechanical valve replacement and root 2012 dr sawyer at [facility] last assessed 08/15/2016    CERVICAL BIOPSY  W/ LOOP ELECTRODE EXCISION      COLPOSCOPY      INDUCED       surgically by dilation and evacuation    RHINOPLASTY         Family History   Problem Relation Age of Onset    Asthma Father     Coronary artery disease Father     Hypertension Father     No Known Problems Sister     Breast cancer Maternal Grandmother     No Known Problems Mother     No Known Problems Daughter     No Known Problems Son     Diabetes Maternal Grandfather     No Known Problems Paternal Grandmother     Other Paternal Grandfather         Susanne Fever    No Known Problems Sister     No Known Problems Sister     No Known Problems Daughter     No Known Problems Son     No Known Problems Son      I have reviewed and agree with the history as documented  Social History     Tobacco Use    Smoking status: Never Smoker    Smokeless tobacco: Never Used   Substance Use Topics    Alcohol use: Yes     Frequency: 2-4 times a month     Comment: drinks hard liquor, social per allscripts    Drug use: No        Review of Systems   Constitutional: Negative for fever  HENT: Positive for nosebleeds  Negative for congestion  Eyes: Negative for pain and redness  Respiratory: Negative for cough and shortness of breath  Cardiovascular: Negative for chest pain  Gastrointestinal: Negative for abdominal pain and vomiting  Physical Exam  Physical Exam   Constitutional: She is oriented to person, place, and time  She appears well-developed and well-nourished  HENT:   Head: Normocephalic  Right Ear: External ear normal    Left Ear: External ear normal    Nose: Nose normal    Mouth/Throat: Oropharynx is clear and moist    There was no active bleeding occasionally the patient would wipe her nose and there would be a small amount of blood  Using head lamp I examined the patient's left ear there was no sign of active bleeding  I placed a Donnell-Synephrine pledget in the nose and there was minimal bleeding  Eyes: Pupils are equal, round, and reactive to light  EOM and lids are normal    Neck: Normal range of motion  Neck supple  Pulmonary/Chest: Effort normal  No respiratory distress  Musculoskeletal: Normal range of motion  She exhibits no deformity  Neurological: She is alert and oriented to person, place, and time  Skin: Skin is warm and dry  Psychiatric: She has a normal mood and affect  Nursing note and vitals reviewed        Vital Signs  ED Triage Vitals [02/28/19 1234]   Temperature Pulse Respirations Blood Pressure SpO2   97 6 °F (36 4 °C) 61 18 138/63 100 %      Temp Source Heart Rate Source Patient Position - Orthostatic VS BP Location FiO2 (%)   Oral Monitor Sitting Left arm --      Pain Score       No Pain           Vitals:    02/28/19 1234   BP: 138/63   Pulse: 61   Patient Position - Orthostatic VS: Sitting       Visual Acuity      ED Medications  Medications   phenylephrine (DONNELL-SYNEPHRINE) 0 25 % nasal spray 2 spray (2 sprays Each Nare Given 2/28/19 1314)   silver nitrate-potassium nitrate (ARZOL SILVER NITRATE) 13-86 % applicator 1 applicator (1 applicator Topical Given 2/28/19 1341)       Diagnostic Studies  Results Reviewed     Procedure Component Value Units Date/Time    Protime-INR [[id number]]  (Abnormal) Collected:  02/28/19 1313    Lab Status:  Final result Specimen:  Blood from Arm, Left Updated:  02/28/19 1336     Protime 32 6 seconds      INR 3 24    CBC and differential [[id number]] Collected:  02/28/19 1313    Lab Status:  Final result Specimen:  Blood from Arm, Left Updated:  02/28/19 1320     WBC 5 73 Thousand/uL      RBC 4 12 Million/uL      Hemoglobin 12 8 g/dL      Hematocrit 38 4 %      MCV 93 fL      MCH 31 1 pg      MCHC 33 3 g/dL      RDW 12 0 %      MPV 9 7 fL      Platelets 796 Thousands/uL      nRBC 0 /100 WBCs      Neutrophils Relative 73 %      Immat GRANS % 0 %      Lymphocytes Relative 17 %      Monocytes Relative 7 %      Eosinophils Relative 2 %      Basophils Relative 1 %      Neutrophils Absolute 4 18 Thousands/µL      Immature Grans Absolute 0 01 Thousand/uL      Lymphocytes Absolute 0 99 Thousands/µL      Monocytes Absolute 0 40 Thousand/µL      Eosinophils Absolute 0 11 Thousand/µL      Basophils Absolute 0 04 Thousands/µL                  No orders to display              Procedures  Epistaxis Mgmt  Date/Time: 2/28/2019 1:52 PM  Performed by: Brook Champion MD  Authorized by: Brook Champion MD     Patient location:  ED  Consent:     Consent obtained:  Verbal    Consent given by:  Patient    Risks discussed:  Bleeding and infection    Alternatives discussed:  No treatment and observation  Universal protocol:     Patient identity confirmed:  Verbally with patient  Anesthesia (see MAR for exact dosages): Anesthesia method:  None  Procedure details:     Treatment site:  L anterior    Hemostasis method:  Silver nitrate    Approach:  External    Spec Headlamp used: Yes      Treatment complexity:  Limited  Post-procedure details:     Patient tolerance of procedure:   Tolerated well, no immediate complications  Comments:      Scant amount of bleeding following cautery, no active bleeding seen comma           Phone Contacts  ED Phone Contact    ED Course          while waiting for her laboratory testing the patient reported some oozing, I re-examined her with a headlight and cauterized a small area which seem like it could be the source of the bleeding  There was no active bleeding  Patient did not need to hold pressure but we discussed holding pressure for 5 minutes if the bleeding would recur with her head forward  No sign of active bleeding at this time  laboratory testing showed normal CBC, normal white count no sign of inflammation, normal hemoglobin of 12 8 no sign of excessive bleeding, normal platelets  INR was 3 24 patient reports this is high for her we discussed that normally mechanical valve patient has run between 2 5 and 3 5  We discussed holding Coumadin for 1 dose  We discussed follow-up lab work  The patient will call her primary provider  [facility] medical decision making 12-year-old female presents emergency department with some left-sided epistaxis, nose was examined, I did cauterize a small area in the nose which I felt might be the source of bleeding  There was no active bleeding while in the emergency department  Patient is on Coumadin for mechanical valve, INR was slightly high, we discussed holding her Coumadin and following up with her provider for repeat testing  We discussed indications to return      Disposition  Final diagnoses:   Acute anterior epistaxis   Anticoagulated     Time reflects when diagnosis was documented in both MDM as applicable and the Disposition within this note     Time User Action Codes Description Comment    2/28/2019  1:47 PM Alfred Navas [R04 0] Acute anterior epistaxis     2/28/2019  1:48 PM Alfred Navas [Z79 01] Anticoagulated       ED Disposition     ED Disposition Condition Date/Time Comment    Discharge Stable u Feb 28, 2019  1:47 PM Israel Lovelace discharge to home/self care             Follow-up Information     Follow up With Specialties Details Why Contact Info    Desiree Euceda MD Otolaryngology   [address] [place] [address]            Discharge Medication List as of 2/28/2019  1:51 PM      CONTINUE these medications which have NOT CHANGED    Details   aspirin (ECOTRIN LOW STRENGTH) 81 mg EC tablet Take 81 mg by mouth daily, Historical Med      BIOTIN 5000 PO Take 20,000 mcg by mouth daily, Historical Med      medroxyPROGESTERone (DEPO-PROVERA) 150 mg/mL injection Inject 1 mL (150 mg total) into a muscle every 3 (three) months, Starting Tue 2/19/2019, Normal      metoprolol tartrate (LOPRESSOR) 25 mg tablet take 1 tablet by mouth twice a day, Normal      Multiple Vitamins-Minerals (MULTIVITAMIN WITH MINERALS) tablet Take 1 tablet by mouth daily, Historical Med      rizatriptan (MAXALT) 10 MG tablet take 1 tablet by mouth ONCE AS NEEDED FOR MIGRAINE FOR UP TO 1 DOSE, Historical Med      warfarin (COUMADIN) 5 mg tablet Take 1 tablet (5 mg total) by mouth daily As directed based on INR, Starting u 1/31/2019, Normal           No discharge procedures on file      ED Provider  Electronically Signed by           Korin Curtis MD  02/28/19 1682

## 2019-03-01 ENCOUNTER — HOSPITAL ENCOUNTER (OUTPATIENT)
Dept: NON INVASIVE DIAGNOSTICS | Facility: CLINIC | Age: 44
Discharge: HOME | End: 2019-03-01
Payer: COMMERCIAL

## 2019-03-01 DIAGNOSIS — M79.661 RIGHT CALF PAIN: Primary | ICD-10-CM

## 2019-03-01 DIAGNOSIS — M79.661 RIGHT CALF PAIN: ICD-10-CM

## 2019-03-01 PROCEDURE — 93971 EXTREMITY STUDY: CPT | Performed by: SURGERY

## 2019-03-01 PROCEDURE — 93971 EXTREMITY STUDY: CPT

## 2019-03-01 NOTE — TELEPHONE ENCOUNTER
I thought she was going to mention it to them so they could eval it   I will order a stat u/s of the leg
Rosa Elena Dejesus from [facility] cardiology called in regards to pt's appt earlier with [facility]  Pt mentioned a possible cyst on her leg but since she went to the ER, she was unsure what [facility] advises  Pt says it is very painful       Please call pt   [phone]
STAT SCHEDULED FOR TODAY AT 12PM AT [facility] NOTIFIED
10/15/18 1338   BP: 110/60   203#  5'10\"    General:  Comfortable. No distress. Eyes:  No scleral icterus  Ears:  Normal  Nose:  Normal  Mouth:  Mucous membranes moist  Respiratory: Lungs CTA. No accessory muscle use. Heart:  Regular rhythm  Abdomen:  Soft. Non tender. Non distended. No evident hernias. Musculoskeletal:  No abnormal movements. ROM extremities normal.  Skin:  No rashes. Lesion    Location:   Large area of fatty tissue that is prominent and hanging in suprapubic area   Pigmented:   No       Crusting absent             Neurologic:  No focal deficits. Psychiatric:  AAA. O x 3.            ASSESSMENT:  1. Pannus, abdominal            PLAN:  Patient agrees that this is not a hernia and is fatty tissue. Contact information given for plastic surgeon to evaluate and see if any options for this patient.

## 2019-03-02 ENCOUNTER — TELEPHONE (OUTPATIENT)
Dept: INTERNAL MEDICINE CLINIC | Facility: CLINIC | Age: 44
End: 2019-03-02

## 2019-03-02 NOTE — TELEPHONE ENCOUNTER
PT  YUKI   WANTS  TO KNOW  WHAT  SHE  CAN  TAKE OVER  THE  COUNTER       RUNNING  NOSE   AND  BACK  OF  HER  THROAT  HURTS  COUGHING  ALL  NIGHT

## 2019-03-08 ENCOUNTER — ANTICOAG VISIT (OUTPATIENT)
Dept: CARDIOLOGY CLINIC | Facility: CLINIC | Age: 44
End: 2019-03-08

## 2019-03-08 ENCOUNTER — TELEPHONE (OUTPATIENT)
Dept: INTERNAL MEDICINE CLINIC | Facility: CLINIC | Age: 44
End: 2019-03-08

## 2019-03-08 DIAGNOSIS — I35.9 AORTIC VALVE DISORDER: ICD-10-CM

## 2019-03-08 LAB — INR PPP: 2.6 (ref 0.86–1.17)

## 2019-03-08 NOTE — TELEPHONE ENCOUNTER
Pt was seen last week, Jeanette Escoto told her to take Claritin  Pt is still very sick, cough kept her up all night  A lot of phlem  Can Leticia call something in or does she need an appt      [phone]

## 2019-03-12 ENCOUNTER — TELEPHONE (OUTPATIENT)
Dept: INTERNAL MEDICINE CLINIC | Facility: CLINIC | Age: 44
End: 2019-03-12

## 2019-03-12 NOTE — TELEPHONE ENCOUNTER
Patient called wanting to ask Osmin Veliz a question,    She has a bit of a sore throat, no fever, cough, spitting out yellow phlegm    She was here 2/28 with a bad cough  Feels like it never went away    She would like to know if she should be seen or let it run its course?   Also, wants to know if she is contagious    Patient call back # [phone]

## 2019-03-13 ENCOUNTER — OFFICE VISIT (OUTPATIENT)
Dept: INTERNAL MEDICINE CLINIC | Facility: CLINIC | Age: 44
End: 2019-03-13
Payer: COMMERCIAL

## 2019-03-13 VITALS
HEART RATE: 70 BPM | OXYGEN SATURATION: 99 % | DIASTOLIC BLOOD PRESSURE: 70 MMHG | BODY MASS INDEX: 21.6 KG/M2 | WEIGHT: 110.6 LBS | SYSTOLIC BLOOD PRESSURE: 128 MMHG | TEMPERATURE: 98 F

## 2019-03-13 DIAGNOSIS — R49.0 HOARSENESS: ICD-10-CM

## 2019-03-13 DIAGNOSIS — J06.9 VIRAL UPPER RESPIRATORY TRACT INFECTION: Primary | ICD-10-CM

## 2019-03-13 PROCEDURE — 99213 OFFICE O/P EST LOW 20 MIN: CPT | Performed by: PHYSICIAN ASSISTANT

## 2019-03-13 NOTE — PROGRESS NOTES
Assessment/Plan:  Intermittent hoarseness stuffy nose coughing for 2 weeks  Physical exam is unremarkable except for the hoarseness  She will take Allegra-D and extra-strength Tylenol 2 pills 4 times a day she will call back if she develops worsening cough fever chest pain shortness of breath or ear problems       Diagnoses and all orders for this visit:    Viral upper respiratory tract infection    Hoarseness        No problem-specific Assessment & Plan notes found for this encounter  Subjective:      Patient ID: Bran Viveros is a 37 y o  female  She has had cold symptoms for 2 weeks with a scratchy throat cough of occasional yellowish phlegm in the last 2 days she has developed hoarseness that is intermittent no problems with her ears though she has had a previous history of a perforated ear drum  No facial pain  Having some postnasal drip but no yellowish drainage from her nose  History of aortic valve replacement on Coumadin  She is feeling pretty well appetite is good she is working every day no shortness of breath fever chills aching  Cough   Pertinent negatives include no chest pain, chills, ear pain, eye redness, fever, headaches, myalgias, postnasal drip, rash, rhinorrhea, sore throat, shortness of breath or wheezing  The following portions of the patient's history were reviewed and updated as appropriate:   She has a past medical history of Abnormal Pap smear of cervix, Allergic contact dermatitis, Allergic rhinitis, Aortic valve disorder, Aortic valve insufficiency, Asthma, Benign neoplasm of skin, Cardiac disease, Costochondritis, Hyperinsulinism, Inguinal lymphadenopathy, Migraine, and Varicose 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  ,  does not have any pertinent problems on file  ,   has a past surgical history that includes Aortic valve replacement; AUGMENTATION BREAST; Cervical biopsy w/ loop electrode excision; Cardiac valve replacement (03/2011);  Rhinoplasty; Induced ; and Colposcopy  ,  family history includes Asthma in her father; Breast cancer in her maternal grandmother; Coronary artery disease in her father; Diabetes in her maternal grandfather; Hypertension in her father; No Known Problems in her daughter, daughter, mother, paternal grandmother, sister, sister, sister, son, son, and son; Other in her paternal grandfather  ,   reports that she has never smoked  She has never used smokeless tobacco  She reports that she drinks alcohol  She reports that she does not use drugs  ,  is allergic to dog epithelium allergy skin test; oxycodone; pollen extract; and shrimp flavor     Current Outpatient Medications   Medication Sig Dispense Refill    aspirin (ECOTRIN LOW STRENGTH) 81 mg EC tablet Take 81 mg by mouth daily      BIOTIN 5000 PO Take 20,000 mcg by mouth daily      metoprolol tartrate (LOPRESSOR) 25 mg tablet take 1 tablet by mouth twice a day 180 tablet 3    Multiple Vitamins-Minerals (MULTIVITAMIN WITH MINERALS) tablet Take 1 tablet by mouth daily      rizatriptan (MAXALT) 10 MG tablet take 1 tablet by mouth ONCE AS NEEDED FOR MIGRAINE FOR UP TO 1 DOSE  0    warfarin (COUMADIN) 5 mg tablet Take 1 tablet (5 mg total) by mouth daily As directed based on INR 90 tablet 3     No current facility-administered medications for this visit  Review of Systems   Constitutional: Negative for activity change, appetite change, chills, diaphoresis, fatigue, fever and unexpected weight change  HENT: Positive for voice change  Negative for congestion, dental problem, drooling, ear discharge, ear pain, facial swelling, hearing loss, nosebleeds, postnasal drip, rhinorrhea, sinus pressure, sinus pain, sneezing, sore throat, tinnitus and trouble swallowing  Eyes: Negative for photophobia, pain, discharge, redness, itching and visual disturbance  Respiratory: Positive for cough   Negative for apnea, choking, chest tightness, shortness of breath, wheezing and stridor  Cardiovascular: Negative for chest pain, palpitations and leg swelling  Gastrointestinal: Negative for abdominal distention, abdominal pain, anal bleeding, blood in stool, constipation, diarrhea, nausea, rectal pain and vomiting  Endocrine: Negative for cold intolerance, heat intolerance, polydipsia, polyphagia and polyuria  Genitourinary: Negative for decreased urine volume, difficulty urinating, dysuria, enuresis, flank pain, frequency, genital sores, hematuria and urgency  Musculoskeletal: Negative for arthralgias, back pain, gait problem, joint swelling, myalgias, neck pain and neck stiffness  Skin: Negative for color change, pallor, rash and wound  Neurological: Negative for dizziness, tremors, seizures, syncope, facial asymmetry, speech difficulty, weakness, light-headedness, numbness and headaches  Hematological: Negative for adenopathy  Does not bruise/bleed easily  Psychiatric/Behavioral: Negative for agitation, behavioral problems, confusion, decreased concentration, dysphoric mood, hallucinations, self-injury, sleep disturbance and suicidal ideas  The patient is not nervous/anxious and is not hyperactive  Objective:  Vitals:    03/13/19 1119   BP: 128/70   BP Location: Left arm   Patient Position: Sitting   Pulse: 70   Temp: 98 °F (36 7 °C)   TempSrc: Oral   SpO2: 99%   Weight: 50 2 kg (110 lb 9 6 oz)     Body mass index is 21 6 kg/m²  Physical Exam   Constitutional: She is oriented to person, place, and time  She appears well-developed  HENT:   Head: Normocephalic  Right Ear: External ear normal    Left Ear: External ear normal    Mouth/Throat: Oropharynx is clear and moist    Hoarseness noted scarred left ear drum hearing intact   Eyes: Pupils are equal, round, and reactive to light  Conjunctivae are normal    Neck: Neck supple  No thyromegaly present  Cardiovascular: Normal rate, regular rhythm, normal heart sounds and intact distal pulses     Prosthetic valve sounds   Pulmonary/Chest: Effort normal and breath sounds normal  No stridor  No respiratory distress  She has no wheezes  She has no rales  She exhibits no tenderness  Abdominal: Soft  Bowel sounds are normal    Musculoskeletal: Normal range of motion  She exhibits no edema  Lymphadenopathy:     She has no cervical adenopathy  Neurological: She is alert and oriented to person, place, and time  She has normal reflexes  Skin: Skin is warm and dry

## 2019-03-14 ENCOUNTER — OFFICE VISIT (OUTPATIENT)
Dept: OBGYN CLINIC | Facility: MEDICAL CENTER | Age: 44
End: 2019-03-14
Payer: COMMERCIAL

## 2019-03-14 VITALS — BODY MASS INDEX: 21.09 KG/M2 | WEIGHT: 108 LBS | SYSTOLIC BLOOD PRESSURE: 106 MMHG | DIASTOLIC BLOOD PRESSURE: 64 MMHG

## 2019-03-14 DIAGNOSIS — Z30.42 ENCOUNTER FOR DEPO-PROVERA CONTRACEPTION: Primary | ICD-10-CM

## 2019-03-14 LAB — SL AMB POCT URINE HCG: NEGATIVE

## 2019-03-14 PROCEDURE — 81025 URINE PREGNANCY TEST: CPT | Performed by: PHYSICIAN ASSISTANT

## 2019-03-14 PROCEDURE — 96372 THER/PROPH/DIAG INJ SC/IM: CPT | Performed by: PHYSICIAN ASSISTANT

## 2019-03-14 RX ORDER — MEDROXYPROGESTERONE ACETATE 150 MG/ML
150 INJECTION, SUSPENSION INTRAMUSCULAR ONCE
Status: COMPLETED | OUTPATIENT
Start: 2019-03-14 | End: 2019-03-14

## 2019-03-14 RX ADMIN — MEDROXYPROGESTERONE ACETATE 150 MG: 150 INJECTION, SUSPENSION INTRAMUSCULAR at 16:46

## 2019-03-14 NOTE — PROGRESS NOTES
Pt here for depo provera, menses started last night, unprotected intercourse last week  Negative UPT, injection given IM in left buttock, as per NEISHA Mari  Tolerated well, no complications noted  Pt scheduled next appt for approx 12 weeks

## 2019-03-15 ENCOUNTER — HOSPITAL ENCOUNTER (EMERGENCY)
Facility: HOSPITAL | Age: 44
Discharge: HOME | End: 2019-03-15
Attending: EMERGENCY MEDICINE | Admitting: EMERGENCY MEDICINE
Payer: COMMERCIAL

## 2019-03-15 VITALS
OXYGEN SATURATION: 96 % | DIASTOLIC BLOOD PRESSURE: 63 MMHG | TEMPERATURE: 98.2 F | SYSTOLIC BLOOD PRESSURE: 136 MMHG | RESPIRATION RATE: 18 BRPM | HEART RATE: 83 BPM

## 2019-03-15 DIAGNOSIS — Z00.00 PERIODIC HEALTH ASSESSMENT, GENERAL SCREENING, ADULT: Primary | ICD-10-CM

## 2019-03-15 DIAGNOSIS — R04.0 LEFT-SIDED EPISTAXIS: Primary | ICD-10-CM

## 2019-03-15 LAB
APTT PPP: 61 SECONDS (ref 26–38)
BASOPHILS # BLD AUTO: 0.04 THOUSANDS/ΜL (ref 0–0.1)
BASOPHILS NFR BLD AUTO: 0 % (ref 0–1)
EOSINOPHIL # BLD AUTO: 0.19 THOUSAND/ΜL (ref 0–0.61)
EOSINOPHIL NFR BLD AUTO: 2 % (ref 0–6)
ERYTHROCYTE [DISTWIDTH] IN BLOOD BY AUTOMATED COUNT: 11.9 % (ref 11.6–15.1)
HCT VFR BLD AUTO: 32.6 % (ref 34.8–46.1)
HGB BLD-MCNC: 11.1 G/DL (ref 11.5–15.4)
IMM GRANULOCYTES # BLD AUTO: 0.02 THOUSAND/UL (ref 0–0.2)
IMM GRANULOCYTES NFR BLD AUTO: 0 % (ref 0–2)
INR PPP: 3.04 (ref 0.86–1.17)
LYMPHOCYTES # BLD AUTO: 1.04 THOUSANDS/ΜL (ref 0.6–4.47)
LYMPHOCYTES NFR BLD AUTO: 11 % (ref 14–44)
MCH RBC QN AUTO: 31.2 PG (ref 26.8–34.3)
MCHC RBC AUTO-ENTMCNC: 34 G/DL (ref 31.4–37.4)
MCV RBC AUTO: 92 FL (ref 82–98)
MONOCYTES # BLD AUTO: 0.78 THOUSAND/ΜL (ref 0.17–1.22)
MONOCYTES NFR BLD AUTO: 8 % (ref 4–12)
NEUTROPHILS # BLD AUTO: 7.55 THOUSANDS/ΜL (ref 1.85–7.62)
NEUTS SEG NFR BLD AUTO: 79 % (ref 43–75)
NRBC BLD AUTO-RTO: 0 /100 WBCS
PLATELET # BLD AUTO: 203 THOUSANDS/UL (ref 149–390)
PMV BLD AUTO: 9.4 FL (ref 8.9–12.7)
PROTHROMBIN TIME: 31 SECONDS (ref 11.8–14.2)
RBC # BLD AUTO: 3.56 MILLION/UL (ref 3.81–5.12)
WBC # BLD AUTO: 9.62 THOUSAND/UL (ref 4.31–10.16)

## 2019-03-15 PROCEDURE — 85610 PROTHROMBIN TIME: CPT | Performed by: PHYSICIAN ASSISTANT

## 2019-03-15 PROCEDURE — 85730 THROMBOPLASTIN TIME PARTIAL: CPT | Performed by: PHYSICIAN ASSISTANT

## 2019-03-15 PROCEDURE — 36415 COLL VENOUS BLD VENIPUNCTURE: CPT | Performed by: PHYSICIAN ASSISTANT

## 2019-03-15 PROCEDURE — 99283 EMERGENCY DEPT VISIT LOW MDM: CPT

## 2019-03-15 PROCEDURE — 85025 COMPLETE CBC W/AUTO DIFF WBC: CPT | Performed by: PHYSICIAN ASSISTANT

## 2019-03-15 RX ORDER — OXYMETAZOLINE HYDROCHLORIDE 0.05 G/100ML
2 SPRAY NASAL ONCE
Status: COMPLETED | OUTPATIENT
Start: 2019-03-15 | End: 2019-03-15

## 2019-03-15 RX ORDER — PNV,CALCIUM 72/IRON/FOLIC ACID 27 MG-1 MG
TABLET ORAL
Qty: 90 TABLET | Refills: 0 | Status: SHIPPED | OUTPATIENT
Start: 2019-03-15 | End: 2019-06-19 | Stop reason: SDUPTHER

## 2019-03-15 RX ORDER — TRANEXAMIC ACID 100 MG/ML
500 INJECTION, SOLUTION INTRAVENOUS ONCE
Status: DISCONTINUED | OUTPATIENT
Start: 2019-03-15 | End: 2019-03-15

## 2019-03-15 RX ADMIN — OXYMETAZOLINE HCL 2 SPRAY: 0.05 SPRAY NASAL at 02:16

## 2019-03-15 NOTE — DISCHARGE INSTRUCTIONS
Use Afrin as needed  Follow-up with ENT  Follow-up with PCP  Follow up with emergency department symptoms persist or exacerbate

## 2019-03-15 NOTE — ED PROVIDER NOTES
History  Chief Complaint   Patient presents with    Nose Bleed     pt presents with a nose bleed starting tonight  says she got her first shot of new birth control today  bleeding controlled at this time    Patient is a 78-year-old female with history of mechanical valve replacement, rhinoplasty, and allergic rhinitis that presents emergency department with resolved left nasal nosebleed that started at 1 a m  Patient stated that left nasal nosebleed had continued trickling for 1 hour  Patient denies associated symptomatology  Patient is currently on Coumadin daily and daily aspirin 81 mg  Patient states that her left nasal nosebleed had began spontaneously and lasted for 5 minutes before stopping  Patient was seen 02/28/2019 for acute anterior epistaxis and was delivered Afrin and nasal cautery and was to discharged home care with follow-up to PCP  Patient affirms palliative factors of mechanical nose pinching and denies provocative factors  Patient denies not effective treatment  Patient denies fevers, chills, and vomiting  Patient denies diarrhea, constipation, urinary symptoms  Patient denies headaches, tinnitus, dizziness, meningeal, and vertiginous symptoms  Patient denies numbness, tingling, and loss of power  Patient denies sick contacts and recent travel  Patient denies recent falls and recent trauma  Patient denies chest pain, shortness of breath, and abdominal pain  Patient is not in acute distress        History provided by:  Parent   used: No    Nose Bleed   Location:  L nare  Severity:  Mild  Duration:  1 hour  Timing:  Sporadic  Progression:  Resolved  Chronicity:  Recurrent  Context: anticoagulants    Context: not aspirin use, not BiPAP, not bleeding disorder, not CPAP, not drug use, not elevation change, not foreign body, not home oxygen, not hypertension, not nose picking, not recent infection, not thrombocytopenia, not trauma and not weather change    Associated symptoms: no cough, no dizziness, no fever and no headaches        Prior to Admission Medications   Prescriptions Last Dose Informant Patient Reported? Taking?    BIOTIN 5000 PO  Self Yes No   Sig: Take 20,000 mcg by mouth daily   Multiple Vitamins-Minerals (MULTIVITAMIN WITH MINERALS) tablet  Self Yes No   Sig: Take 1 tablet by mouth daily   aspirin (ECOTRIN LOW STRENGTH) 81 mg EC tablet  Self Yes No   Sig: Take 81 mg by mouth daily   metoprolol tartrate (LOPRESSOR) 25 mg tablet  Self No No   Sig: take 1 tablet by mouth twice a day   rizatriptan (MAXALT) 10 MG tablet  Self Yes No   Sig: take 1 tablet by mouth ONCE AS NEEDED FOR MIGRAINE FOR UP TO 1 DOSE   warfarin (COUMADIN) 5 mg tablet  Self No No   Sig: Take 1 tablet (5 mg total) by mouth daily As directed based on INR      Facility-Administered Medications Last Administration Doses Remaining   medroxyPROGESTERone (DEPO-PROVERA) IM injection 150 mg 3/14/2019  4:46 PM 0          Past Medical History:   Diagnosis Date    Abnormal Pap smear of cervix     Allergic contact dermatitis     Allergic rhinitis     resolved 2018    Aortic valve disorder     Aortic valve insufficiency     Asthma     Benign neoplasm of skin     Cardiac disease     Costochondritis     Hyperinsulinism     Inguinal lymphadenopathy     resolved 2015    Migraine     resolved 2015    Varicose 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     unspecified laterality       Past Surgical History:   Procedure Laterality Date    AORTIC VALVE REPLACEMENT      complications 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 dr sawyer   [address]  2011    bovine, with redo and mechanical valve replacement and root 2012 dr sawyer at [facility] last assessed 08/15/2016    CERVICAL BIOPSY  W/ LOOP ELECTRODE EXCISION      COLPOSCOPY      INDUCED       surgically by dilation and evacuation    RHINOPLASTY         Family History   Problem Relation Age of Onset    Asthma Father     Coronary artery disease Father     Hypertension Father     No Known Problems Sister     Breast cancer Maternal Grandmother     No Known Problems Mother     No Known Problems Daughter     No Known Problems Son     Diabetes Maternal Grandfather     No Known Problems Paternal Grandmother     Other Paternal Grandfather         Susanne Fever    No Known Problems Sister     No Known Problems Sister     No Known Problems Daughter     No Known Problems Son     No Known Problems Son      I have reviewed and agree with the history as documented  Social History     Tobacco Use    Smoking status: Never Smoker    Smokeless tobacco: Never Used   Substance Use Topics    Alcohol use: Yes     Frequency: 2-4 times a month     Comment: drinks hard liquor, social per allscripts    Drug use: No        Review of Systems   Constitutional: Negative for chills and fever  HENT: Positive for nosebleeds  Respiratory: Negative for cough, chest tightness and shortness of breath  Cardiovascular: Negative for chest pain and palpitations  Gastrointestinal: Negative for abdominal pain, constipation, diarrhea, nausea and vomiting  Genitourinary: Negative for difficulty urinating, dysuria and frequency  Musculoskeletal: Negative for back pain and gait problem  Skin: Negative for pallor and rash  Allergic/Immunologic: Negative for environmental allergies and food allergies  Neurological: Negative for dizziness and headaches  Psychiatric/Behavioral: Negative for confusion  Physical Exam  Physical Exam   Constitutional: She is oriented to person, place, and time  She appears well-developed and well-nourished  She is active and cooperative  Non-toxic appearance  She does not have a sickly appearance  She does not appear ill  No distress  HENT:   Head: Normocephalic and atraumatic  Right Ear: Hearing, tympanic membrane, external ear and ear canal normal  No drainage, swelling or tenderness  No mastoid tenderness  No decreased hearing is noted  Left Ear: Hearing, tympanic membrane, external ear and ear canal normal  No drainage, swelling or tenderness  No mastoid tenderness  No decreased hearing is noted  Nose: No nasal septal hematoma  Epistaxis is observed  Right sinus exhibits no maxillary sinus tenderness and no frontal sinus tenderness  Left sinus exhibits no maxillary sinus tenderness and no frontal sinus tenderness  Mouth/Throat: Uvula is midline, oropharynx is clear and moist and mucous membranes are normal    Anterior nasal formed clot with no left nare obstruction  patient has no oropharyngeal bleeding noted   Eyes: Pupils are equal, round, and reactive to light  Conjunctivae, EOM and lids are normal  Right eye exhibits no discharge  Left eye exhibits no discharge  Neck: Trachea normal, normal range of motion, full passive range of motion without pain and phonation normal  Neck supple  No JVD present  No tracheal tenderness, no spinous process tenderness and no muscular tenderness present  No neck rigidity  No tracheal deviation and normal range of motion present  Cardiovascular: Normal rate, regular rhythm, normal heart sounds, intact distal pulses and normal pulses  Pulses:       Radial pulses are 2+ on the right side, and 2+ on the left side  Posterior tibial pulses are 2+ on the right side, and 2+ on the left side  Pulmonary/Chest: Effort normal and breath sounds normal  No stridor  She has no decreased breath sounds  She has no wheezes  She has no rhonchi  She has no rales  She exhibits no tenderness, no bony tenderness and no crepitus  Abdominal: Soft  Bowel sounds are normal  She exhibits no distension  There is no tenderness  There is no rigidity, no rebound, no guarding and no CVA tenderness  Musculoskeletal: Normal range of motion  Lymphadenopathy:        Head (right side): No submental, no submandibular, no tonsillar, no preauricular, no posterior auricular and no occipital adenopathy present  Head (left side): No submental, no submandibular, no tonsillar, no preauricular, no posterior auricular and no occipital adenopathy present  She has no cervical adenopathy  Right cervical: No superficial cervical, no deep cervical and no posterior cervical adenopathy present  Left cervical: No superficial cervical, no deep cervical and no posterior cervical adenopathy present  Neurological: She is alert and oriented to person, place, and time  She has normal strength and normal reflexes  No sensory deficit  GCS eye subscore is 4  GCS verbal subscore is 5  GCS motor subscore is 6  Reflex Scores:       Patellar reflexes are 2+ on the right side and 2+ on the left side  Skin: Skin is warm and intact  Capillary refill takes less than 2 seconds  She is not diaphoretic  Psychiatric: She has a normal mood and affect  Her speech is normal and behavior is normal  Judgment and thought content normal  Cognition and memory are normal    Nursing note and vitals reviewed        Vital Signs  ED Triage Vitals [03/15/19 0157]   Temperature Pulse Respirations Blood Pressure SpO2   98 2 °F (36 8 °C) 83 18 136/63 96 %      Temp Source Heart Rate Source Patient Position - Orthostatic VS BP Location FiO2 (%)   Oral -- -- -- --      Pain Score       --           Vitals:    03/15/19 0157   BP: 136/63   Pulse: 83       qSOFA     Row Name 03/15/19 0157                Altered mental status GCS < 15  --        Respiratory Rate > / =93  0        Systolic BP < / =543  0        Q Sofa Score  0              Visual Acuity      ED Medications  Medications   oxymetazoline (AFRIN) 0 05 % nasal spray 2 spray (2 sprays Each Nare Given 3/15/19 0216)       Diagnostic Studies  Results Reviewed     Procedure Component Value Units Date/Time    Protime-INR [[id number]]  (Abnormal) Collected:  03/15/19 0226    Lab Status:  Final result Specimen:  Blood from Arm, Right Updated:  03/15/19 0243     Protime 31 0 seconds      INR 3 04    APTT [[id number]]  (Abnormal) Collected:  03/15/19 0226    Lab Status:  Final result Specimen:  Blood from Arm, Right Updated:  03/15/19 0243     PTT 61 seconds     CBC and differential [[id number]]  (Abnormal) Collected:  03/15/19 0226    Lab Status:  Final result Specimen:  Blood from Arm, Right Updated:  03/15/19 0231     WBC 9 62 Thousand/uL      RBC 3 56 Million/uL      Hemoglobin 11 1 g/dL      Hematocrit 32 6 %      MCV 92 fL      MCH 31 2 pg      MCHC 34 0 g/dL      RDW 11 9 %      MPV 9 4 fL      Platelets 869 Thousands/uL      nRBC 0 /100 WBCs      Neutrophils Relative 79 %      Immat GRANS % 0 %      Lymphocytes Relative 11 %      Monocytes Relative 8 %      Eosinophils Relative 2 %      Basophils Relative 0 %      Neutrophils Absolute 7 55 Thousands/µL      Immature Grans Absolute 0 02 Thousand/uL      Lymphocytes Absolute 1 04 Thousands/µL      Monocytes Absolute 0 78 Thousand/µL      Eosinophils Absolute 0 19 Thousand/µL      Basophils Absolute 0 04 Thousands/µL                  No orders to display              Procedures  Procedures       Phone Contacts  ED Phone Contact    ED Course  ED Course as of Mar 15 0433   Fri Mar 15, 2019   0235 CBC and differential(!)                               MDM  Number of Diagnoses or Management Options  Left-sided epistaxis: established and worsening  Diagnosis management comments: Patient has left anterior nasal formed clot with no left nare obstruction    Delivered Afrin bilateral nares in the emergency department  Reserved patient in the emergency department and also noted no commencement bilateral epistaxis; patient has no oropharyngeal bleeding noted on physical exam  The patient PT INR in therapeutic range at 3 04 and protime 31 0; patient has mechanical valve replacement  Follow-up with ENT  Follow-up with PCP  Follow up with emergency department symptoms persist or exacerbate  Patient demonstrates verbal understanding of all discharge instructions, followup, and treatment plan  Amount and/or Complexity of Data Reviewed  Clinical lab tests: ordered and reviewed        Disposition  Final diagnoses:   Left-sided epistaxis     Time reflects when diagnosis was documented in both MDM as applicable and the Disposition within this note     Time User Action Codes Description Comment    3/15/2019  2:49 AM Rondal Hides Add [R04 0] Epistaxis     3/15/2019  2:49 AM Rondal Hides Add [R04 0] Left-sided epistaxis     3/15/2019  2:49 AM Rondal Hides Modify [R04 0] Left-sided epistaxis     3/15/2019  2:49 AM Assunta Ching [R04 0] Epistaxis       ED Disposition     ED Disposition Condition Date/Time Comment    Discharge Stable Fri Mar 15, 2019  2:49 AM Dell Hash discharge to home/self care              Follow-up Information     Follow up With Specialties Details Why Contact Info Additional Information    Janet Anna MD Internal Medicine, Palliative Care Call in 1 week for further evaluation of symptoms [address],  Fredi Casey 16 [address] Katie Whalen 9371 Emergency Department Emergency Medicine Go to  As needed [address] [facility], [place]  [address] [facility], [address] [place], [place], [address] ENT Otolaryngology In 2 days for further evaluation of symptoms [address] [facility] [address]  [address] ENT, [address] [place], [place], [address]          Discharge Medication List as of 3/15/2019  2:53 AM      CONTINUE these medications which have NOT CHANGED    Details   aspirin (ECOTRIN LOW STRENGTH) 81 mg EC tablet Take 81 mg by mouth daily, Historical Med      BIOTIN 5000 PO Take 20,000 mcg by mouth daily, Historical Med      metoprolol tartrate (LOPRESSOR) 25 mg tablet take 1 tablet by mouth twice a day, Normal      Multiple Vitamins-Minerals (MULTIVITAMIN WITH MINERALS) tablet Take 1 tablet by mouth daily, Historical Med      rizatriptan (MAXALT) 10 MG tablet take 1 tablet by mouth ONCE AS NEEDED FOR MIGRAINE FOR UP TO 1 DOSE, Historical Med      warfarin (COUMADIN) 5 mg tablet Take 1 tablet (5 mg total) by mouth daily As directed based on INR, Starting Thu 1/31/2019, Normal           No discharge procedures on file      ED Provider  Electronically Signed by           Gissel Russ PA-C  03/15/19 Darren Donahue PA-C  03/15/19 5611

## 2019-03-26 ENCOUNTER — ANTICOAG VISIT (OUTPATIENT)
Dept: CARDIOLOGY CLINIC | Facility: CLINIC | Age: 44
End: 2019-03-26

## 2019-03-26 DIAGNOSIS — I35.9 AORTIC VALVE DISORDER: ICD-10-CM

## 2019-03-26 LAB — INR PPP: 3.1 (ref 0.86–1.17)

## 2019-03-28 ENCOUNTER — OFFICE VISIT (OUTPATIENT)
Dept: OTOLARYNGOLOGY | Facility: CLINIC | Age: 44
End: 2019-03-28
Payer: COMMERCIAL

## 2019-03-28 VITALS
WEIGHT: 104 LBS | HEART RATE: 69 BPM | SYSTOLIC BLOOD PRESSURE: 150 MMHG | RESPIRATION RATE: 17 BRPM | DIASTOLIC BLOOD PRESSURE: 87 MMHG | HEIGHT: 60 IN | BODY MASS INDEX: 20.42 KG/M2

## 2019-03-28 DIAGNOSIS — M95.0 ACQUIRED NASAL DEFORMITY: ICD-10-CM

## 2019-03-28 DIAGNOSIS — J34.2 DEVIATED NASAL SEPTUM: ICD-10-CM

## 2019-03-28 DIAGNOSIS — R04.0 RECURRENT EPISTAXIS: Primary | ICD-10-CM

## 2019-03-28 DIAGNOSIS — J34.89 NASAL VALVE COLLAPSE: ICD-10-CM

## 2019-03-28 PROCEDURE — 30901 CONTROL OF NOSEBLEED: CPT | Performed by: OTOLARYNGOLOGY

## 2019-03-28 PROCEDURE — 99204 OFFICE O/P NEW MOD 45 MIN: CPT | Performed by: OTOLARYNGOLOGY

## 2019-03-28 NOTE — PROGRESS NOTES
[facility]  Shravan Tatum 37 y o  female MRN: [id number]  Encounter: [id number]  Lety Blum MD  Office : [phone]  Also available on [employer] Text    Thank you for referring Shravan Tatum for an evaluation  My recommendations are included  Please do not hesitate to contact me with any questions you may have  ASSESSMENT AND PLAN:      1  Recurrent epistaxis     2  Deviated nasal septum     3  Acquired nasal deformity     4  Nasal valve collapse       Recurrent epistaxis cauterized suspected source of bleed  Patient does have chronic nasal obstruction, nasal valve collapse, and a nasal deformity  Briefly discussed surgical options  Patient to think about the possibility of a new surgery  Recommend lubrication of the nose  Follow-up in 2 weeks  ______________________________________________________________________    Reason for consultation :  Nosebleed    HPI: Shravan Tatum is a 37 y o  female who presents with recurrent epistaxis left nasal cavity  She does have a history of nasal surgery apparently septoplasty 12 years ago  Overall breathing okay  Of note patient has a mechanical valve and is on Coumadin  REVIEW OF SYSTEMS:    Review of systems:  10 Point ROS was performed and negative except as above or otherwise noted in the medical record      Historical Information   Past Medical History:   Diagnosis Date    Abnormal Pap smear of cervix     Allergic contact dermatitis     Allergic rhinitis     resolved 01/17/2018    Aortic valve disorder     Aortic valve insufficiency     Asthma     Benign neoplasm of skin     Cardiac disease     Costochondritis     Hyperinsulinism     Inguinal lymphadenopathy     resolved 08/27/2015    Migraine     resolved 08/27/2015    Varicose 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     unspecified laterality     Past Surgical History:   Procedure Laterality Date    AORTIC VALVE REPLACEMENT      complications 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, replaced with mechanical aortic valve  and root replacement at [facility] dr sawyer   [address]      enlargement    CARDIAC VALVE REPLACEMENT  2011    bovine, with redo and mechanical valve replacement and root 2012 dr sawyer at [facility] last assessed 08/15/2016    CERVICAL BIOPSY  W/ LOOP ELECTRODE EXCISION      COLPOSCOPY      INDUCED       surgically by dilation and evacuation    RHINOPLASTY       Social History   Social History     Substance and Sexual Activity   Alcohol Use Yes    Frequency: 2-4 times a month    Comment: drinks hard liquor, social per allscripts     Social History     Substance and Sexual Activity   Drug Use No     Social History     Tobacco Use   Smoking Status Never Smoker   Smokeless Tobacco Never Used     Family History   Problem Relation Age of Onset    Asthma Father     Coronary artery disease Father     Hypertension Father     No Known Problems Sister     Breast cancer Maternal Grandmother     No Known Problems Mother     No Known Problems Daughter     No Known Problems Son     Diabetes Maternal Grandfather     No Known Problems Paternal Grandmother     Other Paternal Grandfather         Susanne Fever    No Known Problems Sister     No Known Problems Sister     No Known Problems Daughter     No Known Problems Son     No Known Problems Son        Meds/Allergies       Current Outpatient Medications:     aspirin (ECOTRIN LOW STRENGTH) 81 mg EC tablet    BIOTIN 5000 PO    metoprolol tartrate (LOPRESSOR) 25 mg tablet    Multiple Vitamins-Minerals (MULTIVITAMIN WITH MINERALS) tablet    Prenatal Vit-Fe Fumarate-FA (PREPLUS) 27-1 MG TABS    rizatriptan (MAXALT) 10 MG tablet    warfarin (COUMADIN) 5 mg tablet    Allergies   Allergen Reactions    Dog Epithelium Allergy Skin Test Rash     Only certain dogs    Oxycodone Vomiting     Patient denies allergy    Pollen Extract Other (See Comments)     Test showed allergy but rarely has s/s    Shrimp Flavor Swelling     (only Shrimp) per pt not too sure  Patient states she is not allergic         PHYSICAL EXAM:    Blood pressure 150/87, pulse 69, resp  rate 17, height 5' (1 524 m), weight 47 2 kg (104 lb), last menstrual period 03/13/2019, not currently breastfeeding  Body mass index is 20 31 kg/m²  Constitutional: Oriented to person, place, and time  Well-developed and well-nourished, no apparent distress, non-toxic appearance  Cooperative, able to hear and answer questions without difficulty  Voice: Normal voice quality  Head: Normocephalic, atraumatic  No scars, masses or lesions  Face: Symmetric, no edema, no sinus tenderness  Eyes: Vision grossly intact, extra-ocular movement intact  Right Ear: External ear normal   Auditory canal clear  Tympanic membrane well-appearing, without retraction or scarring  No fluid present  No post-auricular erythema or tenderness  Left Ear: External ear normal   Auditory canal clear  Tympanic membrane well-appearing, without retraction or scarring  No fluid present  No post-auricular erythema or tenderness  Nose: s/p rhinoplpasty, narrow nasal vault, open roof, concave profile  Septum deviated to left, scar band to inferior turbinate  There is a small vessel in anterior septum and a larger vessel on the septo turbinal adhesion  Topical anesthesia applied  Cauterized with silver nitrate  Q tip test strongly positive left   Mucosa moist, adhesion turbinates well appearing  No crusting, polyps or discharge evident  Oral cavity: Dentition intact  Mucosa moist, lips normal   Tongue mobile, floor of mouth normal   Hard palate unremarkable  No masses or lesions  Oropharynx: Uvula is midline, sot palate normal   Unremarkable oropharyngeal inlet  Tonsils 1+ unremarkable  Posterior pharyngeal wall clear  No masses or lesions  Salivary glands:  Parotid glands and submandibular glands symmetric, no enlargement or tenderness    Neck: Normal laryngeal elevation with swallow  Trachea midline  No masses or lesions  No palpable adenopathy  Thyroid: normal in size, unremarkable without tenderness or palpable nodules  Pulmonary/Chest: Normal effort and rate  No respiratory distress  Musculoskeletal: Normal range of motion  Neurological: Cranial nerves 2-12 intact  Skin: Skin is warm and dry  Psychiatric: Normal mood and affect      Lab Results: reviewed PT and her INR is in therapeutic range  Imaging Studies: I have personally reviewed images on the PACS system and : non contributory  EKG, Pathology, and   Other Studies: I have personally reviewed pertinent reports and  non contributory

## 2019-04-05 ENCOUNTER — OFFICE VISIT (OUTPATIENT)
Dept: CARDIOLOGY CLINIC | Facility: CLINIC | Age: 44
End: 2019-04-05
Payer: COMMERCIAL

## 2019-04-05 VITALS
DIASTOLIC BLOOD PRESSURE: 80 MMHG | WEIGHT: 106.6 LBS | OXYGEN SATURATION: 98 % | HEART RATE: 67 BPM | BODY MASS INDEX: 20.93 KG/M2 | HEIGHT: 60 IN | SYSTOLIC BLOOD PRESSURE: 120 MMHG

## 2019-04-05 DIAGNOSIS — I35.9 AORTIC VALVE DISORDER: Primary | ICD-10-CM

## 2019-04-05 DIAGNOSIS — Z79.01 ANTICOAGULATED: ICD-10-CM

## 2019-04-05 PROCEDURE — 99213 OFFICE O/P EST LOW 20 MIN: CPT | Performed by: INTERNAL MEDICINE

## 2019-04-09 ENCOUNTER — ANTICOAG VISIT (OUTPATIENT)
Dept: CARDIOLOGY CLINIC | Facility: CLINIC | Age: 44
End: 2019-04-09

## 2019-04-09 DIAGNOSIS — I35.9 AORTIC VALVE DISORDER: ICD-10-CM

## 2019-04-09 LAB — INR PPP: 3 (ref 0.86–1.17)

## 2019-04-22 ENCOUNTER — TELEPHONE (OUTPATIENT)
Dept: CARDIOLOGY CLINIC | Facility: CLINIC | Age: 44
End: 2019-04-22

## 2019-04-29 ENCOUNTER — TELEPHONE (OUTPATIENT)
Dept: OBGYN CLINIC | Facility: CLINIC | Age: 44
End: 2019-04-29

## 2019-04-30 ENCOUNTER — ANTICOAG VISIT (OUTPATIENT)
Dept: CARDIOLOGY CLINIC | Facility: CLINIC | Age: 44
End: 2019-04-30

## 2019-04-30 DIAGNOSIS — I35.9 AORTIC VALVE DISORDER: ICD-10-CM

## 2019-04-30 LAB — INR PPP: 4 (ref 0.86–1.17)

## 2019-05-15 ENCOUNTER — ANTICOAG VISIT (OUTPATIENT)
Dept: CARDIOLOGY CLINIC | Facility: CLINIC | Age: 44
End: 2019-05-15

## 2019-05-15 DIAGNOSIS — I35.9 AORTIC VALVE DISORDER: ICD-10-CM

## 2019-05-15 LAB — INR PPP: 3.4 (ref 0.86–1.17)

## 2019-05-25 DIAGNOSIS — Z92.89 PERSONAL HISTORY OF OTHER MEDICAL TREATMENT (CODE): ICD-10-CM

## 2019-05-31 ENCOUNTER — TELEPHONE (OUTPATIENT)
Dept: CARDIOLOGY CLINIC | Facility: CLINIC | Age: 44
End: 2019-05-31

## 2019-05-31 NOTE — TELEPHONE ENCOUNTER
S/w pt   She will self test today then go to the lab on Monday to test again at the request of her surgeon

## 2019-05-31 NOTE — TELEPHONE ENCOUNTER
PT IS HAVING A PROC DONE ON 6/6/19 & THE DOCT OFFICE WOULD LIKE PT'S INR RESULTS FAXED TO THEM [phone] AS SOON AS PT GETS DRAWN

## 2019-06-03 ENCOUNTER — HOSPITAL ENCOUNTER (OUTPATIENT)
Dept: NON INVASIVE DIAGNOSTICS | Facility: CLINIC | Age: 44
Discharge: HOME | End: 2019-06-03
Payer: COMMERCIAL

## 2019-06-03 ENCOUNTER — TRANSCRIBE ORDERS (OUTPATIENT)
Dept: LAB | Facility: CLINIC | Age: 44
End: 2019-06-03

## 2019-06-03 ENCOUNTER — APPOINTMENT (OUTPATIENT)
Dept: LAB | Facility: CLINIC | Age: 44
End: 2019-06-03
Payer: COMMERCIAL

## 2019-06-03 DIAGNOSIS — R53.83 OTHER FATIGUE: ICD-10-CM

## 2019-06-03 DIAGNOSIS — N64.4 MASTODYNIA: ICD-10-CM

## 2019-06-03 DIAGNOSIS — I35.9 AORTIC VALVE DISORDER: ICD-10-CM

## 2019-06-03 DIAGNOSIS — Z98.2 PRESENCE OF CEREBROSPINAL FLUID DRAINAGE DEVICE: Primary | ICD-10-CM

## 2019-06-03 DIAGNOSIS — Z79.01 LONG TERM (CURRENT) USE OF ANTICOAGULANTS: ICD-10-CM

## 2019-06-03 DIAGNOSIS — Z98.2 PRESENCE OF CEREBROSPINAL FLUID DRAINAGE DEVICE: ICD-10-CM

## 2019-06-03 DIAGNOSIS — Z79.01 ANTICOAGULATED: Primary | ICD-10-CM

## 2019-06-03 LAB
ERYTHROCYTE [DISTWIDTH] IN BLOOD BY AUTOMATED COUNT: 12.2 % (ref 11.6–15.1)
HCT VFR BLD AUTO: 38.5 % (ref 34.8–46.1)
HGB BLD-MCNC: 12.7 G/DL (ref 11.5–15.4)
INR PPP: 2.33 (ref 0.86–1.17)
MCH RBC QN AUTO: 31.4 PG (ref 26.8–34.3)
MCHC RBC AUTO-ENTMCNC: 33 G/DL (ref 31.4–37.4)
MCV RBC AUTO: 95 FL (ref 82–98)
PLATELET # BLD AUTO: 236 THOUSANDS/UL (ref 149–390)
PMV BLD AUTO: 10.7 FL (ref 8.9–12.7)
PROTHROMBIN TIME: 25.6 SECONDS (ref 11.8–14.2)
RBC # BLD AUTO: 4.05 MILLION/UL (ref 3.81–5.12)
WBC # BLD AUTO: 6.57 THOUSAND/UL (ref 4.31–10.16)

## 2019-06-03 PROCEDURE — 85027 COMPLETE CBC AUTOMATED: CPT

## 2019-06-03 PROCEDURE — 36415 COLL VENOUS BLD VENIPUNCTURE: CPT

## 2019-06-03 PROCEDURE — 93306 TTE W/DOPPLER COMPLETE: CPT

## 2019-06-03 PROCEDURE — 85610 PROTHROMBIN TIME: CPT

## 2019-06-03 PROCEDURE — 93306 TTE W/DOPPLER COMPLETE: CPT | Performed by: INTERNAL MEDICINE

## 2019-06-04 ENCOUNTER — TELEPHONE (OUTPATIENT)
Dept: CARDIOLOGY CLINIC | Facility: CLINIC | Age: 44
End: 2019-06-04

## 2019-06-04 ENCOUNTER — ANTICOAG VISIT (OUTPATIENT)
Dept: CARDIOLOGY CLINIC | Facility: CLINIC | Age: 44
End: 2019-06-04

## 2019-06-04 ENCOUNTER — CLINICAL SUPPORT (OUTPATIENT)
Dept: OBGYN CLINIC | Facility: MEDICAL CENTER | Age: 44
End: 2019-06-04
Payer: COMMERCIAL

## 2019-06-04 VITALS — WEIGHT: 108 LBS | DIASTOLIC BLOOD PRESSURE: 76 MMHG | BODY MASS INDEX: 21.09 KG/M2 | SYSTOLIC BLOOD PRESSURE: 118 MMHG

## 2019-06-04 DIAGNOSIS — I35.9 AORTIC VALVE DISORDER: ICD-10-CM

## 2019-06-04 DIAGNOSIS — Z30.42 ENCOUNTER FOR MANAGEMENT AND INJECTION OF DEPO-PROVERA: ICD-10-CM

## 2019-06-04 PROCEDURE — 96372 THER/PROPH/DIAG INJ SC/IM: CPT | Performed by: PHYSICIAN ASSISTANT

## 2019-06-04 RX ORDER — MEDROXYPROGESTERONE ACETATE 150 MG/ML
150 INJECTION, SUSPENSION INTRAMUSCULAR ONCE
Status: COMPLETED | OUTPATIENT
Start: 2019-06-04 | End: 2019-06-04

## 2019-06-04 RX ADMIN — MEDROXYPROGESTERONE ACETATE 150 MG: 150 INJECTION, SUSPENSION INTRAMUSCULAR at 12:28

## 2019-06-05 ENCOUNTER — TELEPHONE (OUTPATIENT)
Dept: CARDIOLOGY CLINIC | Facility: CLINIC | Age: 44
End: 2019-06-05

## 2019-06-06 ENCOUNTER — TELEPHONE (OUTPATIENT)
Dept: CARDIOLOGY CLINIC | Facility: CLINIC | Age: 44
End: 2019-06-06

## 2019-06-11 ENCOUNTER — TELEPHONE (OUTPATIENT)
Dept: CARDIOLOGY CLINIC | Facility: CLINIC | Age: 44
End: 2019-06-11

## 2019-06-11 ENCOUNTER — ANTICOAG VISIT (OUTPATIENT)
Dept: CARDIOLOGY CLINIC | Facility: CLINIC | Age: 44
End: 2019-06-11

## 2019-06-11 DIAGNOSIS — I35.9 AORTIC VALVE DISORDER: ICD-10-CM

## 2019-06-11 LAB — INR PPP: 2.2 (ref 0.86–1.17)

## 2019-06-11 NOTE — TELEPHONE ENCOUNTER
S/w pt  Her INR was low at 2 2, but understaable as she was holding for a procedure that got canceled   Aware to stay on the same dose and retest again in 1 week

## 2019-06-11 NOTE — TELEPHONE ENCOUNTER
Pt called and stated that she was schedule to have surgery with Dr Natanael Milligan and the surgery was cancelled  Pt stated all she was told is that the surgery was cancelled due to her heart  Pt also stated that her blood work came back showing her levels were low  Pt would like a call back & would like someone to please explain to her on what this means

## 2019-06-19 DIAGNOSIS — Z00.00 PERIODIC HEALTH ASSESSMENT, GENERAL SCREENING, ADULT: ICD-10-CM

## 2019-06-19 RX ORDER — PNV,CALCIUM 72/IRON/FOLIC ACID 27 MG-1 MG
TABLET ORAL
Qty: 90 TABLET | Refills: 0 | Status: SHIPPED | OUTPATIENT
Start: 2019-06-19 | End: 2019-09-09 | Stop reason: SDUPTHER

## 2019-06-20 ENCOUNTER — APPOINTMENT (OUTPATIENT)
Dept: LAB | Facility: HOSPITAL | Age: 44
End: 2019-06-20
Attending: INTERNAL MEDICINE
Payer: COMMERCIAL

## 2019-06-20 ENCOUNTER — ANTICOAG VISIT (OUTPATIENT)
Dept: CARDIOLOGY CLINIC | Facility: CLINIC | Age: 44
End: 2019-06-20

## 2019-06-20 DIAGNOSIS — I35.9 AORTIC VALVE DISORDER: ICD-10-CM

## 2019-06-20 DIAGNOSIS — Z79.01 LONG TERM (CURRENT) USE OF ANTICOAGULANTS: ICD-10-CM

## 2019-06-20 LAB
INR PPP: 2.14 (ref 0.86–1.17)
PROTHROMBIN TIME: 23.6 SECONDS (ref 11.8–14.2)

## 2019-06-20 PROCEDURE — 85610 PROTHROMBIN TIME: CPT

## 2019-06-20 PROCEDURE — 36415 COLL VENOUS BLD VENIPUNCTURE: CPT

## 2019-06-25 ENCOUNTER — ANTICOAG VISIT (OUTPATIENT)
Dept: CARDIOLOGY CLINIC | Facility: CLINIC | Age: 44
End: 2019-06-25
Payer: COMMERCIAL

## 2019-06-25 ENCOUNTER — TELEPHONE (OUTPATIENT)
Dept: CARDIOLOGY CLINIC | Facility: CLINIC | Age: 44
End: 2019-06-25

## 2019-06-25 DIAGNOSIS — I35.9 AORTIC VALVE DISORDER: ICD-10-CM

## 2019-06-25 DIAGNOSIS — Z79.01 LONG TERM (CURRENT) USE OF ANTICOAGULANTS: Primary | ICD-10-CM

## 2019-06-25 LAB — INR PPP: 3 (ref 0.84–1.19)

## 2019-06-25 PROCEDURE — 93793 ANTICOAG MGMT PT WARFARIN: CPT

## 2019-07-04 ENCOUNTER — HOSPITAL ENCOUNTER (EMERGENCY)
Facility: HOSPITAL | Age: 44
Discharge: HOME | End: 2019-07-05
Attending: EMERGENCY MEDICINE
Payer: COMMERCIAL

## 2019-07-04 DIAGNOSIS — S99.912A INJURY OF LEFT ANKLE, INITIAL ENCOUNTER: Primary | ICD-10-CM

## 2019-07-04 PROCEDURE — 99283 EMERGENCY DEPT VISIT LOW MDM: CPT

## 2019-07-05 ENCOUNTER — APPOINTMENT (EMERGENCY)
Dept: RADIOLOGY | Facility: HOSPITAL | Age: 44
End: 2019-07-05
Payer: COMMERCIAL

## 2019-07-05 VITALS
RESPIRATION RATE: 18 BRPM | HEART RATE: 72 BPM | OXYGEN SATURATION: 99 % | DIASTOLIC BLOOD PRESSURE: 70 MMHG | SYSTOLIC BLOOD PRESSURE: 148 MMHG | TEMPERATURE: 98 F

## 2019-07-05 PROCEDURE — 99283 EMERGENCY DEPT VISIT LOW MDM: CPT | Performed by: EMERGENCY MEDICINE

## 2019-07-05 PROCEDURE — 73610 X-RAY EXAM OF ANKLE: CPT

## 2019-07-05 NOTE — ED PROVIDER NOTES
History  Chief Complaint   Patient presents with    Ankle Injury     pt hit something while weed wacking around 1700 today, hit her left ankle and is now swollen     HPI  69-year-old female presents with left ankle injury  She states she was weed whacking today and a rock  hit her ankle  She was able to ambulate  Pain is aching constant, nonradiating  Nothing makes better or worse  Prior to Admission Medications   Prescriptions Last Dose Informant Patient Reported? Taking?    BIOTIN 5000 PO  Self Yes No   Sig: Take 20,000 mcg by mouth daily   FOLIC ACID PO  Self Yes No   Sig: Take by mouth daily   Multiple Vitamins-Minerals (MULTIVITAMIN WITH MINERALS) tablet  Self Yes No   Sig: Take 1 tablet by mouth daily   Prenatal Vit-Fe Fumarate-FA (PREPLUS) 27-1 MG TABS   No No   Sig: take 1 tablet by mouth daily   aspirin (ECOTRIN LOW STRENGTH) 81 mg EC tablet  Self Yes No   Sig: Take 81 mg by mouth daily   metoprolol tartrate (LOPRESSOR) 25 mg tablet  Self No No   Sig: take 1 tablet by mouth twice a day   rizatriptan (MAXALT) 10 MG tablet  Self Yes No   Sig: take 1 tablet by mouth ONCE AS NEEDED FOR MIGRAINE FOR UP TO 1 DOSE   warfarin (COUMADIN) 5 mg tablet  Self No No   Sig: Take 1 tablet (5 mg total) by mouth daily As directed based on INR      Facility-Administered Medications: None       Past Medical History:   Diagnosis Date    Abnormal Pap smear of cervix     Allergic contact dermatitis     Allergic rhinitis     resolved 01/17/2018    Aortic valve disorder     Aortic valve insufficiency     Asthma     Benign neoplasm of skin     Cardiac disease     Costochondritis     Hyperinsulinism     Inguinal lymphadenopathy     resolved 08/27/2015    Migraine     resolved 08/27/2015    Varicose 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     unspecified laterality       Past Surgical History:   Procedure Laterality Date    AORTIC VALVE REPLACEMENT      complications 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 dr singer Brianna Donohue      enlargement    CARDIAC VALVE REPLACEMENT  2011    bovine, with redo and mechanical valve replacement and root 2012 dr sawyer at [facility] last assessed 08/15/2016    CERVICAL BIOPSY  W/ LOOP ELECTRODE EXCISION      COLPOSCOPY      INDUCED       surgically by dilation and evacuation    RHINOPLASTY         Family History   Problem Relation Age of Onset    Asthma Father     Coronary artery disease Father     Hypertension Father     No Known Problems Sister     Breast cancer Maternal Grandmother     No Known Problems Mother     No Known Problems Daughter     No Known Problems Son     Diabetes Maternal Grandfather     No Known Problems Paternal Grandmother     Other Paternal Grandfather         Susanne Fever    No Known Problems Sister     No Known Problems Sister     No Known Problems Daughter     No Known Problems Son     No Known Problems Son      I have reviewed and agree with the history as documented  Social History     Tobacco Use    Smoking status: Never Smoker    Smokeless tobacco: Never Used   Substance Use Topics    Alcohol use: Yes     Frequency: 2-4 times a month     Comment: drinks hard liquor, social per allscripts    Drug use: No        Review of Systems   Constitutional: Negative for chills and fever  HENT: Negative for dental problem and ear pain  Eyes: Negative for pain and redness  Respiratory: Negative for cough and shortness of breath  Cardiovascular: Negative for chest pain and palpitations  Gastrointestinal: Negative for abdominal pain and nausea  Endocrine: Negative for polydipsia and polyphagia  Genitourinary: Negative for dysuria and frequency  Musculoskeletal: Positive for arthralgias  Negative for joint swelling  Skin: Negative for color change and rash  Neurological: Negative for dizziness and headaches     Psychiatric/Behavioral: Negative for behavioral problems and confusion  All other systems reviewed and are negative  Physical Exam  Physical Exam   Constitutional: She is oriented to person, place, and time  She appears well-developed and well-nourished  No distress  HENT:   Head: Atraumatic  Right Ear: External ear normal    Left Ear: External ear normal    Nose: Nose normal    Eyes: Pupils are equal, round, and reactive to light  Conjunctivae and EOM are normal    Neck: Normal range of motion  Neck supple  No JVD present  Cardiovascular: Normal rate, regular rhythm and normal heart sounds  No murmur heard  Pulmonary/Chest: Effort normal and breath sounds normal  No respiratory distress  She has no wheezes  Abdominal: Soft  Bowel sounds are normal  She exhibits no distension  There is no tenderness  Musculoskeletal: Normal range of motion  She exhibits no edema  Left ankle tenderness to palpation over medial aspect, normal strength and sensation   Neurological: She is alert and oriented to person, place, and time  No cranial nerve deficit  Skin: Skin is warm and dry  Capillary refill takes less than 2 seconds  She is not diaphoretic  Psychiatric: She has a normal mood and affect  Her behavior is normal    Nursing note and vitals reviewed        Vital Signs  ED Triage Vitals   Temperature Pulse Respirations Blood Pressure SpO2   07/04/19 2358 07/04/19 2356 07/04/19 2356 07/04/19 2357 07/04/19 2356   98 1 °F (36 7 °C) 67 18 150/76 99 %      Temp Source Heart Rate Source Patient Position - Orthostatic VS BP Location FiO2 (%)   07/04/19 2358 07/05/19 0056 07/05/19 0056 07/05/19 0156 --   Oral Monitor Lying Right arm       Pain Score       07/04/19 2356       Worst Possible Pain           Vitals:    07/04/19 2357 07/05/19 0056 07/05/19 0156 07/05/19 0256   BP: 150/76 148/74 150/76 148/70   Pulse:  70 74 72   Patient Position - Orthostatic VS:  Lying Lying Lying         Visual Acuity      ED Medications  Medications - No data to display    Diagnostic Studies  Results Reviewed     None                 XR ankle 3+ views LEFT   Final Result by Kristi Lovell DO (07/05 0801)      No acute osseous abnormality  Workstation performed: [id number]                    Procedures  Procedures       ED Course                               MDM  X-ray with no fracture or dislocation, likely contusion, treat with Ace wrap, orthopedic surgery follow-up  Disposition  Final diagnoses:   Injury of left ankle, initial encounter     Time reflects when diagnosis was documented in both MDM as applicable and the Disposition within this note     Time User Action Codes Description Comment    7/5/2019  1:51 AM Yola Dupree Add [[id number]] Injury of left ankle, initial encounter       ED Disposition     ED Disposition Condition Date/Time Comment    Discharge Stable Fri Jul 5, 2019  1:51 AM Chen Alex discharge to home/self care              Follow-up Information     Follow up With Specialties Details Why Dennise Sy MD Orthopedic Surgery Call  for continued pain [address] [address]  [phone]            Discharge Medication List as of 7/5/2019  1:51 AM      CONTINUE these medications which have NOT CHANGED    Details   aspirin (ECOTRIN LOW STRENGTH) 81 mg EC tablet Take 81 mg by mouth daily, Historical Med      BIOTIN 5000 PO Take 20,000 mcg by mouth daily, Historical Med      FOLIC ACID PO Take by mouth daily, Historical Med      metoprolol tartrate (LOPRESSOR) 25 mg tablet take 1 tablet by mouth twice a day, Normal      Multiple Vitamins-Minerals (MULTIVITAMIN WITH MINERALS) tablet Take 1 tablet by mouth daily, Historical Med      Prenatal Vit-Fe Fumarate-FA (PREPLUS) 27-1 MG TABS take 1 tablet by mouth daily, Normal      rizatriptan (MAXALT) 10 MG tablet take 1 tablet by mouth ONCE AS NEEDED FOR MIGRAINE FOR UP TO 1 DOSE, Historical Med      warfarin (COUMADIN) 5 mg tablet Take 1 tablet (5 mg total) by mouth daily As directed based on INR, Starting Thu 1/31/2019, Normal           No discharge procedures on file      ED Provider  Electronically Signed by           Anastasia Penn MD  07/07/19 5541

## 2019-07-10 ENCOUNTER — ANTICOAG VISIT (OUTPATIENT)
Dept: CARDIOLOGY CLINIC | Facility: CLINIC | Age: 44
End: 2019-07-10
Payer: COMMERCIAL

## 2019-07-10 ENCOUNTER — TELEPHONE (OUTPATIENT)
Dept: CARDIOLOGY CLINIC | Facility: CLINIC | Age: 44
End: 2019-07-10

## 2019-07-10 DIAGNOSIS — Z95.2 H/O MECHANICAL AORTIC VALVE REPLACEMENT: Primary | ICD-10-CM

## 2019-07-10 DIAGNOSIS — I35.9 AORTIC VALVE DISORDER: ICD-10-CM

## 2019-07-10 DIAGNOSIS — Z79.01 LONG TERM (CURRENT) USE OF ANTICOAGULANTS: ICD-10-CM

## 2019-07-10 LAB — INR PPP: 2.3 (ref 0.84–1.19)

## 2019-07-10 PROCEDURE — 93793 ANTICOAG MGMT PT WARFARIN: CPT

## 2019-07-16 PROBLEM — J34.89 NASAL OBSTRUCTION: Status: ACTIVE | Noted: 2019-07-16

## 2019-07-16 PROBLEM — R04.0 RECURRENT EPISTAXIS: Status: ACTIVE | Noted: 2019-07-16

## 2019-07-16 PROBLEM — J34.2 NASAL SEPTAL DEVIATION: Status: ACTIVE | Noted: 2019-07-16

## 2019-07-16 PROBLEM — J34.3 NASAL TURBINATE HYPERTROPHY: Status: ACTIVE | Noted: 2019-07-16

## 2019-07-20 DIAGNOSIS — G43.909 MIGRAINE WITHOUT STATUS MIGRAINOSUS, NOT INTRACTABLE, UNSPECIFIED MIGRAINE TYPE: Primary | ICD-10-CM

## 2019-07-21 RX ORDER — RIZATRIPTAN BENZOATE 10 MG/1
TABLET ORAL
Qty: 30 TABLET | Refills: 0 | Status: SHIPPED | OUTPATIENT
Start: 2019-07-21 | End: 2020-04-15

## 2019-07-26 ENCOUNTER — APPOINTMENT (OUTPATIENT)
Dept: LAB | Facility: HOSPITAL | Age: 44
End: 2019-07-26
Attending: INTERNAL MEDICINE
Payer: COMMERCIAL

## 2019-07-26 ENCOUNTER — ANTICOAG VISIT (OUTPATIENT)
Dept: CARDIOLOGY CLINIC | Facility: CLINIC | Age: 44
End: 2019-07-26

## 2019-07-26 DIAGNOSIS — Z79.01 LONG TERM (CURRENT) USE OF ANTICOAGULANTS: Primary | ICD-10-CM

## 2019-07-26 DIAGNOSIS — I35.9 AORTIC VALVE DISORDER: ICD-10-CM

## 2019-07-26 LAB
INR PPP: 2.77 (ref 0.84–1.19)
PROTHROMBIN TIME: 29.7 SECONDS (ref 11.6–14.5)

## 2019-07-26 PROCEDURE — 36415 COLL VENOUS BLD VENIPUNCTURE: CPT

## 2019-07-26 PROCEDURE — 85610 PROTHROMBIN TIME: CPT

## 2019-08-14 ENCOUNTER — ANTICOAG VISIT (OUTPATIENT)
Dept: CARDIOLOGY CLINIC | Facility: CLINIC | Age: 44
End: 2019-08-14

## 2019-08-14 DIAGNOSIS — I35.9 AORTIC VALVE DISORDER: ICD-10-CM

## 2019-08-14 LAB — INR PPP: 2.9 (ref 0.84–1.19)

## 2019-08-21 ENCOUNTER — TELEPHONE (OUTPATIENT)
Dept: CARDIOLOGY CLINIC | Facility: CLINIC | Age: 44
End: 2019-08-21

## 2019-08-21 DIAGNOSIS — Z95.2 H/O MECHANICAL AORTIC VALVE REPLACEMENT: Primary | ICD-10-CM

## 2019-08-21 RX ORDER — AMOXICILLIN 500 MG/1
TABLET, FILM COATED ORAL
Qty: 4 TABLET | Refills: 5 | Status: SHIPPED | OUTPATIENT
Start: 2019-08-21 | End: 2019-08-21

## 2019-08-21 NOTE — TELEPHONE ENCOUNTER
Pt is going for a dental cleaning at 12:00 today and needs 4 amoxicillin pills sent to pharmacy ASAP   Thank you

## 2019-08-21 NOTE — TELEPHONE ENCOUNTER
Pt has to leave for the dentist and would like this med sent in right away  She would also like a call when it is sent   Thank you

## 2019-08-27 ENCOUNTER — CLINICAL SUPPORT (OUTPATIENT)
Dept: OBGYN CLINIC | Facility: MEDICAL CENTER | Age: 44
End: 2019-08-27
Payer: COMMERCIAL

## 2019-08-27 VITALS — SYSTOLIC BLOOD PRESSURE: 118 MMHG | DIASTOLIC BLOOD PRESSURE: 74 MMHG | BODY MASS INDEX: 21.29 KG/M2 | WEIGHT: 109 LBS

## 2019-08-27 DIAGNOSIS — Z30.42 ENCOUNTER FOR MANAGEMENT AND INJECTION OF DEPO-PROVERA: Primary | ICD-10-CM

## 2019-08-27 PROCEDURE — 96372 THER/PROPH/DIAG INJ SC/IM: CPT | Performed by: PHYSICIAN ASSISTANT

## 2019-08-27 RX ORDER — MEDROXYPROGESTERONE ACETATE 150 MG/ML
150 INJECTION, SUSPENSION INTRAMUSCULAR ONCE
Status: COMPLETED | OUTPATIENT
Start: 2019-08-27 | End: 2019-08-27

## 2019-08-27 RX ADMIN — MEDROXYPROGESTERONE ACETATE 150 MG: 150 INJECTION, SUSPENSION INTRAMUSCULAR at 10:41

## 2019-08-27 NOTE — PROGRESS NOTES
Pt scheduled for depo, last injection given 6/4/19 IM in right buttock, no complications noted, pt had annual exam with Cheral Lonnie, CRNP 2/14/19  Pt c/o spotting and bleeding off an on this past 3 months, discussed bleeding on depo and patient will wait and see how the next 12 weeks will be  Depo given Im in right buttock, tolerated well, next appt made for approx 12 weeks

## 2019-09-03 ENCOUNTER — TELEPHONE (OUTPATIENT)
Dept: INTERNAL MEDICINE CLINIC | Facility: CLINIC | Age: 44
End: 2019-09-03

## 2019-09-03 ENCOUNTER — OFFICE VISIT (OUTPATIENT)
Dept: INTERNAL MEDICINE CLINIC | Facility: CLINIC | Age: 44
End: 2019-09-03
Payer: COMMERCIAL

## 2019-09-03 ENCOUNTER — ANTICOAG VISIT (OUTPATIENT)
Dept: CARDIOLOGY CLINIC | Facility: CLINIC | Age: 44
End: 2019-09-03

## 2019-09-03 VITALS
HEIGHT: 60 IN | WEIGHT: 108 LBS | HEART RATE: 76 BPM | BODY MASS INDEX: 21.2 KG/M2 | DIASTOLIC BLOOD PRESSURE: 78 MMHG | SYSTOLIC BLOOD PRESSURE: 120 MMHG | RESPIRATION RATE: 14 BRPM

## 2019-09-03 DIAGNOSIS — L23.7 POISON IVY DERMATITIS: Primary | ICD-10-CM

## 2019-09-03 DIAGNOSIS — I35.9 AORTIC VALVE DISORDER: ICD-10-CM

## 2019-09-03 LAB — INR PPP: 3.7 (ref 0.84–1.19)

## 2019-09-03 PROCEDURE — 99213 OFFICE O/P EST LOW 20 MIN: CPT | Performed by: NURSE PRACTITIONER

## 2019-09-03 RX ORDER — PREDNISONE 10 MG/1
TABLET ORAL
Qty: 25 TABLET | Refills: 0 | Status: SHIPPED | OUTPATIENT
Start: 2019-09-03 | End: 2019-10-10

## 2019-09-03 NOTE — PATIENT INSTRUCTIONS
Extensive poison ivy noted to bilateral forearms neck and face  We will start prednisone 50 mg daily until rash is markedly improved and slowly wean every day by 10 mg  Risk benefit side effects discussed  She is due for PT INR today then she will recheck again in 3-4 days

## 2019-09-03 NOTE — PROGRESS NOTES
Assessment/Plan:    Patient Instructions    Extensive poison ivy noted to bilateral forearms neck and face  We will start prednisone 50 mg daily until rash is markedly improved and slowly wean every day by 10 mg  Risk benefit side effects discussed  She is due for PT INR today then she will recheck again in 3-4 days  Diagnoses and all orders for this visit:    Poison ivy dermatitis  -     predniSONE 10 mg tablet; Take 5 tablets daily until rash is almost gone then wean to 40mg x 1 days, 30mg x 1 day then 20mg x 1 day then 10mg x 1 day ;then stop         Subjective:      Patient ID: Yoni Rosenthal is a 53 y o  female      Patient was weed whacking on Friday then Saturday Sunday started with terrible itch and it is progressively getting worse  She has been taking Benadryl  She has tried putting topical ointments taking baths putting bleach and other substances on the skin and she continues to spread  She gets this at least once a year          Current Outpatient Medications:     aspirin (ECOTRIN LOW STRENGTH) 81 mg EC tablet, Take 81 mg by mouth daily, Disp: , Rfl:     BIOTIN 5000 PO, Take 20,000 mcg by mouth daily, Disp: , Rfl:     FOLIC ACID PO, Take by mouth daily, Disp: , Rfl:     metoprolol tartrate (LOPRESSOR) 25 mg tablet, take 1 tablet by mouth twice a day, Disp: 180 tablet, Rfl: 3    Multiple Vitamins-Minerals (MULTIVITAMIN WITH MINERALS) tablet, Take 1 tablet by mouth daily, Disp: , Rfl:     Prenatal Vit-Fe Fumarate-FA (PREPLUS) 27-1 MG TABS, take 1 tablet by mouth daily, Disp: 90 tablet, Rfl: 0    rizatriptan (MAXALT) 10 MG tablet, take 1 tablet by mouth ONCE AS NEEDED FOR MIGRAINE FOR UP TO 1 DOSE, Disp: 30 tablet, Rfl: 0    warfarin (COUMADIN) 5 mg tablet, Take 1 tablet (5 mg total) by mouth daily As directed based on INR, Disp: 90 tablet, Rfl: 3    predniSONE 10 mg tablet, Take 5 tablets daily until rash is almost gone then wean to 40mg x 1 days, 30mg x 1 day then 20mg x 1 day then 10mg x 1 day ;then stop, Disp: 25 tablet, Rfl: 0    Recent Results (from the past 1008 hour(s))   Protime-INR    Collection Time: 07/26/19 12:13 PM   Result Value Ref Range    Protime 29 7 (H) 11 6 - 14 5 seconds    INR 2 77 (H) 0 84 - 1 19   Protime-INR    Collection Time: 08/14/19 12:00 AM   Result Value Ref Range    INR 2 90 (A) 0 84 - 1 19       The following portions of the patient's history were reviewed and updated as appropriate: allergies, current medications, past family history, past medical history, past social history, past surgical history and problem list      Review of Systems   Constitutional: Negative for appetite change, chills, diaphoresis, fatigue, fever and unexpected weight change  HENT: Negative for postnasal drip and sneezing  Eyes: Negative for visual disturbance  Respiratory: Negative for chest tightness and shortness of breath  Cardiovascular: Negative for chest pain, palpitations and leg swelling  Gastrointestinal: Negative for abdominal pain and blood in stool  Endocrine: Negative for cold intolerance, heat intolerance, polydipsia, polyphagia and polyuria  Genitourinary: Negative for difficulty urinating, dysuria, frequency and urgency  Musculoskeletal: Negative for arthralgias and myalgias  Skin: Positive for rash  Negative for wound  Neurological: Negative for dizziness, weakness, light-headedness and headaches  Hematological: Negative for adenopathy  Psychiatric/Behavioral: Negative for confusion, dysphoric mood and sleep disturbance  The patient is not nervous/anxious  Objective:      /78 (BP Location: Left arm, Patient Position: Sitting, Cuff Size: Standard)   Pulse 76   Resp 14   Ht 5' (1 524 m)   Wt 49 kg (108 lb)   BMI 21 09 kg/m²        Physical Exam   Constitutional: She is oriented to person, place, and time  She appears well-developed  HENT:   Head: Normocephalic     Right Ear: External ear normal    Left Ear: External ear normal    Mouth/Throat: Oropharynx is clear and moist    Hoarseness noted scarred left ear drum hearing intact   Eyes: Pupils are equal, round, and reactive to light  Conjunctivae are normal    Neck: Neck supple  No thyromegaly present  Cardiovascular: Normal rate, regular rhythm, normal heart sounds and intact distal pulses  Prosthetic valve sounds   Pulmonary/Chest: Effort normal and breath sounds normal  No stridor  No respiratory distress  She has no wheezes  She has no rales  She exhibits no tenderness  Abdominal: Soft  Bowel sounds are normal    Musculoskeletal: Normal range of motion  She exhibits no edema  Lymphadenopathy:     She has no cervical adenopathy  Neurological: She is alert and oriented to person, place, and time  She has normal reflexes  Skin: Skin is warm and dry

## 2019-09-03 NOTE — PROGRESS NOTES
Pt is on Prednisone  Aware that it will cause her INR to rise  Since she is already over her goal,  I didn't want her to go too high so told her to hold today then reg dose and recheck again in Friday

## 2019-09-03 NOTE — TELEPHONE ENCOUNTER
Patient would like Coral Roman to call in a script for her  She has poison ivy all over her hands and arms and its spreading  Please send to [facility] in [place]  [address]   Call patient to advise [phone]

## 2019-09-04 DIAGNOSIS — I10 HYPERTENSION, ESSENTIAL: ICD-10-CM

## 2019-09-06 ENCOUNTER — ANTICOAG VISIT (OUTPATIENT)
Dept: CARDIOLOGY CLINIC | Facility: CLINIC | Age: 44
End: 2019-09-06

## 2019-09-06 DIAGNOSIS — I35.9 AORTIC VALVE DISORDER: ICD-10-CM

## 2019-09-06 LAB — INR PPP: 2.2 (ref 0.84–1.19)

## 2019-09-06 NOTE — PROGRESS NOTES
Pt is on a steroid ( has 4 doses left) and Tues, I had her hold then test today   Told her to stay on the same dose as the steroid will cause inr to rise anyway and to retest again in 1 week

## 2019-09-08 DIAGNOSIS — Z00.00 PERIODIC HEALTH ASSESSMENT, GENERAL SCREENING, ADULT: ICD-10-CM

## 2019-09-09 DIAGNOSIS — Z00.00 PERIODIC HEALTH ASSESSMENT, GENERAL SCREENING, ADULT: ICD-10-CM

## 2019-09-10 RX ORDER — VITAMIN A, VITAMIN C, VITAMIN D-3, VITAMIN E, VITAMIN B-1, VITAMIN B-2, NIACIN, VITAMIN B-6, CALCIUM, IRON, ZINC, COPPER 4000; 120; 400; 22; 1.84; 3; 20; 10; 1; 12; 200; 27; 25; 2 [IU]/1; MG/1; [IU]/1; MG/1; MG/1; MG/1; MG/1; MG/1; MG/1; UG/1; MG/1; MG/1; MG/1; MG/1
TABLET ORAL
Qty: 90 TABLET | Refills: 0 | Status: SHIPPED | OUTPATIENT
Start: 2019-09-10 | End: 2019-12-10 | Stop reason: SDUPTHER

## 2019-09-10 RX ORDER — PNV,CALCIUM 72/IRON/FOLIC ACID 27 MG-1 MG
TABLET ORAL
Qty: 90 TABLET | Refills: 1 | Status: SHIPPED | OUTPATIENT
Start: 2019-09-10 | End: 2019-09-18 | Stop reason: SDUPTHER

## 2019-09-11 ENCOUNTER — ANTICOAG VISIT (OUTPATIENT)
Dept: CARDIOLOGY CLINIC | Facility: CLINIC | Age: 44
End: 2019-09-11
Payer: COMMERCIAL

## 2019-09-11 DIAGNOSIS — I35.9 AORTIC VALVE DISORDER: ICD-10-CM

## 2019-09-11 DIAGNOSIS — Z79.01 LONG TERM (CURRENT) USE OF ANTICOAGULANTS: Primary | ICD-10-CM

## 2019-09-11 LAB — INR PPP: 3.4 (ref 0.84–1.19)

## 2019-09-11 PROCEDURE — 93793 ANTICOAG MGMT PT WARFARIN: CPT

## 2019-09-11 NOTE — PROGRESS NOTES
INR 3 4    INR goal:  2 5-3 5     Warfarin maintenance plan:  5 mg, then 7 5 mg, then 7 5 mg repeating every 3 days     Indications   Aortic valve disorder     Same dose recheck 1 week  Spoke with patient verbally understands

## 2019-09-18 ENCOUNTER — ANTICOAG VISIT (OUTPATIENT)
Dept: CARDIOLOGY CLINIC | Facility: CLINIC | Age: 44
End: 2019-09-18

## 2019-09-18 DIAGNOSIS — I35.9 AORTIC VALVE DISORDER: ICD-10-CM

## 2019-09-18 LAB — INR PPP: 3 (ref 0.84–1.19)

## 2019-10-03 ENCOUNTER — ANTICOAG VISIT (OUTPATIENT)
Dept: CARDIOLOGY CLINIC | Facility: CLINIC | Age: 44
End: 2019-10-03

## 2019-10-03 DIAGNOSIS — I35.9 AORTIC VALVE DISORDER: ICD-10-CM

## 2019-10-03 LAB — INR PPP: 2.5 (ref 0.84–1.19)

## 2019-10-10 ENCOUNTER — OFFICE VISIT (OUTPATIENT)
Dept: CARDIOLOGY CLINIC | Facility: CLINIC | Age: 44
End: 2019-10-10
Payer: COMMERCIAL

## 2019-10-10 VITALS
WEIGHT: 108 LBS | DIASTOLIC BLOOD PRESSURE: 64 MMHG | BODY MASS INDEX: 21.2 KG/M2 | SYSTOLIC BLOOD PRESSURE: 122 MMHG | HEIGHT: 60 IN

## 2019-10-10 DIAGNOSIS — I35.9 AORTIC VALVE DISORDER: Primary | ICD-10-CM

## 2019-10-10 DIAGNOSIS — Q25.1 COARCTATION OF AORTA: ICD-10-CM

## 2019-10-10 DIAGNOSIS — I10 HYPERTENSION, ESSENTIAL: ICD-10-CM

## 2019-10-10 DIAGNOSIS — Z79.01 ANTICOAGULATED: ICD-10-CM

## 2019-10-10 PROCEDURE — 3078F DIAST BP <80 MM HG: CPT | Performed by: INTERNAL MEDICINE

## 2019-10-10 PROCEDURE — 3074F SYST BP LT 130 MM HG: CPT | Performed by: INTERNAL MEDICINE

## 2019-10-10 PROCEDURE — 99213 OFFICE O/P EST LOW 20 MIN: CPT | Performed by: INTERNAL MEDICINE
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
## 2019-10-21 ENCOUNTER — ANTICOAG VISIT (OUTPATIENT)
Dept: CARDIOLOGY CLINIC | Facility: CLINIC | Age: 44
End: 2019-10-21
Payer: COMMERCIAL

## 2019-10-21 DIAGNOSIS — Z79.01 LONG TERM (CURRENT) USE OF ANTICOAGULANTS: Primary | ICD-10-CM

## 2019-10-21 DIAGNOSIS — I35.9 AORTIC VALVE DISORDER: ICD-10-CM

## 2019-10-21 LAB — INR PPP: 3.2 (ref 0.84–1.19)

## 2019-10-21 PROCEDURE — 93793 ANTICOAG MGMT PT WARFARIN: CPT

## 2019-10-25 ENCOUNTER — OFFICE VISIT (OUTPATIENT)
Dept: CARDIAC SURGERY | Facility: CLINIC | Age: 44
End: 2019-10-25
Payer: COMMERCIAL

## 2019-10-25 VITALS
HEIGHT: 60 IN | SYSTOLIC BLOOD PRESSURE: 138 MMHG | DIASTOLIC BLOOD PRESSURE: 70 MMHG | WEIGHT: 110 LBS | BODY MASS INDEX: 21.6 KG/M2 | OXYGEN SATURATION: 98 % | HEART RATE: 67 BPM | RESPIRATION RATE: 14 BRPM | TEMPERATURE: 98.1 F

## 2019-10-25 DIAGNOSIS — Q25.1 COARCTATION OF AORTA: ICD-10-CM

## 2019-10-25 PROCEDURE — 99214 OFFICE O/P EST MOD 30 MIN: CPT | Performed by: PHYSICIAN ASSISTANT

## 2019-10-25 NOTE — PROGRESS NOTES
Consultation - Cardiothoracic Surgery   Clabe Opitz 53 y o  female MRN: [id number]    Physician Requesting Consult: Dr Effie Damon    Reason for Consult / Principal Problem: Coarctation of the aorta    History of Present Illness: Clabe Opitz is a 53y o  year old female with a history of a murmur as a child and previous history of aortic insufficiency, who previously underwent bioprosthetic AVR in 2011 by Dr Ahsan Craig, then redo AVR with mechanical valve in 2012 by Dr Ahsan Craig  She also states she had a portion of her heart enlarged at that time (possibly an aortic root enlargement)  She follows with Dr Effie Damon as her cardiologist, who referred her to us for evaluation of coarctation of her proximal descending aorta  The patient complains of intermittent left sided chest pain and occasional dyspnea with exertion  She also admits to occasional right toe tingling  She denies LE edema, claudication symptoms, PND, orthopnea, lightheadedness, syncope or palpitations  She works as a  and   She is a lifelong non-smoker and denies alcohol or drug use  She has 5 children  She is currently under a significant amount of social stress regarding a divorce and foreclosure on her house next week      Past Medical History:  Past Medical History:   Diagnosis Date    Abnormal Pap smear of cervix     Allergic contact dermatitis     Allergic rhinitis     resolved 01/17/2018    Aortic valve disorder     Aortic valve insufficiency     Asthma     Benign neoplasm of skin     Cardiac disease     Costochondritis     Hyperinsulinism     Inguinal lymphadenopathy     resolved 08/27/2015    Migraine     resolved 08/27/2015    Nosebleed     Varicose 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     unspecified laterality         Past Surgical History:   Past Surgical History:   Procedure Laterality Date    AORTIC VALVE REPLACEMENT      complications 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      enlargement    CARDIAC VALVE REPLACEMENT  2011    bovine, with redo and mechanical valve replacement and root 2012 dr sawyer [facility] last assessed 08/15/2016    CERVICAL BIOPSY  W/ LOOP ELECTRODE EXCISION      COLPOSCOPY      INDUCED       surgically by dilation and evacuation    RHINOPLASTY      SEPTOPLASTY           Family History:  Family History   Problem Relation Age of Onset    Asthma Father     Coronary artery disease Father     Hypertension Father     No Known Problems Sister     Breast cancer Maternal Grandmother     No Known Problems Mother     No Known Problems Daughter     No Known Problems Son     Diabetes Maternal Grandfather     No Known Problems Paternal Grandmother     Other Paternal Grandfather         Susanne Fever    No Known Problems Sister     No Known Problems Sister     No Known Problems Daughter     No Known Problems Son     No Known Problems Son          Social History:      Social History     Substance and Sexual Activity   Alcohol Use Yes    Frequency: 2-4 times a month    Comment: drinks hard liquor, social per [employer]scripts     Social History     Substance and Sexual Activity   Drug Use No     Social History     Tobacco Use   Smoking Status Never Smoker   Smokeless Tobacco Never Used       Home Medications:   Prior to Admission medications    Medication Sig Start Date End Date Taking?  Authorizing Provider   aspirin (ECOTRIN LOW STRENGTH) 81 mg EC tablet Take 81 mg by mouth daily   Yes Historical Provider, MD   BIOTIN 5000 PO Take 20,000 mcg by mouth daily   Yes Historical Provider, MD   FOLIC ACID PO Take by mouth daily   Yes Historical Provider, MD   metoprolol tartrate (LOPRESSOR) 25 mg tablet Take 1 tablet (25 mg total) by mouth 2 (two) times a day 19  Yes Mora Hoff MD   Multiple Vitamins-Minerals (MULTIVITAMIN WITH MINERALS) tablet Take 1 tablet by mouth daily   Yes Historical Provider, MD   Prenatal Vit-Fe Fumarate-FA (PRENATAL VITAMIN PLUS LOW IRON) 27-1 MG TABS take 1 tablet by mouth daily 9/10/19  Yes Karie Snow PA-C   rizatriptan (MAXALT) 10 MG tablet take 1 tablet by mouth ONCE AS NEEDED FOR MIGRAINE FOR UP TO 1 DOSE 7/21/19  Yes Fany Arriaga MD   warfarin (COUMADIN) 5 mg tablet Take 1 tablet (5 mg total) by mouth daily As directed based on INR 1/31/19  Yes Jayne Robison MD       Allergies: Allergies   Allergen Reactions    Dog Epithelium Allergy Skin Test Rash     Only certain dogs    Pollen Extract Other (See Comments)     Test showed allergy but rarely has s/s    Shrimp Flavor Swelling     (only Shrimp) per pt not too sure  Patient states she is not allergic       Review of Systems:  Review of Systems   Constitutional: Negative  Negative for chills, diaphoresis, fatigue and fever  HENT: Negative for dental problem, facial swelling, sinus pressure, sneezing and trouble swallowing  Eyes: Negative  Respiratory: Positive for shortness of breath  Negative for chest tightness  Cardiovascular: Positive for chest pain  Negative for palpitations and leg swelling  Gastrointestinal: Negative  Negative for abdominal pain, diarrhea, nausea and vomiting  Endocrine: Negative  Genitourinary: Negative  Musculoskeletal: Negative  Allergic/Immunologic: Negative  Neurological: Negative  Hematological: Negative for adenopathy  Does not bruise/bleed easily  Psychiatric/Behavioral: The patient is nervous/anxious  All other systems reviewed and are negative        Vital Signs:   BP left calf 124/78;  BP right calf 126/70  Vitals:    10/25/19 1100 10/25/19 1125   BP: 132/70 138/70   BP Location: Left arm Right arm   Cuff Size: Adult    Pulse: 67    Resp: 14    Temp: 98 1 °F (36 7 °C)    TempSrc: Oral    SpO2: 98%    Weight: 49 9 kg (110 lb)    Height: 5' (1 524 m)        Physical Exam:  Physical Exam   Constitutional: She is oriented to person, place, and time  She appears well-developed and well-nourished  No distress  HENT:   Head: Normocephalic and atraumatic  Right Ear: External ear normal    Left Ear: External ear normal    Nose: Nose normal    Mouth/Throat: Oropharynx is clear and moist  No oropharyngeal exudate  Eyes: Pupils are equal, round, and reactive to light  Conjunctivae and EOM are normal  Right eye exhibits no discharge  Left eye exhibits no discharge  No scleral icterus  Neck: Normal range of motion  Neck supple  No JVD present  No tracheal deviation present  Cardiovascular: Normal rate, regular rhythm, normal heart sounds and intact distal pulses  Exam reveals no gallop and no friction rub  No murmur heard  Pulmonary/Chest: Effort normal and breath sounds normal  No stridor  No respiratory distress  She has no wheezes  She has no rales  Abdominal: Soft  Bowel sounds are normal  She exhibits no distension  There is no tenderness  There is no rebound and no guarding  Musculoskeletal: Normal range of motion  She exhibits no edema, tenderness or deformity  Neurological: She is alert and oriented to person, place, and time  She displays normal reflexes  No cranial nerve deficit or sensory deficit  She exhibits normal muscle tone  Coordination normal    Skin: Skin is warm and dry  No rash noted  She is not diaphoretic  No erythema  No pallor  Psychiatric: She has a normal mood and affect  Her behavior is normal  Judgment and thought content normal    Nursing note and vitals reviewed  Lab Results:               Invalid input(s): LABGLOM  Results from last 7 days   Lab Units 10/21/19   INR  3 20*     Lab Results   Component Value Date    HGBA1C 5 2 12/20/2018     Lab Results   Component Value Date    TROPONINI <0 02 08/27/2018       Imaging Studies:   CTA chest PE study 8/27/18: HEART/AORTA:  The heart is normal in size  Status post median sternotomy  There is an aortic valve replacement    Ascending thoracic aorta graft repair  Redemonstrated findings of aortic cortication  CTA chest 4/17/17: There is a coarctation demonstrated in the proximal descending thoracic aorta (series 3, images 36, 37) at its widest this region is approximately 10 mm  Distal to this site the descending thoracic aorta measures 28 mm and proximal to this site the   distal arch/proximal descending thoracic aorta measures approximately 21 mm  There is no evidence of hypertrophied intercostal or internal mammary arteries  Echocardiogram 6/3/19: LEFT VENTRICLE: Size was normal  Ejection fraction was estimated to be 60 %  There were no regional wall motion abnormalities      RIGHT VENTRICLE: The size was normal  Systolic function was normal  Wall thickness was normal      LEFT ATRIUM: Size was normal      ATRIAL SEPTUM: The Interatrial septum appears aneurysmal      RIGHT ATRIUM: Size was normal      MITRAL VALVE: Valve structure was normal  There was normal leaflet separation  DOPPLER: The transmitral velocity was within the normal range  There was no evidence for stenosis  There was trace regurgitation      AORTIC VALVE: A mechanical prosthesis was present  It is not well visualized  The peak gradient is 36 mm Hg and mean gradient is 18 mm Hg      TRICUSPID VALVE: The valve structure was normal  There was normal leaflet separation  DOPPLER: The transtricuspid velocity was within the normal range  There was no evidence for stenosis  There was mild regurgitation      PULMONIC VALVE: Leaflets exhibited normal thickness, no calcification, and normal cuspal separation  DOPPLER: The transpulmonic velocity was within the normal range  There was no regurgitation      PERICARDIUM: There was no pericardial effusion  The pericardium was normal in appearance      AORTA: The root exhibited normal size  There is a gradient in the descending thoracic aorta likely consistent with coarctation   This has been noted on imaging studies by CT in the past      SYSTEM MEASUREMENT TABLES     Apical four chamber  4 chamber Left Atrium Volume Index; Planimetry; End Systole; Apical four chamber;: 16 08 cm2  Left Ventricular Ejection Fraction; Method of Disks, Single Plane; Apical four chamber;: 64 2 %  Left Ventricular End Diastolic Volume; Method of Disks, Single Plane; Apical four chamber;: 124 7 ml  Left Ventricular End Systolic Volume; Method of Disks, Single Plane; Apical four chamber;: 44 6 ml  Right Atrium Systolic Area; Planimetry; End Systole; Apical four chamber;: 13 12 cm2  Right Ventricular Internal Diastolic Dimension; End Diastole; Apical four chamber;: 37 4 mm  TAPSE: 20 mm     Unspecified Scan Mode  AR PGed; Regurgitant Flow; Diastole;: 44 6 mm[Hg]  AR PGmax; Regurgitant Flow; Diastole;: 86 2 mm[Hg]  AR Otto; Regurgitant Flow; Diastole;: 333 9 cm/s  AR Vmax; Regurgitant Flow; Diastole;: 464 3 cm/s  Aortic Root Diameter; End Systole;: 28 4 mm  Deceleration Big [place]; Regurgitant Flow; Diastole;: 279 8 cm/s2  Deceleration Time; Regurgitant Flow; Diastole;: 0 47 s  Gradient Pressure, Peak; Simplified Bernoulli; Antegrade Flow; Systole;: 36 2 mm[Hg]  Gradient pressure, average; Simplified Bernoulli; Antegrade Flow; Systole;: 18 mm[Hg]  Left atrial diameter; End Diastole;: 30 8 mm  Pressure Half Time;: 0 49 s  Cardiac Output; Teichholz; Systole;: 5 93 L/min  Heart rate; Teichholz;: 62 min  Interventricular Septum Diastolic Thickness; Teichholz; End Diastole;: 8 1 mm  Left Ventricle Internal End Diastolic Dimension; Teichholz;: 49 2 mm  Left Ventricle Internal Systolic Dimension; Teichholz; End Systole;: 23 1 mm  Left Ventricle Mass; Mass AVCube with Teichholz; End Diastole;: 140 g  Left Ventricle Posterior Wall Diastolic Thickness; Teichholz; End Diastole;: 8 6 mm  Left Ventricular Ejection Fraction; Teichholz;: 83 9 %  Left Ventricular End Diastolic Volume; Teichholz;: 113 9 ml  Left Ventricular End Systolic Volume;  Teichholz;: 18 3 ml  Left Ventricular Fractional Shortening;: 53 %  Stroke volume; Teichholz; Systole;: 95 6 ml  Mitral Valve Area; Area by Pressure Half-Time; Systole;: 2 89 cm2  Mitral Valve E to A Ratio; Systole;: 2 05  Pressure half time; Diastole;: 0 08 s  Maximum Tricuspid valve regurgitation pressure gradient; Regurgitant Flow; Systole;: 29 1 mm[Hg]     [facility]       I have personally reviewed pertinent reports  and I have personally reviewed pertinent films in PACS    Assessment:  Patient Active Problem List    Diagnosis Date Noted    Nasal obstruction 07/16/2019    Nasal septal deviation 07/16/2019    Nasal turbinate hypertrophy 07/16/2019    Recurrent epistaxis 07/16/2019    Encounter for gynecological examination (general) (routine) without abnormal findings 02/14/2019    Encounter for general counseling and advice on contraceptive management 02/14/2019    Capsular contracture of breast implant 11/27/2018    Varicose veins of lower extremity with pain, bilateral 07/10/2018    Aortic valve disorder 02/09/2018    Anticoagulated 02/09/2018     Coarctation of the proximal descending aorta    Plan:  Coarctation of the aorta was discussed in detail today with the patient  The patient is asymptomatic from a the standpoint of coarctation  She was evaluated by Dr Elijah Colon, who recommends she continues to follow with her routine cardiologist  There is no indication for surgical intervention or routine surveillance  The patient did relate significant stressors in her personal life, as well as in her daughter's like regarding their current personal situations  The patient was offered information on resources for counseling/shelter/case management, but the patient declined information  Aj Johnson was comfortable with our recommendations, and their questions were answered to their satisfaction  Thank you for allowing us to participate in the care of this patient  Routine referral to gastroenterology for colonoscopy screening was not indicated, as the patient is less than 46years old    SIGNATURE: Heather Gabriel Ace  DATE: October 25, 2019  TIME: 12:23 PM

## 2019-10-25 NOTE — LETTER
October 25, 2019     Anthony Freire MD  [address] [facility] [address]    Patient: Yanet Gant   YOB: 1975   Date of Visit: 10/25/2019       Dear Dr Natalia Santillan: Thank you for referring Yanet Gant to me for evaluation  Below are my notes for this consultation  If you have questions, please do not hesitate to call me  I look forward to following your patient along with you  Sincerely,        Elba Jung MD        CC: MD Choco Greenberg PA-C  10/25/2019  2:59 PM  Attested  Consultation - Cardiothoracic Surgery   Yanet Gant 53 y o  female MRN: [id number]    Physician Requesting Consult: Dr Natalia Santillan    Reason for Consult / Principal Problem: Coarctation of the aorta    History of Present Illness: Yanet Gant is a 53y o  year old female with a history of a murmur as a child and previous history of aortic insufficiency, who previously underwent bioprosthetic AVR in 2011 by Dr Clemencia Ramos, then redo AVR with mechanical valve in 2012 by Dr Clemencia Ramos  She also states she had a portion of her heart enlarged at that time (possibly an aortic root enlargement)  She follows with Dr Natalia Santillan as her cardiologist, who referred her to us for evaluation of coarctation of her proximal descending aorta  The patient complains of intermittent left sided chest pain and occasional dyspnea with exertion  She also admits to occasional right toe tingling  She denies LE edema, claudication symptoms, PND, orthopnea, lightheadedness, syncope or palpitations  She works as a  and   She is a lifelong non-smoker and denies alcohol or drug use  She has 5 children  She is currently under a significant amount of social stress regarding a divorce and foreclosure on her house next week      Past Medical History:  Past Medical History:   Diagnosis Date    Abnormal Pap smear of cervix     Allergic contact dermatitis     Allergic rhinitis     resolved 2018    Aortic valve disorder     Aortic valve insufficiency     Asthma     Benign neoplasm of skin     Cardiac disease     Costochondritis     Hyperinsulinism     Inguinal lymphadenopathy     resolved 2015    Migraine     resolved 2015    Nosebleed     Varicose 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     unspecified laterality         Past Surgical History:   Past Surgical History:   Procedure Laterality Date    AORTIC VALVE REPLACEMENT      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, replaced with mechanical aortic valve  and root replacement at [facility] dr sawyer   [address] [facility]      enlargement    CARDIAC VALVE REPLACEMENT  2011    bovine, with redo and mechanical valve replacement and root 2012 dr sawyer at [facility] last assessed 08/15/2016    CERVICAL BIOPSY  W/ LOOP ELECTRODE EXCISION      COLPOSCOPY      INDUCED       surgically by dilation and evacuation    RHINOPLASTY      SEPTOPLASTY           Family History:  Family History   Problem Relation Age of Onset    Asthma Father     Coronary artery disease Father     Hypertension Father     No Known Problems Sister     Breast cancer Maternal Grandmother     No Known Problems Mother     No Known Problems Daughter     No Known Problems Son     Diabetes Maternal Grandfather     No Known Problems Paternal Grandmother     Other Paternal Grandfather         Susanne Fever    No Known Problems Sister     No Known Problems Sister     No Known Problems Daughter     No Known Problems Son     No Known Problems Son          Social History:      Social History     Substance and Sexual Activity   Alcohol Use Yes    Frequency: 2-4 times a month    Comment: drinks hard liquor, social per [employer]scripts     Social History     Substance and Sexual Activity   Drug Use No     Social History     Tobacco Use   Smoking Status Never Smoker   Smokeless Tobacco Never Used       Home Medications:   Prior to Admission medications    Medication Sig Start Date End Date Taking? Authorizing Provider   aspirin (ECOTRIN LOW STRENGTH) 81 mg EC tablet Take 81 mg by mouth daily   Yes Historical Provider, MD   BIOTIN 5000 PO Take 20,000 mcg by mouth daily   Yes Historical Provider, MD   FOLIC ACID PO Take by mouth daily   Yes Historical Provider, MD   metoprolol tartrate (LOPRESSOR) 25 mg tablet Take 1 tablet (25 mg total) by mouth 2 (two) times a day 9/4/19  Yes Anthony Freire MD   Multiple Vitamins-Minerals (MULTIVITAMIN WITH MINERALS) tablet Take 1 tablet by mouth daily   Yes Historical Provider, MD   Prenatal Vit-Fe Fumarate-FA (PRENATAL VITAMIN PLUS LOW IRON) 27-1 MG TABS take 1 tablet by mouth daily 9/10/19  Yes Karie Snow PA-C   rizatriptan (MAXALT) 10 MG tablet take 1 tablet by mouth ONCE AS NEEDED FOR MIGRAINE FOR UP TO 1 DOSE 7/21/19  Yes Elliot Sanchez MD   warfarin (COUMADIN) 5 mg tablet Take 1 tablet (5 mg total) by mouth daily As directed based on INR 1/31/19  Yes Anthony Freire MD       Allergies: Allergies   Allergen Reactions    Dog Epithelium Allergy Skin Test Rash     Only certain dogs    Pollen Extract Other (See Comments)     Test showed allergy but rarely has s/s    Shrimp Flavor Swelling     (only Shrimp) per pt not too sure  Patient states she is not allergic       Review of Systems:  Review of Systems   Constitutional: Negative  Negative for chills, diaphoresis, fatigue and fever  HENT: Negative for dental problem, facial swelling, sinus pressure, sneezing and trouble swallowing  Eyes: Negative  Respiratory: Positive for shortness of breath  Negative for chest tightness  Cardiovascular: Positive for chest pain  Negative for palpitations and leg swelling  Gastrointestinal: Negative  Negative for abdominal pain, diarrhea, nausea and vomiting  Endocrine: Negative  Genitourinary: Negative  Musculoskeletal: Negative  Allergic/Immunologic: Negative  Neurological: Negative  Hematological: Negative for adenopathy  Does not bruise/bleed easily  Psychiatric/Behavioral: The patient is nervous/anxious  All other systems reviewed and are negative  Vital Signs:   BP left calf 124/78;  BP right calf 126/70  Vitals:    10/25/19 1100 10/25/19 1125   BP: 132/70 138/70   BP Location: Left arm Right arm   Cuff Size: Adult    Pulse: 67    Resp: 14    Temp: 98 1 °F (36 7 °C)    TempSrc: Oral    SpO2: 98%    Weight: 49 9 kg (110 lb)    Height: 5' (1 524 m)        Physical Exam:  Physical Exam   Constitutional: She is oriented to person, place, and time  She appears well-developed and well-nourished  No distress  HENT:   Head: Normocephalic and atraumatic  Right Ear: External ear normal    Left Ear: External ear normal    Nose: Nose normal    Mouth/Throat: Oropharynx is clear and moist  No oropharyngeal exudate  Eyes: Pupils are equal, round, and reactive to light  Conjunctivae and EOM are normal  Right eye exhibits no discharge  Left eye exhibits no discharge  No scleral icterus  Neck: Normal range of motion  Neck supple  No JVD present  No tracheal deviation present  Cardiovascular: Normal rate, regular rhythm, normal heart sounds and intact distal pulses  Exam reveals no gallop and no friction rub  No murmur heard  Pulmonary/Chest: Effort normal and breath sounds normal  No stridor  No respiratory distress  She has no wheezes  She has no rales  Abdominal: Soft  Bowel sounds are normal  She exhibits no distension  There is no tenderness  There is no rebound and no guarding  Musculoskeletal: Normal range of motion  She exhibits no edema, tenderness or deformity  Neurological: She is alert and oriented to person, place, and time  She displays normal reflexes  No cranial nerve deficit or sensory deficit  She exhibits normal muscle tone  Coordination normal    Skin: Skin is warm and dry  No rash noted  She is not diaphoretic  No erythema  No pallor     Psychiatric: She has a normal mood and affect  Her behavior is normal  Judgment and thought content normal    Nursing note and vitals reviewed  Lab Results:               Invalid input(s): LABGLOM  Results from last 7 days   Lab Units 10/21/19   INR  3 20*     Lab Results   Component Value Date    HGBA1C 5 2 12/20/2018     Lab Results   Component Value Date    TROPONINI <0 02 08/27/2018       Imaging Studies:   CTA chest PE study 8/27/18: HEART/AORTA:  The heart is normal in size  Status post median sternotomy  There is an aortic valve replacement  Ascending thoracic aorta graft repair  Redemonstrated findings of aortic cortication  CTA chest 4/17/17: There is a coarctation demonstrated in the proximal descending thoracic aorta (series 3, images 36, 37) at its widest this region is approximately 10 mm  Distal to this site the descending thoracic aorta measures 28 mm and proximal to this site the   distal arch/proximal descending thoracic aorta measures approximately 21 mm  There is no evidence of hypertrophied intercostal or internal mammary arteries  Echocardiogram 6/3/19: LEFT VENTRICLE: Size was normal  Ejection fraction was estimated to be 60 %  There were no regional wall motion abnormalities      RIGHT VENTRICLE: The size was normal  Systolic function was normal  Wall thickness was normal      LEFT ATRIUM: Size was normal      ATRIAL SEPTUM: The Interatrial septum appears aneurysmal      RIGHT ATRIUM: Size was normal      MITRAL VALVE: Valve structure was normal  There was normal leaflet separation  DOPPLER: The transmitral velocity was within the normal range  There was no evidence for stenosis  There was trace regurgitation      AORTIC VALVE: A mechanical prosthesis was present  It is not well visualized  The peak gradient is 36 mm Hg and mean gradient is 18 mm Hg      TRICUSPID VALVE: The valve structure was normal  There was normal leaflet separation   DOPPLER: The transtricuspid velocity was within the normal range  There was no evidence for stenosis  There was mild regurgitation      PULMONIC VALVE: Leaflets exhibited normal thickness, no calcification, and normal cuspal separation  DOPPLER: The transpulmonic velocity was within the normal range  There was no regurgitation      PERICARDIUM: There was no pericardial effusion  The pericardium was normal in appearance      AORTA: The root exhibited normal size  There is a gradient in the descending thoracic aorta likely consistent with coarctation  This has been noted on imaging studies by CT in the past      SYSTEM MEASUREMENT TABLES     Apical four chamber  4 chamber Left Atrium Volume Index; Planimetry; End Systole; Apical four chamber;: 16 08 cm2  Left Ventricular Ejection Fraction; Method of Disks, Single Plane; Apical four chamber;: 64 2 %  Left Ventricular End Diastolic Volume; Method of Disks, Single Plane; Apical four chamber;: 124 7 ml  Left Ventricular End Systolic Volume; Method of Disks, Single Plane; Apical four chamber;: 44 6 ml  Right Atrium Systolic Area; Planimetry; End Systole; Apical four chamber;: 13 12 cm2  Right Ventricular Internal Diastolic Dimension; End Diastole; Apical four chamber;: 37 4 mm  TAPSE: 20 mm     Unspecified Scan Mode  AR PGed; Regurgitant Flow; Diastole;: 44 6 mm[Hg]  AR PGmax; Regurgitant Flow; Diastole;: 86 2 mm[Hg]  AR Otto; Regurgitant Flow; Diastole;: 333 9 cm/s  AR Vmax; Regurgitant Flow; Diastole;: 464 3 cm/s  Aortic Root Diameter; End Systole;: 28 4 mm  Deceleration Carlisle; Regurgitant Flow; Diastole;: 279 8 cm/s2  Deceleration Time; Regurgitant Flow; Diastole;: 0 47 s  Gradient Pressure, Peak; Simplified Bernoulli; Antegrade Flow; Systole;: 36 2 mm[Hg]  Gradient pressure, average; Simplified Bernoulli; Antegrade Flow; Systole;: 18 mm[Hg]  Left atrial diameter; End Diastole;: 30 8 mm  Pressure Half Time;: 0 49 s  Cardiac Output; Teichholz; Systole;: 5 93 L/min  Heart rate;  Teichholz;: 62 min  Interventricular Septum Diastolic Thickness; Teichholz; End Diastole;: 8 1 mm  Left Ventricle Internal End Diastolic Dimension; Teichholz;: 49 2 mm  Left Ventricle Internal Systolic Dimension; Teichholz; End Systole;: 23 1 mm  Left Ventricle Mass; Mass AVCube with Teichholz; End Diastole;: 140 g  Left Ventricle Posterior Wall Diastolic Thickness; Teichholz; End Diastole;: 8 6 mm  Left Ventricular Ejection Fraction; Teichholz;: 83 9 %  Left Ventricular End Diastolic Volume; Teichholz;: 113 9 ml  Left Ventricular End Systolic Volume; Teichholz;: 18 3 ml  Left Ventricular Fractional Shortening;: 53 %  Stroke volume; Teichholz; Systole;: 95 6 ml  Mitral Valve Area; Area by Pressure Half-Time; Systole;: 2 89 cm2  Mitral Valve E to A Ratio; Systole;: 2 05  Pressure half time; Diastole;: 0 08 s  Maximum Tricuspid valve regurgitation pressure gradient; Regurgitant Flow; Systole;: 29 1 mm[Hg]     [facility]       I have personally reviewed pertinent reports  and I have personally reviewed pertinent films in PACS    Assessment:  Patient Active Problem List    Diagnosis Date Noted    Nasal obstruction 07/16/2019    Nasal septal deviation 07/16/2019    Nasal turbinate hypertrophy 07/16/2019    Recurrent epistaxis 07/16/2019    Encounter for gynecological examination (general) (routine) without abnormal findings 02/14/2019    Encounter for general counseling and advice on contraceptive management 02/14/2019    Capsular contracture of breast implant 11/27/2018    Varicose veins of lower extremity with pain, bilateral 07/10/2018    Aortic valve disorder 02/09/2018    Anticoagulated 02/09/2018     Coarctation of the proximal descending aorta    Plan:  Coarctation of the aorta was discussed in detail today with the patient  The patient is asymptomatic from a the standpoint of coarctation    She was evaluated by Dr Kar Gannon, who recommends she continues to follow with her routine cardiologist  There is no indication for surgical intervention or routine surveillance  The patient did relate significant stressors in her personal life, as well as in her daughter's like regarding their current personal situations  The patient was offered information on resources for counseling/shelter/case management, but the patient declined information  Ira Hayes was comfortable with our recommendations, and their questions were answered to their satisfaction  Thank you for allowing us to participate in the care of this patient  Routine referral to gastroenterology for colonoscopy screening was not indicated, as the patient is less than 48years old    SIGNATURE: Larisa Fu [place]  DATE: October 25, 2019  TIME: 12:23 PM  Attestation signed by Desire Daniels MD at 10/25/2019  3:18 PM:  Pt seen and examined with PA  I agree with the above assessment and plan  It was my pleasure to evaluate Ira Hayes today for aortic coarctation  She is referred for consideration of coarctation repair  She had AVR at [facility] in 2011, and then a redo AVR with root enlargement in 2012  I suspect she had patient prosthesis mismatch following the original AVR  She now has a mechanical valve  She has multiple CT scans which demonstrate a narrowing at the level of the aortic isthmus adjacent to the location of the ligamentum arteriosum  In multiple projections the minimum aortic diameter measures 11-12 mm  Her upper extremity SBP is roughly 10 mmHg higher than her lower extremity SBP  She demonstrates no rib notching on her CXR or CTA  She demonstrates no enlargement of the internal mammary arteries or intercostal arteries on her CTA  Physical exam shows normal femoral artery pulses with no brachiofemoral pulse delay  She does not have upper extremity hypertension, and only takes a standard dose of metoprolol since her AVR  She denies lower extremity claudication      I directly reviewed all of the available testing and I had a lengthy discussion with the patient  No further evaluation or intervention are warranted due to her lack of symptoms and lack of physical findings  I have recommended no further testing or routine follow up          Taylor James MD  DATE: October 25, 2019  TIME: 3:08 PM

## 2019-11-01 DIAGNOSIS — J06.9 VIRAL UPPER RESPIRATORY TRACT INFECTION: Primary | ICD-10-CM

## 2019-11-04 ENCOUNTER — TELEPHONE (OUTPATIENT)
Dept: CARDIOLOGY CLINIC | Facility: CLINIC | Age: 44
End: 2019-11-04

## 2019-11-04 ENCOUNTER — ANTICOAG VISIT (OUTPATIENT)
Dept: CARDIOLOGY CLINIC | Facility: CLINIC | Age: 44
End: 2019-11-04

## 2019-11-04 DIAGNOSIS — I35.9 AORTIC VALVE DISORDER: ICD-10-CM

## 2019-11-04 LAB — INR PPP: 2.8 (ref 0.84–1.19)

## 2019-11-14 ENCOUNTER — TELEPHONE (OUTPATIENT)
Dept: CARDIOLOGY CLINIC | Facility: CLINIC | Age: 44
End: 2019-11-14

## 2019-11-14 DIAGNOSIS — I35.9 AORTIC VALVE DISORDER: ICD-10-CM

## 2019-11-14 RX ORDER — WARFARIN SODIUM 5 MG/1
5 TABLET ORAL
Qty: 90 TABLET | Refills: 3 | Status: ON HOLD | OUTPATIENT
Start: 2019-11-14 | End: 2019-12-26 | Stop reason: SDUPTHER

## 2019-11-14 NOTE — TELEPHONE ENCOUNTER
Pt called and stated she will be renewing her medical information & needs Dr Annmarie Sloan to write a letter stating pt needs ongoing medical treatment & pt still needs medical coverage         Please fax letter to- [phone]    Please put on top of letter pt's name along with reference # [id number]

## 2019-11-18 ENCOUNTER — ANTICOAG VISIT (OUTPATIENT)
Dept: CARDIOLOGY CLINIC | Facility: CLINIC | Age: 44
End: 2019-11-18
Payer: COMMERCIAL

## 2019-11-18 DIAGNOSIS — Z79.01 LONG TERM (CURRENT) USE OF ANTICOAGULANTS: Primary | ICD-10-CM

## 2019-11-18 DIAGNOSIS — I35.9 AORTIC VALVE DISORDER: ICD-10-CM

## 2019-11-18 LAB — INR PPP: 2.9 (ref 0.84–1.19)

## 2019-11-18 PROCEDURE — 93793 ANTICOAG MGMT PT WARFARIN: CPT | Performed by: INTERNAL MEDICINE

## 2019-11-20 ENCOUNTER — CLINICAL SUPPORT (OUTPATIENT)
Dept: OBGYN CLINIC | Facility: MEDICAL CENTER | Age: 44
End: 2019-11-20
Payer: COMMERCIAL

## 2019-11-20 DIAGNOSIS — Z30.42 ENCOUNTER FOR DEPO-PROVERA CONTRACEPTION: Primary | ICD-10-CM

## 2019-11-20 PROCEDURE — 96372 THER/PROPH/DIAG INJ SC/IM: CPT | Performed by: NURSE PRACTITIONER

## 2019-11-20 RX ORDER — MEDROXYPROGESTERONE ACETATE 150 MG/ML
150 INJECTION, SUSPENSION INTRAMUSCULAR ONCE
Status: COMPLETED | OUTPATIENT
Start: 2019-11-20 | End: 2019-11-20

## 2019-11-20 RX ADMIN — MEDROXYPROGESTERONE ACETATE 150 MG: 150 INJECTION, SUSPENSION INTRAMUSCULAR at 10:42

## 2019-11-20 NOTE — PROGRESS NOTES
Pt presents for her depo injection, given in her left buttock  She did well  Not having any problems on the depo    [id number]  LOT [id number]  EXP 2/28/2023

## 2019-11-29 ENCOUNTER — ANTICOAG VISIT (OUTPATIENT)
Dept: CARDIOLOGY CLINIC | Facility: CLINIC | Age: 44
End: 2019-11-29
Payer: COMMERCIAL

## 2019-11-29 ENCOUNTER — TELEPHONE (OUTPATIENT)
Dept: CARDIOLOGY CLINIC | Facility: CLINIC | Age: 44
End: 2019-11-29

## 2019-11-29 DIAGNOSIS — I35.9 AORTIC VALVE DISORDER: ICD-10-CM

## 2019-11-29 LAB — INR PPP: 2.9 (ref 0.84–1.19)

## 2019-11-29 PROCEDURE — 93793 ANTICOAG MGMT PT WARFARIN: CPT | Performed by: INTERNAL MEDICINE

## 2019-12-03 ENCOUNTER — TELEPHONE (OUTPATIENT)
Dept: CARDIOLOGY CLINIC | Facility: CLINIC | Age: 44
End: 2019-12-03

## 2019-12-03 NOTE — TELEPHONE ENCOUNTER
S/w pt  She checked her INR is at 3 8  Told her to hold her Coumadin dose x 2 days them resume  I asked her if she is using the nasal spray as Dr Lola Simpson recommended and she said no  Advised her to do so  Also advised to call them to get an appt today  or go to the ER

## 2019-12-03 NOTE — TELEPHONE ENCOUNTER
Call transferred to me, pt has been experiencing a nose bleed since this morning  Pt is on Coumadin and has a hx of Recurrent Epistaxis and see's  ENT Dr Debbie Hogan , last seen on 9/18/19 and was cauterized  Advised to call Dr Debbie Hogan pt states that she did and has an appt next Tuesday  Coumadin nurse Lefty Walker asked pt to check her INR, call transferred to Lefty Walker

## 2019-12-04 ENCOUNTER — OFFICE VISIT (OUTPATIENT)
Dept: INTERNAL MEDICINE CLINIC | Facility: CLINIC | Age: 44
End: 2019-12-04
Payer: COMMERCIAL

## 2019-12-04 ENCOUNTER — APPOINTMENT (OUTPATIENT)
Dept: LAB | Facility: CLINIC | Age: 44
End: 2019-12-04
Payer: COMMERCIAL

## 2019-12-04 VITALS
WEIGHT: 114.8 LBS | BODY MASS INDEX: 22.54 KG/M2 | HEIGHT: 60 IN | DIASTOLIC BLOOD PRESSURE: 78 MMHG | SYSTOLIC BLOOD PRESSURE: 122 MMHG | HEART RATE: 80 BPM | TEMPERATURE: 99.3 F

## 2019-12-04 DIAGNOSIS — R04.0 RECURRENT EPISTAXIS: ICD-10-CM

## 2019-12-04 DIAGNOSIS — J02.9 SORE THROAT: ICD-10-CM

## 2019-12-04 DIAGNOSIS — J34.3 NASAL TURBINATE HYPERTROPHY: ICD-10-CM

## 2019-12-04 DIAGNOSIS — Z79.01 ANTICOAGULATED: ICD-10-CM

## 2019-12-04 DIAGNOSIS — J02.9 SORE THROAT: Primary | ICD-10-CM

## 2019-12-04 DIAGNOSIS — J03.90 TONSILLITIS: ICD-10-CM

## 2019-12-04 LAB — S PYO AG THROAT QL: NEGATIVE

## 2019-12-04 PROCEDURE — 87147 CULTURE TYPE IMMUNOLOGIC: CPT

## 2019-12-04 PROCEDURE — 99214 OFFICE O/P EST MOD 30 MIN: CPT | Performed by: NURSE PRACTITIONER

## 2019-12-04 PROCEDURE — 3008F BODY MASS INDEX DOCD: CPT | Performed by: NURSE PRACTITIONER

## 2019-12-04 PROCEDURE — 87070 CULTURE OTHR SPECIMN AEROBIC: CPT

## 2019-12-04 PROCEDURE — 87880 STREP A ASSAY W/OPTIC: CPT | Performed by: NURSE PRACTITIONER

## 2019-12-04 RX ORDER — AMOXICILLIN AND CLAVULANATE POTASSIUM 875; 125 MG/1; MG/1
1 TABLET, FILM COATED ORAL EVERY 12 HOURS SCHEDULED
Qty: 20 TABLET | Refills: 0 | Status: SHIPPED | OUTPATIENT
Start: 2019-12-04 | End: 2019-12-14

## 2019-12-04 NOTE — PATIENT INSTRUCTIONS
Sinusitis tonsillitis rapid strep is negative will send regular culture as well  Treat empirically with amoxicillin probiotics recommended  History of aortic valve, anticoagulated with Coumadin    Recheck INR on Friday discussed with Cardiology     history of recurring epistaxis she is scheduled to see ENT next week

## 2019-12-04 NOTE — PROGRESS NOTES
Assessment/Plan:    Patient Instructions    Sinusitis tonsillitis rapid strep is negative will send regular culture as well  Treat empirically with amoxicillin probiotics recommended  History of aortic valve, anticoagulated with Coumadin  Recheck INR on Friday discussed with Cardiology     history of recurring epistaxis she is scheduled to see ENT next week         Diagnoses and all orders for this visit:    Sore throat  -     POCT rapid strepA  -     Throat culture; Future    Tonsillitis  -     amoxicillin-clavulanate (AUGMENTIN) 875-125 mg per tablet;  Take 1 tablet by mouth every 12 (twelve) hours for 10 days    Nasal turbinate hypertrophy    Recurrent epistaxis    Anticoagulated         Subjective:      Patient ID: Maximiliano Chavez is a 53 y o  female    Not feeling well for several days predominately sore throat, ear pain and had bloody nose for hours yesterday was supposed to see ent but couldn't get there b/c of weather  inr was 3 9 coumadin on hold  Low grade fever, hurts to swallow        Current Outpatient Medications:     aspirin (ECOTRIN LOW STRENGTH) 81 mg EC tablet, Take 81 mg by mouth daily, Disp: , Rfl:     BIOTIN 5000 PO, Take 20,000 mcg by mouth daily, Disp: , Rfl:     FOLIC ACID PO, Take by mouth daily, Disp: , Rfl:     metoprolol tartrate (LOPRESSOR) 25 mg tablet, Take 1 tablet (25 mg total) by mouth 2 (two) times a day, Disp: 180 tablet, Rfl: 3    Prenatal Vit-Fe Fumarate-FA (PRENATAL VITAMIN PLUS LOW IRON) 27-1 MG TABS, take 1 tablet by mouth daily, Disp: 90 tablet, Rfl: 0    rizatriptan (MAXALT) 10 MG tablet, take 1 tablet by mouth ONCE AS NEEDED FOR MIGRAINE FOR UP TO 1 DOSE, Disp: 30 tablet, Rfl: 0    VENTOLIN  (90 Base) MCG/ACT inhaler, inhale 2 puffs by mouth every 6 hours if needed, Disp: 18 g, Rfl: 1    warfarin (COUMADIN) 5 mg tablet, Take 1 tablet (5 mg total) by mouth daily As directed based on INR, Disp: 90 tablet, Rfl: 3    amoxicillin-clavulanate (AUGMENTIN) 875-125 mg per tablet, Take 1 tablet by mouth every 12 (twelve) hours for 10 days, Disp: 20 tablet, Rfl: 0    Multiple Vitamins-Minerals (MULTIVITAMIN WITH MINERALS) tablet, Take 1 tablet by mouth daily, Disp: , Rfl:     Recent Results (from the past 1008 hour(s))   Protime-INR    Collection Time: 11/04/19 12:00 AM   Result Value Ref Range    INR 2 80 (A) 0 84 - 1 19   Protime-INR    Collection Time: 11/18/19 12:00 AM   Result Value Ref Range    INR 2 90 (A) 0 84 - 1 19   Protime-INR    Collection Time: 11/29/19 12:00 AM   Result Value Ref Range    INR 2 90 (A) 0 84 - 1 19   POCT rapid strepA    Collection Time: 12/04/19 10:49 AM   Result Value Ref Range     RAPID STREP A Negative Negative       The following portions of the patient's history were reviewed and updated as appropriate: allergies, current medications, past family history, past medical history, past social history, past surgical history and problem list      Review of Systems   Constitutional: Negative for appetite change, chills, diaphoresis, fatigue, fever and unexpected weight change  HENT: Positive for postnasal drip, sinus pain, sneezing, sore throat and trouble swallowing  Eyes: Negative for visual disturbance  Respiratory: Positive for cough  Negative for chest tightness and shortness of breath  Cardiovascular: Negative for chest pain, palpitations and leg swelling  Gastrointestinal: Negative for abdominal pain and blood in stool  Endocrine: Negative for cold intolerance, heat intolerance, polydipsia, polyphagia and polyuria  Genitourinary: Negative for difficulty urinating, dysuria, frequency and urgency  Musculoskeletal: Negative for arthralgias and myalgias  Skin: Negative for rash and wound  Neurological: Negative for dizziness, weakness, light-headedness and headaches  Hematological: Negative for adenopathy  Psychiatric/Behavioral: Negative for confusion, dysphoric mood and sleep disturbance   The patient is not nervous/anxious  Objective:      /78 (BP Location: Left arm, Patient Position: Sitting)   Pulse 80   Temp 99 3 °F (37 4 °C) (Tympanic)   Ht 5' (1 524 m)   Wt 52 1 kg (114 lb 12 8 oz)   BMI 22 42 kg/m²        Physical Exam   Constitutional: She is oriented to person, place, and time  She appears well-developed  No distress  HENT:   Head: Normocephalic and atraumatic  Nose: Nose normal    Mouth/Throat: Posterior oropharyngeal erythema present  Tonsils are 2+ on the left  Tonsillar exudate  Eyes: Pupils are equal, round, and reactive to light  Conjunctivae and EOM are normal    Left nare w/ moderate edema and ulceration to septal region   Neck: Normal range of motion  Neck supple  No JVD present  No tracheal deviation present  No thyromegaly present  Cardiovascular: Normal rate, regular rhythm and intact distal pulses  Exam reveals no gallop and no friction rub  Murmur heard  Pulmonary/Chest: Effort normal and breath sounds normal  No respiratory distress  She has no wheezes  She has no rales  Abdominal: Soft  Bowel sounds are normal  She exhibits no distension  There is no tenderness  Musculoskeletal: Normal range of motion  She exhibits no edema  Lymphadenopathy:     She has cervical adenopathy  Neurological: She is alert and oriented to person, place, and time  No cranial nerve deficit  Skin: Skin is warm and dry  No rash noted  She is not diaphoretic  Psychiatric: She has a normal mood and affect   Her behavior is normal  Judgment and thought content normal

## 2019-12-05 ENCOUNTER — TELEPHONE (OUTPATIENT)
Dept: INTERNAL MEDICINE CLINIC | Facility: CLINIC | Age: 44
End: 2019-12-05

## 2019-12-05 NOTE — TELEPHONE ENCOUNTER
----- Message from Magaly Alvarez, [address] sent at 12/5/2019 12:41 PM EST -----    Negative for strep notify patient please

## 2019-12-06 ENCOUNTER — TELEPHONE (OUTPATIENT)
Dept: INTERNAL MEDICINE CLINIC | Facility: CLINIC | Age: 44
End: 2019-12-06

## 2019-12-06 ENCOUNTER — TELEPHONE (OUTPATIENT)
Dept: CARDIOLOGY CLINIC | Facility: CLINIC | Age: 44
End: 2019-12-06

## 2019-12-06 ENCOUNTER — ANTICOAG VISIT (OUTPATIENT)
Dept: CARDIOLOGY CLINIC | Facility: CLINIC | Age: 44
End: 2019-12-06

## 2019-12-06 DIAGNOSIS — I35.9 AORTIC VALVE DISORDER: ICD-10-CM

## 2019-12-06 LAB — INR PPP: 1.3 (ref 0.84–1.19)

## 2019-12-06 NOTE — TELEPHONE ENCOUNTER
Patient is requesting call back immediately, her INR is 1 3    She leaves for work by 3:15, if she does not answer please LM    Call back # [phone]

## 2019-12-07 LAB — BACTERIA THROAT CULT: ABNORMAL

## 2019-12-09 ENCOUNTER — TELEPHONE (OUTPATIENT)
Dept: INTERNAL MEDICINE CLINIC | Facility: CLINIC | Age: 44
End: 2019-12-09

## 2019-12-09 NOTE — TELEPHONE ENCOUNTER
----- Message from Juani Porter, [address] sent at 12/9/2019  8:12 AM EST -----  Her final culture came back positive for group c strep- we treated her appropriately

## 2019-12-10 ENCOUNTER — HOSPITAL ENCOUNTER (EMERGENCY)
Facility: HOSPITAL | Age: 44
Discharge: HOME | End: 2019-12-10
Attending: EMERGENCY MEDICINE | Admitting: EMERGENCY MEDICINE
Payer: COMMERCIAL

## 2019-12-10 ENCOUNTER — ANTICOAG VISIT (OUTPATIENT)
Dept: CARDIOLOGY CLINIC | Facility: CLINIC | Age: 44
End: 2019-12-10

## 2019-12-10 ENCOUNTER — TELEPHONE (OUTPATIENT)
Dept: CARDIOLOGY CLINIC | Facility: CLINIC | Age: 44
End: 2019-12-10

## 2019-12-10 VITALS
HEART RATE: 86 BPM | TEMPERATURE: 98.1 F | DIASTOLIC BLOOD PRESSURE: 60 MMHG | RESPIRATION RATE: 17 BRPM | SYSTOLIC BLOOD PRESSURE: 150 MMHG | OXYGEN SATURATION: 97 %

## 2019-12-10 DIAGNOSIS — R04.0 EPISTAXIS: Primary | ICD-10-CM

## 2019-12-10 DIAGNOSIS — I35.9 AORTIC VALVE DISORDER: ICD-10-CM

## 2019-12-10 DIAGNOSIS — Z00.00 PERIODIC HEALTH ASSESSMENT, GENERAL SCREENING, ADULT: ICD-10-CM

## 2019-12-10 LAB — INR PPP: 2 (ref 0.84–1.19)

## 2019-12-10 PROCEDURE — 30901 CONTROL OF NOSEBLEED: CPT | Performed by: EMERGENCY MEDICINE

## 2019-12-10 PROCEDURE — 99283 EMERGENCY DEPT VISIT LOW MDM: CPT

## 2019-12-10 PROCEDURE — 99284 EMERGENCY DEPT VISIT MOD MDM: CPT | Performed by: EMERGENCY MEDICINE

## 2019-12-10 RX ORDER — LORAZEPAM 1 MG/1
1 TABLET ORAL ONCE
Status: COMPLETED | OUTPATIENT
Start: 2019-12-10 | End: 2019-12-10

## 2019-12-10 RX ORDER — TRANEXAMIC ACID 100 MG/ML
500 INJECTION, SOLUTION INTRAVENOUS ONCE
Status: COMPLETED | OUTPATIENT
Start: 2019-12-10 | End: 2019-12-10

## 2019-12-10 RX ORDER — OXYMETAZOLINE HYDROCHLORIDE 0.05 G/100ML
2 SPRAY NASAL ONCE
Status: COMPLETED | OUTPATIENT
Start: 2019-12-10 | End: 2019-12-10

## 2019-12-10 RX ADMIN — TRANEXAMIC ACID 500 MG: 100 INJECTION, SOLUTION INTRAVENOUS at 18:53

## 2019-12-10 RX ADMIN — LORAZEPAM 1 MG: 1 TABLET ORAL at 20:41

## 2019-12-10 RX ADMIN — OXYMETAZOLINE HYDROCHLORIDE 2 SPRAY: 5 SPRAY NASAL at 18:53

## 2019-12-10 NOTE — PROGRESS NOTES
lmom instructing her to stay on the same dose and retest again in 1 week  Did not give her an extra dose due to nose bleeds and cauterization done today

## 2019-12-11 RX ORDER — VITAMIN A, VITAMIN C, VITAMIN D-3, VITAMIN E, VITAMIN B-1, VITAMIN B-2, NIACIN, VITAMIN B-6, CALCIUM, IRON, ZINC, COPPER 4000; 120; 400; 22; 1.84; 3; 20; 10; 1; 12; 200; 27; 25; 2 [IU]/1; MG/1; [IU]/1; MG/1; MG/1; MG/1; MG/1; MG/1; MG/1; UG/1; MG/1; MG/1; MG/1; MG/1
TABLET ORAL
Qty: 90 TABLET | Refills: 1 | Status: SHIPPED | OUTPATIENT
Start: 2019-12-11 | End: 2020-05-21 | Stop reason: SDUPTHER

## 2019-12-11 NOTE — ED PROVIDER NOTES
History  Chief Complaint   Patient presents with    Nose Bleed     Patient reports nose bleed x 20 minutes  Patient states "usually my nose bleeds lasts about 7 hours  " Patient taking coumadin states she was just at ENT this morning getting bleed cauterized  Denies thinners  HPI       75-year-old female with history of mechanical aortic valve on Coumadin with INR of 2 yesterday who presents for evaluation of left-sided epistaxis  Patient was seen by ENT earlier in the day today, who performed cauterization  of a left nasal septal ulcer  Patient reports that her nosebleed stopped while in the ENTs office, but then reccurred again 20 minutes ago  She states it has not been stopping despite direct pressure  Denies lightheadedness, headache, or shortness of breath  Prior to Admission Medications   Prescriptions Last Dose Informant Patient Reported? Taking?    BIOTIN 5000 PO  Self Yes No   Sig: Take 20,000 mcg by mouth daily   FOLIC ACID PO  Self Yes No   Sig: Take by mouth daily   Multiple Vitamins-Minerals (MULTIVITAMIN WITH MINERALS) tablet  Self Yes No   Sig: Take 1 tablet by mouth daily   VENTOLIN  (90 Base) MCG/ACT inhaler  Self No No   Sig: inhale 2 puffs by mouth every 6 hours if needed   amoxicillin-clavulanate (AUGMENTIN) 875-125 mg per tablet   No No   Sig: Take 1 tablet by mouth every 12 (twelve) hours for 10 days   aspirin (ECOTRIN LOW STRENGTH) 81 mg EC tablet  Self Yes No   Sig: Take 81 mg by mouth daily   metoprolol tartrate (LOPRESSOR) 25 mg tablet  Self No No   Sig: Take 1 tablet (25 mg total) by mouth 2 (two) times a day   rizatriptan (MAXALT) 10 MG tablet  Self No No   Sig: take 1 tablet by mouth ONCE AS NEEDED FOR MIGRAINE FOR UP TO 1 DOSE   warfarin (COUMADIN) 5 mg tablet  Self No No   Sig: Take 1 tablet (5 mg total) by mouth daily As directed based on INR      Facility-Administered Medications: None       Past Medical History:   Diagnosis Date    Abnormal Pap smear of cervix     Allergic contact dermatitis     Allergic rhinitis     resolved 2018    Aortic valve disorder     Aortic valve insufficiency     Asthma     Benign neoplasm of skin     Cardiac disease     Costochondritis     Hyperinsulinism     Inguinal lymphadenopathy     resolved 2015    Migraine     resolved 2015    Nosebleed     Varicose 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     unspecified laterality       Past Surgical History:   Procedure Laterality Date    AORTIC VALVE REPLACEMENT      complications 6172 failure of bovine valve, replaced with mechanical aortic valve  and root replacement at [facility] dr sawyer   [address] [facility]      enlargement    CARDIAC VALVE REPLACEMENT  2011    bovine, with redo and mechanical valve replacement and root 2012 dr sawyer at [facility] last assessed 08/15/2016    CERVICAL BIOPSY  W/ LOOP ELECTRODE EXCISION      COLPOSCOPY      INDUCED       surgically by dilation and evacuation    RHINOPLASTY      SEPTOPLASTY         Family History   Problem Relation Age of Onset    Asthma Father     Coronary artery disease Father     Hypertension Father     No Known Problems Sister     Breast cancer Maternal Grandmother     No Known Problems Mother     No Known Problems Daughter     No Known Problems Son     Diabetes Maternal Grandfather     No Known Problems Paternal Grandmother     Other Paternal Grandfather         Susanne Fever    No Known Problems Sister     No Known Problems Sister     No Known Problems Daughter     No Known Problems Son     No Known Problems Son      I have reviewed and agree with the history as documented      Social History     Tobacco Use    Smoking status: Never Smoker    Smokeless tobacco: Never Used   Substance Use Topics    Alcohol use: Yes     Frequency: 2-4 times a month     Comment: drinks hard liquor, social per allscripts    Drug use: No        Review of Systems   Constitutional: Negative for chills and fever  HENT: Positive for nosebleeds  Negative for congestion  Eyes: Negative for visual disturbance  Respiratory: Negative for cough and shortness of breath  Cardiovascular: Negative for chest pain and leg swelling  Gastrointestinal: Negative for abdominal pain, diarrhea, nausea and vomiting  Genitourinary: Negative for dysuria and frequency  Musculoskeletal: Negative for arthralgias, back pain, neck pain and neck stiffness  Skin: Negative for rash  Neurological: Negative for weakness, light-headedness, numbness and headaches  Psychiatric/Behavioral: Negative for agitation, behavioral problems and confusion  Physical Exam  Physical Exam   Constitutional: She is oriented to person, place, and time  She appears well-developed and well-nourished  No distress  HENT:   Head: Normocephalic and atraumatic  Right Ear: External ear normal    Left Ear: External ear normal    Nose: Nose normal    Mouth/Throat: Oropharynx is clear and moist    Brisk epistaxis from the L nare, appears to be originating from the nasal septum  No blood visualized dripping down the back of the throat   Eyes: Conjunctivae are normal    Neck: Normal range of motion  Neck supple  Cardiovascular: Normal rate, regular rhythm and normal heart sounds  Exam reveals no gallop and no friction rub  No murmur heard  Pulmonary/Chest: Effort normal and breath sounds normal  No respiratory distress  She has no wheezes  She has no rales  Abdominal: Soft  Bowel sounds are normal  She exhibits no distension  There is no tenderness  There is no guarding  Musculoskeletal: Normal range of motion  She exhibits no edema or deformity  Neurological: She is alert and oriented to person, place, and time  She exhibits normal muscle tone  Skin: Skin is warm and dry  She is not diaphoretic         Vital Signs  ED Triage Vitals [12/10/19 1801]   Temperature Pulse Respirations Blood Pressure SpO2   98 1 °F (36 7 °C) 86 17 150/60 97 %      Temp Source Heart Rate Source Patient Position - Orthostatic VS BP Location FiO2 (%)   Oral Monitor Sitting Left arm --      Pain Score       --           Vitals:    12/10/19 1801   BP: 150/60   Pulse: 86   Patient Position - Orthostatic VS: Sitting         Visual Acuity      ED Medications  Medications   oxymetazoline (AFRIN) 0 05 % nasal spray 2 spray (2 sprays Each Nare Given 12/10/19 1853)   tranexamic acid 100mg/mL (for epistaxis) 500 mg (500 mg Nasal Given 12/10/19 1853)   LORazepam (ATIVAN) tablet 1 mg (1 mg Oral Given 12/10/19 2041)       Diagnostic Studies  Results Reviewed     None                 No orders to display              Procedures  Epistaxis management  Date/Time: 12/10/2019 11:17 AM  Performed by: Ann Vivar MD  Authorized by: Ann Vivar MD     Consent:     Consent obtained:  Verbal    Risks discussed:  Infection and pain    Alternatives discussed:  No treatment and alternative treatment  Universal protocol:     Patient identity confirmed:  Verbally with patient  Procedure details:     Treatment site:  L anterior    Hemostasis method:  Anterior pack and rhino rocket  Post-procedure details:     Assessment:  Bleeding stopped    Patient tolerance of procedure: Tolerated well, no immediate complications  Comments:      TXA-soaked rhinorocket used after afrin nasal spray             ED Course                               MDM  Number of Diagnoses or Management Options  Epistaxis: established and worsening  Diagnosis management comments:  Afebrile and hemodynamically stable  Patient refuses blood work  States that her INR was 2 yesterday  On visualization her nosebleed appears to be from the anterior nasal septum  Already s/p cautery with ENT earlier today  Afrin applied without improvement in nosebleed with direct pressure  At that point at TXA soaked anterior rhino rocket was inserted with 5 cc of air    Patient continued to have some small trickle of blood, so an additional 5 cc of air was injected  Hemostasis has now been achieved  Patient is already taking amoxicillin for a sinus infection, recommend continuing this  Recommend that she follow up with ENT in 48 hours for nasal packing removal, if she is unable to get in to see ENT will need to return to the emergency department to have this done  Return precautions discussed for recurrence of epistaxis  She was discharged in good condition  Patient Progress  Patient progress: stable         Disposition  Final diagnoses:   Epistaxis     Time reflects when diagnosis was documented in both MDM as applicable and the Disposition within this note     Time User Action Codes Description Comment    12/10/2019  7:42 PM Curt Jacobson Add [R04 0] Epistaxis       ED Disposition     ED Disposition Condition Date/Time Comment    Discharge Stable Tue Dec 10, 2019  7:42 PM Татьяна Shultz discharge to home/self care  Follow-up Information     Follow up With Specialties Details Why Ernestine Delcid MD Otolaryngology, Plastic Surgery In 2 days For removal of your nasal packing  [facility] Emergency Medicine  As we discussed, return to the Emergency Department for recurrence of bleeding   [address] [address] ED, [address] [place] [address], [id number]          Discharge Medication List as of 12/10/2019  7:45 PM      CONTINUE these medications which have NOT CHANGED    Details   amoxicillin-clavulanate (AUGMENTIN) 875-125 mg per tablet Take 1 tablet by mouth every 12 (twelve) hours for 10 days, Starting Wed 12/4/2019, Until Sat 12/14/2019, Normal      aspirin (ECOTRIN LOW STRENGTH) 81 mg EC tablet Take 81 mg by mouth daily, Historical Med      BIOTIN 5000 PO Take 20,000 mcg by mouth daily, Historical Med FOLIC ACID PO Take by mouth daily, Historical Med      metoprolol tartrate (LOPRESSOR) 25 mg tablet Take 1 tablet (25 mg total) by mouth 2 (two) times a day, Starting Wed 9/4/2019, Normal      Multiple Vitamins-Minerals (MULTIVITAMIN WITH MINERALS) tablet Take 1 tablet by mouth daily, Historical Med      rizatriptan (MAXALT) 10 MG tablet take 1 tablet by mouth ONCE AS NEEDED FOR MIGRAINE FOR UP TO 1 DOSE, Normal      VENTOLIN  (90 Base) MCG/ACT inhaler inhale 2 puffs by mouth every 6 hours if needed, Normal      warfarin (COUMADIN) 5 mg tablet Take 1 tablet (5 mg total) by mouth daily As directed based on INR, Starting Thu 11/14/2019, Normal      Prenatal Vit-Fe Fumarate-FA (PRENATAL VITAMIN PLUS LOW IRON) 27-1 MG TABS take 1 tablet by mouth daily, Normal           No discharge procedures on file      ED Provider  Electronically Signed by           Emmy Chi MD  12/11/19 2874       Emmy Chi MD  12/19/19 6844

## 2019-12-17 ENCOUNTER — TELEPHONE (OUTPATIENT)
Dept: CARDIOLOGY CLINIC | Facility: CLINIC | Age: 44
End: 2019-12-17

## 2019-12-17 ENCOUNTER — HOSPITAL ENCOUNTER (EMERGENCY)
Facility: HOSPITAL | Age: 44
Discharge: HOME | End: 2019-12-17
Attending: EMERGENCY MEDICINE | Admitting: EMERGENCY MEDICINE
Payer: COMMERCIAL

## 2019-12-17 ENCOUNTER — ANTICOAG VISIT (OUTPATIENT)
Dept: CARDIOLOGY CLINIC | Facility: CLINIC | Age: 44
End: 2019-12-17

## 2019-12-17 VITALS
RESPIRATION RATE: 20 BRPM | SYSTOLIC BLOOD PRESSURE: 135 MMHG | HEART RATE: 68 BPM | DIASTOLIC BLOOD PRESSURE: 63 MMHG | BODY MASS INDEX: 19.63 KG/M2 | OXYGEN SATURATION: 98 % | WEIGHT: 100 LBS | HEIGHT: 60 IN | TEMPERATURE: 97.8 F

## 2019-12-17 DIAGNOSIS — R04.0 RECURRENT EPISTAXIS: Primary | ICD-10-CM

## 2019-12-17 DIAGNOSIS — I35.9 AORTIC VALVE DISORDER: ICD-10-CM

## 2019-12-17 LAB
ANION GAP SERPL CALCULATED.3IONS-SCNC: 12 MMOL/L (ref 4–13)
BASOPHILS # BLD AUTO: 0.03 THOUSANDS/ΜL (ref 0–0.1)
BASOPHILS NFR BLD AUTO: 0 % (ref 0–1)
BUN SERPL-MCNC: 13 MG/DL (ref 5–25)
CALCIUM SERPL-MCNC: 8.6 MG/DL (ref 8.3–10.1)
CHLORIDE SERPL-SCNC: 104 MMOL/L (ref 100–108)
CO2 SERPL-SCNC: 23 MMOL/L (ref 21–32)
CREAT SERPL-MCNC: 0.61 MG/DL (ref 0.6–1.3)
EOSINOPHIL # BLD AUTO: 0.02 THOUSAND/ΜL (ref 0–0.61)
EOSINOPHIL NFR BLD AUTO: 0 % (ref 0–6)
ERYTHROCYTE [DISTWIDTH] IN BLOOD BY AUTOMATED COUNT: 11.7 % (ref 11.6–15.1)
GFR SERPL CREATININE-BSD FRML MDRD: 109 ML/MIN/1.73SQ M
GLUCOSE SERPL-MCNC: 93 MG/DL (ref 65–140)
HCT VFR BLD AUTO: 39 % (ref 34.8–46.1)
HGB BLD-MCNC: 13.4 G/DL (ref 11.5–15.4)
IMM GRANULOCYTES # BLD AUTO: 0.03 THOUSAND/UL (ref 0–0.2)
IMM GRANULOCYTES NFR BLD AUTO: 0 % (ref 0–2)
INR PPP: 2.73 (ref 0.84–1.19)
INR PPP: 3.6 (ref 0.84–1.19)
LYMPHOCYTES # BLD AUTO: 0.84 THOUSANDS/ΜL (ref 0.6–4.47)
LYMPHOCYTES NFR BLD AUTO: 10 % (ref 14–44)
MCH RBC QN AUTO: 30.7 PG (ref 26.8–34.3)
MCHC RBC AUTO-ENTMCNC: 34.4 G/DL (ref 31.4–37.4)
MCV RBC AUTO: 89 FL (ref 82–98)
MONOCYTES # BLD AUTO: 0.36 THOUSAND/ΜL (ref 0.17–1.22)
MONOCYTES NFR BLD AUTO: 4 % (ref 4–12)
NEUTROPHILS # BLD AUTO: 7.6 THOUSANDS/ΜL (ref 1.85–7.62)
NEUTS SEG NFR BLD AUTO: 86 % (ref 43–75)
NRBC BLD AUTO-RTO: 0 /100 WBCS
PLATELET # BLD AUTO: 270 THOUSANDS/UL (ref 149–390)
PMV BLD AUTO: 9.3 FL (ref 8.9–12.7)
POTASSIUM SERPL-SCNC: 3.9 MMOL/L (ref 3.5–5.3)
PROTHROMBIN TIME: 29.3 SECONDS (ref 11.6–14.5)
RBC # BLD AUTO: 4.37 MILLION/UL (ref 3.81–5.12)
SODIUM SERPL-SCNC: 139 MMOL/L (ref 136–145)
WBC # BLD AUTO: 8.88 THOUSAND/UL (ref 4.31–10.16)

## 2019-12-17 PROCEDURE — 36415 COLL VENOUS BLD VENIPUNCTURE: CPT | Performed by: PHYSICIAN ASSISTANT

## 2019-12-17 PROCEDURE — 96375 TX/PRO/DX INJ NEW DRUG ADDON: CPT

## 2019-12-17 PROCEDURE — 99283 EMERGENCY DEPT VISIT LOW MDM: CPT

## 2019-12-17 PROCEDURE — 96376 TX/PRO/DX INJ SAME DRUG ADON: CPT

## 2019-12-17 PROCEDURE — 85610 PROTHROMBIN TIME: CPT | Performed by: PHYSICIAN ASSISTANT

## 2019-12-17 PROCEDURE — 85025 COMPLETE CBC W/AUTO DIFF WBC: CPT | Performed by: PHYSICIAN ASSISTANT

## 2019-12-17 PROCEDURE — 80048 BASIC METABOLIC PNL TOTAL CA: CPT | Performed by: PHYSICIAN ASSISTANT

## 2019-12-17 PROCEDURE — 96374 THER/PROPH/DIAG INJ IV PUSH: CPT

## 2019-12-17 PROCEDURE — 99284 EMERGENCY DEPT VISIT MOD MDM: CPT | Performed by: PHYSICIAN ASSISTANT

## 2019-12-17 RX ORDER — ACETAMINOPHEN 325 MG/1
975 TABLET ORAL ONCE
Status: COMPLETED | OUTPATIENT
Start: 2019-12-17 | End: 2019-12-17

## 2019-12-17 RX ORDER — LABETALOL 20 MG/4 ML (5 MG/ML) INTRAVENOUS SYRINGE
10 ONCE
Status: COMPLETED | OUTPATIENT
Start: 2019-12-17 | End: 2019-12-17

## 2019-12-17 RX ORDER — ACETAMINOPHEN 500 MG
TABLET ORAL
Qty: 1 BOTTLE | Refills: 0 | Status: SHIPPED | OUTPATIENT
Start: 2019-12-17 | End: 2020-10-23

## 2019-12-17 RX ORDER — LORAZEPAM 2 MG/ML
1 INJECTION INTRAMUSCULAR ONCE
Status: COMPLETED | OUTPATIENT
Start: 2019-12-17 | End: 2019-12-17

## 2019-12-17 RX ADMIN — LABETALOL 20 MG/4 ML (5 MG/ML) INTRAVENOUS SYRINGE: at 17:52

## 2019-12-17 RX ADMIN — LORAZEPAM 1 MG: 2 INJECTION INTRAMUSCULAR; INTRAVENOUS at 18:06

## 2019-12-17 RX ADMIN — ACETAMINOPHEN 975 MG: 325 TABLET, FILM COATED ORAL at 17:05

## 2019-12-17 RX ADMIN — LABETALOL 20 MG/4 ML (5 MG/ML) INTRAVENOUS SYRINGE 10 MG: at 19:56

## 2019-12-17 NOTE — TELEPHONE ENCOUNTER
Pt called & said that she is still having nosebleeds & she checked her PT INR & it was at 3 6; tried called [facility] but she was on the phone

## 2019-12-17 NOTE — ED PROVIDER NOTES
History  Chief Complaint   Patient presents with    Nose Bleed     pt c/o recurrent nose bleed-pt was told to come to ER by cardiologist-pt notes she is on Warfarin for AVR-pts INR is 3 6     Mary Jo Rodriges is a 53 y o  female w PMH mechanical AVR, recurrent epistaxis who presents for evaluation of nosebleed  Patient has a hx of recurrent epistaxis  Has been seen on multiple visits by ENT, most recently this morning  She previously had cautery performed on 12/10  She followed up in the ENT office this morning and had small area of eschar removed after which she started bleeding again  10 cm Merocel packing was placed, trimmed to 8 cm  She has been oozing slightly around the packing since and has used about 5 - 6 pieces of gauze to the area since  Prior to Admission Medications   Prescriptions Last Dose Informant Patient Reported? Taking?    BIOTIN 5000 PO  Self Yes No   Sig: Take 20,000 mcg by mouth daily   FOLIC ACID PO  Self Yes No   Sig: Take by mouth daily   Multiple Vitamins-Minerals (MULTIVITAMIN WITH MINERALS) tablet  Self Yes No   Sig: Take 1 tablet by mouth daily   Prenatal Vit-Fe Fumarate-FA (PRENATAL VITAMIN PLUS LOW IRON) 27-1 MG TABS   No No   Sig: take 1 tablet by mouth daily   VENTOLIN  (90 Base) MCG/ACT inhaler  Self No No   Sig: inhale 2 puffs by mouth every 6 hours if needed   aspirin (ECOTRIN LOW STRENGTH) 81 mg EC tablet  Self Yes No   Sig: Take 81 mg by mouth daily   cephalexin (KEFLEX) 500 mg capsule   No No   Sig: Take 1 capsule (500 mg total) by mouth every 12 (twelve) hours for 5 days   metoprolol tartrate (LOPRESSOR) 25 mg tablet  Self No No   Sig: Take 1 tablet (25 mg total) by mouth 2 (two) times a day   rizatriptan (MAXALT) 10 MG tablet  Self No No   Sig: take 1 tablet by mouth ONCE AS NEEDED FOR MIGRAINE FOR UP TO 1 DOSE   warfarin (COUMADIN) 5 mg tablet  Self No No   Sig: Take 1 tablet (5 mg total) by mouth daily As directed based on INR      Facility-Administered Medications: None       Past Medical History:   Diagnosis Date    Abnormal Pap smear of cervix     Allergic contact dermatitis     Allergic rhinitis     resolved 2018    Aortic valve disorder     Aortic valve insufficiency     Asthma     Benign neoplasm of skin     Cardiac disease     Costochondritis     Hyperinsulinism     Inguinal lymphadenopathy     resolved 2015    Migraine     resolved 2015    Nosebleed     Varicose 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     unspecified laterality       Past Surgical History:   Procedure Laterality Date    AORTIC VALVE REPLACEMENT      complications 5078 failure of bovine valve, replaced with mechanical aortic valve  and root replacement at [facility] dr sawyer    AUGMENTATION BREAST      enlargement    CARDIAC VALVE REPLACEMENT  2011    bovine, with redo and mechanical valve replacement and root 2012 dr sawyer at [facility] last assessed 08/15/2016    CERVICAL BIOPSY  W/ LOOP ELECTRODE EXCISION      COLPOSCOPY      INDUCED       surgically by dilation and evacuation    RHINOPLASTY      SEPTOPLASTY         Family History   Problem Relation Age of Onset    Asthma Father     Coronary artery disease Father     Hypertension Father     No Known Problems Sister     Breast cancer Maternal Grandmother     No Known Problems Mother     No Known Problems Daughter     No Known Problems Son     Diabetes Maternal Grandfather     No Known Problems Paternal Grandmother     Other Paternal Grandfather         Susanne Fever    No Known Problems Sister     No Known Problems Sister     No Known Problems Daughter     No Known Problems Son     No Known Problems Son      I have reviewed and agree with the history as documented      Social History     Tobacco Use    Smoking status: Never Smoker    Smokeless tobacco: Never Used   Substance Use Topics    Alcohol use: Yes     Frequency: 2-4 times a month     Comment: drinks hard liquor, social per allscripts    Drug use: No        Review of Systems   Constitutional: Negative for chills, diaphoresis and fever  HENT: Positive for nosebleeds  Negative for congestion and sore throat  Eyes: Negative for visual disturbance  Respiratory: Negative for cough, chest tightness, shortness of breath and wheezing  Cardiovascular: Negative for chest pain and leg swelling  Gastrointestinal: Negative for abdominal pain, constipation, diarrhea, nausea and vomiting  Genitourinary: Negative for difficulty urinating, dysuria, frequency, hematuria, urgency, vaginal bleeding, vaginal discharge and vaginal pain  Musculoskeletal: Negative for arthralgias and myalgias  Neurological: Negative for dizziness, weakness, light-headedness, numbness and headaches  Psychiatric/Behavioral: The patient is not nervous/anxious  Physical Exam  Physical Exam   Constitutional: She is oriented to person, place, and time  She appears well-developed and well-nourished  No distress  HENT:   Head: Normocephalic and atraumatic  Eyes: Pupils are equal, round, and reactive to light  Neck: Neck supple  No tracheal deviation present  Cardiovascular: Normal rate, regular rhythm, normal heart sounds and intact distal pulses  Exam reveals no gallop and no friction rub  No murmur heard  Pulmonary/Chest: Effort normal and breath sounds normal  No respiratory distress  She has no wheezes  She has no rales  Abdominal: Soft  Bowel sounds are normal  She exhibits no distension and no mass  There is no tenderness  There is no guarding  Musculoskeletal: She exhibits no edema or deformity  Neurological: She is alert and oriented to person, place, and time  Skin: Skin is warm and dry  She is not diaphoretic  Psychiatric: She has a normal mood and affect  Her behavior is normal    Nursing note and vitals reviewed        Vital Signs  ED Triage Vitals [12/17/19 1547]   Temperature Pulse Respirations Blood Pressure SpO2   97 8 °F (36 6 °C) 74 17 (!) 171/79 98 %      Temp Source Heart Rate Source Patient Position - Orthostatic VS BP Location FiO2 (%)   Oral Monitor Sitting Left arm --      Pain Score       --           Vitals:    12/17/19 1547 12/17/19 1700 12/17/19 1715   BP: (!) 171/79 127/90 (!) 180/79   Pulse: 74 73 78   Patient Position - Orthostatic VS: Sitting Sitting          Visual Acuity      ED Medications  Medications   LORazepam (ATIVAN) 2 mg/mL injection 1 mg (has no administration in time range)   acetaminophen (TYLENOL) tablet 975 mg (975 mg Oral Given 12/17/19 1705)   Labetalol HCl (NORMODYNE) injection 10 mg ( Intravenous Given 12/17/19 1752)       Diagnostic Studies  Results Reviewed     Procedure Component Value Units Date/Time    Protime-INR [[id number]]  (Abnormal) Collected:  12/17/19 1637    Lab Status:  Final result Specimen:  Blood from Arm, Right Updated:  12/17/19 1656     Protime 29 3 seconds      INR 6 95    Basic metabolic panel [[id number]] Collected:  12/17/19 1637    Lab Status:  Final result Specimen:  Blood from Arm, Right Updated:  12/17/19 1654     Sodium 139 mmol/L      Potassium 3 9 mmol/L      Chloride 104 mmol/L      CO2 23 mmol/L      ANION GAP 12 mmol/L      BUN 13 mg/dL      Creatinine 0 61 mg/dL      Glucose 93 mg/dL      Calcium 8 6 mg/dL      eGFR 109 ml/min/1 73sq m     Narrative:       [place] guidelines for Chronic Kidney Disease (CKD):     Stage 1 with normal or high GFR (GFR > 90 mL/min/1 73 square meters)    Stage 2 Mild CKD (GFR = 60-89 mL/min/1 73 square meters)    Stage 3A Moderate CKD (GFR = 45-59 mL/min/1 73 square meters)    Stage 3B Moderate CKD (GFR = 30-44 mL/min/1 73 square meters)    Stage 4 Severe CKD (GFR = 15-29 mL/min/1 73 square meters)    Stage 5 End Stage CKD (GFR <15 mL/min/1 73 square meters)  Note: GFR calculation is accurate only with a steady state creatinine    CBC and differential [[id number]]  (Abnormal) Collected: 12/17/19 1637    Lab Status:  Final result Specimen:  Blood from Arm, Right Updated:  12/17/19 1645     WBC 8 88 Thousand/uL      RBC 4 37 Million/uL      Hemoglobin 13 4 g/dL      Hematocrit 39 0 %      MCV 89 fL      MCH 30 7 pg      MCHC 34 4 g/dL      RDW 11 7 %      MPV 9 3 fL      Platelets 460 Thousands/uL      nRBC 0 /100 WBCs      Neutrophils Relative 86 %      Immat GRANS % 0 %      Lymphocytes Relative 10 %      Monocytes Relative 4 %      Eosinophils Relative 0 %      Basophils Relative 0 %      Neutrophils Absolute 7 60 Thousands/µL      Immature Grans Absolute 0 03 Thousand/uL      Lymphocytes Absolute 0 84 Thousands/µL      Monocytes Absolute 0 36 Thousand/µL      Eosinophils Absolute 0 02 Thousand/µL      Basophils Absolute 0 03 Thousands/µL                  No orders to display              Procedures  Procedures         ED Course  ED Course as of Dec 17 1759   Tue Dec 17, 2019   1649 Discussed case w Dr Kiera Mckeon of ENT who advised observation, adequate BP control under 120 if possible and continue follow up w ENT on Friday  Have pt avoid manipulation of packing  1744 Patient had one lower BP but then elevated again to 160s - 170s  She does already take metoprolol at home  Will give dose of labetalol here  MDM  Number of Diagnoses or Management Options  Recurrent epistaxis:   Diagnosis management comments: DDX includes but not ltd to:   Patient presents w bleeding and oozing around the packing  Bleeding is overall minimal around this  Has used a few pads of gauze throughout the day today  INR was slightly supra-therapeutic when last checked and was instructed to hold nightly dose and then resume at 5mg       Plan is to obtain:  CBC to check for anemia, leukocytosis, hydration status  Chemistry panel to check for lyte abnormalities, organ function   PT/INR and PTT to assess for coagulopathy and liver function    Based on results:  Patient with INR that has come down to 2 7 from 3 6  Provided tylenol for analgesia for headache related to packing  BP has remained elevated  She will be given labetalol  She does take metoprolol  She was able to get a ride and is very anxious about her packing  We will give her a dose of ativan here in half an hour after we have had a chance to see if labetalol is working  Signed out to [employer]ARGIE at 1800  Depending on how pt responds to labetalol v the ativan we will determine what is more appropriate for her following discharge  Her drug database does not show sx of drug seeking behavior  If she responds more to ativan than labetalol I feel this is appropriate for her to be discharged home with on an as needed basis  However if she responds better to labetalol she can increase her metoprolol dose to 75mg BID to start tonight  Continue to hold coumadin tonight  Follow up w ENT  Can get INR repeated tomorrow  Disposition  Final diagnoses:   Recurrent epistaxis     Time reflects when diagnosis was documented in both MDM as applicable and the Disposition within this note     Time User Action Codes Description Comment    12/17/2019  5:35 PM Ramirez Mckeon Add [R04 0] Recurrent epistaxis       ED Disposition     ED Disposition Condition Date/Time Comment    Discharge Stable Tue Dec 17, 2019  5:35 PM Sissy Flores discharge to home/self care  Follow-up Information     Follow up With Specialties Details Why [address] Ent Otolaryngology  follow up as scheduled [id number] [address] [facility]  [phone]            Patient's Medications   Discharge Prescriptions    ACETAMINOPHEN (TYLENOL) 500 MG TABLET    Take 2 tablets Q8 PRN pain  Start Date: 12/17/2019End Date: --       Order Dose: --       Quantity: 1 Bottle    Refills: 0     No discharge procedures on file      ED Provider  Electronically Signed by           Sammi Castellanos PA-C  12/17/19 1027 Zi Zhou PA-C  12/17/19 [facility]

## 2019-12-17 NOTE — TELEPHONE ENCOUNTER
S/w pt  She had her nose packed today  Told her to hold her coumadin today and also recommended she get a humidifier and take her nasal spray daily to keep nasal area moist  She will retest her INR in 1 week or if she has another episode of a nose bleed she will check her INR then too

## 2019-12-17 NOTE — PROGRESS NOTES
Pt had a nose bleed and packed today   Instructed to hold x 1 day then resume and retest again in 1 week

## 2019-12-17 NOTE — TELEPHONE ENCOUNTER
Pt called back and said that her nose is still bleeding and bleeding through the packing  She called ENT and was told to rest and that there was nothing further to do at this time  Pt called me asking what to do and I advised she go to the ER to have her nose cauterized   Pt said she is going now to [facility]

## 2019-12-20 ENCOUNTER — TELEPHONE (OUTPATIENT)
Dept: CARDIOLOGY CLINIC | Facility: CLINIC | Age: 44
End: 2019-12-20

## 2019-12-20 ENCOUNTER — APPOINTMENT (OUTPATIENT)
Dept: LAB | Facility: HOSPITAL | Age: 44
End: 2019-12-20
Attending: INTERNAL MEDICINE
Payer: COMMERCIAL

## 2019-12-20 DIAGNOSIS — I35.9 AORTIC VALVULAR DISORDER: ICD-10-CM

## 2019-12-20 DIAGNOSIS — Z79.01 LONG TERM (CURRENT) USE OF ANTICOAGULANTS: Primary | ICD-10-CM

## 2019-12-20 LAB
INR PPP: 4.36 (ref 0.84–1.19)
PROTHROMBIN TIME: 42.5 SECONDS (ref 11.6–14.5)

## 2019-12-20 PROCEDURE — 36415 COLL VENOUS BLD VENIPUNCTURE: CPT

## 2019-12-20 PROCEDURE — 85610 PROTHROMBIN TIME: CPT

## 2019-12-20 NOTE — TELEPHONE ENCOUNTER
Pt called to report her INR at 5 1 via home machine  She said she is not on any new meds and denies alcohol  She said that she is supposed to get her nose packing out today and she asked if she will bleed and I said yes  I instructed her to call ENT to see how they want to proceed  She said they want an ov  She called me after the ov and said they are not pulling in until poss Tues when her INR is back in range  I told her to go to the lab to get a stat INR so I can make sure the home INR results are accurate  If she is still in the 5's I told her to hold the weekend and test on Monday

## 2019-12-23 ENCOUNTER — TELEPHONE (OUTPATIENT)
Dept: CARDIOLOGY CLINIC | Facility: CLINIC | Age: 44
End: 2019-12-23

## 2019-12-23 NOTE — TELEPHONE ENCOUNTER
Message # [phone]         2019 12:26p   [NM]  TO:   [facility]  CALLER:  Billy Espinoza  CALLER TELE #:  [phone] Ext    HOSP NAME:  ----------------------------------------------------------------------  Dr:Kendrick  Pt Name:Victorina Rapp Omari  :[birth year]  Emerg?:Y  Msg:Needs to know if she should go back on her coumadin

## 2019-12-23 NOTE — TELEPHONE ENCOUNTER
Her home INR was 1 6  Patient was instructed to take Coumadin 7 5 on Sunday, 5 mg on Monday and check INR on Tuesday morning  FYI

## 2019-12-24 ENCOUNTER — ANESTHESIA EVENT (EMERGENCY)
Dept: PERIOP | Facility: HOSPITAL | Age: 44
End: 2019-12-24
Payer: COMMERCIAL

## 2019-12-24 ENCOUNTER — ANESTHESIA (EMERGENCY)
Dept: PERIOP | Facility: HOSPITAL | Age: 44
End: 2019-12-24
Payer: COMMERCIAL

## 2019-12-24 ENCOUNTER — HOSPITAL ENCOUNTER (EMERGENCY)
Facility: HOSPITAL | Age: 44
Discharge: HOME | End: 2019-12-24
Attending: EMERGENCY MEDICINE
Payer: COMMERCIAL

## 2019-12-24 VITALS
BODY MASS INDEX: 19.63 KG/M2 | HEART RATE: 78 BPM | DIASTOLIC BLOOD PRESSURE: 74 MMHG | HEIGHT: 60 IN | RESPIRATION RATE: 17 BRPM | TEMPERATURE: 98.9 F | WEIGHT: 100 LBS | SYSTOLIC BLOOD PRESSURE: 167 MMHG | OXYGEN SATURATION: 100 %

## 2019-12-24 DIAGNOSIS — G89.18 POST-OP PAIN: Primary | ICD-10-CM

## 2019-12-24 DIAGNOSIS — R04.0 EPISTAXIS, RECURRENT: ICD-10-CM

## 2019-12-24 DIAGNOSIS — R04.0 EPISTAXIS: ICD-10-CM

## 2019-12-24 LAB
ABO GROUP BLD: NORMAL
ANION GAP SERPL CALCULATED.3IONS-SCNC: 10 MMOL/L (ref 4–13)
APTT PPP: 29 SECONDS (ref 23–37)
BASOPHILS # BLD AUTO: 0.03 THOUSANDS/ΜL (ref 0–0.1)
BASOPHILS NFR BLD AUTO: 0 % (ref 0–1)
BLD GP AB SCN SERPL QL: NEGATIVE
BUN SERPL-MCNC: 17 MG/DL (ref 5–25)
CALCIUM SERPL-MCNC: 9.3 MG/DL (ref 8.3–10.1)
CHLORIDE SERPL-SCNC: 103 MMOL/L (ref 100–108)
CO2 SERPL-SCNC: 25 MMOL/L (ref 21–32)
CREAT SERPL-MCNC: 0.78 MG/DL (ref 0.6–1.3)
EOSINOPHIL # BLD AUTO: 0.04 THOUSAND/ΜL (ref 0–0.61)
EOSINOPHIL NFR BLD AUTO: 0 % (ref 0–6)
ERYTHROCYTE [DISTWIDTH] IN BLOOD BY AUTOMATED COUNT: 11.7 % (ref 11.6–15.1)
EXT PREGNANCY TEST URINE: NEGATIVE
EXT. CONTROL: NORMAL
GFR SERPL CREATININE-BSD FRML MDRD: 91 ML/MIN/1.73SQ M
GLUCOSE SERPL-MCNC: 96 MG/DL (ref 65–140)
HCT VFR BLD AUTO: 43.5 % (ref 34.8–46.1)
HGB BLD-MCNC: 14.6 G/DL (ref 11.5–15.4)
IMM GRANULOCYTES # BLD AUTO: 0.03 THOUSAND/UL (ref 0–0.2)
IMM GRANULOCYTES NFR BLD AUTO: 0 % (ref 0–2)
INR PPP: 1.6 (ref 0.84–1.19)
LYMPHOCYTES # BLD AUTO: 0.98 THOUSANDS/ΜL (ref 0.6–4.47)
LYMPHOCYTES NFR BLD AUTO: 11 % (ref 14–44)
MCH RBC QN AUTO: 30.5 PG (ref 26.8–34.3)
MCHC RBC AUTO-ENTMCNC: 33.6 G/DL (ref 31.4–37.4)
MCV RBC AUTO: 91 FL (ref 82–98)
MONOCYTES # BLD AUTO: 0.55 THOUSAND/ΜL (ref 0.17–1.22)
MONOCYTES NFR BLD AUTO: 6 % (ref 4–12)
NEUTROPHILS # BLD AUTO: 7.47 THOUSANDS/ΜL (ref 1.85–7.62)
NEUTS SEG NFR BLD AUTO: 83 % (ref 43–75)
NRBC BLD AUTO-RTO: 0 /100 WBCS
PLATELET # BLD AUTO: 323 THOUSANDS/UL (ref 149–390)
PMV BLD AUTO: 9.4 FL (ref 8.9–12.7)
POTASSIUM SERPL-SCNC: 4 MMOL/L (ref 3.5–5.3)
PROTHROMBIN TIME: 18.3 SECONDS (ref 11.6–14.5)
RBC # BLD AUTO: 4.79 MILLION/UL (ref 3.81–5.12)
RH BLD: NEGATIVE
SODIUM SERPL-SCNC: 138 MMOL/L (ref 136–145)
SPECIMEN EXPIRATION DATE: NORMAL
WBC # BLD AUTO: 9.1 THOUSAND/UL (ref 4.31–10.16)

## 2019-12-24 PROCEDURE — 85730 THROMBOPLASTIN TIME PARTIAL: CPT | Performed by: EMERGENCY MEDICINE

## 2019-12-24 PROCEDURE — 99284 EMERGENCY DEPT VISIT MOD MDM: CPT | Performed by: EMERGENCY MEDICINE

## 2019-12-24 PROCEDURE — 36415 COLL VENOUS BLD VENIPUNCTURE: CPT | Performed by: EMERGENCY MEDICINE

## 2019-12-24 PROCEDURE — 81025 URINE PREGNANCY TEST: CPT | Performed by: STUDENT IN AN ORGANIZED HEALTH CARE EDUCATION/TRAINING PROGRAM

## 2019-12-24 PROCEDURE — 86900 BLOOD TYPING SEROLOGIC ABO: CPT | Performed by: EMERGENCY MEDICINE

## 2019-12-24 PROCEDURE — 99284 EMERGENCY DEPT VISIT MOD MDM: CPT

## 2019-12-24 PROCEDURE — 86850 RBC ANTIBODY SCREEN: CPT | Performed by: EMERGENCY MEDICINE

## 2019-12-24 PROCEDURE — 80048 BASIC METABOLIC PNL TOTAL CA: CPT | Performed by: EMERGENCY MEDICINE

## 2019-12-24 PROCEDURE — 86901 BLOOD TYPING SEROLOGIC RH(D): CPT | Performed by: EMERGENCY MEDICINE

## 2019-12-24 PROCEDURE — 85610 PROTHROMBIN TIME: CPT | Performed by: EMERGENCY MEDICINE

## 2019-12-24 PROCEDURE — 31238 NSL/SINS NDSC SRG NSL HEMRRG: CPT | Performed by: OTOLARYNGOLOGY

## 2019-12-24 PROCEDURE — 85025 COMPLETE CBC W/AUTO DIFF WBC: CPT | Performed by: EMERGENCY MEDICINE

## 2019-12-24 RX ORDER — DEXAMETHASONE SODIUM PHOSPHATE 4 MG/ML
INJECTION, SOLUTION INTRA-ARTICULAR; INTRALESIONAL; INTRAMUSCULAR; INTRAVENOUS; SOFT TISSUE AS NEEDED
Status: DISCONTINUED | OUTPATIENT
Start: 2019-12-24 | End: 2019-12-24 | Stop reason: SURG

## 2019-12-24 RX ORDER — AMOXICILLIN AND CLAVULANATE POTASSIUM 875; 125 MG/1; MG/1
1 TABLET, FILM COATED ORAL EVERY 12 HOURS SCHEDULED
Qty: 20 TABLET | Refills: 0 | Status: SHIPPED | OUTPATIENT
Start: 2019-12-24 | End: 2020-01-03

## 2019-12-24 RX ORDER — OXYMETAZOLINE HYDROCHLORIDE 0.05 G/100ML
SPRAY NASAL AS NEEDED
Status: DISCONTINUED | OUTPATIENT
Start: 2019-12-24 | End: 2019-12-24 | Stop reason: HOSPADM

## 2019-12-24 RX ORDER — METOCLOPRAMIDE HYDROCHLORIDE 5 MG/ML
10 INJECTION INTRAMUSCULAR; INTRAVENOUS ONCE AS NEEDED
Status: DISCONTINUED | OUTPATIENT
Start: 2019-12-24 | End: 2019-12-24 | Stop reason: HOSPADM

## 2019-12-24 RX ORDER — MAGNESIUM HYDROXIDE 1200 MG/15ML
LIQUID ORAL AS NEEDED
Status: DISCONTINUED | OUTPATIENT
Start: 2019-12-24 | End: 2019-12-24 | Stop reason: HOSPADM

## 2019-12-24 RX ORDER — SODIUM CHLORIDE 9 MG/ML
INJECTION, SOLUTION INTRAVENOUS CONTINUOUS PRN
Status: DISCONTINUED | OUTPATIENT
Start: 2019-12-24 | End: 2019-12-24 | Stop reason: SURG

## 2019-12-24 RX ORDER — FENTANYL CITRATE/PF 50 MCG/ML
25 SYRINGE (ML) INJECTION
Status: DISCONTINUED | OUTPATIENT
Start: 2019-12-24 | End: 2019-12-24 | Stop reason: HOSPADM

## 2019-12-24 RX ORDER — PROPOFOL 10 MG/ML
INJECTION, EMULSION INTRAVENOUS AS NEEDED
Status: DISCONTINUED | OUTPATIENT
Start: 2019-12-24 | End: 2019-12-24 | Stop reason: SURG

## 2019-12-24 RX ORDER — MIDAZOLAM HYDROCHLORIDE 2 MG/2ML
INJECTION, SOLUTION INTRAMUSCULAR; INTRAVENOUS AS NEEDED
Status: DISCONTINUED | OUTPATIENT
Start: 2019-12-24 | End: 2019-12-24 | Stop reason: SURG

## 2019-12-24 RX ORDER — LIDOCAINE HYDROCHLORIDE 10 MG/ML
INJECTION, SOLUTION EPIDURAL; INFILTRATION; INTRACAUDAL; PERINEURAL AS NEEDED
Status: DISCONTINUED | OUTPATIENT
Start: 2019-12-24 | End: 2019-12-24 | Stop reason: SURG

## 2019-12-24 RX ORDER — SODIUM CHLORIDE 9 MG/ML
100 INJECTION, SOLUTION INTRAVENOUS CONTINUOUS
Status: DISCONTINUED | OUTPATIENT
Start: 2019-12-24 | End: 2019-12-24 | Stop reason: HOSPADM

## 2019-12-24 RX ORDER — LABETALOL 20 MG/4 ML (5 MG/ML) INTRAVENOUS SYRINGE
10 ONCE
Status: COMPLETED | OUTPATIENT
Start: 2019-12-24 | End: 2019-12-24

## 2019-12-24 RX ORDER — HYDROCODONE BITARTRATE AND ACETAMINOPHEN 5; 325 MG/1; MG/1
1 TABLET ORAL ONCE AS NEEDED
Status: DISCONTINUED | OUTPATIENT
Start: 2019-12-24 | End: 2019-12-24 | Stop reason: HOSPADM

## 2019-12-24 RX ORDER — FENTANYL CITRATE 50 UG/ML
INJECTION, SOLUTION INTRAMUSCULAR; INTRAVENOUS AS NEEDED
Status: DISCONTINUED | OUTPATIENT
Start: 2019-12-24 | End: 2019-12-24 | Stop reason: SURG

## 2019-12-24 RX ORDER — ONDANSETRON 2 MG/ML
INJECTION INTRAMUSCULAR; INTRAVENOUS AS NEEDED
Status: DISCONTINUED | OUTPATIENT
Start: 2019-12-24 | End: 2019-12-24 | Stop reason: SURG

## 2019-12-24 RX ORDER — HYDROCODONE BITARTRATE AND ACETAMINOPHEN 5; 325 MG/1; MG/1
1 TABLET ORAL EVERY 6 HOURS PRN
Qty: 15 TABLET | Refills: 0 | Status: SHIPPED | OUTPATIENT
Start: 2019-12-24 | End: 2020-01-03

## 2019-12-24 RX ORDER — EPINEPHRINE NASAL SOLUTION 1 MG/ML
SOLUTION NASAL AS NEEDED
Status: DISCONTINUED | OUTPATIENT
Start: 2019-12-24 | End: 2019-12-24 | Stop reason: HOSPADM

## 2019-12-24 RX ORDER — ONDANSETRON 2 MG/ML
4 INJECTION INTRAMUSCULAR; INTRAVENOUS ONCE AS NEEDED
Status: DISCONTINUED | OUTPATIENT
Start: 2019-12-24 | End: 2019-12-24 | Stop reason: HOSPADM

## 2019-12-24 RX ORDER — SUCCINYLCHOLINE/SOD CL,ISO/PF 100 MG/5ML
SYRINGE (ML) INTRAVENOUS AS NEEDED
Status: DISCONTINUED | OUTPATIENT
Start: 2019-12-24 | End: 2019-12-24 | Stop reason: SURG

## 2019-12-24 RX ADMIN — SODIUM CHLORIDE: 0.9 INJECTION, SOLUTION INTRAVENOUS at 14:31

## 2019-12-24 RX ADMIN — Medication 100 MG: at 14:38

## 2019-12-24 RX ADMIN — ONDANSETRON 4 MG: 2 INJECTION INTRAMUSCULAR; INTRAVENOUS at 14:38

## 2019-12-24 RX ADMIN — SODIUM CHLORIDE: 0.9 INJECTION, SOLUTION INTRAVENOUS at 15:33

## 2019-12-24 RX ADMIN — PHENYLEPHRINE HYDROCHLORIDE 100 MCG: 10 INJECTION INTRAVENOUS at 15:07

## 2019-12-24 RX ADMIN — LIDOCAINE HYDROCHLORIDE 50 MG: 10 INJECTION, SOLUTION EPIDURAL; INFILTRATION; INTRACAUDAL; PERINEURAL at 14:38

## 2019-12-24 RX ADMIN — PHENYLEPHRINE HYDROCHLORIDE 200 MCG: 10 INJECTION INTRAVENOUS at 14:50

## 2019-12-24 RX ADMIN — MIDAZOLAM HYDROCHLORIDE 2 MG: 1 INJECTION, SOLUTION INTRAMUSCULAR; INTRAVENOUS at 14:31

## 2019-12-24 RX ADMIN — PROPOFOL 150 MG: 10 INJECTION, EMULSION INTRAVENOUS at 14:38

## 2019-12-24 RX ADMIN — FENTANYL CITRATE 100 MCG: 50 INJECTION INTRAMUSCULAR; INTRAVENOUS at 14:38

## 2019-12-24 RX ADMIN — DEXAMETHASONE SODIUM PHOSPHATE 4 MG: 4 INJECTION, SOLUTION INTRAMUSCULAR; INTRAVENOUS at 14:38

## 2019-12-24 RX ADMIN — PHENYLEPHRINE HYDROCHLORIDE 100 MCG: 10 INJECTION INTRAVENOUS at 14:59

## 2019-12-24 RX ADMIN — SODIUM CHLORIDE 1000 ML: 0.9 INJECTION, SOLUTION INTRAVENOUS at 14:13

## 2019-12-24 RX ADMIN — LABETALOL 20 MG/4 ML (5 MG/ML) INTRAVENOUS SYRINGE 10 MG: at 16:42

## 2019-12-24 NOTE — OP NOTE
OPERATIVE REPORT  PATIENT NAME: Billy Espinoza    :  1975  MRN: [id number]  Pt Location: AN OR ROOM 02    SURGERY DATE: 2019    Surgeon(s) and Role:     * Sania Husain MD - Primary    Preop Diagnosis:  Epistaxis [R04 0]    Post-Op Diagnosis Codes:     * Epistaxis [R04 0]    Procedure(s) (LRB):  ENDOSCOPIC CONTROL OF EPISTAXIS (LEFT) (Left)    Specimen(s):  * No specimens in log *    Estimated Blood Loss:   Minimal    Drains:  * No LDAs found *    Anesthesia Type:   General    Operative Indications:  Epistaxis [R04 0]      Operative Findings:  The patient had diffuse bleeding of mucosal surfaces  I did see a scar band between the nasal septum and the inferior turbinate anteriorly  The middle turbinate had been completely excoriated with some traumatic mucosal tears  She also had some excoriations on the inferior turbinate medially as well as the septum  Some scarring between the middle turbinate and lateral wall  She did have an accessory ostium into the maxillary sinus however I did suction blood away from  Maxillary sinus appeared to be clear without pus  Complications:   None    Procedure and Technique:  After the patient was allowed to ask any remaining questions in the preoperative area, the patient was escorted to the operating suite by both ENT and anesthesia  There, surgical time-out was performed to ensure the proper patient and procedure  Once this was done, general endotracheal anesthesia was induced without incident  Once given the go ahead by anesthesia the head of the bed was rotated 90°  The patient was then prepped and draped in normal fashion for the above procedures  The Merocel pack was removed from the left nasal cavity  Epinephrine-soaked pledgets were then placed into the nasal cavity for additional hemostasis  Once enough time was given, the pledgets were removed    Again there is diffuse bleeding of the mucosal surfaces, and I packed and repacked the nose with epinephrine pledgets until the bleeding was slowed down  Once I could see a little bit better, I did realize that she did have some scarring between the nasal septum and the inferior turbinate anteriorly  She also had mucosal tears around the middle turbinate and did have a lot of blood coming from the medial portion of the middle turbinate and the nasal septum opposing it  Again, I did place pledgets in this area to slow bleeding down  Once enough time was given, the pledgets were removed  I used bipolar cautery to cauterize the head of the middle turbinate, the medial portion of the middle turbinate, and the inferior edge of the middle turbinate  There is some bleeding more towards the maxillary sinus, and this was cauterized with suction Bovie cautery  I then used endoscopic scissors to lyse the scarring from the inferior turbinate to the nasal septum  A microdebrider was then used to take down tissue  The edges of this tissue was then bipolared for hemostasis  Additional hemostasis was performed on nasal septum as well as the medial portion of the inferior turbinate using bipolar cautery  Pledgets were then placed again for additional hemostasis  After enough time was given, the pledgets were removed  Shanna hemostatic powder was applied to the nasal cavity  I then placed additional FloSeal where was bleeding the most in between middle turbinates and nasal septum  An OG tube was then used to suction out the patient's stomach contents which included old blood  The patient was then turned over the anesthesia allowed to awaken without incident       I was present for the entire procedure    Patient Disposition:  PACU     SIGNATURE: Dilan Duong MD  DATE: December 24, 2019  TIME: 3:48 PM

## 2019-12-24 NOTE — H&P (VIEW-ONLY)
[facility]  OTOLARYNGOLOGY - HEAD & NECK SURGERY    Floretta Dakins  [id number]  1975    HISTORY & PHYSICAL    Chief Complaint   Patient presents with    Nose Bleed        History of Present Illness: 61-year-old woman who presents for follow-up of nosebleed  The patient has had some issues with nosebleeds over the last few weeks  She has been packed to previous times  She is here for packing removal   She was supposed to have her pack removed last week, but the INR was too high  She states that her INR is now 1 6  She presents here for packing removal  The patient has a history of mechanical heart valve and takes Coumadin  She has been holding her Coumadin for a few days now      Allergies   Allergen Reactions    Dog Epithelium Allergy Skin Test Rash     Only certain dogs    Pollen Extract Other (See Comments)     Test showed allergy but rarely has s/s       Past Medical History:   Diagnosis Date    Abnormal Pap smear of cervix     Allergic contact dermatitis     Allergic rhinitis     resolved 2018    Aortic valve disorder     Aortic valve insufficiency     Asthma     Benign neoplasm of skin     Cardiac disease     Costochondritis     Hyperinsulinism     Inguinal lymphadenopathy     resolved 2015    Migraine     resolved 2015    Nosebleed     Varicose 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     unspecified laterality       Past Surgical History:   Procedure Laterality Date    AORTIC VALVE REPLACEMENT      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, replaced with mechanical aortic valve  and root replacement at [facility] dr sawyer   [address]  2011    bovine, with redo and mechanical valve replacement and root 2012 dr sawyer at [facility] last assessed 08/15/2016    CERVICAL BIOPSY  W/ LOOP ELECTRODE EXCISION      COLPOSCOPY      INDUCED       surgically by dilation and evacuation    RHINOPLASTY      SEPTOPLASTY         Family History   Problem Relation Age of Onset    Asthma Father     Coronary artery disease Father     Hypertension Father     No Known Problems Sister     Breast cancer Maternal Grandmother     No Known Problems Mother     No Known Problems Daughter     No Known Problems Son     Diabetes Maternal Grandfather     No Known Problems Paternal Grandmother     Other Paternal Grandfather         Susanne Fever    No Known Problems Sister     No Known Problems Sister     No Known Problems Daughter     No Known Problems Son     No Known Problems Son         Social History     Tobacco Use    Smoking status: Never Smoker    Smokeless tobacco: Never Used   Substance Use Topics    Alcohol use: Yes     Frequency: 2-4 times a month     Comment: drinks hard liquor, social per allscripts    Drug use: No       Current Outpatient Medications on File Prior to Visit   Medication Sig Dispense Refill    acetaminophen (TYLENOL) 500 mg tablet Take 2 tablets Q8 PRN pain  1 Bottle 0    aspirin (ECOTRIN LOW STRENGTH) 81 mg EC tablet Take 81 mg by mouth daily      BIOTIN 5000 PO Take 20,000 mcg by mouth daily      FOLIC ACID PO Take by mouth daily      metoprolol tartrate (LOPRESSOR) 25 mg tablet Take 1 tablet (25 mg total) by mouth 2 (two) times a day 180 tablet 3    Multiple Vitamins-Minerals (MULTIVITAMIN WITH MINERALS) tablet Take 1 tablet by mouth daily      Prenatal Vit-Fe Fumarate-FA (PRENATAL VITAMIN PLUS LOW IRON) 27-1 MG TABS take 1 tablet by mouth daily 90 tablet 1    rizatriptan (MAXALT) 10 MG tablet take 1 tablet by mouth ONCE AS NEEDED FOR MIGRAINE FOR UP TO 1 DOSE 30 tablet 0    VENTOLIN  (90 Base) MCG/ACT inhaler inhale 2 puffs by mouth every 6 hours if needed 18 g 1    warfarin (COUMADIN) 5 mg tablet Take 1 tablet (5 mg total) by mouth daily As directed based on INR 90 tablet 3     No current facility-administered medications on file prior to visit  Review of Systems:  10-point ROS performed  Patient denies fevers or chills  All other systems reviewed and found to be negative unless otherwise noted in the HPI or MA note  Vitals:    12/24/19 1117   Weight: 45 4 kg (100 lb)   Height: 5' (1 524 m)         General Appearance: No apparent distress    Head: Normocephalic, atraumatic  Face: Symmetric without obvious lesions  Eyes: Conjunctiva clear, extraocular movements are intact  Ears: Pinna normal shape and position  Canals are clear  TMs intact with no middle ear effusion  Nose: External pyramid midline  See procedure note  Oral cavity/Oropharynx: No mucosal lesions, masses, or pharyngeal asymmetry  Neck: No cervical lymphadenopathy or masses appreciated    Skin: Skin warm and dry  Neurological: Cranial nerves II to XII are intact  Respiratory: No stridor or labored breathing  Cardiovascular: Good perfusion of the upper extremities  No cyanosis of the fingers or hands  Psychiatric: Alert and oriented  Procedure: Afrin and 4% lidocaine was injected into the Murocel pack  After time was given, the pack was removed  The patient did have severe bleeding from the left nasal septum  I attempted to slow this down with multiple Afrin and lidocaine-soaked cotton pledgets  Despite this, she continued to bleed  The bleeding was somewhat profuse and I was not able to visualize the area that was bleeding from although it was clearly from the left side  I decided to place a bacitracin coated Merocel pack to control the bleeding  Assessment:  1  Epistaxis            Plan:  Despite the packing, the patient continued to have some mild oozing  After some discussion, we decided that it is best to go to the ER as to possibly go for an endoscopic control of epistaxis  She is going to go to the ER now for possible operative management later today      Sania Husain MD  Otolaryngology - Head & Neck Surgery  [facility]      ** Please Note: Dictation voice to text software may have been used in the creation of this document   **

## 2019-12-24 NOTE — ANESTHESIA POSTPROCEDURE EVALUATION
Post-Op Assessment Note    CV Status:  Stable  Pain Score: 0    Pain management: adequate     Mental Status:  Alert and awake   Hydration Status:  Euvolemic   PONV Controlled:  Controlled   Airway Patency:  Patent   Post Op Vitals Reviewed: Yes      Staff: CRNA           BP  160/70   Temp      Pulse  72 nsr   Resp   14   SpO2   100 % fm 5lpm

## 2019-12-24 NOTE — DISCHARGE INSTRUCTIONS
POST-OPERATIVE CARE INSTRUCTION SHEET      WHAT YOU CAN EXPECT TO EXPERIENCE    You can expect some oozing from your nose for several days after the surgery and again after each office debridement  When bleeding occurs down the front of your nose or into the back of your throat, you should tilt your head back while sitting up and breathe gently through your nose  If bleeding persists for an extended period of time notify our office  Over-the-counter Afrin nose spray (oxymetazoline) can be used to open your nose  and reduce bleeding during the first two nights after surgery if needed  You may experience some discomfort post-operatively due to manipulation and inflammation  Take your pain medication as directed  Often extra-strength Tylenolâ is sufficient  You may wish to take medication for pain prior to your post-operative visits (particularly early on, when the nose is most sensitive)  If the medication is sedating, be sure to have someone available to drive you  SOME IMPORTANT POST-OPERATIVE DO'S AND DO NOT'S    DO NOT blow your nose until you have been given permission to do so  DO NOT bend, lift or strain for at least one week after surgery  These activities will promote bleeding from your nose  You should not plan on participating in rigorous activity until healing is completed  DO NOT suppress the need to cough or sneeze, but cough/sneeze with your mouth open  DO NOT resume use of any aspirin-containing products or other blood thinners until after discussing this with us  Typically, you can restart after 7-10 days  DO continue antibiotics (if they have been prescribed for you)  Diarrhea from antibiotic usage can lead to a serious health problem  This can often be prevented by taking probiotics daily, which is found in yogurt with active cultures or as tablets in a health food store  If you should experience diarrhea, stop the antibiotic and notify us   Further evaluation may be required  DO notify us for any of the following: temperature elevations above 100 5 F, clear watery drainage from your nose, changes in vision, swelling of the eyes, worsening headache or neck stiffness  Contact our office for an emergency or go to the emergency room nearest to you

## 2019-12-24 NOTE — INTERVAL H&P NOTE
H&P reviewed  After examining the patient I find no changes in the patients condition since the H&P had been written  Vitals:    12/24/19 1353   BP: (!) 226/99   Pulse:    Resp:    Temp: 97 9 °F (36 6 °C)   SpO2:      Plan: To OR for endoscopic control of epistaxis; possible left SPA ligation

## 2019-12-24 NOTE — PROGRESS NOTES
As per the surgeon, patient instructed to contact her cardiologist today about when to resume her coumadin

## 2019-12-24 NOTE — ANESTHESIA PREPROCEDURE EVALUATION
Review of Systems/Medical History  Patient summary reviewed  Chart reviewed      Cardiovascular  Valvular heart disease , aortic insufficiency, Valve replacement ,    Pulmonary  Asthma ,        GI/Hepatic  Negative GI/hepatic ROS          Negative  ROS        Endo/Other     GYN  Negative gynecology ROS          Hematology      Comment: Recurrent epistaxis    Musculoskeletal    Comment: Costochondritis        Neurology    Headaches,    Psychology   Negative psychology ROS          Results for Guido Samaniego (MRN [id number]) as of 12/24/2019 14:15   Ref   Range 12/17/2019 16:37   Sodium Latest Ref Range: 136 - 145 mmol/L 139   Potassium Latest Ref Range: 3 5 - 5 3 mmol/L 3 9   Chloride Latest Ref Range: 100 - 108 mmol/L 104   CO2 Latest Ref Range: 21 - 32 mmol/L 23   Anion Gap Latest Ref Range: 4 - 13 mmol/L 12   BUN Latest Ref Range: 5 - 25 mg/dL 13   Creatinine Latest Ref Range: 0 60 - 1 30 mg/dL 0 61   Glucose, Random Latest Ref Range: 65 - 140 mg/dL 93   Calcium Latest Ref Range: 8 3 - 10 1 mg/dL 8 6   eGFR Latest Units: ml/min/1 73sq m 109   WBC Latest Ref Range: 4 31 - 10 16 Thousand/uL 8 88   Red Blood Cell Count Latest Ref Range: 3 81 - 5 12 Million/uL 4 37   Hemoglobin Latest Ref Range: 11 5 - 15 4 g/dL 13 4   HCT Latest Ref Range: 34 8 - 46 1 % 39 0   MCV Latest Ref Range: 82 - 98 fL 89   MCH Latest Ref Range: 26 8 - 34 3 pg 30 7   MCHC Latest Ref Range: 31 4 - 37 4 g/dL 34 4   RDW Latest Ref Range: 11 6 - 15 1 % 11 7   Platelet Count Latest Ref Range: 149 - 390 Thousands/uL 270   MPV Latest Ref Range: 8 9 - 12 7 fL 9 3   nRBC Latest Units: /100 WBCs 0   Neutrophils % Latest Ref Range: 43 - 75 % 86 (H)   Immat GRANS % Latest Ref Range: 0 - 2 % 0   Lymphocytes Relative Latest Ref Range: 14 - 44 % 10 (L)   Monocytes Relative Latest Ref Range: 4 - 12 % 4   Eosinophils Latest Ref Range: 0 - 6 % 0   Basophils Relative Latest Ref Range: 0 - 1 % 0   Immature Grans Absolute Latest Ref Range: 0 00 - 0 20 Thousand/uL 0 03   Absolute Neutrophils Latest Ref Range: 1 85 - 7 62 Thousands/µL 7 60   Lymphocytes Absolute Latest Ref Range: 0 60 - 4 47 Thousands/µL 0 84   Absolute Monocytes Latest Ref Range: 0 17 - 1 22 Thousand/µL 0 36   Absolute Eosinophils Latest Ref Range: 0 00 - 0 61 Thousand/µL 0 02   Basophils Absolute Latest Ref Range: 0 00 - 0 10 Thousands/µL 0 03   Protime Latest Ref Range: 11 6 - 14 5 seconds 29 3 (H)   INR Latest Ref Range: 0 84 - 1 19  2 73 (H)       Physical Exam    Airway    Mallampati score: II  TM Distance: >3 FB  Neck ROM: full     Dental       Cardiovascular      Pulmonary      Other Findings       5/10/18 TTE   SUMMARY     LEFT VENTRICLE:  Size was normal   Systolic function was normal  Ejection fraction was estimated to be 55 %  Wall thickness was normal   Left ventricular diastolic function parameters were normal      VENTRICULAR SEPTUM:  There was "bounce" motion  These 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      RIGHT VENTRICLE:  The size was normal   Systolic function was normal      MITRAL VALVE:  There was trace regurgitation      AORTIC VALVE:  A mechanical prosthesis was present  It exhibited normal function  There was minimal stenosis  There was trace regurgitation      TRICUSPID VALVE:  There was trace regurgitation      Anesthesia Plan  ASA Score- 3 Emergent    Anesthesia Type- general with ASA Monitors  Additional Monitors:   Airway Plan: ETT  Plan Factors-    Induction- intravenous and rapid sequence induction  Postoperative Plan- Plan for postoperative opioid use  Planned trial extubation    Informed Consent- Anesthetic plan and risks discussed with patient  I personally reviewed this patient with the CRNA  Discussed and agreed on the Anesthesia Plan with the CRNA  Siobhan Veras

## 2019-12-24 NOTE — PROGRESS NOTES
I made several attempts to contact the mother based on the emergency contact number listed on her chart, but I was only able to get the answering machine  Left detailed instructions for post-operative care in her AVS and left two of my cards to give to the family  Will try to contact them later as well

## 2019-12-25 ENCOUNTER — HOSPITAL ENCOUNTER (OUTPATIENT)
Facility: HOSPITAL | Age: 44
Setting detail: OBSERVATION
Discharge: HOME | End: 2019-12-26
Attending: EMERGENCY MEDICINE | Admitting: INTERNAL MEDICINE
Payer: COMMERCIAL

## 2019-12-25 DIAGNOSIS — Z79.01 ANTICOAGULATED: ICD-10-CM

## 2019-12-25 DIAGNOSIS — R79.1 SUBTHERAPEUTIC INTERNATIONAL NORMALIZED RATIO (INR): Primary | ICD-10-CM

## 2019-12-25 DIAGNOSIS — I35.9 AORTIC VALVE DISORDER: ICD-10-CM

## 2019-12-25 PROBLEM — Z95.2 HISTORY OF MECHANICAL AORTIC VALVE REPLACEMENT: Status: ACTIVE | Noted: 2019-12-25

## 2019-12-25 LAB
ANION GAP SERPL CALCULATED.3IONS-SCNC: 9 MMOL/L (ref 4–13)
APTT PPP: 31 SECONDS (ref 23–37)
BASOPHILS # BLD AUTO: 0.02 THOUSANDS/ΜL (ref 0–0.1)
BASOPHILS NFR BLD AUTO: 0 % (ref 0–1)
BUN SERPL-MCNC: 10 MG/DL (ref 5–25)
CALCIUM SERPL-MCNC: 8.6 MG/DL (ref 8.3–10.1)
CHLORIDE SERPL-SCNC: 104 MMOL/L (ref 100–108)
CO2 SERPL-SCNC: 25 MMOL/L (ref 21–32)
CREAT SERPL-MCNC: 0.67 MG/DL (ref 0.6–1.3)
EOSINOPHIL # BLD AUTO: 0 THOUSAND/ΜL (ref 0–0.61)
EOSINOPHIL NFR BLD AUTO: 0 % (ref 0–6)
ERYTHROCYTE [DISTWIDTH] IN BLOOD BY AUTOMATED COUNT: 11.7 % (ref 11.6–15.1)
GFR SERPL CREATININE-BSD FRML MDRD: 106 ML/MIN/1.73SQ M
GLUCOSE P FAST SERPL-MCNC: 124 MG/DL (ref 65–99)
GLUCOSE SERPL-MCNC: 124 MG/DL (ref 65–140)
HCT VFR BLD AUTO: 36.1 % (ref 34.8–46.1)
HGB BLD-MCNC: 12.1 G/DL (ref 11.5–15.4)
IMM GRANULOCYTES # BLD AUTO: 0.09 THOUSAND/UL (ref 0–0.2)
IMM GRANULOCYTES NFR BLD AUTO: 1 % (ref 0–2)
INR PPP: 1.71 (ref 0.84–1.19)
INR PPP: 2.18 (ref 0.84–1.19)
INR PPP: 2.51 (ref 0.84–1.19)
LYMPHOCYTES # BLD AUTO: 1.25 THOUSANDS/ΜL (ref 0.6–4.47)
LYMPHOCYTES NFR BLD AUTO: 10 % (ref 14–44)
MAGNESIUM SERPL-MCNC: 2.2 MG/DL (ref 1.6–2.6)
MCH RBC QN AUTO: 30.7 PG (ref 26.8–34.3)
MCHC RBC AUTO-ENTMCNC: 33.5 G/DL (ref 31.4–37.4)
MCV RBC AUTO: 92 FL (ref 82–98)
MONOCYTES # BLD AUTO: 0.66 THOUSAND/ΜL (ref 0.17–1.22)
MONOCYTES NFR BLD AUTO: 5 % (ref 4–12)
NEUTROPHILS # BLD AUTO: 10.27 THOUSANDS/ΜL (ref 1.85–7.62)
NEUTS SEG NFR BLD AUTO: 84 % (ref 43–75)
NRBC BLD AUTO-RTO: 0 /100 WBCS
PLATELET # BLD AUTO: 267 THOUSANDS/UL (ref 149–390)
PMV BLD AUTO: 9.4 FL (ref 8.9–12.7)
POTASSIUM SERPL-SCNC: 3.4 MMOL/L (ref 3.5–5.3)
PROTHROMBIN TIME: 20.3 SECONDS (ref 11.6–14.5)
PROTHROMBIN TIME: 24.5 SECONDS (ref 11.6–14.5)
PROTHROMBIN TIME: 27.4 SECONDS (ref 11.6–14.5)
RBC # BLD AUTO: 3.94 MILLION/UL (ref 3.81–5.12)
SODIUM SERPL-SCNC: 138 MMOL/L (ref 136–145)
WBC # BLD AUTO: 12.29 THOUSAND/UL (ref 4.31–10.16)

## 2019-12-25 PROCEDURE — 85610 PROTHROMBIN TIME: CPT | Performed by: INTERNAL MEDICINE

## 2019-12-25 PROCEDURE — 85610 PROTHROMBIN TIME: CPT | Performed by: PHYSICIAN ASSISTANT

## 2019-12-25 PROCEDURE — 85730 THROMBOPLASTIN TIME PARTIAL: CPT | Performed by: PHYSICIAN ASSISTANT

## 2019-12-25 PROCEDURE — 80048 BASIC METABOLIC PNL TOTAL CA: CPT | Performed by: PHYSICIAN ASSISTANT

## 2019-12-25 PROCEDURE — 83735 ASSAY OF MAGNESIUM: CPT | Performed by: PHYSICIAN ASSISTANT

## 2019-12-25 PROCEDURE — 85025 COMPLETE CBC W/AUTO DIFF WBC: CPT | Performed by: PHYSICIAN ASSISTANT

## 2019-12-25 PROCEDURE — 99285 EMERGENCY DEPT VISIT HI MDM: CPT | Performed by: PHYSICIAN ASSISTANT

## 2019-12-25 PROCEDURE — 99220 PR INITIAL OBSERVATION CARE/DAY 70 MINUTES: CPT | Performed by: PHYSICIAN ASSISTANT

## 2019-12-25 PROCEDURE — 36415 COLL VENOUS BLD VENIPUNCTURE: CPT | Performed by: PHYSICIAN ASSISTANT

## 2019-12-25 RX ORDER — ACETAMINOPHEN 325 MG/1
650 TABLET ORAL EVERY 6 HOURS PRN
Status: DISCONTINUED | OUTPATIENT
Start: 2019-12-25 | End: 2019-12-26 | Stop reason: HOSPADM

## 2019-12-25 RX ORDER — HYDROCODONE BITARTRATE AND ACETAMINOPHEN 5; 325 MG/1; MG/1
1 TABLET ORAL EVERY 6 HOURS PRN
Status: DISCONTINUED | OUTPATIENT
Start: 2019-12-25 | End: 2019-12-26 | Stop reason: HOSPADM

## 2019-12-25 RX ORDER — WARFARIN SODIUM 5 MG/1
5 TABLET ORAL
Status: DISCONTINUED | OUTPATIENT
Start: 2019-12-25 | End: 2019-12-26

## 2019-12-25 RX ORDER — LANOLIN ALCOHOL/MO/W.PET/CERES
400 CREAM (GRAM) TOPICAL DAILY
Status: DISCONTINUED | OUTPATIENT
Start: 2019-12-25 | End: 2019-12-26 | Stop reason: HOSPADM

## 2019-12-25 RX ORDER — AMOXICILLIN AND CLAVULANATE POTASSIUM 875; 125 MG/1; MG/1
1 TABLET, FILM COATED ORAL EVERY 12 HOURS SCHEDULED
Status: DISCONTINUED | OUTPATIENT
Start: 2019-12-25 | End: 2019-12-26 | Stop reason: HOSPADM

## 2019-12-25 RX ORDER — ASPIRIN 81 MG/1
81 TABLET ORAL DAILY
Status: DISCONTINUED | OUTPATIENT
Start: 2019-12-25 | End: 2019-12-26 | Stop reason: HOSPADM

## 2019-12-25 RX ORDER — ONDANSETRON 2 MG/ML
4 INJECTION INTRAMUSCULAR; INTRAVENOUS EVERY 6 HOURS PRN
Status: DISCONTINUED | OUTPATIENT
Start: 2019-12-25 | End: 2019-12-26 | Stop reason: HOSPADM

## 2019-12-25 RX ORDER — CALCIUM CARBONATE 200(500)MG
1000 TABLET,CHEWABLE ORAL DAILY PRN
Status: DISCONTINUED | OUTPATIENT
Start: 2019-12-25 | End: 2019-12-26 | Stop reason: HOSPADM

## 2019-12-25 RX ORDER — POTASSIUM CHLORIDE 20 MEQ/1
40 TABLET, EXTENDED RELEASE ORAL ONCE
Status: COMPLETED | OUTPATIENT
Start: 2019-12-25 | End: 2019-12-25

## 2019-12-25 RX ORDER — CEPHALEXIN 500 MG/1
500 CAPSULE ORAL ONCE
Status: COMPLETED | OUTPATIENT
Start: 2019-12-25 | End: 2019-12-25

## 2019-12-25 RX ADMIN — POTASSIUM CHLORIDE 40 MEQ: 1500 TABLET, EXTENDED RELEASE ORAL at 12:29

## 2019-12-25 RX ADMIN — Medication 1 TABLET: at 08:14

## 2019-12-25 RX ADMIN — AMOXICILLIN AND CLAVULANATE POTASSIUM 1 TABLET: 875; 125 TABLET, FILM COATED ORAL at 08:13

## 2019-12-25 RX ADMIN — Medication 400 MCG: at 17:07

## 2019-12-25 RX ADMIN — METOPROLOL TARTRATE 25 MG: 25 TABLET, FILM COATED ORAL at 17:07

## 2019-12-25 RX ADMIN — Medication 400 MCG: at 08:13

## 2019-12-25 RX ADMIN — ENOXAPARIN SODIUM 50 MG: 60 INJECTION SUBCUTANEOUS at 02:12

## 2019-12-25 RX ADMIN — CEPHALEXIN 500 MG: 500 CAPSULE ORAL at 03:38

## 2019-12-25 RX ADMIN — ENOXAPARIN SODIUM 50 MG: 60 INJECTION SUBCUTANEOUS at 12:31

## 2019-12-25 RX ADMIN — AMOXICILLIN AND CLAVULANATE POTASSIUM 1 TABLET: 875; 125 TABLET, FILM COATED ORAL at 21:23

## 2019-12-25 RX ADMIN — METOPROLOL TARTRATE 25 MG: 25 TABLET, FILM COATED ORAL at 08:13

## 2019-12-25 RX ADMIN — WARFARIN SODIUM 5 MG: 5 TABLET ORAL at 17:07

## 2019-12-25 RX ADMIN — ASPIRIN 81 MG: 81 TABLET ORAL at 08:13

## 2019-12-25 RX ADMIN — Medication 1 TABLET: at 17:08

## 2019-12-25 NOTE — ED NOTES
1  Chief Complaint: medical problem with INR; pt reports INR 1 6 today, dr told her to come to ED; Dr reported needing a levonox shot; pt reports she took 10mg warfarin prior to coming in; pt also reports having a procedure done on her nose earlier in the day)     2  Is this admission related to an injury? no     3  Orientation Status: AOx4     4  Abnormal Labs/ Abnormal Focused Assessment/ Abnormal Vitals: protime 20 3; INR 1 71     5  Medication/ Drips: none     6  Last narcotic/ pain medication given: none     7  IV lines/ drains/ etc: none     8  Isolation Status: none     9  Skin: intact     10   Ambulatory Status: independent     11: ED Phone Number: [phone]     Rolf Tesfaye RN  12/25/19 6526

## 2019-12-25 NOTE — ED PROVIDER NOTES
History  Chief Complaint   Patient presents with    Medical Problem     pt reports INR 1 6 today, dr told her to come to ED; Dr reported needing a levonox shot; pt reports she took 10mg warfarin prior to coming in; pt also reports having a procedure done on her nose earlier in the day     SG is a 52 y/o female with PMH significant for prosthetic aortic valve and epistaxis who presents to emergency department with complaint of nontherapeutic INR  Patient was seen in the ED on 12/17 for epistaxis, packing placed, packing could not be removed due to high INR of 5 6 and concern for bleeding, therefore packing left in place for several days and patient was instructed by ENT to hold coumadin for a few days, patient followed up with ENT on 12/20/19 at which point INR was 1 6 and patient was instructed to go to ED for operative management of epistaxis, as INR was appropriate for surgery  Patient was instructed to restart coumadin 7 5 on Sunday, then 5 on Monday and recheck INR Tuesday  Patient had surgical cautery performed yesterday  States her last checked home INR was 1 6  She contacted her cardiologist today, who told her to take 10mg of coumadin, which she did before coming to ER  Cardiologist then called back saying she should proceed to ED immediately to get INR lab read and that if INR is still 1 6 she requires a lovenox shot and monitoring until INR is in range of 2 5-3 5  Patient currently asymptomatic, without epistaxis, CP, SOB, lightheadedness/dizziness  Patient states she is concerned about low INR and states "the ENT guys told me they weren't sure how to handle me going off of coumadin, as they have no knowledge of the medication "           Prior to Admission Medications   Prescriptions Last Dose Informant Patient Reported? Taking?    FOLIC ACID PO  Self Yes Yes   Sig: Take by mouth daily   HYDROcodone-acetaminophen (NORCO) 5-325 mg per tablet   No Yes   Sig: Take 1 tablet by mouth every 6 (six) hours as needed for pain for up to 10 daysMax Daily Amount: 4 tablets   Multiple Vitamins-Minerals (MULTIVITAMIN WITH MINERALS) tablet  Self Yes Yes   Sig: Take 1 tablet by mouth daily   Prenatal Vit-Fe Fumarate-FA (PRENATAL VITAMIN PLUS LOW IRON) 27-1 MG TABS   No Yes   Sig: take 1 tablet by mouth daily   VENTOLIN  (90 Base) MCG/ACT inhaler  Self No Yes   Sig: inhale 2 puffs by mouth every 6 hours if needed   acetaminophen (TYLENOL) 500 mg tablet   No Yes   Sig: Take 2 tablets Q8 PRN pain     amoxicillin-clavulanate (AUGMENTIN) 875-125 mg per tablet   No Yes   Sig: Take 1 tablet by mouth every 12 (twelve) hours for 10 days   aspirin (ECOTRIN LOW STRENGTH) 81 mg EC tablet  Self Yes Yes   Sig: Take 81 mg by mouth daily   metoprolol tartrate (LOPRESSOR) 25 mg tablet  Self No Yes   Sig: Take 1 tablet (25 mg total) by mouth 2 (two) times a day   rizatriptan (MAXALT) 10 MG tablet  Self No Yes   Sig: take 1 tablet by mouth ONCE AS NEEDED FOR MIGRAINE FOR UP TO 1 DOSE   warfarin (COUMADIN) 5 mg tablet  Self No Yes   Sig: Take 1 tablet (5 mg total) by mouth daily As directed based on INR      Facility-Administered Medications: None       Past Medical History:   Diagnosis Date    Abnormal Pap smear of cervix     Allergic contact dermatitis     Allergic rhinitis     resolved 01/17/2018    Aortic valve disorder     Aortic valve insufficiency     Asthma     Benign neoplasm of skin     Cardiac disease     Costochondritis     Hyperinsulinism     Inguinal lymphadenopathy     resolved 08/27/2015    Migraine     resolved 08/27/2015    Nosebleed     Varicose 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     unspecified laterality       Past Surgical History:   Procedure Laterality Date    AORTIC VALVE REPLACEMENT      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, replaced with mechanical aortic valve  and root replacement at [facility] dr sawyer    AUGMENTATION BREAST      enlargement   [address]  03/2011 bovine, with redo and mechanical valve replacement and root 2012 [facility] last assessed 08/15/2016    CERVICAL BIOPSY  W/ LOOP ELECTRODE EXCISION      COLPOSCOPY      INDUCED       surgically by dilation and evacuation    RHINOPLASTY      SEPTOPLASTY         Family History   Problem Relation Age of Onset    Asthma Father     Coronary artery disease Father     Hypertension Father     No Known Problems Sister     Breast cancer Maternal Grandmother     No Known Problems Mother     No Known Problems Daughter     No Known Problems Son     Diabetes Maternal Grandfather     No Known Problems Paternal Grandmother     Other Paternal Grandfather         Susanne Fever    No Known Problems Sister     No Known Problems Sister     No Known Problems Daughter     No Known Problems Son     No Known Problems Son      I have reviewed and agree with the history as documented  Social History     Tobacco Use    Smoking status: Never Smoker    Smokeless tobacco: Never Used   Substance Use Topics    Alcohol use: Not Currently     Frequency: 2-4 times a month     Comment: drinks hard liquor, social per allscripts    Drug use: No        Review of Systems   Constitutional: Negative for activity change, chills, diaphoresis, fatigue and fever  Respiratory: Negative for cough, choking, chest tightness, shortness of breath, wheezing and stridor  Cardiovascular: Negative for chest pain, palpitations and leg swelling  Gastrointestinal: Negative for abdominal distention, abdominal pain, blood in stool, constipation, diarrhea, nausea and vomiting  Genitourinary: Negative for decreased urine volume, difficulty urinating, dysuria, flank pain, frequency, hematuria and urgency  Musculoskeletal: Negative for arthralgias, back pain, gait problem, joint swelling, myalgias, neck pain and neck stiffness  Skin: Negative for color change, pallor and rash     Neurological: Negative for dizziness, tremors, seizures, syncope, facial asymmetry, speech difficulty, weakness, light-headedness, numbness and headaches  Hematological: Negative for adenopathy  All other systems reviewed and are negative  Physical Exam  Physical Exam   Constitutional: She is oriented to person, place, and time  She appears well-developed and well-nourished  She is cooperative  Non-toxic appearance  No distress  HENT:   Head: Normocephalic and atraumatic  Eyes: Pupils are equal, round, and reactive to light  Conjunctivae and EOM are normal    Neck: Normal range of motion and full passive range of motion without pain  Neck supple  Cardiovascular: Normal rate, regular rhythm, S1 normal, S2 normal and intact distal pulses  PMI is not displaced  Exam reveals no decreased pulses  Murmur heard  Crescendo decrescendo systolic murmur is present with a grade of 6/6  Pulmonary/Chest: Effort normal and breath sounds normal  No accessory muscle usage or stridor  No tachypnea  No respiratory distress  She has no decreased breath sounds  She has no wheezes  She has no rhonchi  She exhibits no tenderness  Abdominal: Soft  Normal appearance, normal aorta and bowel sounds are normal  She exhibits no distension and no mass  There is no hepatosplenomegaly  There is no tenderness  There is no rebound and no guarding  Musculoskeletal: Normal range of motion  She exhibits no edema, tenderness or deformity  Neurological: She is alert and oriented to person, place, and time  She is not disoriented  GCS eye subscore is 4  GCS verbal subscore is 5  GCS motor subscore is 6  Skin: Skin is warm, dry and intact  Capillary refill takes less than 2 seconds  No rash noted  She is not diaphoretic  No erythema  No pallor  Vitals reviewed        Vital Signs  ED Triage Vitals   Temperature Pulse Respirations Blood Pressure SpO2   12/25/19 0032 12/25/19 0024 12/25/19 0024 12/25/19 0024 12/25/19 0024   98 2 °F (36 8 °C) 80 18 141/68 98 %      Temp Source Heart Rate Source Patient Position - Orthostatic VS BP Location FiO2 (%)   12/25/19 0032 12/25/19 0024 12/25/19 0024 12/25/19 0024 --   Oral Monitor Sitting Left arm       Pain Score       12/25/19 0024       No Pain           Vitals:    12/25/19 0024 12/25/19 0100 12/25/19 0145 12/25/19 0258   BP: 141/68 140/69 151/69 152/87   Pulse: 80 86 80    Patient Position - Orthostatic VS: Sitting Lying Lying          Visual Acuity      ED Medications  Medications   enoxaparin (LOVENOX) subcutaneous injection 50 mg (50 mg Subcutaneous Given 12/25/19 0212)   amoxicillin-clavulanate (AUGMENTIN) 875-125 mg per tablet 1 tablet (has no administration in time range)   aspirin (ECOTRIN LOW STRENGTH) EC tablet 81 mg (has no administration in time range)   folic acid (FOLVITE) tablet 400 mcg (has no administration in time range)   HYDROcodone-acetaminophen (NORCO) 5-325 mg per tablet 1 tablet (has no administration in time range)   metoprolol tartrate (LOPRESSOR) tablet 25 mg (has no administration in time range)   prenatal multivitamin tablet 1 tablet (has no administration in time range)   warfarin (COUMADIN) tablet 5 mg (has no administration in time range)   acetaminophen (TYLENOL) tablet 650 mg (has no administration in time range)   magnesium hydroxide (MILK OF MAGNESIA) 400 mg/5 mL oral suspension 30 mL (has no administration in time range)   ondansetron (ZOFRAN) injection 4 mg (has no administration in time range)   calcium carbonate (TUMS) chewable tablet 1,000 mg (has no administration in time range)   cephalexin (KEFLEX) capsule 500 mg (500 mg Oral Given 12/25/19 0338)       Diagnostic Studies  Results Reviewed     Procedure Component Value Units Date/Time    Protime-INR [[id number]]  (Abnormal) Collected:  12/25/19 0112    Lab Status:  Final result Specimen:  Blood from Arm, Left Updated:  12/25/19 0133     Protime 20 3 seconds      INR 1 71    APTT [[id number]]  (Normal) Collected:  12/25/19 0112    Lab Status:  Final result Specimen:  Blood from Arm, Left Updated:  12/25/19 0133     PTT 31 seconds                  No orders to display              Procedures  Procedures         ED Course  ED Course as of Dec 25 0603   Wed Dec 25, 2019   0200 INR 1 7  Case discussed with Augie Jarrett PA-C, patient to be started on lovenox to bridge and obs for normalization of INR  Patient in agreement with plan  MDM  Number of Diagnoses or Management Options  Subtherapeutic international normalized ratio (INR):   Diagnosis management comments: 54 y/o female with PMH significant for prosthetic aortic valve and epistaxis who presents with complaint of non-therapeutic INR  On exam, well-appearing female, nontoxic appearance, no acute distress, afebrile, VSS, systolic grade VI crescendo decrescendo murmur, no decreased or adventitious lung sounds, remainder of exam unremarkable  Will obtain PT INR  Amount and/or Complexity of Data Reviewed  Clinical lab tests: ordered and reviewed  Discussion of test results with the performing providers: yes  Decide to obtain previous medical records or to obtain history from someone other than the patient: yes  Obtain history from someone other than the patient: yes  Review and summarize past medical records: yes  Discuss the patient with other providers: yes  Independent visualization of images, tracings, or specimens: yes    Risk of Complications, Morbidity, and/or Mortality  General comments: See ED course for dispo and plan  I reviewed and discussed lab findings with the patient at bedside  I answered any and all questions the patient had regarding emergency department course of evaluation and treatment  The patient verbalized understanding of and agreement with plan        Patient Progress  Patient progress: stable        Disposition  Final diagnoses:   Subtherapeutic international normalized ratio (INR)     Time reflects when diagnosis was documented in both MDM as applicable and the Disposition within this note     Time User Action Codes Description Comment    12/25/2019  1:59 AM Grazyna Cuadra Add [R79 1] Subtherapeutic international normalized ratio (INR)       ED Disposition     ED Disposition Condition Date/Time Comment    Admit Stable Wed Dec 25, 2019  1:59 AM Case was discussed with Laura Aquino PA-C and the patient's admission status was agreed to be Admission Status: observation status to the service of Dr Emilia Cruz   Follow-up Information    None         Current Discharge Medication List      CONTINUE these medications which have NOT CHANGED    Details   acetaminophen (TYLENOL) 500 mg tablet Take 2 tablets Q8 PRN pain    Qty: 1 Bottle, Refills: 0    Associated Diagnoses: Recurrent epistaxis      amoxicillin-clavulanate (AUGMENTIN) 875-125 mg per tablet Take 1 tablet by mouth every 12 (twelve) hours for 10 days  Qty: 20 tablet, Refills: 0    Associated Diagnoses: Post-op pain; Epistaxis      aspirin (ECOTRIN LOW STRENGTH) 81 mg EC tablet Take 81 mg by mouth daily      FOLIC ACID PO Take by mouth daily      HYDROcodone-acetaminophen (NORCO) 5-325 mg per tablet Take 1 tablet by mouth every 6 (six) hours as needed for pain for up to 10 daysMax Daily Amount: 4 tablets  Qty: 15 tablet, Refills: 0    Associated Diagnoses: Post-op pain; Epistaxis      metoprolol tartrate (LOPRESSOR) 25 mg tablet Take 1 tablet (25 mg total) by mouth 2 (two) times a day  Qty: 180 tablet, Refills: 3    Associated Diagnoses: Hypertension, essential      Multiple Vitamins-Minerals (MULTIVITAMIN WITH MINERALS) tablet Take 1 tablet by mouth daily      Prenatal Vit-Fe Fumarate-FA (PRENATAL VITAMIN PLUS LOW IRON) 27-1 MG TABS take 1 tablet by mouth daily  Qty: 90 tablet, Refills: 1    Associated Diagnoses: Periodic health assessment, general screening, adult      rizatriptan (MAXALT) 10 MG tablet take 1 tablet by mouth ONCE AS NEEDED FOR MIGRAINE FOR UP TO 1 DOSE  Qty: 30 tablet, Refills: 0    Associated Diagnoses: Migraine without status migrainosus, not intractable, unspecified migraine type      VENTOLIN  (90 Base) MCG/ACT inhaler inhale 2 puffs by mouth every 6 hours if needed  Qty: 18 g, Refills: 1    Associated Diagnoses: Viral upper respiratory tract infection      warfarin (COUMADIN) 5 mg tablet Take 1 tablet (5 mg total) by mouth daily As directed based on INR  Qty: 90 tablet, Refills: 3    Associated Diagnoses: Aortic valve disorder           No discharge procedures on file      ED Provider  Electronically Signed by           Ash Jo PA-C  12/25/19 8472

## 2019-12-25 NOTE — ASSESSMENT & PLAN NOTE
· Likely 2/2 supr-therapeutic INR initially  · Had cautery completed today by ENT   Still having some spotting from nare

## 2019-12-25 NOTE — UTILIZATION REVIEW
Initial Clinical Review    Admission: Date/Time/Statement: 12/25/19 @ 0200 OBSERVATION  Orders Placed This Encounter   Procedures    Place in Observation     Standing Status:   Standing     Number of Occurrences:   1     Order Specific Question:   Admitting Physician     Answer:   Sunshine Devine [[id number]]     Order Specific Question:   Level of Care     Answer:   Med Surg [16]     ED Arrival Information     Expected Arrival Acuity Means of Arrival Escorted By Service Admission Type    - 12/24/2019 23:43 Urgent Walk-In Family Member General Medicine Urgent    Arrival Complaint    Medical Problem        Chief Complaint   Patient presents with   [facility] Medical Problem     pt reports INR 1 6 today,  told her to come to ED;  reported needing a levonox shot; pt reports she took 10mg warfarin prior to coming in; pt also reports having a procedure done on her nose earlier in the day       HPI:  Susan Pascual is a 53 y o  female with a history of mechanical aortic valve replacement who presented to the ED from home with sub-therapeutic INR  States was taken off Coumadin x 2 days (Fri and Sat) by ENT 2/2 supra-therapeutic INR @ 5 8 and recurrent epistaxis  Took 7 5mg on Sun, 5mg on Mon and 10 mg today per cardiology for INR 1 6  INR 1 7 in ED  Goal INR 2 5-3 5 2/2 mechanical AVR in 2012  Had nasal bleed cauterized today by ENT  Continues to have some spotting from nare  Plan: Admit to Observation for evaluation and treatment of subtherapeutic INR: Lovenox 1 mg/kg BID      12/25 Internal Medicine note: Today's INR 2 18  Cardiology recommended to continue Lovenox until INR 2 5  Check INR tomorrow  No further epistaxis  Monitor closely         ED Triage Vitals   Temperature Pulse Respirations Blood Pressure SpO2   12/25/19 0032 12/25/19 0024 12/25/19 0024 12/25/19 0024 12/25/19 0024   98 2 °F (36 8 °C) 80 18 141/68 98 %      Temp Source Heart Rate Source Patient Position - Orthostatic VS BP Location FiO2 (%) 12/25/19 0032 12/25/19 0024 12/25/19 0024 12/25/19 0024 --   Oral Monitor Sitting Left arm       Pain Score       12/25/19 0024       No Pain        Wt Readings from Last 1 Encounters:   12/25/19 51 8 kg (114 lb 3 2 oz)     Additional Vital Signs:     Time  Temp  Pulse  Resp  BP   SpO2  O2 Device    12/25/19 06:56:03  97 8   71  17  126/57   98 %      12/25/19 02:58:07  98 °F     16  152/87         12/25/19 0145    80  18  151/69   99 %  None (Room air)    12/25/19 0100    86  18  140/69   96 %  None (Room air)        Pertinent Labs/Diagnostic Test Results:   Results from last 7 days   Lab Units 12/25/19  0441 12/24/19  1406   WBC Thousand/uL 12 29* 9 10   HEMOGLOBIN g/dL 12 1 14 6   HEMATOCRIT % 36 1 43 5   PLATELETS Thousands/uL 267 323   NEUTROS ABS Thousands/µL 10 27* 7 47         Results from last 7 days   Lab Units 12/25/19  0441 12/24/19  1406   SODIUM mmol/L 138 138   POTASSIUM mmol/L 3 4* 4 0   CHLORIDE mmol/L 104 103   CO2 mmol/L 25 25   ANION GAP mmol/L 9 10   BUN mg/dL 10 17   CREATININE mg/dL 0 67 0 78   EGFR ml/min/1 73sq m 106 91   CALCIUM mg/dL 8 6 9 3   MAGNESIUM mg/dL 2 2  --              Results from last 7 days   Lab Units 12/25/19  0441 12/24/19  1406   GLUCOSE RANDOM mg/dL 124 96       Results from last 7 days   Lab Units 12/25/19  1038 12/25/19  0112 12/24/19  1406   PROTIME seconds 24 5* 20 3* 18 3*   INR  2 18* 1 71* 1 60*   PTT seconds  --  31 29       ED Treatment:   Medication Administration from 12/24/2019 8413 to 12/25/2019 0247       Date/Time Order Dose Route Action Action by Comments     12/25/2019 0212 enoxaparin (LOVENOX) subcutaneous injection 50 mg 50 mg Subcutaneous Given Mobile Captainia Counter, RN         Past Medical History:   Diagnosis Date    Abnormal Pap smear of cervix     Allergic contact dermatitis     Allergic rhinitis     resolved 01/17/2018    Aortic valve disorder     Aortic valve insufficiency     Asthma     Benign neoplasm of skin     Cardiac disease     Costochondritis     Hyperinsulinism     Inguinal lymphadenopathy     resolved 08/27/2015    Migraine     resolved 08/27/2015    Nosebleed     Varicose 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     unspecified laterality     Present on Admission:   Subtherapeutic international normalized ratio (INR)   Recurrent epistaxis      Admitting Diagnosis: Subtherapeutic international normalized ratio (INR) [R79 1]  Known medical problems [Z78 9]  Age/Sex: 53 y o  female       Admission Orders: CBC, PT/INR in a m  Scheduled Medications:    Medications:  amoxicillin-clavulanate 1 tablet Oral Q12H [facility] & long-term   aspirin 81 mg Oral Daily   enoxaparin 1 mg/kg Subcutaneous M97D [facility]   folic acid 209 mcg Oral Daily   metoprolol tartrate 25 mg Oral BID   prenatal multivitamin 1 tablet Oral Daily   warfarin 5 mg Oral Daily (warfarin)     Continuous IV Infusions:     PRN Meds:    acetaminophen 650 mg Oral Q6H PRN   calcium carbonate 1,000 mg Oral Daily PRN   HYDROcodone-acetaminophen 1 tablet Oral Q6H PRN   magnesium hydroxide 30 mL Oral Daily PRN   ondansetron 4 mg Intravenous Q6H PRN         Network Utilization Review Department  [email or web address]  ATTENTION: Please call with any questions or concerns to [phone] and carefully listen to the prompts so that you are directed to the right person  All voicemails are confidential   Lula Dixon all requests for admission clinical reviews, approved or denied determinations and any other requests to dedicated fax number below belonging to the campus where the patient is receiving treatment   List of dedicated fax numbers for the Facilities:  FACILITY NAME UR FAX NUMBER   ADMISSION DENIALS (Administrative/Medical Necessity) [phone] [address]  (Maternity/NICU/Pediatrics) [phone] [address] [facility] [address] [facility] [address] [facility] [address] [facility] [phone]   [facility] [phone] [address] [facility], [facility]  [phone] [address] [phone]

## 2019-12-25 NOTE — ASSESSMENT & PLAN NOTE
· INR 1 71 on admission  · States was having recurrent epistaxis  INR was supra-therapeutic (5 8) on Friday  Stopped coumadin x 2 days per ENT  Took 7 5mg on Sunday, 5mg on Monday and 10mg on day of admission per cardiology  Was then sent to ED to be admitted for bridging to INR of 2 5-3 5 2/2 mechanical aortic valve  · Had nasal bleed cauterized on day of admission     · Lovenox 1mg/kg BID first dose now  · INR and CBC in am

## 2019-12-25 NOTE — PLAN OF CARE
Problem: Potential for Falls  Goal: Patient will remain free of falls  Description  INTERVENTIONS:  - Assess patient frequently for physical needs  -  Identify cognitive and physical deficits and behaviors that affect risk of falls    -  [place] fall precautions as indicated by assessment   - Educate patient/family on patient safety including physical limitations  - Instruct patient to call for assistance with activity based on assessment  - Modify environment to reduce risk of injury  - Consider OT/PT consult to assist with strengthening/mobility  Outcome: Progressing     Problem: SAFETY ADULT  Goal: Maintain or return to baseline ADL function  Description  INTERVENTIONS:  -  Assess patient's ability to carry out ADLs; assess patient's baseline for ADL function and identify physical deficits which impact ability to perform ADLs (bathing, care of mouth/teeth, toileting, grooming, dressing, etc )  - Assess/evaluate cause of self-care deficits   - Assess range of motion  - Assess patient's mobility; develop plan if impaired  - Assess patient's need for assistive devices and provide as appropriate  - Encourage maximum independence but intervene and supervise when necessary  - Involve family in performance of ADLs  - Assess for home care needs following discharge   - Consider OT consult to assist with ADL evaluation and planning for discharge  - Provide patient education as appropriate  Outcome: Progressing  Goal: Maintain or return mobility status to optimal level  Description  INTERVENTIONS:  - Assess patient's baseline mobility status (ambulation, transfers, stairs, etc )    - Identify cognitive and physical deficits and behaviors that affect mobility  - Identify mobility aids required to assist with transfers and/or ambulation (gait belt, sit-to-stand, lift, walker, cane, etc )  - [place] fall precautions as indicated by assessment  - Record patient progress and toleration of activity level on Mobility SBAR; progress patient to next Phase/Stage  - Instruct patient to call for assistance with activity based on assessment  - Consider rehabilitation consult to assist with strengthening/weightbearing, etc   Outcome: Progressing     Problem: DISCHARGE PLANNING  Goal: Discharge to home or other facility with appropriate resources  Description  INTERVENTIONS:  - Identify barriers to discharge w/patient and caregiver  - Arrange for needed discharge resources and transportation as appropriate  - Identify discharge learning needs (meds, wound care, etc )  - Arrange for interpretive services to assist at discharge as needed  - Refer to Case Management Department for coordinating discharge planning if the patient needs post-hospital services based on physician/advanced practitioner order or complex needs related to functional status, cognitive ability, or social support system  Outcome: Progressing     Problem: HEMATOLOGIC - ADULT  Goal: Maintains hematologic stability  Description  INTERVENTIONS  - Assess for signs and symptoms of bleeding or hemorrhage  - Monitor labs  - Administer supportive blood products/factors as ordered and appropriate  Outcome: Progressing

## 2019-12-26 ENCOUNTER — TRANSITIONAL CARE MANAGEMENT (OUTPATIENT)
Dept: INTERNAL MEDICINE CLINIC | Facility: CLINIC | Age: 44
End: 2019-12-26

## 2019-12-26 ENCOUNTER — TELEPHONE (OUTPATIENT)
Dept: CARDIOLOGY CLINIC | Facility: CLINIC | Age: 44
End: 2019-12-26

## 2019-12-26 VITALS
BODY MASS INDEX: 22.42 KG/M2 | TEMPERATURE: 98 F | HEART RATE: 80 BPM | OXYGEN SATURATION: 97 % | RESPIRATION RATE: 18 BRPM | DIASTOLIC BLOOD PRESSURE: 70 MMHG | SYSTOLIC BLOOD PRESSURE: 133 MMHG | WEIGHT: 114.2 LBS | HEIGHT: 60 IN

## 2019-12-26 LAB
ERYTHROCYTE [DISTWIDTH] IN BLOOD BY AUTOMATED COUNT: 11.9 % (ref 11.6–15.1)
HCT VFR BLD AUTO: 38.2 % (ref 34.8–46.1)
HGB BLD-MCNC: 12.8 G/DL (ref 11.5–15.4)
INR PPP: 2.38 (ref 0.84–1.19)
MCH RBC QN AUTO: 30.7 PG (ref 26.8–34.3)
MCHC RBC AUTO-ENTMCNC: 33.5 G/DL (ref 31.4–37.4)
MCV RBC AUTO: 92 FL (ref 82–98)
PLATELET # BLD AUTO: 273 THOUSANDS/UL (ref 149–390)
PMV BLD AUTO: 9.6 FL (ref 8.9–12.7)
PROTHROMBIN TIME: 26.3 SECONDS (ref 11.6–14.5)
RBC # BLD AUTO: 4.17 MILLION/UL (ref 3.81–5.12)
WBC # BLD AUTO: 5.29 THOUSAND/UL (ref 4.31–10.16)

## 2019-12-26 PROCEDURE — 99217 PR OBSERVATION CARE DISCHARGE MANAGEMENT: CPT | Performed by: INTERNAL MEDICINE

## 2019-12-26 PROCEDURE — 85610 PROTHROMBIN TIME: CPT | Performed by: PHYSICIAN ASSISTANT

## 2019-12-26 PROCEDURE — 85027 COMPLETE CBC AUTOMATED: CPT | Performed by: INTERNAL MEDICINE

## 2019-12-26 RX ORDER — WARFARIN SODIUM 5 MG/1
5 TABLET ORAL
Qty: 90 TABLET | Refills: 0
Start: 2019-12-26 | End: 2020-10-29

## 2019-12-26 RX ORDER — WARFARIN SODIUM 7.5 MG/1
7.5 TABLET ORAL
Status: DISCONTINUED | OUTPATIENT
Start: 2019-12-26 | End: 2019-12-26 | Stop reason: HOSPADM

## 2019-12-26 RX ADMIN — AMOXICILLIN AND CLAVULANATE POTASSIUM 1 TABLET: 875; 125 TABLET, FILM COATED ORAL at 10:18

## 2019-12-26 RX ADMIN — METOPROLOL TARTRATE 25 MG: 25 TABLET, FILM COATED ORAL at 10:18

## 2019-12-26 RX ADMIN — ASPIRIN 81 MG: 81 TABLET ORAL at 10:18

## 2019-12-26 NOTE — PLAN OF CARE
Problem: Potential for Falls  Goal: Patient will remain free of falls  Description  INTERVENTIONS:  - Assess patient frequently for physical needs  -  Identify cognitive and physical deficits and behaviors that affect risk of falls    -  [place] fall precautions as indicated by assessment   - Educate patient/family on patient safety including physical limitations  - Instruct patient to call for assistance with activity based on assessment  - Modify environment to reduce risk of injury  - Consider OT/PT consult to assist with strengthening/mobility  Outcome: Progressing     Problem: SAFETY ADULT  Goal: Maintain or return to baseline ADL function  Description  INTERVENTIONS:  -  Assess patient's ability to carry out ADLs; assess patient's baseline for ADL function and identify physical deficits which impact ability to perform ADLs (bathing, care of mouth/teeth, toileting, grooming, dressing, etc )  - Assess/evaluate cause of self-care deficits   - Assess range of motion  - Assess patient's mobility; develop plan if impaired  - Assess patient's need for assistive devices and provide as appropriate  - Encourage maximum independence but intervene and supervise when necessary  - Involve family in performance of ADLs  - Assess for home care needs following discharge   - Consider OT consult to assist with ADL evaluation and planning for discharge  - Provide patient education as appropriate  Outcome: Progressing  Goal: Maintain or return mobility status to optimal level  Description  INTERVENTIONS:  - Assess patient's baseline mobility status (ambulation, transfers, stairs, etc )    - Identify cognitive and physical deficits and behaviors that affect mobility  - Identify mobility aids required to assist with transfers and/or ambulation (gait belt, sit-to-stand, lift, walker, cane, etc )  - [place] fall precautions as indicated by assessment  - Record patient progress and toleration of activity level on Mobility SBAR; progress patient to next Phase/Stage  - Instruct patient to call for assistance with activity based on assessment  - Consider rehabilitation consult to assist with strengthening/weightbearing, etc   Outcome: Progressing     Problem: DISCHARGE PLANNING  Goal: Discharge to home or other facility with appropriate resources  Description  INTERVENTIONS:  - Identify barriers to discharge w/patient and caregiver  - Arrange for needed discharge resources and transportation as appropriate  - Identify discharge learning needs (meds, wound care, etc )  - Arrange for interpretive services to assist at discharge as needed  - Refer to Case Management Department for coordinating discharge planning if the patient needs post-hospital services based on physician/advanced practitioner order or complex needs related to functional status, cognitive ability, or social support system  Outcome: Progressing     Problem: HEMATOLOGIC - ADULT  Goal: Maintains hematologic stability  Description  INTERVENTIONS  - Assess for signs and symptoms of bleeding or hemorrhage  - Monitor labs  - Administer supportive blood products/factors as ordered and appropriate  Outcome: Progressing

## 2019-12-26 NOTE — ASSESSMENT & PLAN NOTE
· Patient came with subtherapeutic INR  Lovenox started  · INR today 2 38  I discussed personally with his cardiologist Dr Katarzyna Villavicencio  · Patient has mechanical aortic valve  Does not need any further Lovenox  · Patient will be discharged home on Coumadin    Recheck INR on Saturday and follow up with cardiologist

## 2019-12-26 NOTE — DISCHARGE SUMMARY
Discharge- Yanet Gant 1975, 53 y o  female MRN: [id number]    Unit/Bed#: -01 Encounter: [id number]    Primary Care Provider: Elliot Sanchez MD   Date and time admitted to hospital: 12/25/2019 12:22 AM        History of mechanical aortic valve replacement  Assessment & Plan  · Had bovine valve replaced with a mechanical valve in 2012  · On coumadin therapy  See above    Recurrent epistaxis  Assessment & Plan  · Likely 2/2 supr-therapeutic INR initially  · Had cautery completed by ENT  · No active bleeding now  Follow up with ENT as outpatient  On Augmentin now    * Subtherapeutic international normalized ratio (INR)  Assessment & Plan  · Patient came with subtherapeutic INR  Lovenox started  · INR today 2 38  I discussed personally with his cardiologist Dr Jose Monae  · Patient has mechanical aortic valve  Does not need any further Lovenox  · Patient will be discharged home on Coumadin  Recheck INR on Saturday and follow up with cardiologist       Discharging Physician / Practitioner: Radha May MD  PCP: Elliot Sanchez MD  Admission Date:   Admission Orders (From admission, onward)     Ordered        12/25/19 0200  Place in Observation  Once                   Discharge Date: 12/26/19    Resolved Problems  Date Reviewed: 12/26/2019    None          Consultations During Hospital Stay:  ·     Procedures Performed:   ·     Significant Findings / Test Results:   · INR 2 38 this morning    Incidental Findings:   ·      Test Results Pending at Discharge (will require follow up):   · PT/INR on Saturday     Outpatient Tests Requested:  · PT/INR on Saturday and follow up with Cardiology    Complications:      Reason for Admission:  Subtherapeutic INR    Hospital Course:     HPI on admission-  Yanet Gant is a 53 y o  female who presents with sub-therapeutic INR  States was taken off Coumadin x 2 days (Fri and Sat) by ENT 2/2 supra-therapeutic INR @ 5 8 and recurrent epistaxis   Took 7 5mg on Sun, 5mg on Mon and 10 mg today per cardiology for INR 1 6  INR 1 7 in ED  Goal INR 2 5-3 5 2/2 mechanical AVR in 2012  Had nasal bleed cauterized today by ENT  Continues to have some spotting from nare  Denies recent fever/chills  States was treated for sinusitis and tonsilitis a couple months ago which caused fluctuations in INR  Per ENT is to start Augmentin post cauterization and prolonged nasal packing  Hospital course-  Patient was started on Lovenox and Coumadin bridging  Her INR 2 38 this morning  Discussed personally with Cardiology  Recommended to stop Lovenox and continue Coumadin  Recheck INR on Saturday and follow up with Cardiology in the office  She is on Augmentin for nasal bleed  No further active nose bleeding  Patient to follow up with ENT as outpatient  Currently stable to be discharged home  Please see above list of diagnoses and related plan for additional information  Condition at Discharge: good     Discharge Day Visit / Exam:     Subjective:    Vitals: Blood Pressure: 133/70 (12/26/19 0726)  Pulse: 80 (12/26/19 0726)  Temperature: 98 °F (36 7 °C) (12/26/19 0726)  Temp Source: Oral (12/25/19 1500)  Respirations: 18 (12/26/19 0726)  Height: 5' (152 4 cm) (12/25/19 0029)  Weight - Scale: 51 8 kg (114 lb 3 2 oz) (12/25/19 0029)  SpO2: 97 % (12/26/19 0726)  Exam:   Physical Exam  General- Awake, alert and oriented x 3, looks comfortable  HEENT- Normocephalic, atraumatic, mild oozing noticed in left nostril- no active bleeding  CVS- Normal S1/ S2, Regular rate and rhythm  Respiratory system- B/L clear breath sounds, no wheezing  Abdomen- Soft, Non distended, no tenderness, Bowel sound- present  CNS- No acute focal neurologic deficit noted    Discussion with Family:     Discharge instructions/Information to patient and family:   See after visit summary for information provided to patient and family        Provisions for Follow-Up Care:  See after visit summary for information related to follow-up care and any pertinent home health orders  Disposition:     Home    For Discharges to [facility]:   · Not Applicable to this Patient - Not Applicable to this Patient    Planned Readmission:      Discharge Statement:  I spent 25 minutes discharging the patient  This time was spent on the day of discharge  I had direct contact with the patient on the day of discharge  Greater than 50% of the total time was spent examining patient, answering all patient questions, arranging and discussing plan of care with patient as well as directly providing post-discharge instructions  Additional time then spent on discharge activities  Discharge Medications:  See after visit summary for reconciled discharge medications provided to patient and family        ** Please Note: This note has been constructed using a voice recognition system **

## 2019-12-26 NOTE — DISCHARGE INSTRUCTIONS
Warfarin (By mouth)   Warfarin (WAR-far-in)  Prevents and treats blood clots  May lower the risk of serious complications after a heart attack  This medicine is a blood thinner  Brand Name(s): Coumadin, Jantoven   There may be other brand names for this medicine  When This Medicine Should Not Be Used: This medicine is not right for everyone  Do not use it if you had an allergic reaction to warfarin, if you are pregnant, or if you have health problems that could cause bleeding  How to Use This Medicine:   Tablet  · Take your medicine as directed  Your dose may need to be changed several times to find what works best for you  · This medicine should come with a Medication Guide  Ask your pharmacist for a copy if you do not have one  · Missed dose: Take a dose as soon as you remember  If it is almost time for your next dose, wait until then and take a regular dose  Do not take extra medicine to make up for a missed dose  · Store the medicine in a closed container at room temperature, away from heat, moisture, and direct light  Drugs and Foods to Avoid:   Ask your doctor or pharmacist before using any other medicine, including over-the-counter medicines, vitamins, and herbal products  · Many medicines and foods can affect how warfarin works and may affect the PT/INR test results   Tell your doctor before you start or stop any medicine, especially the following:   ¨ Ginkgo, echinacea, goldenseal, or Jennifer's wort  ¨ Another blood thinner, including apixaban, argatroban, bivalirudin, cilostazol, clopidogrel, dabigatran, desirudin, dipyridamole, heparin, lepirudin, prasugrel, rivaroxaban, ticlopidine  ¨ Medicine to treat depression or anxiety, including citalopram, desvenlafaxine, duloxetine, escitalopram, fluoxetine, fluvoxamine, milnacipran, paroxetine, sertraline, venlafaxine, vilazodone  ¨ Medicine to treat an infection  ¨ NSAID pain or arthritis medicine, including aspirin, celecoxib, diclofenac, diflunisal, fenoprofen, ibuprofen, indomethacin, ketoprofen, ketorolac, mefenamic acid, naproxen, oxaprozin, piroxicam, sulindac  Check labels for over-the-counter medicines to find out if they contain an NSAID  ¨ Steroid medicine, including dexamethasone, hydrocortisone, methylprednisolone, prednisolone, prednisone  · Warfarin works best if you eat about the same amount of vitamin K every day  Foods high in vitamin K include asparagus, broccoli, brussels sprouts, cabbage, green leafy vegetables, plums, rhubarb, and canola oil  Talk to your doctor before you make changes to your normal diet  · Do not eat grapefruit or drink grapefruit juice while you are using this medicine  Warnings While Using This Medicine:   · It is not safe to take this medicine during pregnancy  It could harm an unborn baby  Tell your doctor right away if you become pregnant  Use an effective form of birth control to keep from getting pregnant during treatment and for at least 1 month after your last dose  · Tell your doctor if you are breastfeeding, or if you have kidney disease, liver disease, diabetes, heart disease, heart failure, high blood pressure, an infection, a stomach ulcer, or protein C deficiency  Also tell your doctor if you had recent surgery or an injury, or a history of stroke, anemia, severe bleeding or bruising, or problems caused by heparin use  · This medicine may cause the following problems:   ¨ Bleeding, which may be life-threatening  ¨ Gangrene (skin or tissue damage)  ¨ Calciphylaxis or calcium uremic arteriolopathy  ¨ Purple toes syndrome  · You must have a PT/INR blood test while you use this medicine to check how well your blood is clotting  Your doctor will use the test results to make sure the medicine is working properly  Keep all appointments for the PT/INR blood tests  · You may bleed or bruise more easily with warfarin   To prevent injury or cuts, do not play rough sports, be careful with sharp objects, and brush and floss your teeth gently  Johnsie Macon your nose gently, and do not pick your nose  · Carry an ID card or wear a medical alert bracelet to let emergency caregivers know that you use warfarin  · Tell any doctor or dentist who treats you that you are using this medicine  You may need to stop using this medicine several days before you have surgery or medical tests  · Keep all medicine out of the reach of children  Never share your medicine with anyone  Possible Side Effects While Using This Medicine:   Call your doctor right away if you notice any of these side effects:  · Allergic reaction: Itching or hives, swelling in your face or hands, swelling or tingling in your mouth or throat, chest tightness, trouble breathing  · Bleeding from your gums or nose, coughing up blood, heavy monthly periods  · Bleeding that does not stop, bruising, or weakness  · Dizziness, fainting, lightheadedness  · Pain, brown or black skin, or skin that is cool to the touch  · Purple toes or feet, or new pain in your leg, foot, or toes  · Purplish red, net-like, blotchy spots on the skin  · Red or dark brown urine, or red or black, tarry stools  · Vomiting blood or material that looks like coffee grounds  If you notice other side effects that you think are caused by this medicine, tell your doctor  Call your doctor for medical advice about side effects  You may report side effects to FDA at 3-703-FDA-4053  © 2017 [address] [employer] Information is for End User's use only and may not be sold, redistributed or otherwise used for commercial purposes  The above information is an  only  It is not intended as medical advice for individual conditions or treatments  Talk to your doctor, nurse or pharmacist before following any medical regimen to see if it is safe and effective for you

## 2019-12-26 NOTE — TELEPHONE ENCOUNTER
Pt called today she is admitted to the hospital   She is worried about her INR levels  Pt was upset with our answering service she was trying to contact one of our providers regarding her INR dosage over the konrad krystina holiday , but Dr Mario Lynne did speak with the pt sister   Pt would like a call back on cell  [phone]

## 2019-12-26 NOTE — ASSESSMENT & PLAN NOTE
· Likely 2/2 supr-therapeutic INR initially  · Had cautery completed by ENT  · No active bleeding now  Follow up with ENT as outpatient    On Augmentin now

## 2019-12-26 NOTE — TELEPHONE ENCOUNTER
Spoke with patient advised that while she is in the hospital they will manage her INR  If she has concerns she should ask to speak to cardiology in the hospital  Patient understood and will call after she is discharged for further instructions

## 2019-12-26 NOTE — UTILIZATION REVIEW
Continued Stay Review    Date: 12/26            Current Patient Class: OBS 12/25 0200 Current Level of Care: tele    HPI:44 y o  female initially admitted on 12/25    Assessment/Plan:  plan to continue lovenox until INR is 2 5, INR this am 2 38      Pertinent Labs/Diagnostic Results:   Results from last 7 days   Lab Units 12/26/19  0508 12/25/19  0441 12/24/19  1406   WBC Thousand/uL 5 29 12 29* 9 10   HEMOGLOBIN g/dL 12 8 12 1 14 6   HEMATOCRIT % 38 2 36 1 43 5   PLATELETS Thousands/uL 273 267 323   NEUTROS ABS Thousands/µL  --  10 27* 7 47     Results from last 7 days   Lab Units 12/25/19  0441 12/24/19  1406   SODIUM mmol/L 138 138   POTASSIUM mmol/L 3 4* 4 0   CHLORIDE mmol/L 104 103   CO2 mmol/L 25 25   ANION GAP mmol/L 9 10   BUN mg/dL 10 17   CREATININE mg/dL 0 67 0 78   EGFR ml/min/1 73sq m 106 91   CALCIUM mg/dL 8 6 9 3   MAGNESIUM mg/dL 2 2  --      Results from last 7 days   Lab Units 12/25/19  0441 12/24/19  1406   GLUCOSE RANDOM mg/dL 124 96     Results from last 7 days   Lab Units 12/26/19  0508 12/25/19  2138 12/25/19  1038 12/25/19  0112 12/24/19  1406   PROTIME seconds 26 3* 27 4* 24 5* 20 3* 18 3*   INR  2 38* 2 51* 2 18* 1 71* 1 60*   PTT seconds  --   --   --  31 29     Vital Signs:   12/26/19 07:26:33  98 °F (36 7 °C)  80  18  133/70  91  97 %         Medications:   Scheduled Medications:    Medications:  amoxicillin-clavulanate 1 tablet Oral Q12H [facility]   aspirin 81 mg Oral Daily   enoxaparin 1 mg/kg Subcutaneous T56O [facility]   folic acid 383 mcg Oral Daily   metoprolol tartrate 25 mg Oral BID   prenatal multivitamin 1 tablet Oral Daily   warfarin 5 mg Oral Daily (warfarin)     Continuous IV Infusions:     PRN Meds:    acetaminophen 650 mg Oral Q6H PRN   calcium carbonate 1,000 mg Oral Daily PRN   HYDROcodone-acetaminophen 1 tablet Oral Q6H PRN   magnesium hydroxide 30 mL Oral Daily PRN   ondansetron 4 mg Intravenous Q6H PRN       Discharge Plan: TBD     Network Utilization Review Department  [email or web address]  ATTENTION: Please call with any questions or concerns to [phone] and carefully listen to the prompts so that you are directed to the right person  All voicemails are confidential   Elizabeth Child all requests for admission clinical reviews, approved or denied determinations and any other requests to dedicated fax number below belonging to the campus where the patient is receiving treatment   List of dedicated fax numbers for the Facilities:  [address] [place] [address] DENIALS (Administrative/Medical Necessity) [phone]   [address] (Maternity/NICU/Pediatrics) [phone]   John Vargas [phone]   [facility] [phone]   Marcio Deleon [phone]   [facility] [phone]   [address] [place] [address] [phone]   [facility]  [phone]   [address] [facility], [address]  24[address] [facility] [address] [facility] [phone]

## 2019-12-26 NOTE — DISCHARGE INSTR - AVS FIRST PAGE
Please take Coumadin 7 5 mg today and 5 mg tomorrow  Check INR on Saturday and follow up with the cardiologist   Follow-up with PCP in 1 week  Follow up with ENT as directed  Continue Augmentin as advised  Come back to emergency room if condition worsen or recur

## 2019-12-27 NOTE — UTILIZATION REVIEW
Notification of Discharge  This is a Notification of Discharge from our facility [address]  Please be advised that this patient has been discharge from our facility  Below you will find the admission and discharge date and time including the patients disposition  PRESENTATION DATE: 12/25/2019 12:22 AM  OBS ADMISSION DATE: 12/25/2019  IP ADMISSION DATE: N/A N/A   DISCHARGE DATE: 12/26/2019  1:46 PM  DISPOSITION: Home/Self Care Home/Self Care   Admission Orders listed below:  Admission Orders (From admission, onward)     Ordered        12/25/19 0200  Place in Observation  Once                   Please contact the UR Department if additional information is required to close this patient's authorization/case  [address] [facility] Utilization Review Department  Main: [phone] x carefully listen to the prompts  All voicemails are confidential   [email or web address]  Send all requests for admission clinical reviews, approved or denied determinations and any other requests to dedicated fax number below belonging to the campus where the patient is receiving treatment   List of dedicated fax numbers:  [address] DENIALS (Administrative/Medical Necessity) [phone]   [address] (Maternity/NICU/Pediatrics) [phone]   Demetrio Needs [phone]   Margo Jimenes [phone]   Noah Andrew [phone]   [address] [phone]   [facility]  [phone]   [address] [facility], [address]  24[address] [facility] 1000 [address] [facility] [phone]

## 2019-12-30 ENCOUNTER — TELEPHONE (OUTPATIENT)
Dept: CARDIOLOGY CLINIC | Facility: CLINIC | Age: 44
End: 2019-12-30

## 2019-12-30 ENCOUNTER — ANTICOAG VISIT (OUTPATIENT)
Dept: CARDIOLOGY CLINIC | Facility: CLINIC | Age: 44
End: 2019-12-30

## 2019-12-30 DIAGNOSIS — I35.9 AORTIC VALVE DISORDER: ICD-10-CM

## 2019-12-30 LAB — INR PPP: 2.9 (ref 0.84–1.19)

## 2019-12-30 NOTE — TELEPHONE ENCOUNTER
I s/w pt  She states the on call  called her on Sat with instructions and told her to retest again today  Will call her once I get the results from today

## 2019-12-30 NOTE — TELEPHONE ENCOUNTER
Message # [phone]         2019 09:31a   [MS]  TO:   [facility] - Jessi Olsen  CALLER:  Omaira Leiva #:  [phone] Ext    HOSP NAME:  ----------------------------------------------------------------------  Sara  Pt Diamond Elkins  :[birth year]  Emerg?:Y  Msg:PT INR 3 0 NEED TO KNOW WHAT DOASGE TO TAKE OF COUMADIN      (Message Delivered)   D E L I V E R I E S :  2019 09:34a           MS            Delivered  2019 07:06a           [facility]           E- Mailed  ------------ F O L L O W - U P------------- :  2019 09:34a MS  TT MARTHA @ 9:34

## 2019-12-30 NOTE — ED PROVIDER NOTES
History  Chief Complaint   Patient presents with    Post-op Problem     Pt presents to the ED with a nose bleed  Pt is to go to OR, needs IV and basic labs  Otolaryngologist Dr Reshma Ruelas to go 57-year-old female presents from ENT office for planned treatment of recurrent left-sided epistaxis  Patient has a history of aortic valve replacement and is on chronic anticoagulation therapy  She has had an elevated INR which was complicating epistaxis treatment  Dr Cj Estrada has attempted to stop the bleeding on 3 previous occasions  She continues with brisk bleeding from the left septum despite placement of nasal packing today in the office and he plans to take her to the operating room today  Patient presents extremely anxious and tearful  She is hemodynamically stable  History provided by:  Patient and medical records  Nose Bleed   Location:  L nare  Severity:  Severe  Timing:  Constant  Progression:  Unchanged  Chronicity:  New  Context: anticoagulants    Context: not thrombocytopenia    Relieved by:  Nothing  Worsened by:  Nothing  Ineffective treatments:  Nasal tampon  Associated symptoms: no blood in oropharynx, no facial pain, no sore throat and no syncope    Risk factors: frequent nosebleeds    Risk factors: no recent chemotherapy        Prior to Admission Medications   Prescriptions Last Dose Informant Patient Reported? Taking? FOLIC ACID PO  Self Yes No   Sig: Take by mouth daily   Multiple Vitamins-Minerals (MULTIVITAMIN WITH MINERALS) tablet  Self Yes No   Sig: Take 1 tablet by mouth daily   Prenatal Vit-Fe Fumarate-FA (PRENATAL VITAMIN PLUS LOW IRON) 27-1 MG TABS   No No   Sig: take 1 tablet by mouth daily   Patient not taking: Reported on 12/27/2019   VENTOLIN  (90 Base) MCG/ACT inhaler  Self No No   Sig: inhale 2 puffs by mouth every 6 hours if needed   acetaminophen (TYLENOL) 500 mg tablet   No No   Sig: Take 2 tablets Q8 PRN pain     Patient not taking: Reported on 12/27/2019   aspirin (ECOTRIN LOW STRENGTH) 81 mg EC tablet  Self Yes No   Sig: Take 81 mg by mouth daily   metoprolol tartrate (LOPRESSOR) 25 mg tablet  Self No No   Sig: Take 1 tablet (25 mg total) by mouth 2 (two) times a day   rizatriptan (MAXALT) 10 MG tablet  Self No No   Sig: take 1 tablet by mouth ONCE AS NEEDED FOR MIGRAINE FOR UP TO 1 DOSE      Facility-Administered Medications: None       Past Medical History:   Diagnosis Date    Abnormal Pap smear of cervix     Allergic contact dermatitis     Allergic rhinitis     resolved 2018    Aortic valve disorder     Aortic valve insufficiency     Asthma     Benign neoplasm of skin     Cardiac disease     Costochondritis     Hyperinsulinism     Inguinal lymphadenopathy     resolved 2015    Migraine     resolved 2015    Nosebleed     Varicose 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     unspecified laterality       Past Surgical History:   Procedure Laterality Date    AORTIC VALVE REPLACEMENT      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, replaced with mechanical aortic valve  and root replacement at [facility] dr sawyer   Tony Horse      enlargement   [address]  2011    bovine, with redo and mechanical valve replacement and root 2012 dr sawyer at [facility] last assessed 08/15/2016    CERVICAL BIOPSY  W/ LOOP ELECTRODE EXCISION      COLPOSCOPY      INDUCED       surgically by dilation and evacuation    NASAL/SINUS ENDOSCOPY Left 2019    Procedure: ENDOSCOPIC CONTROL OF EPISTAXIS (LEFT);   Surgeon: Juan Freeman MD;  [facility];  Service: ENT    RHINOPLASTY      SEPTOPLASTY         Family History   Problem Relation Age of Onset    Asthma Father     Coronary artery disease Father     Hypertension Father     No Known Problems Sister     Breast cancer Maternal Grandmother     No Known Problems Mother     No Known Problems Daughter     No Known Problems Son     Diabetes Maternal Grandfather     No Known Problems Paternal Grandmother     Other Paternal Grandfather         Susanne Fever    No Known Problems Sister     No Known Problems Sister     No Known Problems Daughter     No Known Problems Son     No Known Problems Son      I have reviewed and agree with the history as documented  Social History     Tobacco Use    Smoking status: Never Smoker    Smokeless tobacco: Never Used   Substance Use Topics    Alcohol use: Not Currently     Frequency: 2-4 times a month     Comment: drinks hard liquor, social per allscripts    Drug use: No        Review of Systems   HENT: Positive for nosebleeds  Negative for sore throat  Cardiovascular: Negative for syncope  Psychiatric/Behavioral: The patient is nervous/anxious  All other systems reviewed and are negative  Physical Exam  Physical Exam   Constitutional: She is oriented to person, place, and time  She appears well-developed and well-nourished  She appears distressed  HENT:   Head: Normocephalic and atraumatic  Nose: Epistaxis is observed  Mouth/Throat: Oropharynx is clear and moist  No oropharyngeal exudate  Eyes: Pupils are equal, round, and reactive to light  Conjunctivae and EOM are normal    Neck: Normal range of motion  Neck supple  Cardiovascular: Normal rate, regular rhythm, normal heart sounds and intact distal pulses  Pulmonary/Chest: Effort normal and breath sounds normal    Abdominal: Soft  Bowel sounds are normal  She exhibits no distension  There is no tenderness  There is no rebound and no guarding  Musculoskeletal: Normal range of motion  She exhibits no edema, tenderness or deformity  Lymphadenopathy:     She has no cervical adenopathy  Neurological: She is alert and oriented to person, place, and time  She has normal strength and normal reflexes  No cranial nerve deficit or sensory deficit  She exhibits normal muscle tone   Coordination and gait normal    Skin: Skin is warm, dry and intact  No rash noted  Psychiatric: Her behavior is normal  Judgment and thought content normal  Her mood appears anxious   Cognition and memory are normal        Vital Signs  ED Triage Vitals   Temperature Pulse Respirations Blood Pressure SpO2   12/24/19 1353 12/24/19 1352 12/24/19 1352 12/24/19 1353 12/24/19 1352   97 9 °F (36 6 °C) 68 (!) 24 (!) 226/99 100 %      Temp Source Heart Rate Source Patient Position - Orthostatic VS BP Location FiO2 (%)   12/24/19 1353 12/24/19 1352 -- 12/24/19 1352 --   Oral Monitor  Right arm       Pain Score       --                  Vitals:    12/24/19 1615 12/24/19 1630 12/24/19 1645 12/24/19 1700   BP: 163/74 (!) 187/81 (!) 172/78 167/74   Pulse: 74 80 88 78         Visual Acuity      ED Medications  Medications   sodium chloride 0 9 % bolus 1,000 mL ( Intravenous Canceled Entry 12/24/19 1432)   Labetalol HCl (NORMODYNE) injection 10 mg (10 mg Intravenous Given 12/24/19 1642)       Diagnostic Studies  Results Reviewed     Procedure Component Value Units Date/Time    Protime-INR [[id number]]  (Abnormal) Collected:  12/24/19 1406    Lab Status:  Final result Specimen:  Blood from Arm, Right Updated:  12/24/19 1447     Protime 18 3 seconds      INR 1 60    APTT [[id number]]  (Normal) Collected:  12/24/19 1406    Lab Status:  Final result Specimen:  Blood from Arm, Right Updated:  12/24/19 1447     PTT 29 seconds     Basic metabolic panel [[id number]] Collected:  12/24/19 1406    Lab Status:  Final result Specimen:  Blood from Arm, Right Updated:  12/24/19 1447     Sodium 138 mmol/L      Potassium 4 0 mmol/L      Chloride 103 mmol/L      CO2 25 mmol/L      ANION GAP 10 mmol/L      BUN 17 mg/dL      Creatinine 0 78 mg/dL      Glucose 96 mg/dL      Calcium 9 3 mg/dL      eGFR 91 ml/min/1 73sq m     Narrative:       [place] guidelines for Chronic Kidney Disease (CKD):     Stage 1 with normal or high GFR (GFR > 90 mL/min/1 73 square meters)    Stage 2 Mild CKD (GFR = 60-89 mL/min/1 73 square meters)    Stage 3A Moderate CKD (GFR = 45-59 mL/min/1 73 square meters)    Stage 3B Moderate CKD (GFR = 30-44 mL/min/1 73 square meters)    Stage 4 Severe CKD (GFR = 15-29 mL/min/1 73 square meters)    Stage 5 End Stage CKD (GFR <15 mL/min/1 73 square meters)  Note: GFR calculation is accurate only with a steady state creatinine    CBC and differential [[id number]]  (Abnormal) Collected:  12/24/19 1406    Lab Status:  Final result Specimen:  Blood from Arm, Right Updated:  12/24/19 1429     WBC 9 10 Thousand/uL      RBC 4 79 Million/uL      Hemoglobin 14 6 g/dL      Hematocrit 43 5 %      MCV 91 fL      MCH 30 5 pg      MCHC 33 6 g/dL      RDW 11 7 %      MPV 9 4 fL      Platelets 986 Thousands/uL      nRBC 0 /100 WBCs      Neutrophils Relative 83 %      Immat GRANS % 0 %      Lymphocytes Relative 11 %      Monocytes Relative 6 %      Eosinophils Relative 0 %      Basophils Relative 0 %      Neutrophils Absolute 7 47 Thousands/µL      Immature Grans Absolute 0 03 Thousand/uL      Lymphocytes Absolute 0 98 Thousands/µL      Monocytes Absolute 0 55 Thousand/µL      Eosinophils Absolute 0 04 Thousand/µL      Basophils Absolute 0 03 Thousands/µL                  No orders to display              Procedures  Procedures         ED Course                               MDM  Number of Diagnoses or Management Options  Epistaxis, recurrent: new and requires workup     Amount and/or Complexity of Data Reviewed  Clinical lab tests: reviewed and ordered  Decide to obtain previous medical records or to obtain history from someone other than the patient: yes  Obtain history from someone other than the patient: yes  Discuss the patient with other providers: yes  Independent visualization of images, tracings, or specimens: yes    Risk of Complications, Morbidity, and/or Mortality  General comments: Patient transported to the operating room in stable condition    Patient Progress  Patient progress: stable        Disposition  Final diagnoses:   Epistaxis, recurrent     Time reflects when diagnosis was documented in both MDM as applicable and the Disposition within this note     Time User Action Codes Description Comment    12/24/2019  3:43 PM Junior Bradford Add [G89 18] Post-op pain     12/24/2019  3:43 PM Junior Bradford Add [R04 0] Epistaxis     12/24/2019  3:43 PM Agustín Sagastume [G89 18] Post-op pain     12/30/2019  3:22 PM Aye Rosado Add [R04 0] Epistaxis, recurrent       ED Disposition     None      Follow-up Information    None         Discharge Medication List as of 12/24/2019  4:58 PM      START taking these medications    Details   amoxicillin-clavulanate (AUGMENTIN) 875-125 mg per tablet Take 1 tablet by mouth every 12 (twelve) hours for 10 days, Starting Tue 12/24/2019, Until Fri 1/3/2020, Normal      HYDROcodone-acetaminophen (NORCO) 5-325 mg per tablet Take 1 tablet by mouth every 6 (six) hours as needed for pain for up to 10 daysMax Daily Amount: 4 tablets, Starting Tue 12/24/2019, Until Fri 1/3/2020, Normal         CONTINUE these medications which have NOT CHANGED    Details   acetaminophen (TYLENOL) 500 mg tablet Take 2 tablets Q8 PRN pain , Normal      aspirin (ECOTRIN LOW STRENGTH) 81 mg EC tablet Take 81 mg by mouth daily, Historical Med      FOLIC ACID PO Take by mouth daily, Historical Med      metoprolol tartrate (LOPRESSOR) 25 mg tablet Take 1 tablet (25 mg total) by mouth 2 (two) times a day, Starting Wed 9/4/2019, Normal      Multiple Vitamins-Minerals (MULTIVITAMIN WITH MINERALS) tablet Take 1 tablet by mouth daily, Historical Med      Prenatal Vit-Fe Fumarate-FA (PRENATAL VITAMIN PLUS LOW IRON) 27-1 MG TABS take 1 tablet by mouth daily, Normal      rizatriptan (MAXALT) 10 MG tablet take 1 tablet by mouth ONCE AS NEEDED FOR MIGRAINE FOR UP TO 1 DOSE, Normal      VENTOLIN  (90 Base) MCG/ACT inhaler inhale 2 puffs by mouth every 6 hours if needed, Normal BIOTIN 5000 PO Take 20,000 mcg by mouth daily, Historical Med      warfarin (COUMADIN) 5 mg tablet Take 1 tablet (5 mg total) by mouth daily As directed based on INR, Starting Thu 11/14/2019, Normal           No discharge procedures on file      ED Provider  Electronically Signed by           Claudio Chavira DO  12/30/19 9944

## 2020-01-02 ENCOUNTER — ANTICOAG VISIT (OUTPATIENT)
Dept: CARDIOLOGY CLINIC | Facility: CLINIC | Age: 45
End: 2020-01-02

## 2020-01-02 DIAGNOSIS — I35.9 AORTIC VALVE DISORDER: ICD-10-CM

## 2020-01-02 LAB — INR PPP: 3.5 (ref 0.84–1.19)

## 2020-01-02 NOTE — PROGRESS NOTES
Pt has 2 days of antibx left   Told her to take 2 5mg today then 5/5/7 5 alt and retest again on Monday

## 2020-01-06 ENCOUNTER — APPOINTMENT (OUTPATIENT)
Dept: LAB | Facility: HOSPITAL | Age: 45
End: 2020-01-06
Attending: INTERNAL MEDICINE
Payer: COMMERCIAL

## 2020-01-06 ENCOUNTER — TRANSCRIBE ORDERS (OUTPATIENT)
Dept: LAB | Facility: HOSPITAL | Age: 45
End: 2020-01-06

## 2020-01-06 ENCOUNTER — ANTICOAG VISIT (OUTPATIENT)
Dept: CARDIOLOGY CLINIC | Facility: CLINIC | Age: 45
End: 2020-01-06

## 2020-01-06 DIAGNOSIS — I35.9 AORTIC VALVE DISORDER: ICD-10-CM

## 2020-01-06 DIAGNOSIS — Z79.01 LONG TERM (CURRENT) USE OF ANTICOAGULANTS: ICD-10-CM

## 2020-01-06 DIAGNOSIS — Z79.01 LONG TERM (CURRENT) USE OF ANTICOAGULANTS: Primary | ICD-10-CM

## 2020-01-06 LAB
INR PPP: 2.24 (ref 0.84–1.19)
PROTHROMBIN TIME: 25 SECONDS (ref 11.6–14.5)

## 2020-01-06 PROCEDURE — 85610 PROTHROMBIN TIME: CPT

## 2020-01-06 PROCEDURE — 36415 COLL VENOUS BLD VENIPUNCTURE: CPT

## 2020-01-10 ENCOUNTER — ANTICOAG VISIT (OUTPATIENT)
Dept: CARDIOLOGY CLINIC | Facility: CLINIC | Age: 45
End: 2020-01-10

## 2020-01-10 ENCOUNTER — APPOINTMENT (OUTPATIENT)
Dept: LAB | Facility: HOSPITAL | Age: 45
End: 2020-01-10
Attending: INTERNAL MEDICINE
Payer: COMMERCIAL

## 2020-01-10 ENCOUNTER — TELEPHONE (OUTPATIENT)
Dept: CARDIOLOGY CLINIC | Facility: CLINIC | Age: 45
End: 2020-01-10

## 2020-01-10 DIAGNOSIS — Z79.01 LONG TERM (CURRENT) USE OF ANTICOAGULANTS: ICD-10-CM

## 2020-01-10 DIAGNOSIS — I35.9 AORTIC VALVE DISORDER: ICD-10-CM

## 2020-01-15 ENCOUNTER — ANTICOAG VISIT (OUTPATIENT)
Dept: CARDIOLOGY CLINIC | Facility: CLINIC | Age: 45
End: 2020-01-15

## 2020-01-15 DIAGNOSIS — I35.9 AORTIC VALVE DISORDER: ICD-10-CM

## 2020-01-15 LAB — INR PPP: 2.2 (ref 0.84–1.19)

## 2020-01-22 ENCOUNTER — ANTICOAG VISIT (OUTPATIENT)
Dept: CARDIOLOGY CLINIC | Facility: CLINIC | Age: 45
End: 2020-01-22

## 2020-01-22 DIAGNOSIS — I35.9 AORTIC VALVE DISORDER: ICD-10-CM

## 2020-01-22 LAB — INR PPP: 3.3 (ref 0.84–1.19)

## 2020-01-22 NOTE — PROGRESS NOTES
Pt called to report her INR of 3 3  She is in range, but she said she had a hard time getting her finger to stop bleeding  Today is her 7 5mg day so I told her just to take 5mg today then resume reg dose and retest again in 1 week  I didn't want to switch her to 5/5/7 5 again because she went too low then

## 2020-01-29 ENCOUNTER — TELEPHONE (OUTPATIENT)
Dept: CARDIOLOGY CLINIC | Facility: CLINIC | Age: 45
End: 2020-01-29

## 2020-01-29 ENCOUNTER — ANTICOAG VISIT (OUTPATIENT)
Dept: CARDIOLOGY CLINIC | Facility: CLINIC | Age: 45
End: 2020-01-29

## 2020-01-29 DIAGNOSIS — I35.9 AORTIC VALVE DISORDER: ICD-10-CM

## 2020-01-29 LAB — INR PPP: 2.2 (ref 0.84–1.19)

## 2020-02-06 ENCOUNTER — ANTICOAG VISIT (OUTPATIENT)
Dept: CARDIOLOGY CLINIC | Facility: CLINIC | Age: 45
End: 2020-02-06

## 2020-02-06 ENCOUNTER — TELEPHONE (OUTPATIENT)
Dept: CARDIOLOGY CLINIC | Facility: CLINIC | Age: 45
End: 2020-02-06

## 2020-02-06 DIAGNOSIS — I35.9 AORTIC VALVE DISORDER: ICD-10-CM

## 2020-02-06 LAB — INR PPP: 2.5 (ref 0.84–1.19)

## 2020-02-19 ENCOUNTER — ANTICOAG VISIT (OUTPATIENT)
Dept: CARDIOLOGY CLINIC | Facility: CLINIC | Age: 45
End: 2020-02-19

## 2020-02-19 ENCOUNTER — ANNUAL EXAM (OUTPATIENT)
Dept: OBGYN CLINIC | Facility: MEDICAL CENTER | Age: 45
End: 2020-02-19
Payer: COMMERCIAL

## 2020-02-19 ENCOUNTER — APPOINTMENT (OUTPATIENT)
Dept: LAB | Facility: MEDICAL CENTER | Age: 45
End: 2020-02-19
Payer: COMMERCIAL

## 2020-02-19 VITALS — DIASTOLIC BLOOD PRESSURE: 70 MMHG | WEIGHT: 112 LBS | SYSTOLIC BLOOD PRESSURE: 110 MMHG | BODY MASS INDEX: 21.87 KG/M2

## 2020-02-19 DIAGNOSIS — Z01.419 ENCOUNTER FOR GYNECOLOGICAL EXAMINATION (GENERAL) (ROUTINE) WITHOUT ABNORMAL FINDINGS: ICD-10-CM

## 2020-02-19 DIAGNOSIS — I35.9 AORTIC VALVE DISORDER: ICD-10-CM

## 2020-02-19 DIAGNOSIS — Z30.42 ENCOUNTER FOR DEPO-PROVERA CONTRACEPTION: Primary | ICD-10-CM

## 2020-02-19 DIAGNOSIS — Z79.01 LONG TERM (CURRENT) USE OF ANTICOAGULANTS: Primary | ICD-10-CM

## 2020-02-19 DIAGNOSIS — Z30.42 SURVEILLANCE FOR DEPO-PROVERA CONTRACEPTION: ICD-10-CM

## 2020-02-19 DIAGNOSIS — Z12.31 ENCOUNTER FOR SCREENING MAMMOGRAM FOR MALIGNANT NEOPLASM OF BREAST: ICD-10-CM

## 2020-02-19 LAB
INR PPP: 2.68 (ref 0.84–1.19)
PROTHROMBIN TIME: 28 SECONDS (ref 11.6–14.5)

## 2020-02-19 PROCEDURE — 99396 PREV VISIT EST AGE 40-64: CPT | Performed by: NURSE PRACTITIONER

## 2020-02-19 PROCEDURE — 85610 PROTHROMBIN TIME: CPT

## 2020-02-19 PROCEDURE — 36415 COLL VENOUS BLD VENIPUNCTURE: CPT

## 2020-02-19 PROCEDURE — 96372 THER/PROPH/DIAG INJ SC/IM: CPT | Performed by: NURSE PRACTITIONER

## 2020-02-19 RX ORDER — MEDROXYPROGESTERONE ACETATE 150 MG/ML
150 INJECTION, SUSPENSION INTRAMUSCULAR
Status: DISCONTINUED | OUTPATIENT
Start: 2020-02-19 | End: 2021-05-11

## 2020-02-19 RX ORDER — MEDROXYPROGESTERONE ACETATE 150 MG/ML
150 INJECTION, SUSPENSION INTRAMUSCULAR
Qty: 1 ML | Refills: 4 | Status: SHIPPED | OUTPATIENT
Start: 2020-02-19 | End: 2021-01-29 | Stop reason: CLARIF

## 2020-02-19 RX ADMIN — MEDROXYPROGESTERONE ACETATE 150 MG: 150 INJECTION, SUSPENSION INTRAMUSCULAR at 11:08

## 2020-02-19 NOTE — ASSESSMENT & PLAN NOTE
Likes Depo and desires to continue  Advised occasional spotting is acceptable given anticoag therapy  Dose was administered to day  Refill provided   She is aware condoms are recommended with all sexual contact for prevention of STI

## 2020-02-19 NOTE — ASSESSMENT & PLAN NOTE
Benign findings on routine gyn exam  Recommended monthly SBE, annual CBE and annual screening mammo  ASCCP guidelines reviewed and pap with cotesting noted to be up to date  Baseline colon cancer screening recommended; she reports she will never have scope but would consider Cologuard - advised f/u with PCP to discuss  The patient denies STI risk factors and declines testing at this time  Reviewed diet/activity recommendations:  Encouraged daily Ca++ and vitamin D intake as well as daily weight bearing exercise for promotion of bone health    Discussed perimenopausal considerations and symptoms to report  RTO in one year for routine annual gyn exam or sooner PRN

## 2020-02-19 NOTE — PROGRESS NOTES
Assessment/Plan:    Surveillance for Depo-Provera contraception  Likes Depo and desires to continue  Advised occasional spotting is acceptable given anticoag therapy  Dose was administered to day  Refill provided  She is aware condoms are recommended with all sexual contact for prevention of STI    Encounter for gynecological examination (general) (routine) without abnormal findings  Benign findings on routine gyn exam  Recommended monthly SBE, annual CBE and annual screening mammo  ASCCP guidelines reviewed and pap with cotesting noted to be up to date  Baseline colon cancer screening recommended; she reports she will never have scope but would consider Cologuard - advised f/u with PCP to discuss  The patient denies STI risk factors and declines testing at this time  Reviewed diet/activity recommendations:  Encouraged daily Ca++ and vitamin D intake as well as daily weight bearing exercise for promotion of bone health    Discussed perimenopausal considerations and symptoms to report  RTO in one year for routine annual gyn exam or sooner PRN  Diagnoses and all orders for this visit:    Encounter for Depo-Provera contraception  -     medroxyPROGESTERone acetate (DEPO-PROVERA SYRINGE) IM injection 150 mg    Encounter for screening mammogram for malignant neoplasm of breast  -     Mammo screening bilateral w 3d & cad; Future    Encounter for gynecological examination (general) (routine) without abnormal findings    Surveillance for Depo-Provera contraception  -     medroxyPROGESTERone (DEPO-PROVERA) 150 mg/mL injection; Inject 1 mL (150 mg total) into a muscle every 3 (three) months          Subjective:      Patient ID: Andrew Hernandez is a 53 y o  female  This patient presents for routine annual gyn exam    Medically complex but stable  Long term anticoag use (coumadin)  Recent hospitalization for epistaxis - no issues since then  Likes Depo and desires to continue   Spots occasionally on this but this is not bothersome  She denies acute gyn complaints  She denies pelvic pain, breast concerns, abnormal discharge, bowel/bladder dysfunction, depression/anxiety  Sexually active  One new partner since last visit but denies STI concerns and declines testing today  The following portions of the patient's history were reviewed and updated as appropriate: allergies, current medications, past family history, past medical history, past social history, past surgical history and problem list     Review of Systems   Constitutional: Negative  Respiratory: Negative  Cardiovascular: Negative  Gastrointestinal: Negative  Genitourinary: Negative  Musculoskeletal: Negative  Skin: Negative  Neurological: Negative  Psychiatric/Behavioral: Negative  Objective:      /70   Wt 50 8 kg (112 lb)   LMP  (LMP Unknown)   BMI 21 87 kg/m²          Physical Exam   Constitutional: She is oriented to person, place, and time  She appears well-developed and well-nourished  HENT:   Head: Normocephalic and atraumatic  Eyes: Pupils are equal, round, and reactive to light  EOM are normal    Neck: Normal range of motion  Neck supple  No thyromegaly present  Cardiovascular: Normal rate, regular rhythm and normal heart sounds  Pulmonary/Chest: Effort normal and breath sounds normal  No respiratory distress  She has no wheezes  She has no rales  She exhibits no mass, no tenderness and no deformity  Right breast exhibits no inverted nipple, no mass, no nipple discharge, no skin change and no tenderness  Left breast exhibits no inverted nipple, no mass, no nipple discharge, no skin change and no tenderness  No breast tenderness or discharge  Breasts are symmetrical        Abdominal: Soft  She exhibits no distension and no mass  There is no splenomegaly or hepatomegaly  There is no tenderness  There is no rebound and no guarding     Genitourinary: Rectum normal and uterus normal  No breast tenderness or discharge  No labial fusion  There is no rash, tenderness, lesion or injury on the right labia  There is no rash, tenderness, lesion or injury on the left labia  Cervix exhibits no motion tenderness, no discharge and no friability  Right adnexum displays no mass, no tenderness and no fullness  Left adnexum displays no mass, no tenderness and no fullness  No erythema, tenderness or bleeding in the vagina  No foreign body in the vagina  Vaginal discharge found  Musculoskeletal: Normal range of motion  Lymphadenopathy:     She has no cervical adenopathy  She has no axillary adenopathy  Neurological: She is alert and oriented to person, place, and time  No cranial nerve deficit  Skin: Skin is warm and dry  No rash noted  No cyanosis  Nails show no clubbing  Psychiatric: She has a normal mood and affect   Her speech is normal and behavior is normal  Judgment and thought content normal  Cognition and memory are normal

## 2020-02-19 NOTE — PROGRESS NOTES
Pt presents for her yearly and depo injection, given in her right buttock   Last injection was 11/20/2019  Tony [address] [id number]  LOT [id number]  EXP 9/30/2023

## 2020-03-10 ENCOUNTER — TELEPHONE (OUTPATIENT)
Dept: CARDIOLOGY CLINIC | Facility: CLINIC | Age: 45
End: 2020-03-10

## 2020-03-10 DIAGNOSIS — Z79.01 LONG TERM (CURRENT) USE OF ANTICOAGULANTS: Primary | ICD-10-CM

## 2020-03-10 DIAGNOSIS — I35.9 AORTIC VALVULAR DISORDER: ICD-10-CM

## 2020-03-13 ENCOUNTER — APPOINTMENT (OUTPATIENT)
Dept: LAB | Facility: HOSPITAL | Age: 45
End: 2020-03-13
Attending: INTERNAL MEDICINE
Payer: COMMERCIAL

## 2020-03-13 ENCOUNTER — ANTICOAG VISIT (OUTPATIENT)
Dept: CARDIOLOGY CLINIC | Facility: CLINIC | Age: 45
End: 2020-03-13

## 2020-03-13 DIAGNOSIS — I35.9 AORTIC VALVE DISORDER: ICD-10-CM

## 2020-03-13 LAB
INR PPP: 2.38 (ref 0.84–1.19)
PROTHROMBIN TIME: 26.3 SECONDS (ref 11.6–14.5)

## 2020-03-13 PROCEDURE — 36415 COLL VENOUS BLD VENIPUNCTURE: CPT

## 2020-03-13 PROCEDURE — 85610 PROTHROMBIN TIME: CPT

## 2020-03-18 ENCOUNTER — TELEPHONE (OUTPATIENT)
Dept: CARDIOLOGY CLINIC | Facility: CLINIC | Age: 45
End: 2020-03-18

## 2020-03-18 NOTE — TELEPHONE ENCOUNTER
I s/w the patient  I told her that we are following cdc guidelines  I also told her that most pt's have been saying the lab is less crowded 1st thing in the am or late afternoon  She is a self sudhakar so I asked why she does not test at home  She said she does not have strips   Advised to call [employer] to order more

## 2020-03-18 NOTE — TELEPHONE ENCOUNTER
Pt called requesting advise for her routine INR lab work  Pt would like to know if it is safe to go to the lab and if not is there any other option

## 2020-03-20 ENCOUNTER — APPOINTMENT (OUTPATIENT)
Dept: LAB | Facility: HOSPITAL | Age: 45
End: 2020-03-20
Attending: INTERNAL MEDICINE
Payer: COMMERCIAL

## 2020-03-20 ENCOUNTER — ANTICOAG VISIT (OUTPATIENT)
Dept: CARDIOLOGY CLINIC | Facility: CLINIC | Age: 45
End: 2020-03-20

## 2020-03-20 DIAGNOSIS — I35.9 AORTIC VALVE DISORDER: ICD-10-CM

## 2020-03-20 DIAGNOSIS — Z79.01 LONG TERM (CURRENT) USE OF ANTICOAGULANTS: Primary | ICD-10-CM

## 2020-03-20 NOTE — PROGRESS NOTES
S/w pt  She has increased her salad intake and will cont to do so  Aware that it decreases her INR  Aware that if I increase her and she stops eating the salads her inr may go high due to the lack of vit k  Explained diet needs to be consistent   Instructed to take 7 5mg daily and retest again in 1 week

## 2020-03-27 ENCOUNTER — ANTICOAG VISIT (OUTPATIENT)
Dept: CARDIOLOGY CLINIC | Facility: CLINIC | Age: 45
End: 2020-03-27

## 2020-03-27 DIAGNOSIS — I35.9 AORTIC VALVE DISORDER: ICD-10-CM

## 2020-03-27 LAB — INR PPP: 2.9 (ref 0.84–1.19)

## 2020-03-31 ENCOUNTER — ANTICOAG VISIT (OUTPATIENT)
Dept: CARDIOLOGY CLINIC | Facility: CLINIC | Age: 45
End: 2020-03-31

## 2020-03-31 ENCOUNTER — TELEPHONE (OUTPATIENT)
Dept: CARDIOLOGY CLINIC | Facility: CLINIC | Age: 45
End: 2020-03-31

## 2020-03-31 DIAGNOSIS — I35.9 AORTIC VALVE DISORDER: ICD-10-CM

## 2020-03-31 LAB — INR PPP: 3.5 (ref 0.84–1.19)

## 2020-03-31 NOTE — PROGRESS NOTES
S/w pt  She states her mouth was bleeding today  Her INR is at 3 5 but want to try to keep her lower since she bleeds easily  Instructed to take 5mg Tues/Thurs and 7 5mg the rest and retest again on Friday

## 2020-04-02 ENCOUNTER — TELEPHONE (OUTPATIENT)
Dept: INTERNAL MEDICINE CLINIC | Facility: CLINIC | Age: 45
End: 2020-04-02

## 2020-04-02 DIAGNOSIS — G43.909 MIGRAINE WITHOUT STATUS MIGRAINOSUS, NOT INTRACTABLE, UNSPECIFIED MIGRAINE TYPE: ICD-10-CM

## 2020-04-02 RX ORDER — RIZATRIPTAN BENZOATE 10 MG/1
10 TABLET ORAL ONCE AS NEEDED
Qty: 30 TABLET | Refills: 0 | Status: CANCELLED | OUTPATIENT
Start: 2020-04-02

## 2020-04-02 NOTE — TELEPHONE ENCOUNTER
Patient called she is completely out of the medication rizatriptan (MAXALT) 10 MG tablet   Could this be sent today to:    [id number] [facility] [address]    Please call patient when [phone]

## 2020-04-08 ENCOUNTER — ANTICOAG VISIT (OUTPATIENT)
Dept: CARDIOLOGY CLINIC | Facility: CLINIC | Age: 45
End: 2020-04-08

## 2020-04-08 ENCOUNTER — TELEPHONE (OUTPATIENT)
Dept: CARDIOLOGY CLINIC | Facility: CLINIC | Age: 45
End: 2020-04-08

## 2020-04-08 DIAGNOSIS — I35.9 AORTIC VALVE DISORDER: ICD-10-CM

## 2020-04-08 LAB — INR PPP: 3.3 (ref 0.84–1.19)

## 2020-04-15 DIAGNOSIS — G43.909 MIGRAINE WITHOUT STATUS MIGRAINOSUS, NOT INTRACTABLE, UNSPECIFIED MIGRAINE TYPE: ICD-10-CM

## 2020-04-15 RX ORDER — RIZATRIPTAN BENZOATE 10 MG/1
TABLET ORAL
Qty: 30 TABLET | Refills: 0 | Status: SHIPPED | OUTPATIENT
Start: 2020-04-15 | End: 2021-01-27

## 2020-04-16 ENCOUNTER — TELEMEDICINE (OUTPATIENT)
Dept: CARDIOLOGY CLINIC | Facility: CLINIC | Age: 45
End: 2020-04-16
Payer: COMMERCIAL

## 2020-04-16 ENCOUNTER — ANTICOAG VISIT (OUTPATIENT)
Dept: CARDIOLOGY CLINIC | Facility: CLINIC | Age: 45
End: 2020-04-16

## 2020-04-16 VITALS — WEIGHT: 115 LBS | BODY MASS INDEX: 22.46 KG/M2

## 2020-04-16 DIAGNOSIS — J06.9 VIRAL UPPER RESPIRATORY TRACT INFECTION: ICD-10-CM

## 2020-04-16 DIAGNOSIS — I35.9 AORTIC VALVE DISORDER: ICD-10-CM

## 2020-04-16 LAB — INR PPP: 2.8 (ref 0.84–1.19)

## 2020-04-16 PROCEDURE — 99213 OFFICE O/P EST LOW 20 MIN: CPT | Performed by: INTERNAL MEDICINE

## 2020-04-16 RX ORDER — ALBUTEROL SULFATE 90 UG/1
2 AEROSOL, METERED RESPIRATORY (INHALATION) EVERY 6 HOURS PRN
Qty: 18 G | Refills: 1 | Status: SHIPPED | OUTPATIENT
Start: 2020-04-16 | End: 2021-01-29 | Stop reason: CLARIF

## 2020-04-24 ENCOUNTER — TELEPHONE (OUTPATIENT)
Dept: NON INVASIVE DIAGNOSTICS | Facility: HOSPITAL | Age: 45
End: 2020-04-24

## 2020-04-24 ENCOUNTER — TELEPHONE (OUTPATIENT)
Dept: OTHER | Facility: OTHER | Age: 45
End: 2020-04-24

## 2020-04-24 LAB — INR PPP: 2 (ref 0.84–1.19)

## 2020-04-27 ENCOUNTER — ANTICOAG VISIT (OUTPATIENT)
Dept: CARDIOLOGY CLINIC | Facility: CLINIC | Age: 45
End: 2020-04-27

## 2020-04-27 DIAGNOSIS — I35.9 AORTIC VALVE DISORDER: ICD-10-CM

## 2020-04-28 ENCOUNTER — ANTICOAG VISIT (OUTPATIENT)
Dept: CARDIOLOGY CLINIC | Facility: CLINIC | Age: 45
End: 2020-04-28

## 2020-04-28 ENCOUNTER — TELEPHONE (OUTPATIENT)
Dept: CARDIOLOGY CLINIC | Facility: CLINIC | Age: 45
End: 2020-04-28

## 2020-04-28 DIAGNOSIS — I35.9 AORTIC VALVE DISORDER: ICD-10-CM

## 2020-04-28 LAB — INR PPP: 3 (ref 0.84–1.19)

## 2020-05-05 ENCOUNTER — APPOINTMENT (OUTPATIENT)
Dept: LAB | Facility: HOSPITAL | Age: 45
End: 2020-05-05
Attending: INTERNAL MEDICINE
Payer: COMMERCIAL

## 2020-05-05 ENCOUNTER — ANTICOAG VISIT (OUTPATIENT)
Dept: CARDIOLOGY CLINIC | Facility: CLINIC | Age: 45
End: 2020-05-05

## 2020-05-05 DIAGNOSIS — I35.9 AORTIC VALVE DISORDER: ICD-10-CM

## 2020-05-14 ENCOUNTER — CLINICAL SUPPORT (OUTPATIENT)
Dept: OBGYN CLINIC | Facility: MEDICAL CENTER | Age: 45
End: 2020-05-14
Payer: COMMERCIAL

## 2020-05-14 DIAGNOSIS — Z30.42 SURVEILLANCE FOR DEPO-PROVERA CONTRACEPTION: ICD-10-CM

## 2020-05-14 PROCEDURE — 96372 THER/PROPH/DIAG INJ SC/IM: CPT | Performed by: NURSE PRACTITIONER

## 2020-05-14 RX ADMIN — MEDROXYPROGESTERONE ACETATE 150 MG: 150 INJECTION, SUSPENSION INTRAMUSCULAR at 10:25

## 2020-05-21 DIAGNOSIS — Z00.00 PERIODIC HEALTH ASSESSMENT, GENERAL SCREENING, ADULT: ICD-10-CM

## 2020-05-22 RX ORDER — VITAMIN A, VITAMIN C, VITAMIN D-3, VITAMIN E, VITAMIN B-1, VITAMIN B-2, NIACIN, VITAMIN B-6, CALCIUM, IRON, ZINC, COPPER 4000; 120; 400; 22; 1.84; 3; 20; 10; 1; 12; 200; 27; 25; 2 [IU]/1; MG/1; [IU]/1; MG/1; MG/1; MG/1; MG/1; MG/1; MG/1; UG/1; MG/1; MG/1; MG/1; MG/1
TABLET ORAL
Qty: 90 TABLET | Refills: 1 | Status: SHIPPED | OUTPATIENT
Start: 2020-05-22 | End: 2020-10-28

## 2020-05-27 ENCOUNTER — ANTICOAG VISIT (OUTPATIENT)
Dept: CARDIOLOGY CLINIC | Facility: CLINIC | Age: 45
End: 2020-05-27

## 2020-05-27 ENCOUNTER — APPOINTMENT (OUTPATIENT)
Dept: LAB | Facility: HOSPITAL | Age: 45
End: 2020-05-27
Attending: INTERNAL MEDICINE
Payer: COMMERCIAL

## 2020-05-27 DIAGNOSIS — I35.9 AORTIC VALVE DISORDER: ICD-10-CM

## 2020-06-08 ENCOUNTER — TELEPHONE (OUTPATIENT)
Dept: INTERNAL MEDICINE CLINIC | Facility: CLINIC | Age: 45
End: 2020-06-08

## 2020-06-11 ENCOUNTER — APPOINTMENT (OUTPATIENT)
Dept: LAB | Facility: MEDICAL CENTER | Age: 45
End: 2020-06-11
Payer: COMMERCIAL

## 2020-06-11 ENCOUNTER — OFFICE VISIT (OUTPATIENT)
Dept: OBGYN CLINIC | Facility: MEDICAL CENTER | Age: 45
End: 2020-06-11
Payer: COMMERCIAL

## 2020-06-11 VITALS — DIASTOLIC BLOOD PRESSURE: 80 MMHG | BODY MASS INDEX: 22.65 KG/M2 | SYSTOLIC BLOOD PRESSURE: 130 MMHG | WEIGHT: 116 LBS

## 2020-06-11 DIAGNOSIS — Z11.3 SCREENING FOR STD (SEXUALLY TRANSMITTED DISEASE): ICD-10-CM

## 2020-06-11 DIAGNOSIS — N90.7 VULVAR CYST: ICD-10-CM

## 2020-06-11 DIAGNOSIS — Z11.3 SCREENING FOR STD (SEXUALLY TRANSMITTED DISEASE): Primary | ICD-10-CM

## 2020-06-11 LAB
HCV AB SER QL: NORMAL
HIV 1+2 AB+HIV1 P24 AG SERPL QL IA: NORMAL
HIV1 P24 AG SER QL: NORMAL
RPR SER QL: NORMAL

## 2020-06-11 PROCEDURE — 86592 SYPHILIS TEST NON-TREP QUAL: CPT

## 2020-06-11 PROCEDURE — 99213 OFFICE O/P EST LOW 20 MIN: CPT | Performed by: NURSE PRACTITIONER

## 2020-06-11 PROCEDURE — 3075F SYST BP GE 130 - 139MM HG: CPT | Performed by: NURSE PRACTITIONER

## 2020-06-11 PROCEDURE — 87806 HIV AG W/HIV1&2 ANTB W/OPTIC: CPT

## 2020-06-11 PROCEDURE — 1036F TOBACCO NON-USER: CPT | Performed by: NURSE PRACTITIONER

## 2020-06-11 PROCEDURE — 87491 CHLMYD TRACH DNA AMP PROBE: CPT | Performed by: NURSE PRACTITIONER

## 2020-06-11 PROCEDURE — 36415 COLL VENOUS BLD VENIPUNCTURE: CPT

## 2020-06-11 PROCEDURE — 86803 HEPATITIS C AB TEST: CPT

## 2020-06-11 PROCEDURE — 87591 N.GONORRHOEAE DNA AMP PROB: CPT | Performed by: NURSE PRACTITIONER

## 2020-06-11 PROCEDURE — 3079F DIAST BP 80-89 MM HG: CPT | Performed by: NURSE PRACTITIONER

## 2020-06-11 RX ORDER — CEPHALEXIN 500 MG/1
500 CAPSULE ORAL EVERY 12 HOURS SCHEDULED
Qty: 14 CAPSULE | Refills: 0 | Status: SHIPPED | OUTPATIENT
Start: 2020-06-11 | End: 2020-06-18

## 2020-06-12 ENCOUNTER — TELEPHONE (OUTPATIENT)
Dept: INTERNAL MEDICINE CLINIC | Facility: CLINIC | Age: 45
End: 2020-06-12

## 2020-06-13 LAB
C TRACH DNA SPEC QL NAA+PROBE: NEGATIVE
N GONORRHOEA DNA SPEC QL NAA+PROBE: NEGATIVE

## 2020-06-15 ENCOUNTER — ANTICOAG VISIT (OUTPATIENT)
Dept: CARDIOLOGY CLINIC | Facility: CLINIC | Age: 45
End: 2020-06-15

## 2020-06-15 DIAGNOSIS — I35.9 AORTIC VALVE DISORDER: ICD-10-CM

## 2020-06-15 LAB — INR PPP: 3.5 (ref 0.84–1.19)

## 2020-06-18 ENCOUNTER — TELEPHONE (OUTPATIENT)
Dept: OBGYN CLINIC | Facility: MEDICAL CENTER | Age: 45
End: 2020-06-18

## 2020-06-18 ENCOUNTER — TELEPHONE (OUTPATIENT)
Dept: INTERNAL MEDICINE CLINIC | Facility: CLINIC | Age: 45
End: 2020-06-18

## 2020-06-18 DIAGNOSIS — L30.9 DERMATITIS: Primary | ICD-10-CM

## 2020-06-18 RX ORDER — PREDNISONE 10 MG/1
TABLET ORAL
Qty: 18 TABLET | Refills: 0 | Status: SHIPPED | OUTPATIENT
Start: 2020-06-18 | End: 2020-10-23

## 2020-06-22 ENCOUNTER — ANTICOAG VISIT (OUTPATIENT)
Dept: CARDIOLOGY CLINIC | Facility: CLINIC | Age: 45
End: 2020-06-22

## 2020-06-22 DIAGNOSIS — I35.9 AORTIC VALVE DISORDER: ICD-10-CM

## 2020-06-22 LAB — INR PPP: 2.4 (ref 0.84–1.19)

## 2020-06-30 ENCOUNTER — TELEPHONE (OUTPATIENT)
Dept: CARDIOLOGY CLINIC | Facility: CLINIC | Age: 45
End: 2020-06-30

## 2020-06-30 ENCOUNTER — ANTICOAG VISIT (OUTPATIENT)
Dept: CARDIOLOGY CLINIC | Facility: CLINIC | Age: 45
End: 2020-06-30

## 2020-06-30 DIAGNOSIS — I35.9 AORTIC VALVE DISORDER: ICD-10-CM

## 2020-06-30 LAB — INR PPP: 2.5 (ref 0.84–1.19)

## 2020-07-14 ENCOUNTER — ANTICOAG VISIT (OUTPATIENT)
Dept: CARDIOLOGY CLINIC | Facility: CLINIC | Age: 45
End: 2020-07-14

## 2020-07-14 ENCOUNTER — TELEPHONE (OUTPATIENT)
Dept: CARDIOLOGY CLINIC | Facility: CLINIC | Age: 45
End: 2020-07-14

## 2020-07-14 DIAGNOSIS — I35.9 AORTIC VALVE DISORDER: ICD-10-CM

## 2020-07-14 LAB — INR PPP: 2.1 (ref 0.84–1.19)

## 2020-07-29 ENCOUNTER — APPOINTMENT (OUTPATIENT)
Dept: LAB | Facility: HOSPITAL | Age: 45
End: 2020-07-29
Attending: INTERNAL MEDICINE
Payer: COMMERCIAL

## 2020-07-30 ENCOUNTER — ANTICOAG VISIT (OUTPATIENT)
Dept: CARDIOLOGY CLINIC | Facility: CLINIC | Age: 45
End: 2020-07-30

## 2020-07-30 DIAGNOSIS — I35.9 AORTIC VALVE DISORDER: ICD-10-CM

## 2020-07-31 ENCOUNTER — TELEPHONE (OUTPATIENT)
Dept: CARDIOLOGY CLINIC | Facility: CLINIC | Age: 45
End: 2020-07-31

## 2020-07-31 DIAGNOSIS — Z79.01 LONG TERM (CURRENT) USE OF ANTICOAGULANTS: Primary | ICD-10-CM

## 2020-07-31 DIAGNOSIS — Z95.2 H/O MECHANICAL AORTIC VALVE REPLACEMENT: ICD-10-CM

## 2020-08-10 ENCOUNTER — TELEPHONE (OUTPATIENT)
Dept: CARDIOLOGY CLINIC | Facility: CLINIC | Age: 45
End: 2020-08-10

## 2020-08-10 NOTE — TELEPHONE ENCOUNTER
S/dixie Victor  I don't have her form so she is going to refax it to Dr DEMPSEY's fax #   Aware that he is off this week

## 2020-08-10 NOTE — TELEPHONE ENCOUNTER
Vi Pantoja from [facility] called requesting the order status  Victorino states she is waiting for the order on their form

## 2020-08-17 ENCOUNTER — TELEPHONE (OUTPATIENT)
Dept: CARDIOLOGY CLINIC | Facility: CLINIC | Age: 45
End: 2020-08-17

## 2020-08-17 NOTE — TELEPHONE ENCOUNTER
Dedra Rangel called requesting pt demo, insurance and chart notes from 90 days back from today   Franic Holder

## 2020-08-19 ENCOUNTER — ANTICOAG VISIT (OUTPATIENT)
Dept: CARDIOLOGY CLINIC | Facility: CLINIC | Age: 45
End: 2020-08-19

## 2020-08-19 ENCOUNTER — APPOINTMENT (OUTPATIENT)
Dept: LAB | Facility: HOSPITAL | Age: 45
End: 2020-08-19
Attending: INTERNAL MEDICINE
Payer: COMMERCIAL

## 2020-08-19 DIAGNOSIS — I35.9 AORTIC VALVE DISORDER: ICD-10-CM

## 2020-08-19 NOTE — PROGRESS NOTES
S/w the pt  States she had more greens   Instructed to take 7 5mg Sun/Tues/Thurs, 5mg the rest and retest again in 1 week

## 2020-08-27 ENCOUNTER — ANTICOAG VISIT (OUTPATIENT)
Dept: CARDIOLOGY CLINIC | Facility: CLINIC | Age: 45
End: 2020-08-27

## 2020-08-27 ENCOUNTER — APPOINTMENT (OUTPATIENT)
Dept: LAB | Facility: HOSPITAL | Age: 45
End: 2020-08-27
Attending: INTERNAL MEDICINE
Payer: COMMERCIAL

## 2020-08-27 DIAGNOSIS — I35.9 AORTIC VALVE DISORDER: ICD-10-CM

## 2020-08-27 NOTE — TELEPHONE ENCOUNTER
S/w Zahra Console from [facility]  Home INR machine was denied because it is out of network with her insurance   Pt has been notified

## 2020-08-31 DIAGNOSIS — I10 HYPERTENSION, ESSENTIAL: ICD-10-CM

## 2020-08-31 NOTE — TELEPHONE ENCOUNTER
Patient has not been seen in our office in a while  Patient needs an appointment to see Dr Sonam Diaz  Thank you

## 2020-09-14 ENCOUNTER — APPOINTMENT (OUTPATIENT)
Dept: LAB | Facility: HOSPITAL | Age: 45
End: 2020-09-14
Attending: INTERNAL MEDICINE
Payer: COMMERCIAL

## 2020-09-15 ENCOUNTER — TELEPHONE (OUTPATIENT)
Dept: CARDIOLOGY CLINIC | Facility: CLINIC | Age: 45
End: 2020-09-15

## 2020-09-15 ENCOUNTER — ANTICOAG VISIT (OUTPATIENT)
Dept: CARDIOLOGY CLINIC | Facility: CLINIC | Age: 45
End: 2020-09-15

## 2020-09-15 DIAGNOSIS — I35.9 AORTIC VALVE DISORDER: ICD-10-CM

## 2020-09-22 ENCOUNTER — ANTICOAG VISIT (OUTPATIENT)
Dept: CARDIOLOGY CLINIC | Facility: CLINIC | Age: 45
End: 2020-09-22

## 2020-09-22 ENCOUNTER — APPOINTMENT (OUTPATIENT)
Dept: LAB | Facility: HOSPITAL | Age: 45
End: 2020-09-22
Attending: INTERNAL MEDICINE
Payer: COMMERCIAL

## 2020-09-22 DIAGNOSIS — Z79.01 LONG TERM (CURRENT) USE OF ANTICOAGULANTS: Primary | ICD-10-CM

## 2020-09-22 DIAGNOSIS — I35.9 AORTIC VALVE DISORDER: ICD-10-CM

## 2020-10-07 ENCOUNTER — APPOINTMENT (OUTPATIENT)
Dept: LAB | Facility: HOSPITAL | Age: 45
End: 2020-10-07
Attending: INTERNAL MEDICINE
Payer: COMMERCIAL

## 2020-10-07 ENCOUNTER — ANTICOAG VISIT (OUTPATIENT)
Dept: CARDIOLOGY CLINIC | Facility: CLINIC | Age: 45
End: 2020-10-07

## 2020-10-07 DIAGNOSIS — I35.9 AORTIC VALVE DISORDER: ICD-10-CM

## 2020-10-20 ENCOUNTER — ANTICOAG VISIT (OUTPATIENT)
Dept: CARDIOLOGY CLINIC | Facility: CLINIC | Age: 45
End: 2020-10-20

## 2020-10-20 ENCOUNTER — APPOINTMENT (OUTPATIENT)
Dept: LAB | Facility: HOSPITAL | Age: 45
End: 2020-10-20
Attending: INTERNAL MEDICINE
Payer: COMMERCIAL

## 2020-10-20 DIAGNOSIS — I35.9 AORTIC VALVE DISORDER: ICD-10-CM

## 2020-10-23 ENCOUNTER — OFFICE VISIT (OUTPATIENT)
Dept: CARDIOLOGY CLINIC | Facility: CLINIC | Age: 45
End: 2020-10-23
Payer: COMMERCIAL

## 2020-10-23 VITALS
HEIGHT: 60 IN | SYSTOLIC BLOOD PRESSURE: 122 MMHG | DIASTOLIC BLOOD PRESSURE: 70 MMHG | HEART RATE: 71 BPM | BODY MASS INDEX: 21.4 KG/M2 | OXYGEN SATURATION: 99 % | WEIGHT: 109 LBS

## 2020-10-23 DIAGNOSIS — Z79.01 LONG TERM (CURRENT) USE OF ANTICOAGULANTS: ICD-10-CM

## 2020-10-23 DIAGNOSIS — Q25.1 COARCTATION OF AORTA: ICD-10-CM

## 2020-10-23 DIAGNOSIS — I35.9 AORTIC VALVE DISORDER: Primary | ICD-10-CM

## 2020-10-23 DIAGNOSIS — I10 HYPERTENSION, ESSENTIAL: ICD-10-CM

## 2020-10-23 PROBLEM — R79.1 SUBTHERAPEUTIC INTERNATIONAL NORMALIZED RATIO (INR): Status: RESOLVED | Noted: 2019-12-25 | Resolved: 2020-10-23

## 2020-10-23 PROBLEM — R04.0 EPISTAXIS: Status: RESOLVED | Noted: 2019-12-24 | Resolved: 2020-10-23

## 2020-10-23 PROCEDURE — 99214 OFFICE O/P EST MOD 30 MIN: CPT | Performed by: INTERNAL MEDICINE

## 2020-10-28 DIAGNOSIS — I35.9 AORTIC VALVE DISORDER: ICD-10-CM

## 2020-10-28 DIAGNOSIS — Z00.00 PERIODIC HEALTH ASSESSMENT, GENERAL SCREENING, ADULT: ICD-10-CM

## 2020-10-28 RX ORDER — PNV,CALCIUM 72/IRON/FOLIC ACID 27 MG-1 MG
TABLET ORAL
Qty: 90 TABLET | Refills: 1 | Status: SHIPPED | OUTPATIENT
Start: 2020-10-28 | End: 2020-12-11

## 2020-10-29 RX ORDER — WARFARIN SODIUM 5 MG/1
TABLET ORAL
Qty: 90 TABLET | Refills: 0 | Status: SHIPPED | OUTPATIENT
Start: 2020-10-29 | End: 2021-02-12 | Stop reason: SDUPTHER

## 2020-11-10 ENCOUNTER — LAB (OUTPATIENT)
Dept: LAB | Facility: HOSPITAL | Age: 45
End: 2020-11-10
Attending: INTERNAL MEDICINE
Payer: COMMERCIAL

## 2020-11-11 ENCOUNTER — ANTICOAG VISIT (OUTPATIENT)
Dept: CARDIOLOGY CLINIC | Facility: CLINIC | Age: 45
End: 2020-11-11

## 2020-11-11 DIAGNOSIS — I35.9 AORTIC VALVE DISORDER: ICD-10-CM

## 2020-11-16 ENCOUNTER — OFFICE VISIT (OUTPATIENT)
Dept: INTERNAL MEDICINE CLINIC | Facility: CLINIC | Age: 45
End: 2020-11-16
Payer: COMMERCIAL

## 2020-11-16 VITALS
HEIGHT: 60 IN | WEIGHT: 109 LBS | TEMPERATURE: 99.2 F | HEART RATE: 62 BPM | BODY MASS INDEX: 21.4 KG/M2 | RESPIRATION RATE: 12 BRPM | DIASTOLIC BLOOD PRESSURE: 66 MMHG | SYSTOLIC BLOOD PRESSURE: 102 MMHG | OXYGEN SATURATION: 95 %

## 2020-11-16 DIAGNOSIS — J02.9 VIRAL PHARYNGITIS: Primary | ICD-10-CM

## 2020-11-16 PROCEDURE — 3074F SYST BP LT 130 MM HG: CPT | Performed by: NURSE PRACTITIONER

## 2020-11-16 PROCEDURE — 1036F TOBACCO NON-USER: CPT | Performed by: NURSE PRACTITIONER

## 2020-11-16 PROCEDURE — 99214 OFFICE O/P EST MOD 30 MIN: CPT | Performed by: NURSE PRACTITIONER

## 2020-11-16 PROCEDURE — 3008F BODY MASS INDEX DOCD: CPT | Performed by: NURSE PRACTITIONER

## 2020-11-16 PROCEDURE — 3725F SCREEN DEPRESSION PERFORMED: CPT | Performed by: NURSE PRACTITIONER

## 2020-11-16 PROCEDURE — 3078F DIAST BP <80 MM HG: CPT | Performed by: NURSE PRACTITIONER

## 2020-11-23 ENCOUNTER — ANTICOAG VISIT (OUTPATIENT)
Dept: CARDIOLOGY CLINIC | Facility: CLINIC | Age: 45
End: 2020-11-23

## 2020-11-23 ENCOUNTER — LAB (OUTPATIENT)
Dept: LAB | Facility: HOSPITAL | Age: 45
End: 2020-11-23
Attending: INTERNAL MEDICINE
Payer: COMMERCIAL

## 2020-11-23 DIAGNOSIS — I35.9 AORTIC VALVE DISORDER: ICD-10-CM

## 2020-12-01 ENCOUNTER — HOSPITAL ENCOUNTER (OUTPATIENT)
Dept: NON INVASIVE DIAGNOSTICS | Facility: CLINIC | Age: 45
Discharge: HOME | End: 2020-12-01
Payer: COMMERCIAL

## 2020-12-01 DIAGNOSIS — I35.9 AORTIC VALVE DISORDER: ICD-10-CM

## 2020-12-01 PROCEDURE — 93306 TTE W/DOPPLER COMPLETE: CPT | Performed by: INTERNAL MEDICINE

## 2020-12-01 PROCEDURE — 93306 TTE W/DOPPLER COMPLETE: CPT

## 2020-12-05 ENCOUNTER — TELEPHONE (OUTPATIENT)
Dept: OTHER | Facility: OTHER | Age: 45
End: 2020-12-05

## 2020-12-05 ENCOUNTER — LAB (OUTPATIENT)
Dept: LAB | Facility: HOSPITAL | Age: 45
End: 2020-12-05
Attending: INTERNAL MEDICINE
Payer: COMMERCIAL

## 2020-12-07 ENCOUNTER — ANTICOAG VISIT (OUTPATIENT)
Dept: CARDIOLOGY CLINIC | Facility: CLINIC | Age: 45
End: 2020-12-07

## 2020-12-07 DIAGNOSIS — I35.9 AORTIC VALVE DISORDER: ICD-10-CM

## 2020-12-10 ENCOUNTER — TELEPHONE (OUTPATIENT)
Dept: CARDIOLOGY CLINIC | Facility: CLINIC | Age: 45
End: 2020-12-10

## 2020-12-10 DIAGNOSIS — Z00.00 PERIODIC HEALTH ASSESSMENT, GENERAL SCREENING, ADULT: ICD-10-CM

## 2020-12-10 NOTE — TELEPHONE ENCOUNTER
Ian Melendrez called to get the results of her echocardiogram that was done last week      [phone]

## 2020-12-10 NOTE — TELEPHONE ENCOUNTER
Spoke with patient and informed her that echocardiogram shows normal ejection fraction and normally functioning prosthetic mechanical aortic valve   Patient has coarctation of aorta which is known and mentioned again in the present report  Patient verbally understood

## 2020-12-11 ENCOUNTER — ANTICOAG VISIT (OUTPATIENT)
Dept: CARDIOLOGY CLINIC | Facility: CLINIC | Age: 45
End: 2020-12-11

## 2020-12-11 ENCOUNTER — LAB (OUTPATIENT)
Dept: LAB | Facility: HOSPITAL | Age: 45
End: 2020-12-11
Attending: INTERNAL MEDICINE
Payer: COMMERCIAL

## 2020-12-11 DIAGNOSIS — I35.9 AORTIC VALVE DISORDER: ICD-10-CM

## 2020-12-11 RX ORDER — PNV,CALCIUM 72/IRON/FOLIC ACID 27 MG-1 MG
TABLET ORAL
Qty: 90 TABLET | Refills: 1 | Status: SHIPPED | OUTPATIENT
Start: 2020-12-11 | End: 2021-10-20

## 2020-12-13 ENCOUNTER — TELEPHONE (OUTPATIENT)
Dept: OTHER | Facility: OTHER | Age: 45
End: 2020-12-13

## 2020-12-14 ENCOUNTER — LAB (OUTPATIENT)
Dept: LAB | Facility: HOSPITAL | Age: 45
End: 2020-12-14
Attending: INTERNAL MEDICINE
Payer: COMMERCIAL

## 2020-12-14 ENCOUNTER — ANTICOAG VISIT (OUTPATIENT)
Dept: CARDIOLOGY CLINIC | Facility: CLINIC | Age: 45
End: 2020-12-14

## 2020-12-14 DIAGNOSIS — Z79.01 LONG TERM (CURRENT) USE OF ANTICOAGULANTS: ICD-10-CM

## 2020-12-14 DIAGNOSIS — I35.9 AORTIC VALVE DISORDER: ICD-10-CM

## 2020-12-14 DIAGNOSIS — I35.9 AORTIC VALVULAR DISORDER: ICD-10-CM

## 2020-12-14 LAB
INR PPP: 1.92 (ref 0.84–1.19)
PROTHROMBIN TIME: 22.3 SECONDS (ref 11.6–14.5)

## 2020-12-14 PROCEDURE — 36415 COLL VENOUS BLD VENIPUNCTURE: CPT

## 2020-12-14 PROCEDURE — 85610 PROTHROMBIN TIME: CPT

## 2020-12-18 ENCOUNTER — ANTICOAG VISIT (OUTPATIENT)
Dept: CARDIOLOGY CLINIC | Facility: CLINIC | Age: 45
End: 2020-12-18

## 2020-12-18 ENCOUNTER — LAB (OUTPATIENT)
Dept: LAB | Facility: HOSPITAL | Age: 45
End: 2020-12-18
Attending: INTERNAL MEDICINE
Payer: COMMERCIAL

## 2020-12-18 DIAGNOSIS — I35.9 AORTIC VALVE DISORDER: ICD-10-CM

## 2020-12-18 DIAGNOSIS — I35.9 AORTIC VALVULAR DISORDER: ICD-10-CM

## 2020-12-18 DIAGNOSIS — Z79.01 LONG TERM (CURRENT) USE OF ANTICOAGULANTS: ICD-10-CM

## 2020-12-18 LAB
INR PPP: 2.09 (ref 0.84–1.19)
PROTHROMBIN TIME: 23.7 SECONDS (ref 11.6–14.5)

## 2020-12-18 PROCEDURE — 36415 COLL VENOUS BLD VENIPUNCTURE: CPT

## 2020-12-18 PROCEDURE — 85610 PROTHROMBIN TIME: CPT

## 2020-12-28 ENCOUNTER — ANTICOAG VISIT (OUTPATIENT)
Dept: CARDIOLOGY CLINIC | Facility: CLINIC | Age: 45
End: 2020-12-28

## 2020-12-28 ENCOUNTER — APPOINTMENT (OUTPATIENT)
Dept: LAB | Facility: HOSPITAL | Age: 45
End: 2020-12-28
Attending: INTERNAL MEDICINE
Payer: COMMERCIAL

## 2020-12-28 DIAGNOSIS — I35.9 AORTIC VALVE DISORDER: ICD-10-CM

## 2020-12-28 DIAGNOSIS — I35.9 AORTIC VALVULAR DISORDER: ICD-10-CM

## 2020-12-28 DIAGNOSIS — Z79.01 LONG TERM (CURRENT) USE OF ANTICOAGULANTS: ICD-10-CM

## 2020-12-28 LAB
INR PPP: 2.31 (ref 0.84–1.19)
PROTHROMBIN TIME: 24.3 SECONDS (ref 11.6–14.5)

## 2020-12-28 PROCEDURE — 36415 COLL VENOUS BLD VENIPUNCTURE: CPT

## 2020-12-28 PROCEDURE — 85610 PROTHROMBIN TIME: CPT

## 2021-01-13 ENCOUNTER — ANTICOAG VISIT (OUTPATIENT)
Dept: CARDIOLOGY CLINIC | Facility: CLINIC | Age: 46
End: 2021-01-13

## 2021-01-13 ENCOUNTER — LAB (OUTPATIENT)
Dept: LAB | Facility: HOSPITAL | Age: 46
End: 2021-01-13
Attending: INTERNAL MEDICINE
Payer: COMMERCIAL

## 2021-01-13 DIAGNOSIS — I35.9 AORTIC VALVULAR DISORDER: ICD-10-CM

## 2021-01-13 DIAGNOSIS — I35.9 AORTIC VALVE DISORDER: ICD-10-CM

## 2021-01-13 DIAGNOSIS — Z79.01 LONG TERM (CURRENT) USE OF ANTICOAGULANTS: ICD-10-CM

## 2021-01-13 LAB
INR PPP: 2.37 (ref 0.84–1.19)
PROTHROMBIN TIME: 24.8 SECONDS (ref 11.6–14.5)

## 2021-01-13 PROCEDURE — 85610 PROTHROMBIN TIME: CPT

## 2021-01-13 PROCEDURE — 36415 COLL VENOUS BLD VENIPUNCTURE: CPT

## 2021-01-18 ENCOUNTER — TELEPHONE (OUTPATIENT)
Dept: OBGYN CLINIC | Facility: MEDICAL CENTER | Age: 46
End: 2021-01-18

## 2021-01-18 NOTE — TELEPHONE ENCOUNTER
Pt has pain while sitting at the bottom of her tailbone and she didn't get hurt  She would like to know if she needs an appointment with us or if she should get a referral elsewhere  Please advise

## 2021-01-20 NOTE — TELEPHONE ENCOUNTER
Inr from [facility]re today is 1 9  Takes 5/7 5mg  Last inr 2 3  aoritc valve disorder [Negative] : Allergic/Immunologic

## 2021-01-20 NOTE — TELEPHONE ENCOUNTER
Per comm consent lm to pt that I have been trying to reach her for days to discuss her concerns  Told pt I would be closing encounter but if she does need further assistance or appt to please call us back

## 2021-01-26 ENCOUNTER — TELEPHONE (OUTPATIENT)
Dept: OBGYN CLINIC | Facility: CLINIC | Age: 46
End: 2021-01-26

## 2021-01-27 DIAGNOSIS — G43.909 MIGRAINE WITHOUT STATUS MIGRAINOSUS, NOT INTRACTABLE, UNSPECIFIED MIGRAINE TYPE: ICD-10-CM

## 2021-01-27 RX ORDER — RIZATRIPTAN BENZOATE 10 MG/1
TABLET ORAL
Qty: 30 TABLET | Refills: 0 | Status: SHIPPED | OUTPATIENT
Start: 2021-01-27 | End: 2022-05-03

## 2021-01-29 ENCOUNTER — OFFICE VISIT (OUTPATIENT)
Dept: INTERNAL MEDICINE CLINIC | Facility: CLINIC | Age: 46
End: 2021-01-29
Payer: COMMERCIAL

## 2021-01-29 ENCOUNTER — APPOINTMENT (OUTPATIENT)
Dept: LAB | Facility: CLINIC | Age: 46
End: 2021-01-29
Payer: COMMERCIAL

## 2021-01-29 VITALS
HEIGHT: 60 IN | HEART RATE: 60 BPM | DIASTOLIC BLOOD PRESSURE: 70 MMHG | WEIGHT: 116.2 LBS | TEMPERATURE: 98.8 F | RESPIRATION RATE: 12 BRPM | SYSTOLIC BLOOD PRESSURE: 98 MMHG | BODY MASS INDEX: 22.81 KG/M2

## 2021-01-29 DIAGNOSIS — Z79.01 ANTICOAGULATED: ICD-10-CM

## 2021-01-29 DIAGNOSIS — F41.9 ANXIETY: ICD-10-CM

## 2021-01-29 DIAGNOSIS — R53.83 OTHER FATIGUE: ICD-10-CM

## 2021-01-29 DIAGNOSIS — K62.89 RECTAL PAIN: ICD-10-CM

## 2021-01-29 DIAGNOSIS — Z13.6 SCREENING FOR CARDIOVASCULAR CONDITION: ICD-10-CM

## 2021-01-29 DIAGNOSIS — Z79.01 ANTICOAGULATED: Primary | ICD-10-CM

## 2021-01-29 LAB
ALBUMIN SERPL BCP-MCNC: 4 G/DL (ref 3.5–5)
ALP SERPL-CCNC: 51 U/L (ref 46–116)
ALT SERPL W P-5'-P-CCNC: 26 U/L (ref 12–78)
ANION GAP SERPL CALCULATED.3IONS-SCNC: 3 MMOL/L (ref 4–13)
AST SERPL W P-5'-P-CCNC: 22 U/L (ref 5–45)
BASOPHILS # BLD AUTO: 0.04 THOUSANDS/ΜL (ref 0–0.1)
BASOPHILS NFR BLD AUTO: 1 % (ref 0–1)
BILIRUB SERPL-MCNC: 1.24 MG/DL (ref 0.2–1)
BUN SERPL-MCNC: 11 MG/DL (ref 5–25)
CALCIUM SERPL-MCNC: 8.4 MG/DL (ref 8.3–10.1)
CHLORIDE SERPL-SCNC: 105 MMOL/L (ref 100–108)
CHOLEST SERPL-MCNC: 144 MG/DL (ref 50–200)
CO2 SERPL-SCNC: 31 MMOL/L (ref 21–32)
CREAT SERPL-MCNC: 0.71 MG/DL (ref 0.6–1.3)
EOSINOPHIL # BLD AUTO: 0.14 THOUSAND/ΜL (ref 0–0.61)
EOSINOPHIL NFR BLD AUTO: 3 % (ref 0–6)
ERYTHROCYTE [DISTWIDTH] IN BLOOD BY AUTOMATED COUNT: 11.7 % (ref 11.6–15.1)
GFR SERPL CREATININE-BSD FRML MDRD: 102 ML/MIN/1.73SQ M
GLUCOSE SERPL-MCNC: 86 MG/DL (ref 65–140)
HCT VFR BLD AUTO: 38.9 % (ref 34.8–46.1)
HDLC SERPL-MCNC: 63 MG/DL
HGB BLD-MCNC: 12.9 G/DL (ref 11.5–15.4)
IMM GRANULOCYTES # BLD AUTO: 0.02 THOUSAND/UL (ref 0–0.2)
IMM GRANULOCYTES NFR BLD AUTO: 0 % (ref 0–2)
INR PPP: 2.12 (ref 0.84–1.19)
LDLC SERPL CALC-MCNC: 66 MG/DL (ref 0–100)
LYMPHOCYTES # BLD AUTO: 1.35 THOUSANDS/ΜL (ref 0.6–4.47)
LYMPHOCYTES NFR BLD AUTO: 24 % (ref 14–44)
MCH RBC QN AUTO: 30.6 PG (ref 26.8–34.3)
MCHC RBC AUTO-ENTMCNC: 33.2 G/DL (ref 31.4–37.4)
MCV RBC AUTO: 92 FL (ref 82–98)
MONOCYTES # BLD AUTO: 0.41 THOUSAND/ΜL (ref 0.17–1.22)
MONOCYTES NFR BLD AUTO: 7 % (ref 4–12)
NEUTROPHILS # BLD AUTO: 3.75 THOUSANDS/ΜL (ref 1.85–7.62)
NEUTS SEG NFR BLD AUTO: 65 % (ref 43–75)
NONHDLC SERPL-MCNC: 81 MG/DL
NRBC BLD AUTO-RTO: 0 /100 WBCS
PLATELET # BLD AUTO: 244 THOUSANDS/UL (ref 149–390)
PMV BLD AUTO: 10.4 FL (ref 8.9–12.7)
POTASSIUM SERPL-SCNC: 3.9 MMOL/L (ref 3.5–5.3)
PROT SERPL-MCNC: 7.2 G/DL (ref 6.4–8.2)
PROTHROMBIN TIME: 23.6 SECONDS (ref 11.6–14.5)
RBC # BLD AUTO: 4.21 MILLION/UL (ref 3.81–5.12)
SODIUM SERPL-SCNC: 139 MMOL/L (ref 136–145)
TRIGL SERPL-MCNC: 76 MG/DL
TSH SERPL DL<=0.05 MIU/L-ACNC: 0.79 UIU/ML (ref 0.36–3.74)
WBC # BLD AUTO: 5.71 THOUSAND/UL (ref 4.31–10.16)

## 2021-01-29 PROCEDURE — 85025 COMPLETE CBC W/AUTO DIFF WBC: CPT

## 2021-01-29 PROCEDURE — 80061 LIPID PANEL: CPT

## 2021-01-29 PROCEDURE — 80053 COMPREHEN METABOLIC PANEL: CPT

## 2021-01-29 PROCEDURE — 84443 ASSAY THYROID STIM HORMONE: CPT

## 2021-01-29 PROCEDURE — 99214 OFFICE O/P EST MOD 30 MIN: CPT | Performed by: NURSE PRACTITIONER

## 2021-01-29 PROCEDURE — 85610 PROTHROMBIN TIME: CPT

## 2021-01-29 PROCEDURE — 36415 COLL VENOUS BLD VENIPUNCTURE: CPT

## 2021-01-29 RX ORDER — ESCITALOPRAM OXALATE 10 MG/1
TABLET ORAL
Qty: 30 TABLET | Refills: 3 | Status: SHIPPED | OUTPATIENT
Start: 2021-01-29 | End: 2021-04-08 | Stop reason: ALTCHOICE
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
## 2021-02-01 ENCOUNTER — TELEPHONE (OUTPATIENT)
Dept: INTERNAL MEDICINE CLINIC | Facility: CLINIC | Age: 46
End: 2021-02-01

## 2021-02-01 NOTE — TELEPHONE ENCOUNTER
----- Message from Jennifer Lewis, [address] sent at 2/1/2021  9:19 AM EST -----  Labs stable , I forwared INR to Dr Billie Acharya

## 2021-02-02 ENCOUNTER — ANTICOAG VISIT (OUTPATIENT)
Dept: CARDIOLOGY CLINIC | Facility: CLINIC | Age: 46
End: 2021-02-02

## 2021-02-02 DIAGNOSIS — I35.9 AORTIC VALVE DISORDER: ICD-10-CM

## 2021-02-03 NOTE — PROGRESS NOTES
Dr Hay Love left a message on patient's voicemail to stay with the same dosage of Coumadin and recheck in 2 weeks

## 2021-02-12 DIAGNOSIS — I35.9 AORTIC VALVE DISORDER: ICD-10-CM

## 2021-02-12 RX ORDER — WARFARIN SODIUM 5 MG/1
5 TABLET ORAL DAILY
Qty: 90 TABLET | Refills: 0 | Status: SHIPPED | OUTPATIENT
Start: 2021-02-12 | End: 2021-07-01 | Stop reason: SDUPTHER

## 2021-02-19 ENCOUNTER — APPOINTMENT (OUTPATIENT)
Dept: LAB | Facility: HOSPITAL | Age: 46
End: 2021-02-19
Attending: INTERNAL MEDICINE
Payer: COMMERCIAL

## 2021-02-19 ENCOUNTER — ANTICOAG VISIT (OUTPATIENT)
Dept: CARDIOLOGY CLINIC | Facility: CLINIC | Age: 46
End: 2021-02-19

## 2021-02-19 DIAGNOSIS — I35.9 AORTIC VALVE DISORDER: ICD-10-CM

## 2021-02-23 DIAGNOSIS — I10 HYPERTENSION, ESSENTIAL: ICD-10-CM

## 2021-02-25 ENCOUNTER — ANNUAL EXAM (OUTPATIENT)
Dept: OBGYN CLINIC | Facility: MEDICAL CENTER | Age: 46
End: 2021-02-25
Payer: COMMERCIAL

## 2021-02-25 VITALS — WEIGHT: 108 LBS | SYSTOLIC BLOOD PRESSURE: 120 MMHG | BODY MASS INDEX: 21.09 KG/M2 | DIASTOLIC BLOOD PRESSURE: 82 MMHG

## 2021-02-25 DIAGNOSIS — Z30.42 SURVEILLANCE FOR DEPO-PROVERA CONTRACEPTION: ICD-10-CM

## 2021-02-25 DIAGNOSIS — Z12.31 ENCOUNTER FOR SCREENING MAMMOGRAM FOR MALIGNANT NEOPLASM OF BREAST: ICD-10-CM

## 2021-02-25 DIAGNOSIS — Z01.419 ENCOUNTER FOR GYNECOLOGICAL EXAMINATION (GENERAL) (ROUTINE) WITHOUT ABNORMAL FINDINGS: Primary | ICD-10-CM

## 2021-02-25 PROCEDURE — 99396 PREV VISIT EST AGE 40-64: CPT | Performed by: NURSE PRACTITIONER

## 2021-02-25 RX ORDER — MEDROXYPROGESTERONE ACETATE 150 MG/ML
150 INJECTION, SUSPENSION INTRAMUSCULAR
Qty: 1 ML | Refills: 4 | Status: SHIPPED | OUTPATIENT
Start: 2021-02-25 | End: 2021-05-11 | Stop reason: ALTCHOICE

## 2021-02-25 NOTE — ASSESSMENT & PLAN NOTE
Benign findings on routine gyn exam  Recommended monthly SBE, annual CBE and annual screening mammo  ASCCP guidelines reviewed and pap with cotesting noted to be up to date  The patient denies STI risk factors and declines testing at this time  Reviewed diet/activity recommendations:  Encouraged daily Ca++ and vitamin D intake as well as daily weight bearing exercise for promotion of bone health    Discussed perimenopausal considerations and symptoms to report  RTO in one year for routine annual gyn exam or sooner PRN

## 2021-03-01 NOTE — ASSESSMENT & PLAN NOTE
Pt desires to restart Depo  Advised administration with menses  The patient agrees with plan   She is aware condoms are recommended for prevention of STI with all sexual contact

## 2021-03-01 NOTE — PROGRESS NOTES
Assessment/Plan:    Encounter for gynecological examination (general) (routine) without abnormal findings  Benign findings on routine gyn exam  Recommended monthly SBE, annual CBE and annual screening mammo  ASCCP guidelines reviewed and pap with cotesting noted to be up to date  The patient denies STI risk factors and declines testing at this time  Reviewed diet/activity recommendations:  Encouraged daily Ca++ and vitamin D intake as well as daily weight bearing exercise for promotion of bone health    Discussed perimenopausal considerations and symptoms to report  RTO in one year for routine annual gyn exam or sooner PRN  Surveillance for Depo-Provera contraception  Pt desires to restart Depo  Advised administration with menses  The patient agrees with plan  She is aware condoms are recommended for prevention of STI with all sexual contact          Diagnoses and all orders for this visit:    Encounter for gynecological examination (general) (routine) without abnormal findings    Encounter for screening mammogram for malignant neoplasm of breast  -     Mammo screening bilateral w 3d & cad; Future    Surveillance for Depo-Provera contraception  -     medroxyPROGESTERone (DEPO-PROVERA) 150 mg/mL injection; Inject 1 mL (150 mg total) into a muscle every 3 (three) months          Subjective:      Patient ID: Andrew Hernandez is a 50 y o  female  This patient presents for routine annual gyn exam    Medically complex but stable  Stopped depo because she forgot to return for injection  Interested in restarting  Rarely sexually active since last dose and denies preg concerns  She denies acute gyn complaints  Menses have resumed and these are regular and light to mod  She denies pelvic pain, breast concerns, abnormal discharge, bowel/bladder dysfunction, depression/anxiety  She denies STI concerns              The following portions of the patient's history were reviewed and updated as appropriate: allergies, current medications, past family history, past medical history, past social history, past surgical history and problem list     Review of Systems   Constitutional: Negative  Respiratory: Negative  Cardiovascular: Negative  Gastrointestinal: Negative  Genitourinary: Negative  Musculoskeletal: Negative  Skin: Negative  Neurological: Negative  Psychiatric/Behavioral: Negative  Objective:      /82   Wt 49 kg (108 lb)   LMP 02/15/2021   BMI 21 09 kg/m²          Physical Exam  Constitutional:       Appearance: She is well-developed  HENT:      Head: Normocephalic and atraumatic  Eyes:      Pupils: Pupils are equal, round, and reactive to light  Neck:      Musculoskeletal: Normal range of motion and neck supple  Thyroid: No thyromegaly  Cardiovascular:      Rate and Rhythm: Normal rate and regular rhythm  Heart sounds: Normal heart sounds  Pulmonary:      Effort: Pulmonary effort is normal  No respiratory distress  Breath sounds: Normal breath sounds  No wheezing or rales  Chest:      Chest wall: No mass, deformity or tenderness  Breasts: Breasts are symmetrical          Right: No inverted nipple, mass, nipple discharge, skin change or tenderness  Left: No inverted nipple, mass, nipple discharge, skin change or tenderness  Abdominal:      General: There is no distension  Palpations: Abdomen is soft  There is no hepatomegaly, splenomegaly or mass  Tenderness: There is no abdominal tenderness  There is no guarding or rebound  Genitourinary:     Labia:         Right: No rash, tenderness, lesion or injury  Left: No rash, tenderness, lesion or injury  Vagina: Normal  No foreign body  No vaginal discharge, erythema, tenderness or bleeding  Cervix: No cervical motion tenderness, discharge or friability  Uterus: Normal        Adnexa:         Right: No mass, tenderness or fullness            Left: No mass, tenderness or fullness  Rectum: Normal    Musculoskeletal: Normal range of motion  Lymphadenopathy:      Cervical: No cervical adenopathy  Skin:     General: Skin is warm and dry  Findings: No rash  Nails: There is no clubbing  Neurological:      Mental Status: She is alert and oriented to person, place, and time  Cranial Nerves: No cranial nerve deficit  Psychiatric:         Speech: Speech normal          Behavior: Behavior normal          Thought Content:  Thought content normal          Judgment: Judgment normal

## 2021-03-02 ENCOUNTER — OFFICE VISIT (OUTPATIENT)
Dept: GASTROENTEROLOGY | Facility: CLINIC | Age: 46
End: 2021-03-02
Payer: COMMERCIAL

## 2021-03-02 VITALS
BODY MASS INDEX: 21.91 KG/M2 | HEART RATE: 77 BPM | SYSTOLIC BLOOD PRESSURE: 132 MMHG | DIASTOLIC BLOOD PRESSURE: 78 MMHG | WEIGHT: 111.6 LBS | HEIGHT: 60 IN

## 2021-03-02 DIAGNOSIS — K62.89 RECTAL PAIN: ICD-10-CM

## 2021-03-02 PROCEDURE — 1036F TOBACCO NON-USER: CPT | Performed by: INTERNAL MEDICINE

## 2021-03-02 PROCEDURE — 3008F BODY MASS INDEX DOCD: CPT | Performed by: INTERNAL MEDICINE

## 2021-03-02 PROCEDURE — 99244 OFF/OP CNSLTJ NEW/EST MOD 40: CPT | Performed by: INTERNAL MEDICINE
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
## 2021-03-04 ENCOUNTER — ANTICOAG VISIT (OUTPATIENT)
Dept: CARDIOLOGY CLINIC | Facility: CLINIC | Age: 46
End: 2021-03-04

## 2021-03-04 ENCOUNTER — LAB (OUTPATIENT)
Dept: LAB | Facility: HOSPITAL | Age: 46
End: 2021-03-04
Attending: INTERNAL MEDICINE
Payer: COMMERCIAL

## 2021-03-04 ENCOUNTER — TELEPHONE (OUTPATIENT)
Dept: CARDIOLOGY CLINIC | Facility: CLINIC | Age: 46
End: 2021-03-04

## 2021-03-04 DIAGNOSIS — I35.9 AORTIC VALVE DISORDER: ICD-10-CM

## 2021-03-04 DIAGNOSIS — Z79.01 ANTICOAGULATION MONITORING, INR RANGE 2-3: Primary | ICD-10-CM

## 2021-03-04 DIAGNOSIS — Z79.01 LONG TERM (CURRENT) USE OF ANTICOAGULANTS: ICD-10-CM

## 2021-03-04 DIAGNOSIS — Z79.01 ANTICOAGULATED: ICD-10-CM

## 2021-03-04 DIAGNOSIS — Z95.2 H/O MECHANICAL AORTIC VALVE REPLACEMENT: Primary | ICD-10-CM

## 2021-03-04 LAB
INR PPP: 2.21 (ref 0.84–1.19)
PROTHROMBIN TIME: 24.7 SECONDS (ref 11.6–14.5)

## 2021-03-04 PROCEDURE — 85610 PROTHROMBIN TIME: CPT

## 2021-03-04 PROCEDURE — 36415 COLL VENOUS BLD VENIPUNCTURE: CPT

## 2021-03-04 RX ORDER — AMOXICILLIN 500 MG/1
TABLET, FILM COATED ORAL
Qty: 4 TABLET | Refills: 3 | Status: SHIPPED | OUTPATIENT
Start: 2021-03-04 | End: 2021-03-04

## 2021-03-04 NOTE — PROGRESS NOTES
S/w pt  Advised to stay on the same dose and retest in 2 weeks  Pt will be having a procedure on 3/15 and will be bridged with Lovenox

## 2021-03-04 NOTE — TELEPHONE ENCOUNTER
Pt broke her crown  She has an appt with the Dentist next Tues  She is asking for a refill on her Amoxicillin   Med pending

## 2021-03-04 NOTE — TELEPHONE ENCOUNTER
Pt called requesting to speak to [facility] regarding her mouth bleeding   Called transferred to [facility]

## 2021-03-04 NOTE — TELEPHONE ENCOUNTER
I s/w pt  States she did break her crown and is seeing the dentist next Tues  I advised she get her INR checked today  Also advised she get a humidifier to keep the air cool so her mouth does not dry out because he thinks she may sleep with her mouth open

## 2021-03-14 ENCOUNTER — TELEPHONE (OUTPATIENT)
Dept: OTHER | Facility: OTHER | Age: 46
End: 2021-03-14

## 2021-03-14 NOTE — TELEPHONE ENCOUNTER
Msg: "I have a colonoscopy scheduled tomorrow 3/15, and my daughter administered the Lovenox injection, but gave me   6 instead of the  5 that was instructed  I have another injection tonight, and wanted to know if I should give myself even less   So, I would like to speak to the doctor "

## 2021-03-15 ENCOUNTER — ANESTHESIA (OUTPATIENT)
Dept: GASTROENTEROLOGY | Facility: HOSPITAL | Age: 46
End: 2021-03-15

## 2021-03-15 ENCOUNTER — TELEPHONE (OUTPATIENT)
Dept: GASTROENTEROLOGY | Facility: CLINIC | Age: 46
End: 2021-03-15

## 2021-03-15 ENCOUNTER — HOSPITAL ENCOUNTER (OUTPATIENT)
Dept: GASTROENTEROLOGY | Facility: HOSPITAL | Age: 46
Setting detail: OUTPATIENT SURGERY
Discharge: HOME | End: 2021-03-15
Attending: INTERNAL MEDICINE | Admitting: INTERNAL MEDICINE
Payer: COMMERCIAL

## 2021-03-15 ENCOUNTER — TELEPHONE (OUTPATIENT)
Dept: OTHER | Facility: OTHER | Age: 46
End: 2021-03-15

## 2021-03-15 ENCOUNTER — ANESTHESIA EVENT (OUTPATIENT)
Dept: GASTROENTEROLOGY | Facility: HOSPITAL | Age: 46
End: 2021-03-15

## 2021-03-15 VITALS
OXYGEN SATURATION: 100 % | BODY MASS INDEX: 20.91 KG/M2 | HEIGHT: 60 IN | TEMPERATURE: 97.9 F | DIASTOLIC BLOOD PRESSURE: 56 MMHG | SYSTOLIC BLOOD PRESSURE: 113 MMHG | RESPIRATION RATE: 20 BRPM | HEART RATE: 57 BPM | WEIGHT: 106.48 LBS

## 2021-03-15 DIAGNOSIS — K62.89 RECTAL PAIN: ICD-10-CM

## 2021-03-15 LAB
EXT PREGNANCY TEST URINE: NEGATIVE
EXT. CONTROL: NORMAL

## 2021-03-15 PROCEDURE — 81025 URINE PREGNANCY TEST: CPT | Performed by: ANESTHESIOLOGY

## 2021-03-15 PROCEDURE — 45378 DIAGNOSTIC COLONOSCOPY: CPT | Performed by: INTERNAL MEDICINE

## 2021-03-15 RX ORDER — DICYCLOMINE HCL 20 MG
20 TABLET ORAL 3 TIMES DAILY PRN
Qty: 60 TABLET | Refills: 3 | Status: SHIPPED | OUTPATIENT
Start: 2021-03-15 | End: 2021-05-11 | Stop reason: ALTCHOICE

## 2021-03-15 RX ORDER — LIDOCAINE HYDROCHLORIDE 10 MG/ML
INJECTION, SOLUTION EPIDURAL; INFILTRATION; INTRACAUDAL; PERINEURAL AS NEEDED
Status: DISCONTINUED | OUTPATIENT
Start: 2021-03-15 | End: 2021-03-15

## 2021-03-15 RX ORDER — KETOROLAC TROMETHAMINE 30 MG/ML
INJECTION, SOLUTION INTRAMUSCULAR; INTRAVENOUS AS NEEDED
Status: DISCONTINUED | OUTPATIENT
Start: 2021-03-15 | End: 2021-03-15

## 2021-03-15 RX ORDER — PROPOFOL 10 MG/ML
INJECTION, EMULSION INTRAVENOUS AS NEEDED
Status: DISCONTINUED | OUTPATIENT
Start: 2021-03-15 | End: 2021-03-15

## 2021-03-15 RX ORDER — SODIUM CHLORIDE, SODIUM LACTATE, POTASSIUM CHLORIDE, CALCIUM CHLORIDE 600; 310; 30; 20 MG/100ML; MG/100ML; MG/100ML; MG/100ML
125 INJECTION, SOLUTION INTRAVENOUS CONTINUOUS
Status: DISCONTINUED | OUTPATIENT
Start: 2021-03-15 | End: 2021-03-19 | Stop reason: HOSPADM

## 2021-03-15 RX ORDER — ONDANSETRON 2 MG/ML
INJECTION INTRAMUSCULAR; INTRAVENOUS AS NEEDED
Status: DISCONTINUED | OUTPATIENT
Start: 2021-03-15 | End: 2021-03-15

## 2021-03-15 RX ADMIN — PROPOFOL 20 MG: 10 INJECTION, EMULSION INTRAVENOUS at 11:10

## 2021-03-15 RX ADMIN — ONDANSETRON 4 MG: 2 INJECTION INTRAMUSCULAR; INTRAVENOUS at 11:09

## 2021-03-15 RX ADMIN — SODIUM CHLORIDE, SODIUM LACTATE, POTASSIUM CHLORIDE, AND CALCIUM CHLORIDE 125 ML/HR: .6; .31; .03; .02 INJECTION, SOLUTION INTRAVENOUS at 10:18

## 2021-03-15 RX ADMIN — PROPOFOL 30 MG: 10 INJECTION, EMULSION INTRAVENOUS at 11:11

## 2021-03-15 RX ADMIN — PROPOFOL 120 MG: 10 INJECTION, EMULSION INTRAVENOUS at 11:09

## 2021-03-15 RX ADMIN — LIDOCAINE HYDROCHLORIDE 20 MG: 10 INJECTION, SOLUTION EPIDURAL; INFILTRATION; INTRACAUDAL; PERINEURAL at 11:05

## 2021-03-15 RX ADMIN — PROPOFOL 20 MG: 10 INJECTION, EMULSION INTRAVENOUS at 11:14

## 2021-03-15 RX ADMIN — KETOROLAC TROMETHAMINE 15 MG: 30 INJECTION, SOLUTION INTRAMUSCULAR at 11:10

## 2021-03-15 RX ADMIN — PROPOFOL 30 MG: 10 INJECTION, EMULSION INTRAVENOUS at 11:12

## 2021-03-15 NOTE — DISCHARGE INSTRUCTIONS
Colonoscopy   WHAT YOU NEED TO KNOW:   A colonoscopy is a procedure to examine the inside of your colon (intestine) with a scope  Polyps or tissue growths may have been removed during your colonoscopy  It is normal to feel bloated and to have some abdominal discomfort  You should be passing gas  If you have hemorrhoids or you had polyps removed, you may have a small amount of bleeding  DISCHARGE INSTRUCTIONS:   Call your doctor if:   · You have a large amount of bright red blood in your bowel movements  · Your abdomen is hard and firm and you have severe pain  · You have sudden trouble breathing  · You develop a rash or hives  · You have a fever within 24 hours of your procedure  · You have not had a bowel movement for 3 days after your procedure  · You have questions or concerns about your condition or care  After your colonoscopy:   · Do not lift, strain, or run  for 3 days  · Rest as much as possible  You have been given medicine to relax you  Do not  drive or make important decisions for at least 24 hours  Return to your normal activity as directed  · Relieve gas and discomfort from bloating  by lying on your left side with a heating pad on your abdomen  You may need to take short walks to help the gas move out  Eat small meals until bloating is relieved  If you had polyps removed: For 7 days after your procedure:  · Do not  take aspirin  · Do not  go on long car rides  Help prevent constipation:   · Eat a variety of healthy foods  Healthy foods include fruit, vegetables, whole-grain breads, low-fat dairy products, beans, lean meat, and fish  Ask if you need to be on a special diet  Your healthcare provider may recommend that you eat high-fiber foods such as cooked beans  Fiber helps you have regular bowel movements  · Drink liquids as directed  Adults should drink between 9 and 13 eight-ounce cups of liquid every day  Ask what amount is best for you   For most people, good liquids to drink are water, juice, and milk  · Exercise as directed  Talk to your healthcare provider about the best exercise plan for you  Exercise can help prevent constipation, decrease your blood pressure and improve your health  Follow up with your healthcare provider as directed:  Write down your questions so you remember to ask them during your visits  © Copyright [address] Information is for End User's use only and may not be sold, redistributed or otherwise used for commercial purposes  All illustrations and images included in CareNotes® are the copyrighted property of A D A M , Inc  or [address] [place], [place] [address]o [employer]   The above information is an  only  It is not intended as medical advice for individual conditions or treatments  Talk to your doctor, nurse or pharmacist before following any medical regimen to see if it is safe and effective for you

## 2021-03-15 NOTE — ANESTHESIA PREPROCEDURE EVALUATION
Procedure:  COLONOSCOPY    Relevant Problems   CARDIO   (+) History of mechanical aortic valve replacement        Physical Exam    Airway    Mallampati score: II  TM Distance: >3 FB  Neck ROM: full     Dental       Cardiovascular  Cardiovascular exam normal    Pulmonary  Pulmonary exam normal     Other Findings        Anesthesia Plan  ASA Score- 3     Anesthesia Type- IV sedation with anesthesia with ASA Monitors  Additional Monitors:   Airway Plan:           Plan Factors-Exercise tolerance (METS): >4 METS  Chart reviewed  EKG reviewed  Existing labs reviewed  Patient summary reviewed  Induction- intravenous  Postoperative Plan-     Informed Consent- Anesthetic plan and risks discussed with patient  I personally reviewed this patient with the CRNA  Discussed and agreed on the Anesthesia Plan with the CRNA  Ashkan Putnam 75-year-old female with rectal pain  She reports that she was out of work 5 months ago when she started sitting on the couch wine reports that she has rectal pain  She reports that is an internal feeling it is like an ache and a pressure and like a balloon inside of her  She reports that she feels that at other times but is much less when she was upright or walking around  She reports normal bowel movements  She has no melena hematochezia  She denies upper abdominal symptoms  She has no heartburn regurgitation nausea vomiting burping belching  Her mother had does have inflammatory bowel disease she has ulcerative colitis  She has had no weight loss  She has never had a colonoscopy    She does not think that the problem happens when she is more anxious      Aortic valve disorder   Anticoagulated   Varicose veins of lower extremity with pain, bilateral   Capsular contracture of breast implant   Encounter for gynecological examination (general) (routine) without abnormal findings   Surveillance for Depo-Provera contraception   Nasal obstruction   Nasal septal deviation Nasal turbinate hypertrophy   Recurrent epistaxis   History of mechanical aortic valve replacement   Vulvar cyst

## 2021-03-15 NOTE — ANESTHESIA POSTPROCEDURE EVALUATION
Post-Op Assessment Note    CV Status:  Stable  Pain Score: 0    Pain management: adequate     Mental Status:  Sleepy   Hydration Status:  Stable   PONV Controlled:  None   Airway Patency:  Patent      Post Op Vitals Reviewed: Yes      Staff: CRNA         No complications documented      BP   129/66   Temp      Pulse 55   Resp 16   SpO2 98% 2L FM

## 2021-03-15 NOTE — TELEPHONE ENCOUNTER
She called because she accidentally gave herself 0 6 Lovenox instead of 0 5 in the morning on 3/14  We discussed and agreed she would take 0 4 of Lovenox lat night on 3/14 instead of 0 5 and then skip her morning dose on 3/15 as previously planned  I reassured her this small error on her part should not significantly ncrease her risk of bleeding during her procedure

## 2021-03-15 NOTE — INTERVAL H&P NOTE
H&P reviewed  After examining the patient I find no changes in the patients condition since the H&P had been written      Vitals:    03/15/21 1006   BP: 137/69   Pulse: 63   Resp: 14   Temp: 98 4 °F (36 9 °C)   SpO2: 99%

## 2021-03-16 NOTE — TELEPHONE ENCOUNTER
It looks like this was already addressed by Dr Olga Villanueva and she had her colonoscopy yesterday with Dr Angela Duke

## 2021-03-16 NOTE — TELEPHONE ENCOUNTER
[address] 71 Patient call and say that her daughter gave her the shotofr her medication and she did not got medication for the past two night Lovenox 60 mg/0 6 ml

## 2021-03-16 NOTE — TELEPHONE ENCOUNTER
Route to pa  Not sure if this was addressed or was because of the new message     Dr Jeremy Posada responded yesterday morning and this message seems to have came in yesterday evening

## 2021-03-17 NOTE — RESULT NOTES
Verified Results  (1) PT WITH INR 02Jun2017 12:55PM Mary Marks   REPORT COMMENT:  FASTING:NO     Test Name Result Flag Reference   INR 2 1 H    Reference Range                     0 9-1 1  Moderate-intensity Warfarin Therapy 2 0-3 0  Higher-intensity Warfarin Therapy   3 0-4 0   PT 21 1 sec H 9 0-11 5   For more information on this test, go to:  [email or web address] Statement Selected

## 2021-03-18 ENCOUNTER — ANTICOAG VISIT (OUTPATIENT)
Dept: CARDIOLOGY CLINIC | Facility: CLINIC | Age: 46
End: 2021-03-18

## 2021-03-18 ENCOUNTER — APPOINTMENT (OUTPATIENT)
Dept: LAB | Facility: HOSPITAL | Age: 46
End: 2021-03-18
Attending: INTERNAL MEDICINE
Payer: COMMERCIAL

## 2021-03-18 ENCOUNTER — TELEPHONE (OUTPATIENT)
Dept: CARDIOLOGY CLINIC | Facility: CLINIC | Age: 46
End: 2021-03-18

## 2021-03-18 DIAGNOSIS — I35.9 AORTIC VALVE DISORDER: ICD-10-CM

## 2021-03-18 NOTE — PROGRESS NOTES
S/w to take 10mg of Warfarin today and 7 5mg tomorrow along with the lovenox injections, then go back to her reg dose of Warfarin on Sat and Sun along with the Lovenox injections and test INR on Monday

## 2021-03-18 NOTE — TELEPHONE ENCOUNTER
enoxaparin (LOVENOX) 60 mg/0 6 mL Patient called and would like to know when the medication will be called into pharmacy, The pharmacy states they have not received it it yet

## 2021-03-22 ENCOUNTER — APPOINTMENT (OUTPATIENT)
Dept: LAB | Facility: CLINIC | Age: 46
End: 2021-03-22
Payer: COMMERCIAL

## 2021-03-22 ENCOUNTER — DOCUMENTATION (OUTPATIENT)
Dept: CARDIOLOGY CLINIC | Facility: CLINIC | Age: 46
End: 2021-03-22

## 2021-03-22 ENCOUNTER — TELEPHONE (OUTPATIENT)
Dept: OTHER | Facility: OTHER | Age: 46
End: 2021-03-22

## 2021-03-22 DIAGNOSIS — Z79.01 ANTICOAGULATION MONITORING, INR RANGE 2-3: ICD-10-CM

## 2021-03-22 DIAGNOSIS — Z79.01 LONG TERM (CURRENT) USE OF ANTICOAGULANTS: ICD-10-CM

## 2021-03-22 LAB
INR PPP: 2.09 (ref 0.84–1.19)
PROTHROMBIN TIME: 23.6 SECONDS (ref 11.6–14.5)

## 2021-03-22 PROCEDURE — 85610 PROTHROMBIN TIME: CPT

## 2021-03-22 PROCEDURE — 36415 COLL VENOUS BLD VENIPUNCTURE: CPT

## 2021-03-22 NOTE — PROGRESS NOTES
Patient called for INR results  Her INR is therapeutic at 2 09   She was advised to stop the lovenox injections and resume her prior dose of 5mg alternating with 7 5mg

## 2021-03-22 NOTE — TELEPHONE ENCOUNTER
Lou Luis [birth year] Pt had a PT INR done today @ [facility] and they were supposed to call her back regarding the results  She is very confused as to how much medication she should be taking   [phone]    Paged Dr Radha Flor via [facility]

## 2021-03-23 NOTE — TELEPHONE ENCOUNTER
I s/w the pt to make sure she understood the plan and to retest in 1 week  Pt verbalized understanding

## 2021-03-23 NOTE — TELEPHONE ENCOUNTER
MD Mitch Altman, NAYLA             Patient called for INR results  Her INR is therapeutic at 2 09  She was advised to stop the lovenox injections and resume her prior dose of 5mg alternating with 7 5mg  [place] follow up with her tomorrow   She should recheck her INR in 1 week (1) no aspiration, contrast does not enter airway (2) contrast enters airway, remains above the vocal cords, no residue remains (penetration) (3) contrast remains above the vocal cords, visible residue remains (penetration)

## 2021-03-30 ENCOUNTER — APPOINTMENT (OUTPATIENT)
Dept: LAB | Facility: HOSPITAL | Age: 46
End: 2021-03-30
Attending: INTERNAL MEDICINE
Payer: COMMERCIAL

## 2021-03-30 ENCOUNTER — ANTICOAG VISIT (OUTPATIENT)
Dept: CARDIOLOGY CLINIC | Facility: CLINIC | Age: 46
End: 2021-03-30

## 2021-03-30 DIAGNOSIS — I35.9 AORTIC VALVE DISORDER: ICD-10-CM

## 2021-03-30 DIAGNOSIS — Z79.01 ANTICOAGULATION MONITORING, INR RANGE 2-3: ICD-10-CM

## 2021-03-30 DIAGNOSIS — Z79.01 LONG TERM (CURRENT) USE OF ANTICOAGULANTS: ICD-10-CM

## 2021-03-30 LAB
INR PPP: 2.2 (ref 0.84–1.19)
PROTHROMBIN TIME: 24.7 SECONDS (ref 11.6–14.5)

## 2021-03-30 PROCEDURE — 85610 PROTHROMBIN TIME: CPT

## 2021-03-30 PROCEDURE — 36415 COLL VENOUS BLD VENIPUNCTURE: CPT

## 2021-04-08 ENCOUNTER — OFFICE VISIT (OUTPATIENT)
Dept: CARDIOLOGY CLINIC | Facility: CLINIC | Age: 46
End: 2021-04-08
Payer: COMMERCIAL

## 2021-04-08 VITALS
SYSTOLIC BLOOD PRESSURE: 118 MMHG | DIASTOLIC BLOOD PRESSURE: 72 MMHG | OXYGEN SATURATION: 99 % | HEART RATE: 60 BPM | BODY MASS INDEX: 21.2 KG/M2 | WEIGHT: 108 LBS | HEIGHT: 60 IN

## 2021-04-08 DIAGNOSIS — Z79.01 ANTICOAGULATION MONITORING, INR RANGE 2-3: ICD-10-CM

## 2021-04-08 DIAGNOSIS — Q25.1 COARCTATION OF AORTA: ICD-10-CM

## 2021-04-08 DIAGNOSIS — I35.9 AORTIC VALVE DISORDER: Primary | ICD-10-CM

## 2021-04-08 DIAGNOSIS — I10 HYPERTENSION, ESSENTIAL: ICD-10-CM

## 2021-04-08 PROCEDURE — 3008F BODY MASS INDEX DOCD: CPT | Performed by: NURSE PRACTITIONER

## 2021-04-08 PROCEDURE — 3074F SYST BP LT 130 MM HG: CPT | Performed by: INTERNAL MEDICINE

## 2021-04-08 PROCEDURE — 99214 OFFICE O/P EST MOD 30 MIN: CPT | Performed by: INTERNAL MEDICINE

## 2021-04-08 PROCEDURE — 3078F DIAST BP <80 MM HG: CPT | Performed by: INTERNAL MEDICINE
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
## 2021-04-13 ENCOUNTER — ANTICOAG VISIT (OUTPATIENT)
Dept: CARDIOLOGY CLINIC | Facility: CLINIC | Age: 46
End: 2021-04-13

## 2021-04-13 ENCOUNTER — APPOINTMENT (OUTPATIENT)
Dept: LAB | Facility: HOSPITAL | Age: 46
End: 2021-04-13
Attending: INTERNAL MEDICINE
Payer: COMMERCIAL

## 2021-04-13 DIAGNOSIS — Z79.01 LONG TERM (CURRENT) USE OF ANTICOAGULANTS: ICD-10-CM

## 2021-04-13 DIAGNOSIS — I35.9 AORTIC VALVE DISORDER: ICD-10-CM

## 2021-04-13 DIAGNOSIS — Z79.01 ANTICOAGULATION MONITORING, INR RANGE 2-3: ICD-10-CM

## 2021-04-13 LAB
INR PPP: 2.55 (ref 0.84–1.19)
PROTHROMBIN TIME: 26.3 SECONDS (ref 11.6–14.5)

## 2021-04-13 PROCEDURE — 36415 COLL VENOUS BLD VENIPUNCTURE: CPT

## 2021-04-13 PROCEDURE — 85610 PROTHROMBIN TIME: CPT

## 2021-04-25 ENCOUNTER — HOSPITAL ENCOUNTER (EMERGENCY)
Facility: HOSPITAL | Age: 46
Discharge: HOME | End: 2021-04-25
Attending: EMERGENCY MEDICINE | Admitting: EMERGENCY MEDICINE
Payer: COMMERCIAL

## 2021-04-25 ENCOUNTER — TELEPHONE (OUTPATIENT)
Dept: OTHER | Facility: OTHER | Age: 46
End: 2021-04-25

## 2021-04-25 VITALS
TEMPERATURE: 98.5 F | DIASTOLIC BLOOD PRESSURE: 66 MMHG | OXYGEN SATURATION: 97 % | BODY MASS INDEX: 21.18 KG/M2 | RESPIRATION RATE: 17 BRPM | SYSTOLIC BLOOD PRESSURE: 130 MMHG | HEART RATE: 80 BPM | WEIGHT: 108.44 LBS

## 2021-04-25 DIAGNOSIS — Z20.822 CLOSE EXPOSURE TO COVID-19 VIRUS: Primary | ICD-10-CM

## 2021-04-25 LAB — SARS-COV-2 RNA RESP QL NAA+PROBE: POSITIVE

## 2021-04-25 PROCEDURE — 99283 EMERGENCY DEPT VISIT LOW MDM: CPT

## 2021-04-25 PROCEDURE — 87635 SARS-COV-2 COVID-19 AMP PRB: CPT | Performed by: EMERGENCY MEDICINE

## 2021-04-25 PROCEDURE — 99282 EMERGENCY DEPT VISIT SF MDM: CPT | Performed by: EMERGENCY MEDICINE

## 2021-04-25 NOTE — TELEPHONE ENCOUNTER
Areta Cover [birth year]  Pt just found out that she has covid and she is getting a fever of 101 2  She is concerned bc she has mechanical valves   [phone]    Paged Dr Ludivina Blanc via Viviana Monge

## 2021-04-25 NOTE — ED PROVIDER NOTES
History  Chief Complaint   Patient presents with    Biological Exposure     Reports she had exposure to coworker 1 week ago who tested positive, needs test to go to [place] is going to visit mother  HPI    51 yo F presenting for covid test  States she has minimal symptoms, mild non productive cough intermittently, currently asymptomatic  Exposed to covid one week ago  Supposed to go to [place] tomorrow, so wanted to get tested prior to leaving  No chest pain or sob  Remainder ros neg  Prior to Admission Medications   Prescriptions Last Dose Informant Patient Reported? Taking?    FOLIC ACID PO  Self Yes No   Sig: Take by mouth daily   Prenatal Vit-Fe Fumarate-FA (PrePLUS) 27-1 MG TABS  Self No No   Sig: take 1 tablet by mouth daily   aspirin (ECOTRIN LOW STRENGTH) 81 mg EC tablet  Self Yes No   Sig: Take 81 mg by mouth daily   dicyclomine (BENTYL) 20 mg tablet   No No   Sig: Take 1 tablet (20 mg total) by mouth 3 (three) times a day as needed (abdominal pain)   medroxyPROGESTERone (DEPO-PROVERA) 150 mg/mL injection  Self No No   Sig: Inject 1 mL (150 mg total) into a muscle every 3 (three) months   metoprolol tartrate (LOPRESSOR) 25 mg tablet  Self No No   Sig: Take 1 tablet (25 mg total) by mouth 2 (two) times a day   rizatriptan (MAXALT) 10 MG tablet  Self No No   Sig: TAKE 1 TABLET BY MOUTH ONCE AS NEEDED FOR MIGRAINE FOR UP TO 1 DOSE   warfarin (COUMADIN) 5 mg tablet  Self No No   Sig: Take 1 tablet (5 mg total) by mouth daily As directed      Facility-Administered Medications Last Administration Doses Remaining   medroxyPROGESTERone acetate (DEPO-PROVERA SYRINGE) IM injection 150 mg 5/14/2020 10:25 AM           Past Medical History:   Diagnosis Date    Abnormal Pap smear of cervix     Allergic contact dermatitis     Allergic rhinitis     resolved 01/17/2018    Aortic valve disorder     Aortic valve insufficiency     Asthma     Benign neoplasm of skin     Cardiac disease     Costochondritis     Hyperinsulinism     Inguinal lymphadenopathy     resolved 2015    Migraine     resolved 2015    Nosebleed     Varicose 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     unspecified laterality       Past Surgical History:   Procedure Laterality Date    AORTIC VALVE REPLACEMENT      complications 4191 failure of bovine valve, replaced with mechanical aortic valve  and root replacement at [facility] dr singer Bolanos Camel      enlargement    CARDIAC VALVE REPLACEMENT  2011    bovine, with redo and mechanical valve replacement and root 2012 dr sawyer at [facility] last assessed 08/15/2016    CERVICAL BIOPSY  W/ LOOP ELECTRODE EXCISION      COLPOSCOPY      INDUCED       surgically by dilation and evacuation    NASAL/SINUS ENDOSCOPY Left 2019    Procedure: ENDOSCOPIC CONTROL OF EPISTAXIS (LEFT); Surgeon: Sabi Tsai MD;  [facility];  Service: ENT    RHINOPLASTY      SEPTOPLASTY         Family History   Problem Relation Age of Onset    Asthma Father     Coronary artery disease Father     Hypertension Father     No Known Problems Sister     Breast cancer Maternal Grandmother     No Known Problems Mother     No Known Problems Daughter     No Known Problems Son     Diabetes Maternal Grandfather     No Known Problems Paternal Grandmother     Other Paternal Grandfather         Susanne Fever    No Known Problems Sister     No Known Problems Sister     No Known Problems Daughter     No Known Problems Son     No Known Problems Son      I have reviewed and agree with the history as documented      E-Cigarette/Vaping    E-Cigarette Use Never User      E-Cigarette/Vaping Substances    Nicotine No     THC No     CBD No     Flavoring No     Other No     Unknown No      Social History     Tobacco Use    Smoking status: Never Smoker    Smokeless tobacco: Never Used   Substance Use Topics    Alcohol use: Yes     Frequency: 2-4 times a month     Comment: drinks hard liquor, social per allscripts    Drug use: No       Review of Systems   Constitutional: Negative for chills, fatigue and fever  HENT: Negative for sore throat  Eyes: Negative for redness and visual disturbance  Respiratory: Negative for cough and shortness of breath  Cardiovascular: Negative for chest pain  Gastrointestinal: Negative for abdominal pain, diarrhea and nausea  Genitourinary: Negative for difficulty urinating, dysuria and pelvic pain  Musculoskeletal: Negative for back pain  Skin: Negative for rash  Neurological: Negative for syncope, weakness and headaches  All other systems reviewed and are negative  Physical Exam  Physical Exam  Vitals signs and nursing note reviewed  Constitutional:       General: She is not in acute distress  HENT:      Head: Normocephalic and atraumatic  Eyes:      Conjunctiva/sclera: Conjunctivae normal    Neck:      Musculoskeletal: Normal range of motion  Cardiovascular:      Rate and Rhythm: Normal rate and regular rhythm  Heart sounds: Normal heart sounds  Pulmonary:      Effort: Pulmonary effort is normal  No respiratory distress  Breath sounds: Normal breath sounds  Abdominal:      General: Bowel sounds are normal       Palpations: Abdomen is soft  Tenderness: There is no abdominal tenderness  Musculoskeletal: Normal range of motion  Skin:     General: Skin is warm and dry  Findings: No rash  Neurological:      Mental Status: She is alert and oriented to person, place, and time  Cranial Nerves: No cranial nerve deficit  Sensory: No sensory deficit  Motor: No abnormal muscle tone        Coordination: Coordination normal          Vital Signs  ED Triage Vitals [04/25/21 1400]   Temperature Pulse Respirations Blood Pressure SpO2   98 5 °F (36 9 °C) 80 17 130/66 97 %      Temp Source Heart Rate Source Patient Position - Orthostatic VS BP Location FiO2 (%)   Oral Monitor Sitting Left arm -- Pain Score       --           Vitals:    04/25/21 1400   BP: 130/66   Pulse: 80   Patient Position - Orthostatic VS: Sitting         Visual Acuity      ED Medications  Medications - No data to display    Diagnostic Studies  Results Reviewed     Procedure Component Value Units Date/Time    Novel Coronavirus (Covid-19),PCR [facility] - 24 Hour Routine [[id number]]  (Abnormal) Collected: 04/25/21 1406    Lab Status: Final result Specimen: Nares from Nasopharyngeal Swab Updated: 04/25/21 1456     SARS-CoV-2 Positive    Narrative: The specimen collection materials, transport medium, and/or testing methodology utilized in the production of these test results have been proven to be reliable in a limited validation with an abbreviated program under the Emergency Utilization Authorization provided by the FDA  Testing reported as "Presumptive positive" will be confirmed with secondary testing to ensure result accuracy  Clinical caution and judgement should be used with the interpretation of these results with consideration of the clinical impression and other laboratory testing  Testing reported as "Positive" or "Negative" has been proven to be accurate according to standard laboratory validation requirements  All testing is performed with control materials showing appropriate reactivity at standard intervals  No orders to display              Procedures  Procedures         ED Course           MDM    covid test sent  Asymptomatic  To quarantine if positive  The patient was instructed to follow up as documented  Strict return precautions were discussed with the patient and the patient was instructed to return to the emergency department immediately if symptoms worsen  The patient/patient family member acknowledged and were in agreement with plan  Informed of covid pos result, will quarantine      Disposition  Final diagnoses:   Close exposure to COVID-19 virus     Time reflects when diagnosis was documented in both MDM as applicable and the Disposition within this note     Time User Action Codes Description Comment    4/25/2021  2:06 PM Janie Ríos Add [Z20 222] Close exposure to COVID-19 virus       ED Disposition     ED Disposition Condition Date/Time Comment    Discharge Stable Sun Apr 25, 2021  2:06 PM [facility]  Follow-up Information     Follow up With Specialties Details Why Contact Info    Pranay Carrillo MD Internal Medicine, Palliative Care Schedule an appointment as soon as possible for a visit in 2 days For follow up regarding your symptoms and recheck, As needed [address] [place] [address], [facility] [phone] [facility] [address]             Discharge Medication List as of 4/25/2021  2:06 PM      CONTINUE these medications which have NOT CHANGED    Details   aspirin (ECOTRIN LOW STRENGTH) 81 mg EC tablet Take 81 mg by mouth daily, Historical Med      dicyclomine (BENTYL) 20 mg tablet Take 1 tablet (20 mg total) by mouth 3 (three) times a day as needed (abdominal pain), Starting Mon 8/78/4696, Normal      FOLIC ACID PO Take by mouth daily, Historical Med      medroxyPROGESTERone (DEPO-PROVERA) 150 mg/mL injection Inject 1 mL (150 mg total) into a muscle every 3 (three) months, Starting Thu 2/25/2021, Normal      metoprolol tartrate (LOPRESSOR) 25 mg tablet Take 1 tablet (25 mg total) by mouth 2 (two) times a day, Starting Tue 2/23/2021, Normal      Prenatal Vit-Fe Fumarate-FA (PrePLUS) 27-1 MG TABS take 1 tablet by mouth daily, Normal      rizatriptan (MAXALT) 10 MG tablet TAKE 1 TABLET BY MOUTH ONCE AS NEEDED FOR MIGRAINE FOR UP TO 1 DOSE, Normal      warfarin (COUMADIN) 5 mg tablet Take 1 tablet (5 mg total) by mouth daily As directed, Starting Fri 2/12/2021, Normal           No discharge procedures on file      PDMP Review       Value Time User    PDMP Reviewed  Yes 12/24/2019  3:44 PM Charly Meadows MD          ED Provider  Electronically Signed by           Ankit Moss MD  04/25/21 4223

## 2021-04-26 ENCOUNTER — TELEMEDICINE (OUTPATIENT)
Dept: INTERNAL MEDICINE CLINIC | Facility: CLINIC | Age: 46
End: 2021-04-26
Payer: COMMERCIAL

## 2021-04-26 ENCOUNTER — TELEPHONE (OUTPATIENT)
Dept: INTERNAL MEDICINE CLINIC | Facility: CLINIC | Age: 46
End: 2021-04-26

## 2021-04-26 ENCOUNTER — TELEPHONE (OUTPATIENT)
Dept: CARDIOLOGY CLINIC | Facility: CLINIC | Age: 46
End: 2021-04-26

## 2021-04-26 DIAGNOSIS — U07.1 COVID-19: Primary | ICD-10-CM

## 2021-04-26 PROCEDURE — 1036F TOBACCO NON-USER: CPT | Performed by: NURSE PRACTITIONER

## 2021-04-26 PROCEDURE — 99213 OFFICE O/P EST LOW 20 MIN: CPT | Performed by: NURSE PRACTITIONER

## 2021-04-26 NOTE — PROGRESS NOTES
COVID-19 Outpatient Progress Note    Assessment/Plan:    Problem List Items Addressed This Visit     None         Disposition:     I recommended self-quarantine for 10 days and to watch for symptoms until 14 days after exposure  If patient were to develop symptoms, they should self isolate and call our office for further guidance  I have spent 15 minutes directly with the patient  Greater than 50% of this time was spent in counseling/coordination of care regarding: prognosis, patient and family education and importance of treatment compliance  Encounter provider NEISHA Lema    Provider located at [address] [place] [address]    Recent Visits  No visits were found meeting these conditions  Showing recent visits within past 7 days and meeting all other requirements     Today's Visits  Date Type Provider Dept   04/26/21 Telephone [address] today's visits and meeting all other requirements     Future Appointments  No visits were found meeting these conditions  Showing future appointments within next 150 days and meeting all other requirements      This virtual check-in was done via [employer] and patient was informed that this is not a secure, HIPAA-compliant platform  She agrees to proceed  Patient agrees to participate in a virtual check in via telephone or video visit instead of presenting to the office to address urgent/immediate medical needs  Patient is aware this is a billable service  After connecting through [facility], the patient was identified by name and date of birth  Caty Gillespie was informed that this was a telemedicine visit and that the exam was being conducted confidentially over secure lines  My office door was closed  No one else was in the room  Caty Gillespie acknowledged consent and understanding of privacy and security of the telemedicine visit   I informed the patient that I have reviewed her record in Epic and presented the opportunity for her to ask any questions regarding the visit today  The patient agreed to participate  Subjective:   Jayden Genao is a 50 y o  female who is concerned about COVID-19  Patient's symptoms include fever, cough and headache  Date of symptom onset: 2021  Date of exposure: 2021    Exposure:   Contact with a person who is under investigation (PUI) for or who is positive for COVID-19 within the last 14 days?: Yes    Hospitalized recently for fever and/or lower respiratory symptoms?: No      Currently a healthcare worker that is involved in direct patient care?: No      Lab Results   Component Value Date    SARSCOV2 Positive (A) 2021     Past Medical History:   Diagnosis Date    Abnormal Pap smear of cervix     Allergic contact dermatitis     Allergic rhinitis     resolved 2018    Aortic valve disorder     Aortic valve insufficiency     Asthma     Benign neoplasm of skin     Cardiac disease     Costochondritis     Hyperinsulinism     Inguinal lymphadenopathy     resolved 2015    Migraine     resolved 2015    Nosebleed     Varicose 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     unspecified laterality     Past Surgical History:   Procedure Laterality Date    AORTIC VALVE REPLACEMENT      complications 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, replaced with mechanical aortic valve  and root replacement at [facility] dr sawyer   [address]  2011    bovine, with redo and mechanical valve replacement and root 2012 dr sawyer at [facility] last assessed 08/15/2016    CERVICAL BIOPSY  W/ LOOP ELECTRODE EXCISION      COLPOSCOPY      INDUCED       surgically by dilation and evacuation    NASAL/SINUS ENDOSCOPY Left 2019    Procedure: ENDOSCOPIC CONTROL OF EPISTAXIS (LEFT);   Surgeon: Cristine Hansen MD;  Location: [facility]; Service: ENT    RHINOPLASTY      SEPTOPLASTY       Current Outpatient Medications   Medication Sig Dispense Refill    aspirin (ECOTRIN LOW STRENGTH) 81 mg EC tablet Take 81 mg by mouth daily      dicyclomine (BENTYL) 20 mg tablet Take 1 tablet (20 mg total) by mouth 3 (three) times a day as needed (abdominal pain) 60 tablet 3    FOLIC ACID PO Take by mouth daily      medroxyPROGESTERone (DEPO-PROVERA) 150 mg/mL injection Inject 1 mL (150 mg total) into a muscle every 3 (three) months 1 mL 4    metoprolol tartrate (LOPRESSOR) 25 mg tablet Take 1 tablet (25 mg total) by mouth 2 (two) times a day 180 tablet 1    Prenatal Vit-Fe Fumarate-FA (PrePLUS) 27-1 MG TABS take 1 tablet by mouth daily 90 tablet 1    rizatriptan (MAXALT) 10 MG tablet TAKE 1 TABLET BY MOUTH ONCE AS NEEDED FOR MIGRAINE FOR UP TO 1 DOSE 30 tablet 0    warfarin (COUMADIN) 5 mg tablet Take 1 tablet (5 mg total) by mouth daily As directed 90 tablet 0     Current Facility-Administered Medications   Medication Dose Route Frequency Provider Last Rate Last Admin    medroxyPROGESTERone acetate (DEPO-PROVERA SYRINGE) IM injection 150 mg  150 mg Intramuscular Q3 Months NEISHA Estrada   150 mg at 05/14/20 1025     Allergies   Allergen Reactions    Dog Epithelium Allergy Skin Test Rash     Only certain dogs    Pollen Extract Other (See Comments)     Test showed allergy but rarely has s/s       Review of Systems   Constitutional: Positive for fever  Respiratory: Positive for cough  Neurological: Positive for headaches  Objective: There were no vitals filed for this visit  Physical Exam  Constitutional:       Appearance: She is well-developed  HENT:      Head: Normocephalic and atraumatic  Eyes:      Pupils: Pupils are equal, round, and reactive to light  Pulmonary:      Effort: Pulmonary effort is normal    Neurological:      Mental Status: She is alert and oriented to person, place, and time         VIRTUAL VISIT Raymundo Cross Zonia Gold acknowledges that she has consented to an online visit or consultation  She understands that the online visit is based solely on information provided by her, and that, in the absence of a face-to-face physical evaluation by the physician, the diagnosis she receives is both limited and provisional in terms of accuracy and completeness  This is not intended to replace a full medical face-to-face evaluation by the physician  Maciel Helton understands and accepts these terms

## 2021-04-26 NOTE — TELEPHONE ENCOUNTER
I spoke to patient yesterday and advised her to take Tylenol and contact primary care physician for further recommendation

## 2021-04-27 ENCOUNTER — TELEPHONE (OUTPATIENT)
Dept: INTERNAL MEDICINE CLINIC | Facility: CLINIC | Age: 46
End: 2021-04-27

## 2021-04-27 NOTE — TELEPHONE ENCOUNTER
CALLED PT   AND WAS NOTIFIED AND STATED SHE HAD DRANK SOME SODA AS HAS NOT HAD SODA IN 3 DAYS AND HER HEADACHE IS BETTER AND STATED SHE WILL KEEP TRACK OF THUS AND IF GETS WORSE WILL GO TO ER

## 2021-04-27 NOTE — TELEPHONE ENCOUNTER
Patient has covid saw Faisal Edith wanted to know if she should be taking a z-pck or something   Please call patient back at [phone]

## 2021-04-27 NOTE — TELEPHONE ENCOUNTER
She can eat and drink like usual- she can take her normal migraine medication but it may just be part of covid too   If its the worse headache of her life then I would recommend going to the ER

## 2021-04-27 NOTE — TELEPHONE ENCOUNTER
Having a severe migrane for the last few days  She is wondering if it is because she has not had any caffeine  Wants to know if she can have caffeine? ? ?Please advise

## 2021-04-28 NOTE — TELEPHONE ENCOUNTER
These messages shouldnt be sent to me when not in the office   If she isnt well she needs to be set up to see someone in urgent care

## 2021-05-03 ENCOUNTER — TELEPHONE (OUTPATIENT)
Dept: INTERNAL MEDICINE CLINIC | Facility: CLINIC | Age: 46
End: 2021-05-03

## 2021-05-03 NOTE — TELEPHONE ENCOUNTER
Retesting is not recommended  Her employee should read the CDC instructions  People can stay positive for 3 months after infection, but are only infectious for a week to 10 days  CDC recommends waiting 10 days after symptom onset or after positive test if no symptoms and at least 24 hours since the last fever without the use of Tylenol before patient has come off of isolation

## 2021-05-03 NOTE — TELEPHONE ENCOUNTER
Patient was COVID Positive  She needs to have a negative test before starting her new job  She needs to know when can she get retested and if this can be set up for her

## 2021-05-04 ENCOUNTER — ANTICOAG VISIT (OUTPATIENT)
Dept: CARDIOLOGY CLINIC | Facility: CLINIC | Age: 46
End: 2021-05-04

## 2021-05-04 ENCOUNTER — APPOINTMENT (OUTPATIENT)
Dept: LAB | Facility: HOSPITAL | Age: 46
End: 2021-05-04
Attending: INTERNAL MEDICINE
Payer: COMMERCIAL

## 2021-05-04 DIAGNOSIS — Z79.01 ANTICOAGULATION MONITORING, INR RANGE 2-3: ICD-10-CM

## 2021-05-04 DIAGNOSIS — Z79.01 LONG TERM (CURRENT) USE OF ANTICOAGULANTS: ICD-10-CM

## 2021-05-04 DIAGNOSIS — I35.9 AORTIC VALVE DISORDER: ICD-10-CM

## 2021-05-04 LAB
INR PPP: 3.09 (ref 0.84–1.19)
PROTHROMBIN TIME: 30.4 SECONDS (ref 11.6–14.5)

## 2021-05-04 PROCEDURE — 85610 PROTHROMBIN TIME: CPT

## 2021-05-04 PROCEDURE — 36415 COLL VENOUS BLD VENIPUNCTURE: CPT

## 2021-05-04 NOTE — PROGRESS NOTES
Pt to remain on the same dose and retest in 2 weeks   lmom for pt with INR results, dosing instructions and when to retest

## 2021-05-11 ENCOUNTER — OFFICE VISIT (OUTPATIENT)
Dept: INTERNAL MEDICINE CLINIC | Facility: CLINIC | Age: 46
End: 2021-05-11
Payer: COMMERCIAL

## 2021-05-11 VITALS
HEART RATE: 63 BPM | DIASTOLIC BLOOD PRESSURE: 76 MMHG | HEIGHT: 60 IN | WEIGHT: 110.4 LBS | SYSTOLIC BLOOD PRESSURE: 126 MMHG | OXYGEN SATURATION: 96 % | BODY MASS INDEX: 21.68 KG/M2 | TEMPERATURE: 98.7 F

## 2021-05-11 DIAGNOSIS — H65.92 LEFT NON-SUPPURATIVE OTITIS MEDIA: Primary | ICD-10-CM

## 2021-05-11 PROCEDURE — 99214 OFFICE O/P EST MOD 30 MIN: CPT | Performed by: NURSE PRACTITIONER

## 2021-05-11 PROCEDURE — 3008F BODY MASS INDEX DOCD: CPT | Performed by: NURSE PRACTITIONER

## 2021-05-11 PROCEDURE — 3078F DIAST BP <80 MM HG: CPT | Performed by: NURSE PRACTITIONER

## 2021-05-11 PROCEDURE — 3074F SYST BP LT 130 MM HG: CPT | Performed by: NURSE PRACTITIONER

## 2021-05-11 PROCEDURE — 1036F TOBACCO NON-USER: CPT | Performed by: NURSE PRACTITIONER

## 2021-05-11 RX ORDER — AMOXICILLIN 500 MG/1
500 CAPSULE ORAL EVERY 8 HOURS SCHEDULED
Qty: 30 CAPSULE | Refills: 0 | Status: SHIPPED | OUTPATIENT
Start: 2021-05-11 | End: 2021-05-21

## 2021-05-11 NOTE — PATIENT INSTRUCTIONS
Flonase nasal spray daily  Zyrtec or claritan daily  Allergic Rhinitis   WHAT YOU NEED TO KNOW:   Allergic rhinitis, or hay fever, is swelling of the inside of your nose  The swelling is a reaction to allergens in the air  An allergen can be anything that causes an allergic reaction  Allergies to weeds, grass, trees, or mold often cause seasonal allergic rhinitis  Indoor dust mites, cockroaches, pet dander, or mold can also cause allergic rhinitis  DISCHARGE INSTRUCTIONS:   Call 911 for the following:   · You have chest pain or shortness of breath  Return to the emergency department if:   · You have severe pain  · You cough up blood  Contact your healthcare provider if:   · You have a fever  · You have ear or sinus pain, or a headache  · Your symptoms get worse, even after treatment  · You have yellow, green, brown, or bloody mucus coming from your nose  · Your nose is bleeding or you have pain inside your nose  · You have trouble sleeping because of your symptoms  · You have questions or concerns about your condition or care  Medicines:   · Medicines  help decrease your symptoms and clear your stuffy nose  · Take your medicine as directed  Contact your healthcare provider if you think your medicine is not helping or if you have side effects  Tell him of her if you are allergic to any medicine  Keep a list of the medicines, vitamins, and herbs you take  Include the amounts, and when and why you take them  Bring the list or the pill bottles to follow-up visits  Carry your medicine list with you in case of an emergency  How to manage allergic rhinitis:  The best way to manage allergic rhinitis is to avoid allergens that can trigger your symptoms  Any of the following may help decrease your symptoms:  · Rinse your nose and sinuses  with a salt water solution or use a salt water nasal spray  This will help thin the mucus in your nose and rinse away pollen and dirt  It will also help reduce swelling so you can breathe normally  Ask your healthcare provider how often to rinse your nose  · Reduce exposure to dust mites  Wash sheets and towels in hot water every week  Cover your pillows and mattresses with allergen-free covers  Limit the number of stuffed animals and soft toys your child has  Wash your child's toys in hot water regularly  Vacuum weekly and use a vacuum  with an air filter  If possible, get rid of carpets and curtains  These collect dust and dust mites  · Reduce exposure to pollen  Keep windows and doors closed in your house and car  Stay inside when air pollution or the pollen count is high  Run your air conditioner on recycle, and change air filters often  Shower and wash your hair before bed every night to rinse away pollen  · Reduce exposure to pet dander  If possible, do not keep cats, dogs, birds, or other pets  If you do keep pets in your home, keep them out of bedrooms and carpeted rooms  Bathe them often  · Reduce exposure to mold  Do not spend time in basements  Choose artificial plants instead of live plants  Keep your home's humidity at less than 45%  Do not have ponds or standing water in your home or yard  · Do not smoke  Avoid others who smoke  Ask your healthcare provider for information if you currently smoke and need help to quit  Follow up with your healthcare provider as directed: You may need to see an allergist often to control your symptoms  Write down your questions so you remember to ask them during your visits  © Copyright [address] Information is for End User's use only and may not be sold, redistributed or otherwise used for commercial purposes  All illustrations and images included in CareNotes® are the copyrighted property of [employer]  or [facility] [employer]   The above information is an  only  It is not intended as medical advice for individual conditions or treatments  Talk to your doctor, nurse or pharmacist before following any medical regimen to see if it is safe and effective for you  Ear Infection   AMBULATORY CARE:   An ear infection  is also called otitis media  An ear infection may be caused by blocked or swollen eustachian tubes  Eustachian tubes connect the middle ear to the back of the nose and throat  They drain fluid from the middle ear  With an ear infection, fluid builds up and is infected by germs  The germs grow easily in fluid trapped behind the eardrum  Common symptoms include the following:   · Ear pain    · Fever or a headache    · Trouble hearing    · Ringing or buzzing in your ear    · Plugged ear or an ear that feels full    · Dizziness    · Nausea or vomiting    Call 911 or have someone call 911 for the following:   · You have a seizure  Seek immediate care for the following symptoms:   · You have a fever and a stiff neck  Contact your healthcare provider if:   · Your ear pain gets worse or does not go away, even after treatment  · The outside of your ear is red or swollen  · You are vomiting or have diarrhea  · You have fluid coming from your ear  · You have questions or concerns about your condition or care  Medicines: You may  need any of the following:  · Acetaminophen  decreases pain and fever  It is available without a doctor's order  Ask how much to take and how often to take it  Follow directions  Read the labels of all other medicines you are using to see if they also contain acetaminophen, or ask your doctor or pharmacist  Acetaminophen can cause liver damage if not taken correctly  Do not use more than 4 grams (4,000 milligrams) total of acetaminophen in one day  · NSAIDs , such as ibuprofen, help decrease swelling, pain, and fever  This medicine is available with or without a doctor's order  NSAIDs can cause stomach bleeding or kidney problems in certain people   If you take blood thinner medicine, always ask your healthcare provider if NSAIDs are safe for you  Always read the medicine label and follow directions  · Ear drops  help treat your ear pain  · Antibiotics  help treat a bacterial infection that caused your ear infection  · Take your medicine as directed  Contact your healthcare provider if you think your medicine is not helping or if you have side effects  Tell him or her if you are allergic to any medicine  Keep a list of the medicines, vitamins, and herbs you take  Include the amounts, and when and why you take them  Bring the list or the pill bottles to follow-up visits  Carry your medicine list with you in case of an emergency  Manage your symptoms:   · Apply heat  on your ear for 15 to 20 minutes, 3 to 4 times a day or as directed  Heat helps decrease pain  · Apply ice  on your ear for 15 to 20 minutes, 3 to 4 times a day for 2 days or as directed  Use an ice pack, or put crushed ice in a plastic bag  Cover it with a towel before you apply it to your ear  Ice decreases swelling and pain  Prevent an ear infection:   · Wash your hands often  Use soap and water  Wash your hands after you use the bathroom, change a child's diapers, or sneeze  Wash your hands before you prepare or eat food  · Stay away from people who are ill  Some germs are easily and quickly spread through contact  Follow up with your healthcare provider as directed:  Write down your questions so you remember to ask them during your visits  © Copyright [address] Information is for End User's use only and may not be sold, redistributed or otherwise used for commercial purposes  All illustrations and images included in CareNotes® are the copyrighted property of [employer]  or [facility] [employer]   The above information is an  only  It is not intended as medical advice for individual conditions or treatments   Talk to your doctor, nurse or pharmacist before following any medical regimen to see if it is safe and effective for you  Making the best informed decision about COVID vaccination  How does the vaccine work? The COVID vaccine is an mRNA vaccine   mRNA technology is new, but not unknown  They have been studied for more than a decade   mRNA vaccines do not contain a live virus and do not carry a risk of causing disease in the vaccinated person   mRNA from the vaccine never enters the nucleus of the cell and does not affect or interact with a persons DNA  Is the vaccine approved? Vaccines from both [employer] and [employer] are being released by the Food and Drug Administration (FDA) under  emergency use authorization (EUA)  An EUA is issued by the FDA to allow access to critical medical products that may help during a public health  emergency  An EUA is different from approval/licensure  The following criteria must be met for an EUA to be issued:   The product will be used for a serious or life-threatening disease or condition   Based on the totality of scientific evidence available, it is reasonable to believe the product may be effective   The known and potential benefits of the product outweigh the known and potential risks of the product   There is no adequate FDA-approved alternative available  Is the vaccine recommended? The Advisory Committee on [employer] (ACIP) issued an interim recommendation for use of the  vaccines for the prevention of COVID-19 as follows:   Pfizer[employer] COVID-19 vaccine in persons aged ? 16 years   Moderna COVID-19 vaccine in persons aged ? 18 years  Is the vaccine effective? Both vaccines were found to be >95% effective in preventing COVID infection  Is the vaccine safe? The vaccine was well-tolerated and the rate of adverse effects was low  There were no safety concerns after an  average of 2 months of patient monitoring     The most common reactions were injection site pain, fatigue, headache, muscle pain, and joint pains    There were no serious neurological adverse effects   There are no concerns with any negative effect of the vaccine on male or female fertility  1   Since mRNA vaccines are a new technology without long-term human data how can we know they  are safe?  Most adverse effects from vaccines happen within the first 6-8 weeks after vaccination   [facility] [employer] [facility] trials have followed >70,000 people for more than 2 months and have observed no  serious or unexpected side effects attributed to the vaccine   Researchers have a solid grasp on how mRNA interacts with the immune system and have no theoretical  safety concerns   There have been many long-term animal studies done with mRNA that show no safety concerns  We do not anticipate there will be any long-term safety concerns with mRNA vaccines  Is there a difference between [employer] and Moderna vaccines and should I choose one preferentially?  Both the AMEE vaccines are nearly identical, so there are no appreciable benefits of getting one  over the other  We recommend scheduling an appointment at a location that is convenient for your schedule both now and for  the required booster in 3-4 weeks  Can everyone get the vaccine? When considering if the vaccine is right for you, it is important to consider the actual population in which the  vaccine was studied  Pfizer vaccine: This trial included only individuals that were 16 and older  No children were enrolled  There were also no pregnant/lactating women or immunosuppressed patients who were enrolled  Of the study  participants:   40% were >55 and 25% were > 65   83% were White, 28% /, and 9% Black/   46% had at least one comorbidity   35% were obese  Moderna vaccine: This trial included only individuals that were 18 and older  No children were enrolled    There were also no pregnant/lactating women or immunosuppressed patients who were enrolled  Of the study  participants:   25% were > 65   79% were White, 20% /, and 9 8% Black/   22% had at least one risk factor for severe COVID-19 infection  While there are limited data on the following special populations, we have attempted to summarize the  considerations for each group and our general recommendation for considering vaccination  Can persons with a current or prior history of COVID-19 infection get the vaccine?  Data from clinical trials suggest the vaccine is safe and likely efficacious in persons with evidence of a prior  COVID infection   Based on the estimated duration of antibodies from COVID infection as well as evidence suggesting that  reinfection is uncommon in the 90 days after initial infection, vaccine deferral could prolong your overall  period of immunity  While there is no harm in receiving the vaccine at any time after your recovery from [place], we favor deferring  vaccination for 90 days from the time of infection (initial symptom onset)  2   If I got COVID infection, do I still need to get the vaccine? Shouldnt I be protected already?  Antibodies from natural COVID infection last at least 90 days   After these 90 days, there is a risk of reinfection (we have seen several cases - usually about 6 months later)   Symptoms of a second infection tend to be milder but still worse then side effects expected with the vaccine   There is early evidence that antibodies from the vaccine last for 3 months, and likely longer   Some research shows that the vaccine may induce a better immune response than natural infection  We favor those individuals who have had COVID infection also get the vaccine, waiting 90 days as above  Can persons who previously received passive antibody therapy for COVID-19 get the vaccine?    There are currently no available data on the safety and efficacy of vaccine in persons who received monoclonal  antibodies or convalescent plasma as part of COVID-19 treatment   Based on the estimated half-life of such therapies as well as evidence suggesting that reinfection is uncommon  in the 90 days after initial infection, vaccination deferral could avoid interference of the antibody treatment with  vaccine-induced immune responses  We favor deferring vaccination for 90 days from the time of receipt of antibody treatment  Can persons with underlying medical conditions get the vaccine?  The vaccine may be administered to persons with underlying medical conditions who have no  contraindications to vaccination   Clinical trials demonstrated similar safety and efficacy profiles in persons with some underlying medical  conditions, including those that place them at increased risk for severe COVID-19, compared to persons without  comorbidities  We strongly favor vaccine administration in individuals with comorbidities that might put them at increased risk  for severe COVID infection  Can immunocompromised persons get the vaccine?  There are currently no available data on the safety and efficacy of the vaccine in people with HIV infection,  other immunocompromising conditions, or who take immunosuppressive medications or therapies   Based on current knowledge, experts believe that mRNA vaccines are unlikely to pose a risk for people who  are immunosuppressed   In the setting of underlying immunosuppression, there is a potential for reduced immune responses and the  need to continue to follow other preventive measures (i e  masking, distancing, avoiding crowds, etc ) to protect  against COVID infection   Persons with HIV infection, other immunocompromising conditions, or who take immunosuppressive  medications or therapies might be at increased risk for severe COVID-19     Because of the complexities with different forms of immunosuppression, the appropriateness and timing of  vaccine needs to be carefully considered between the patient and provider  We favor vaccine administration in immunocompromised persons in most situations after careful discussion with  the patients physician  Can pregnant women get the vaccine?  There are currently no available data on the safety and efficacy of the vaccine in pregnant people   Based on current knowledge, experts believe that mRNA vaccines are unlikely to pose a risk for people who  are pregnant  3    Observational data demonstrate that while the absolute risk is low, pregnant people with COVID-19 have an  increased risk of severe illness, including illness resulting in ICU admission, mechanical ventilation, or death  Additionally, they might be at an increased risk of adverse pregnancy outcomes, such as  birth   When making a decision, pregnant people and their health care providers should consider the level of  COVID-19 community transmission, the patients personal risk of david COVID-19, the risks of COVID-19  to the patient and potential risks to the fetus, the efficacy of the vaccine, the side effects of the vaccine and the  lack of data about the vaccine during pregnancy  We favor vaccine administration in pregnant women if they are interested after careful discussion with the  patients obstetrician  Can lactating women get the vaccine?  There are currently no available data on the safety and efficacy of the vaccine in lactating people or the effects  of mRNA vaccines on the  infant or milk production/excretion   Based on current knowledge, experts believe that mRNA vaccines are unlikely to pose a risk to the  breastfeeding infant  We favor vaccine administration in lactating women if they are interested after discussion with the childs  pediatrician  Can adolescents get the vaccine?  While the vaccine is approved for individuals age 12 and above, only 80 participants out of >40,000 were aged  16-17 years     While vaccine safety and efficacy data in this age group are limited, there are no biologically plausible reasons  for safety and efficacy profiles to be different than those observed in persons 25years of age and older, and no  safety concerns were observed in these individuals  We favor Pfizer vaccine administration in adolescents aged 16-17  Can you get the COVID vaccine along with other routine vaccinations?  There are no data as far as the interaction between COVID-19 vaccination and other vaccines  We recommend that other vaccines should not be given for two week s before or after COVID-19 vaccination  If vaccines are given <2 weeks apart, there is no harm and the COVID booster does not need to be delayed  Are there any contraindication s to receiving the vaccine? Please see our COVID Vaccine Guidance for Allergies & Side Effectsdocument on the intranet  Will receiving the vaccine cause me to test positive for COVID-19? No  Receiving the vaccine should not affect future PCR test results for COVID, however, it is possible to  coincidentally contract actual COVID around time of vaccination  Can persons on quarantine for a COVID exposure or out-of-state travel still get the vaccine? We recommend deferring vaccination until the quarantine period is over in order to ensure affected individuals  dont actually have COVID infection that could be spread to others or affect their own immune response to  the vaccine  4  If I get the vaccine, do I still need to quarantine after travel or exposure to COVID? Yes, the CDC recommendation for quarantine after travel and close exposure to individuals with COVID remains  unchanged, regardless of your vaccination status  What happens if I test positive after the first vaccination? Can I still get my 2nd booster shot? Yes, you should continue to receive your 2nd booster shot   If you are still feeling ill or remain on isolation for  COVID at the time your 2nd dose is scheduled, you should delay receiving this booster until you are well and  off isolation  If I have received the vaccine but end up getting COVID, can I still receive monoclonal antibody  therapy? Yes  If a patient is diagnosed with COVID and they meet the current criteria for monoclonal antibody therapy,  treatment can still be administered regardless of their vaccination status  Can I still donate blood if I receive the COVID vaccine? Yes  Receiving the COVID vaccine will not affect your future ability to donate blood  Per [place], individuals must wait 2 weeks after each does of the vaccine before donating whole blood,  platelets, or plasma  Vaccine recipients are NOT eligible for donating convalescent plasma  What happens if I develop an adverse reaction after receiving the COVID vaccine? Please see our COVID Vaccine Guidance for Allergies & Side Effects  document on the intranet  Any severe or  worrisome reaction should be report ed and discussed with your PCP  Mild reactions may be reported through the [employer]   [employer] vaccines/safety/ vsafe html  How long does it take to be fully immune to COVID after receiving the vaccine? While there is some protection after the first dose, it will generally take two weeks following the second dose of  the vaccine for people to gain full immunity  I had a bad reaction to the first shot and dont want to get my second dose  Will I still be protected?  Yes, but not as much as you would with the full 2-dose series   It is estimated that the vaccine has a 50% efficacy after one dose, and >94% efficacy after the second dose  We encourage individuals to complete the FULL vaccine series so to get maximum protection against COVID,  with the use of acetaminophen and NSAIDs as needed to treat severe side effects  Will the vaccine cover this new variant of the virus first seen in the [place]?   Yes, none of the observed mutations in the SARS CoV-2 virus should affect the efficacy of the vaccine  Will [facility] require its employees to get the COVID vaccine? While we are encouraging vaccination in all eligible individuals, the vaccine is not mandatory at this time  Can my family members get vaccinated too? Vaccination is currently being offered by the federal government to healthcare providers and residents of  nursing facilities  If your family member is not in one of these groups, they will need to wait until vaccine is  released to other phased risk groups  [facility] is planning to continue to offer vaccine to patients as vaccine  supplies are expanded  We anticipate wider access to the vaccine by early spring  5   [id number]  Can I donate my vaccine to someone else (i e family member, neighbor, etc )?   Due to regulations on vaccine distribution phases by the state, unfortunately you cannot donate your allotted  vaccine to another individual   How can I learn more about the vaccine?  www cdc gov/ [email or web address] html

## 2021-05-11 NOTE — PROGRESS NOTES
Kevin    NAME: Bryan   AGE: 50 y o  SEX: female  : 1975     DATE: 2021     Assessment and Plan:     Problem List Items Addressed This Visit        Nervous and Auditory    Left non-suppurative otitis media - Primary    Relevant Medications    amoxicillin (AMOXIL) 500 mg capsule              No follow-ups on file  Chief Complaint:     Chief Complaint   Patient presents with    Cerumen Impaction     left ear infection        History of Present Illness:     Patient with pain, clogged sensation in her left ear since Friday  History of ear infections  No fevers  Post nasal drip due to allergies  Not taking anything for allergic rhinitis  Will treat patient for otitis media  Patient was diagnosed with COVID-19 last month  She continues to decline the COVID vaccine     Review of Systems:     Review of Systems   Constitutional: Negative for activity change, fatigue and fever  HENT: Positive for ear pain and postnasal drip  Negative for congestion, hearing loss, rhinorrhea, trouble swallowing and voice change  Eyes: Negative for photophobia, pain, discharge and visual disturbance  Respiratory: Negative for cough, chest tightness and shortness of breath  Cardiovascular: Negative for chest pain, palpitations and leg swelling  Gastrointestinal: Negative for abdominal pain, blood in stool, constipation, nausea and vomiting  Endocrine: Negative for cold intolerance and heat intolerance  Genitourinary: Negative for difficulty urinating, frequency, hematuria, urgency, vaginal bleeding and vaginal discharge  Musculoskeletal: Negative for arthralgias and myalgias  Skin: Negative  Neurological: Negative for dizziness, weakness, numbness and headaches  Psychiatric/Behavioral: Negative for decreased concentration  The patient is not nervous/anxious           Problem List:     Patient Active Problem List   Diagnosis    Aortic valve disorder    Anticoagulated    Varicose veins of lower extremity with pain, bilateral    Capsular contracture of breast implant    Encounter for gynecological examination (general) (routine) without abnormal findings    Surveillance for Depo-Provera contraception    Nasal obstruction    Nasal septal deviation    Nasal turbinate hypertrophy    Recurrent epistaxis    History of mechanical aortic valve replacement    Vulvar cyst    Screening for STD (sexually transmitted disease)    Left non-suppurative otitis media        Objective:     /76 (BP Location: Left arm, Patient Position: Sitting, Cuff Size: Standard)   Pulse 63   Temp 98 7 °F (37 1 °C) (Temporal) Comment: no nsaids  Ht 5' (1 524 m)   Wt 50 1 kg (110 lb 6 4 oz)   SpO2 96%   BMI 21 56 kg/m²     Physical Exam  Vitals signs reviewed  Constitutional:       Appearance: Normal appearance  HENT:      Head: Normocephalic  Right Ear: Tympanic membrane, ear canal and external ear normal  There is impacted cerumen  Left Ear: Ear canal and external ear normal  Swelling present  Tympanic membrane is scarred and erythematous  Nose: Nose normal       Mouth/Throat:      Mouth: Mucous membranes are moist       Pharynx: Oropharynx is clear  Eyes:      Extraocular Movements: Extraocular movements intact  Pupils: Pupils are equal, round, and reactive to light  Cardiovascular:      Rate and Rhythm: Normal rate and regular rhythm  Comments: Heart sounds consistent with prosthetic mechanical valve  Pulmonary:      Effort: Pulmonary effort is normal       Breath sounds: Normal breath sounds  Musculoskeletal: Normal range of motion  Skin:     General: Skin is warm and dry  Neurological:      General: No focal deficit present  Mental Status: She is alert and oriented to person, place, and time     Psychiatric:         Mood and Affect: Mood normal          Behavior: Behavior normal          Thought Content: Thought content normal          Judgment: Judgment normal          Sandra Simpson, [address] [place] [address]

## 2021-05-20 ENCOUNTER — APPOINTMENT (OUTPATIENT)
Dept: LAB | Facility: CLINIC | Age: 46
End: 2021-05-20
Payer: COMMERCIAL

## 2021-05-20 DIAGNOSIS — Z79.01 LONG TERM (CURRENT) USE OF ANTICOAGULANTS: ICD-10-CM

## 2021-05-20 DIAGNOSIS — Z79.01 ANTICOAGULATION MONITORING, INR RANGE 2-3: ICD-10-CM

## 2021-05-20 LAB
INR PPP: 2.79 (ref 0.84–1.19)
PROTHROMBIN TIME: 29.3 SECONDS (ref 11.6–14.5)

## 2021-05-20 PROCEDURE — 36415 COLL VENOUS BLD VENIPUNCTURE: CPT

## 2021-05-20 PROCEDURE — 85610 PROTHROMBIN TIME: CPT

## 2021-05-21 ENCOUNTER — ANTICOAG VISIT (OUTPATIENT)
Dept: CARDIOLOGY CLINIC | Facility: CLINIC | Age: 46
End: 2021-05-21

## 2021-05-21 DIAGNOSIS — I35.9 AORTIC VALVE DISORDER: ICD-10-CM

## 2021-06-08 ENCOUNTER — APPOINTMENT (OUTPATIENT)
Dept: LAB | Facility: HOSPITAL | Age: 46
End: 2021-06-08
Attending: INTERNAL MEDICINE
Payer: COMMERCIAL

## 2021-06-08 ENCOUNTER — ANTICOAG VISIT (OUTPATIENT)
Dept: CARDIOLOGY CLINIC | Facility: CLINIC | Age: 46
End: 2021-06-08

## 2021-06-08 DIAGNOSIS — I35.9 AORTIC VALVE DISORDER: ICD-10-CM

## 2021-06-16 NOTE — H&P
H&P- Ramon Cranker 1975, 53 y o  female MRN: [id number]    Unit/Bed#: ED 23 Encounter: [id number]    Primary Care Provider: Emmanuelle Mckinney MD   Date and time admitted to hospital: 12/25/2019 12:22 AM        * Subtherapeutic international normalized ratio (INR)  Assessment & Plan  · INR 1 71 on admission  · States was having recurrent epistaxis  INR was supra-therapeutic (5 8) on Friday  Stopped coumadin x 2 days per ENT  Took 7 5mg on Sunday, 5mg on Monday and 10mg on day of admission per cardiology  Was then sent to ED to be admitted for bridging to INR of 2 5-3 5 2/2 mechanical aortic valve  · Had nasal bleed cauterized on day of admission  · Lovenox 1mg/kg BID first dose now  · INR and CBC in am    History of mechanical aortic valve replacement  Assessment & Plan  · Had bovine valve replaced with a mechanical valve in 2012  · On coumadin therapy  See above    Recurrent epistaxis  Assessment & Plan  · Likely 2/2 supr-therapeutic INR initially  · Had cautery completed today by ENT  Still having some spotting from nare      VTE Prophylaxis: Warfarin (Coumadin)  / sequential compression device   Code Status: Level 1 Full Code  POLST: POLST form is not discussed and not completed at this time  Discussion with family: NA    Anticipated Length of Stay:  Patient will be admitted on an Observation basis with an anticipated length of stay of  Less than 2 midnights  Justification for Hospital Stay: See AP above    Total Time for Visit, including Counseling / Coordination of Care: 45 minutes  Greater than 50% of this total time spent on direct patient counseling and coordination of care  Chief Complaint:   Sent by cardiology for bridging to therapeutic INR    History of Present Illness:    Ramon Cranker is a 53 y o  female who presents with sub-therapeutic INR  States was taken off Coumadin x 2 days (Fri and Sat) by ENT 2/2 supra-therapeutic INR @ 5 8 and recurrent epistaxis   Took 7 5mg on Sun, 5mg on Mon and 10 mg today per cardiology for INR 1 6  INR 1 7 in ED  Goal INR 2 5-3 5 2/2 mechanical AVR in 2012  Had nasal bleed cauterized today by ENT  Continues to have some spotting from nare  Denies recent fever/chills  States was treated for sinusitis and tonsilitis a couple months ago which caused fluctuations in INR  Per ENT is to start Augmentin post cauterization and prolonged nasal packing  Review of Systems:    Review of Systems   Constitutional: Negative for activity change, chills and fever  HENT: Positive for nosebleeds and sinus pressure  Negative for congestion, ear pain and sore throat  Eyes: Negative for visual disturbance  Respiratory: Negative for cough, shortness of breath and wheezing  Cardiovascular: Negative for chest pain, palpitations and leg swelling  Gastrointestinal: Negative for abdominal pain, constipation, diarrhea, nausea and vomiting  Genitourinary: Negative for difficulty urinating, dysuria, frequency and urgency  Musculoskeletal: Negative for neck pain and neck stiffness  Neurological: Negative for dizziness, syncope and headaches  All other systems reviewed and are negative        Past Medical and Surgical History:     Past Medical History:   Diagnosis Date    Abnormal Pap smear of cervix     Allergic contact dermatitis     Allergic rhinitis     resolved 01/17/2018    Aortic valve disorder     Aortic valve insufficiency     Asthma     Benign neoplasm of skin     Cardiac disease     Costochondritis     Hyperinsulinism     Inguinal lymphadenopathy     resolved 08/27/2015    Migraine     resolved 08/27/2015    Nosebleed     Varicose 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     unspecified laterality       Past Surgical History:   Procedure Laterality Date    AORTIC VALVE REPLACEMENT      complications 4109 failure of bovine valve, replaced with mechanical aortic valve  and root replacement at [facility] dr singer Madi Navarro enlargement    CARDIAC VALVE REPLACEMENT  2011    bovine, with redo and mechanical valve replacement and root 2012 dr sawyer at [facility] last assessed 08/15/2016    CERVICAL BIOPSY  W/ LOOP ELECTRODE EXCISION      COLPOSCOPY      INDUCED       surgically by dilation and evacuation    RHINOPLASTY      SEPTOPLASTY         Meds/Allergies:    Prior to Admission medications    Medication Sig Start Date End Date Taking? Authorizing Provider   acetaminophen (TYLENOL) 500 mg tablet Take 2 tablets Q8 PRN pain   19  Yes Su Klein PA-C   amoxicillin-clavulanate (AUGMENTIN) 875-125 mg per tablet Take 1 tablet by mouth every 12 (twelve) hours for 10 days 12/24/19 1/3/20 Yes Vijaya Shaw MD   aspirin (ECOTRIN LOW STRENGTH) 81 mg EC tablet Take 81 mg by mouth daily   Yes Historical Provider, MD   FOLIC ACID PO Take by mouth daily   Yes Historical Provider, MD   HYDROcodone-acetaminophen (NORCO) 5-325 mg per tablet Take 1 tablet by mouth every 6 (six) hours as needed for pain for up to 10 daysMax Daily Amount: 4 tablets 12/24/19 1/3/20 Yes Vijaya Shaw MD   metoprolol tartrate (LOPRESSOR) 25 mg tablet Take 1 tablet (25 mg total) by mouth 2 (two) times a day 19  Yes Rigoberto Wilson MD   Multiple Vitamins-Minerals (MULTIVITAMIN WITH MINERALS) tablet Take 1 tablet by mouth daily   Yes Historical Provider, MD   Prenatal Vit-Fe Fumarate-FA (PRENATAL VITAMIN PLUS LOW IRON) 27-1 MG TABS take 1 tablet by mouth daily 19  Yes Skip Salcido PA-C   rizatriptan (MAXALT) 10 MG tablet take 1 tablet by mouth ONCE AS NEEDED FOR MIGRAINE FOR UP TO 1 DOSE 19  Yes Tova Rico MD   VENTOLIN  (90 Base) MCG/ACT inhaler inhale 2 puffs by mouth every 6 hours if needed 19  Yes Tova Rico MD   warfarin (COUMADIN) 5 mg tablet Take 1 tablet (5 mg total) by mouth daily As directed based on INR 19  Yes NEISHA Riojas   BIOTIN 5000 PO Take 20,000 mcg by mouth daily  19 Historical Provider, MD     I have reviewed home medications with patient family member  Allergies: Allergies   Allergen Reactions    Dog Epithelium Allergy Skin Test Rash     Only certain dogs    Pollen Extract Other (See Comments)     Test showed allergy but rarely has s/s       Social History:     Marital Status: Legally    Patient Pre-hospital Living Situation: Lives w/ daughter  Patient Pre-hospital Level of Mobility: Ambulatory w/o assistive device  Patient Pre-hospital Diet Restrictions: None  Substance Use History:   Social History     Substance and Sexual Activity   Alcohol Use Not Currently    Frequency: 2-4 times a month    Comment: drinks hard liquor, social per allscripts     Social History     Tobacco Use   Smoking Status Never Smoker   Smokeless Tobacco Never Used     Social History     Substance and Sexual Activity   Drug Use No       Family History:    Family History   Problem Relation Age of Onset    Asthma Father     Coronary artery disease Father     Hypertension Father     No Known Problems Sister     Breast cancer Maternal Grandmother     No Known Problems Mother     No Known Problems Daughter     No Known Problems Son     Diabetes Maternal Grandfather     No Known Problems Paternal Grandmother     Other Paternal Grandfather         Susanne Fever    No Known Problems Sister     No Known Problems Sister     No Known Problems Daughter     No Known Problems Son     No Known Problems Son        Physical Exam:     Vitals:   Blood Pressure: 151/69 (12/25/19 0145)  Pulse: 80 (12/25/19 0145)  Temperature: 98 2 °F (36 8 °C) (12/25/19 0032)  Temp Source: Oral (12/25/19 0032)  Respirations: 18 (12/25/19 0145)  Height: 5' (152 4 cm) (12/25/19 0029)  Weight - Scale: 51 8 kg (114 lb 3 2 oz) (12/25/19 0029)  SpO2: 99 % (12/25/19 0145)    Physical Exam   Constitutional: She is oriented to person, place, and time  She appears well-developed and well-nourished     HENT:   Head: Normocephalic and atraumatic  Eyes: Pupils are equal, round, and reactive to light  EOM are normal    Neck: Normal range of motion  Neck supple  Cardiovascular: Normal rate, regular rhythm, normal heart sounds and intact distal pulses  Exam reveals no gallop and no friction rub  No murmur heard  Pulmonary/Chest: Effort normal and breath sounds normal  No respiratory distress  She has no wheezes  She has no rales  Abdominal: Soft  Bowel sounds are normal  There is no tenderness  There is no rebound and no guarding  Musculoskeletal: Normal range of motion  She exhibits no edema or tenderness  Neurological: She is alert and oriented to person, place, and time  Skin: Skin is warm  Psychiatric: She has a normal mood and affect  Her behavior is normal  Thought content normal          Additional Data:     Lab Results: I have personally reviewed pertinent reports  Results from last 7 days   Lab Units 12/24/19  1406   WBC Thousand/uL 9 10   HEMOGLOBIN g/dL 14 6   HEMATOCRIT % 43 5   PLATELETS Thousands/uL 323   NEUTROS PCT % 83*   LYMPHS PCT % 11*   MONOS PCT % 6   EOS PCT % 0     Results from last 7 days   Lab Units 12/24/19  1406   SODIUM mmol/L 138   POTASSIUM mmol/L 4 0   CHLORIDE mmol/L 103   CO2 mmol/L 25   BUN mg/dL 17   CREATININE mg/dL 0 78   ANION GAP mmol/L 10   CALCIUM mg/dL 9 3   GLUCOSE RANDOM mg/dL 96     Results from last 7 days   Lab Units 12/25/19  0112   INR  1 71*                   Imaging: NA    No orders to display       EKG, Pathology, and Other Studies Reviewed on Admission:   · EKG: NA    Allscripts / Epic Records Reviewed: Yes     ** Please Note: This note has been constructed using a voice recognition system   ** Attending Attestation (For Attendings USE Only)...

## 2021-06-18 DIAGNOSIS — I10 HYPERTENSION, ESSENTIAL: ICD-10-CM

## 2021-06-21 ENCOUNTER — ANTICOAG VISIT (OUTPATIENT)
Dept: CARDIOLOGY CLINIC | Facility: CLINIC | Age: 46
End: 2021-06-21

## 2021-06-21 ENCOUNTER — APPOINTMENT (OUTPATIENT)
Dept: LAB | Facility: HOSPITAL | Age: 46
End: 2021-06-21
Attending: INTERNAL MEDICINE
Payer: COMMERCIAL

## 2021-06-21 DIAGNOSIS — I35.9 AORTIC VALVE DISORDER: Primary | ICD-10-CM

## 2021-06-21 NOTE — PROGRESS NOTES
S/w the pt  Advised to take 2 5mg today then take 7 5mg Sun/Tues/Thurs, 5mg the rest and retest in 10-14 days

## 2021-06-22 ENCOUNTER — HOSPITAL ENCOUNTER (EMERGENCY)
Facility: HOSPITAL | Age: 46
Discharge: HOME | End: 2021-06-22
Attending: EMERGENCY MEDICINE
Payer: COMMERCIAL

## 2021-06-22 VITALS
WEIGHT: 105 LBS | DIASTOLIC BLOOD PRESSURE: 75 MMHG | SYSTOLIC BLOOD PRESSURE: 193 MMHG | HEIGHT: 60 IN | TEMPERATURE: 98 F | HEART RATE: 56 BPM | OXYGEN SATURATION: 98 % | RESPIRATION RATE: 16 BRPM | BODY MASS INDEX: 20.62 KG/M2

## 2021-06-22 DIAGNOSIS — T14.8XXA HEMATOMA: Primary | ICD-10-CM

## 2021-06-22 LAB
ANION GAP SERPL CALCULATED.3IONS-SCNC: 5 MMOL/L (ref 4–13)
APTT PPP: 40 SECONDS (ref 23–37)
BASOPHILS # BLD AUTO: 0.06 THOUSANDS/ΜL (ref 0–0.1)
BASOPHILS NFR BLD AUTO: 1 % (ref 0–1)
BUN SERPL-MCNC: 10 MG/DL (ref 5–25)
CALCIUM SERPL-MCNC: 8.3 MG/DL (ref 8.3–10.1)
CHLORIDE SERPL-SCNC: 105 MMOL/L (ref 100–108)
CO2 SERPL-SCNC: 29 MMOL/L (ref 21–32)
CREAT SERPL-MCNC: 0.61 MG/DL (ref 0.6–1.3)
EOSINOPHIL # BLD AUTO: 0.12 THOUSAND/ΜL (ref 0–0.61)
EOSINOPHIL NFR BLD AUTO: 2 % (ref 0–6)
ERYTHROCYTE [DISTWIDTH] IN BLOOD BY AUTOMATED COUNT: 12.2 % (ref 11.6–15.1)
GFR SERPL CREATININE-BSD FRML MDRD: 108 ML/MIN/1.73SQ M
GLUCOSE SERPL-MCNC: 86 MG/DL (ref 65–140)
HCT VFR BLD AUTO: 34.2 % (ref 34.8–46.1)
HGB BLD-MCNC: 11.6 G/DL (ref 11.5–15.4)
IMM GRANULOCYTES # BLD AUTO: 0.02 THOUSAND/UL (ref 0–0.2)
IMM GRANULOCYTES NFR BLD AUTO: 0 % (ref 0–2)
INR PPP: 3.37 (ref 0.84–1.19)
LYMPHOCYTES # BLD AUTO: 1.43 THOUSANDS/ΜL (ref 0.6–4.47)
LYMPHOCYTES NFR BLD AUTO: 27 % (ref 14–44)
MCH RBC QN AUTO: 31 PG (ref 26.8–34.3)
MCHC RBC AUTO-ENTMCNC: 33.9 G/DL (ref 31.4–37.4)
MCV RBC AUTO: 91 FL (ref 82–98)
MONOCYTES # BLD AUTO: 0.43 THOUSAND/ΜL (ref 0.17–1.22)
MONOCYTES NFR BLD AUTO: 8 % (ref 4–12)
NEUTROPHILS # BLD AUTO: 3.21 THOUSANDS/ΜL (ref 1.85–7.62)
NEUTS SEG NFR BLD AUTO: 62 % (ref 43–75)
NRBC BLD AUTO-RTO: 0 /100 WBCS
PLATELET # BLD AUTO: 208 THOUSANDS/UL (ref 149–390)
PMV BLD AUTO: 9.8 FL (ref 8.9–12.7)
POTASSIUM SERPL-SCNC: 3.5 MMOL/L (ref 3.5–5.3)
PROTHROMBIN TIME: 32.5 SECONDS (ref 11.6–14.5)
RBC # BLD AUTO: 3.74 MILLION/UL (ref 3.81–5.12)
SODIUM SERPL-SCNC: 139 MMOL/L (ref 136–145)
WBC # BLD AUTO: 5.27 THOUSAND/UL (ref 4.31–10.16)

## 2021-06-22 PROCEDURE — 36415 COLL VENOUS BLD VENIPUNCTURE: CPT | Performed by: NURSE PRACTITIONER

## 2021-06-22 PROCEDURE — 99283 EMERGENCY DEPT VISIT LOW MDM: CPT

## 2021-06-22 PROCEDURE — 85025 COMPLETE CBC W/AUTO DIFF WBC: CPT | Performed by: NURSE PRACTITIONER

## 2021-06-22 PROCEDURE — 85610 PROTHROMBIN TIME: CPT | Performed by: NURSE PRACTITIONER

## 2021-06-22 PROCEDURE — 85730 THROMBOPLASTIN TIME PARTIAL: CPT | Performed by: NURSE PRACTITIONER

## 2021-06-22 PROCEDURE — 80048 BASIC METABOLIC PNL TOTAL CA: CPT | Performed by: NURSE PRACTITIONER

## 2021-06-22 PROCEDURE — 99284 EMERGENCY DEPT VISIT MOD MDM: CPT | Performed by: NURSE PRACTITIONER

## 2021-06-23 NOTE — ED PROVIDER NOTES
History  Chief Complaint   Patient presents with    Leg Injury     right knee pain and swelling, +thinners, reports her dog jumping on her, bruising to R thigh, also report gazebo falling on her left leg      This is a pleasant 77-year-old female presents here with a chief complaint swelling and ecchymosis over the right lateral knee  She cannot recall any specific injury but is noted to have bruising all over the lower extremities  She does take warfarin therapy for valvular repair performed many years ago  After conversing with her she does seem somewhat accident prone in that she bumps into things and she also has a dog that jumped some to her  The area of tenderness and swelling over the right lateral knee is not concerning for deep vein thrombosis rather represents hematoma  Will evaluate her INR and her platelets          Prior to Admission Medications   Prescriptions Last Dose Informant Patient Reported? Taking?    FOLIC ACID PO  Self Yes No   Sig: Take by mouth daily   Prenatal Vit-Fe Fumarate-FA (PrePLUS) 27-1 MG TABS  Self No No   Sig: take 1 tablet by mouth daily   aspirin (ECOTRIN LOW STRENGTH) 81 mg EC tablet  Self Yes No   Sig: Take 81 mg by mouth daily   metoprolol tartrate (LOPRESSOR) 25 mg tablet   No No   Sig: Take 1 tablet (25 mg total) by mouth 2 (two) times a day   rizatriptan (MAXALT) 10 MG tablet  Self No No   Sig: TAKE 1 TABLET BY MOUTH ONCE AS NEEDED FOR MIGRAINE FOR UP TO 1 DOSE   warfarin (COUMADIN) 5 mg tablet  Self No No   Sig: Take 1 tablet (5 mg total) by mouth daily As directed      Facility-Administered Medications: None       Past Medical History:   Diagnosis Date    Abnormal Pap smear of cervix     Allergic contact dermatitis     Allergic rhinitis     resolved 01/17/2018    Aortic valve disorder     Aortic valve insufficiency     Asthma     Benign neoplasm of skin     Cardiac disease     Costochondritis     Hyperinsulinism     Inguinal lymphadenopathy     resolved 2015    Migraine     resolved 2015    Nosebleed     Varicose 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     unspecified laterality       Past Surgical History:   Procedure Laterality Date    AORTIC VALVE REPLACEMENT      complications 8856 failure of bovine valve, replaced with mechanical aortic valve  and root [facility] at [facility] dr singer Cole Harrison      enlargement   [address]  2011    bovine, with redo and mechanical valve replacement and root 2012 dr sawyer at [facility] last assessed 08/15/2016    CERVICAL BIOPSY  W/ LOOP ELECTRODE EXCISION      COLPOSCOPY      INDUCED       surgically by dilation and evacuation    NASAL/SINUS ENDOSCOPY Left 2019    Procedure: ENDOSCOPIC CONTROL OF EPISTAXIS (LEFT); Surgeon: Lucas Sicard, MD;  [facility];  Service: ENT    RHINOPLASTY      SEPTOPLASTY         Family History   Problem Relation Age of Onset    Asthma Father     Coronary artery disease Father     Hypertension Father     No Known Problems Sister     Breast cancer Maternal Grandmother     No Known Problems Mother     No Known Problems Daughter     No Known Problems Son     Diabetes Maternal Grandfather     No Known Problems Paternal Grandmother     Other Paternal Grandfather         Susanne Fever    No Known Problems Sister     No Known Problems Sister     No Known Problems Daughter     No Known Problems Son     No Known Problems Son      I have reviewed and agree with the history as documented      E-Cigarette/Vaping    E-Cigarette Use Never User      E-Cigarette/Vaping Substances    Nicotine No     THC No     CBD No     Flavoring No     Other No     Unknown No      Social History     Tobacco Use    Smoking status: Never Smoker    Smokeless tobacco: Never Used   Vaping Use    Vaping Use: Never used   Substance Use Topics    Alcohol use: Yes     Comment: drinks hard liquor, social per allscripts    Drug use: No       Review of Systems   Constitutional: Negative for diaphoresis, fatigue and fever  HENT: Negative for congestion, ear pain, nosebleeds and sore throat  Eyes: Negative for photophobia, pain, discharge and visual disturbance  Respiratory: Negative for cough, choking, chest tightness, shortness of breath and wheezing  Cardiovascular: Negative for chest pain and palpitations  Gastrointestinal: Negative for abdominal distention, abdominal pain, diarrhea and vomiting  Genitourinary: Negative for dysuria, flank pain and frequency  Musculoskeletal: Negative for back pain, gait problem and joint swelling  Skin: Negative for color change and rash  Neurological: Negative for dizziness, syncope and headaches  Hematological: Bruises/bleeds easily  Psychiatric/Behavioral: Negative for behavioral problems and confusion  The patient is not nervous/anxious  All other systems reviewed and are negative  Physical Exam  Physical Exam  Vitals and nursing note reviewed  Constitutional:       General: She is not in acute distress  Appearance: She is well-developed  She is not ill-appearing or toxic-appearing  HENT:      Head: Normocephalic and atraumatic  Mouth/Throat:      Dentition: Normal dentition  Eyes:      General:         Right eye: No discharge  Left eye: No discharge  Cardiovascular:      Rate and Rhythm: Normal rate and regular rhythm  Pulmonary:      Effort: Pulmonary effort is normal  No accessory muscle usage or respiratory distress  Abdominal:      General: There is no distension  Tenderness: There is no guarding  Musculoskeletal:         General: Normal range of motion  Cervical back: Normal range of motion and neck supple  Legs:    Skin:     General: Skin is warm and dry  Neurological:      Mental Status: She is alert and oriented to person, place, and time        Coordination: Coordination normal    Psychiatric:         Behavior: Behavior is cooperative           Vital Signs  ED Triage Vitals [06/22/21 1845]   Temperature Pulse Respirations Blood Pressure SpO2   98 °F (36 7 °C) 83 18 (!) 193/75 98 %      Temp Source Heart Rate Source Patient Position - Orthostatic VS BP Location FiO2 (%)   Oral Monitor -- -- --      Pain Score       --           Vitals:    06/22/21 1845 06/22/21 2015   BP: (!) 193/75    Pulse: 83 56         Visual Acuity      ED Medications  Medications - No data to display    Diagnostic Studies  Results Reviewed     Procedure Component Value Units Date/Time    APTT [[id number]]  (Abnormal) Collected: 06/22/21 1947    Lab Status: Final result Specimen: Blood from Arm, Left Updated: 06/22/21 2020     PTT 40 seconds     Protime-INR [[id number]]  (Abnormal) Collected: 06/22/21 1947    Lab Status: Final result Specimen: Blood from Arm, Left Updated: 06/22/21 2020     Protime 32 5 seconds      INR 7 77    Basic metabolic panel [[id number]] Collected: 06/22/21 1947    Lab Status: Final result Specimen: Blood from Arm, Left Updated: 06/22/21 2003     Sodium 139 mmol/L      Potassium 3 5 mmol/L      Chloride 105 mmol/L      CO2 29 mmol/L      ANION GAP 5 mmol/L      BUN 10 mg/dL      Creatinine 0 61 mg/dL      Glucose 86 mg/dL      Calcium 8 3 mg/dL      eGFR 108 ml/min/1 73sq m     Narrative:      [place] guidelines for Chronic Kidney Disease (CKD):     Stage 1 with normal or high GFR (GFR > 90 mL/min/1 73 square meters)    Stage 2 Mild CKD (GFR = 60-89 mL/min/1 73 square meters)    Stage 3A Moderate CKD (GFR = 45-59 mL/min/1 73 square meters)    Stage 3B Moderate CKD (GFR = 30-44 mL/min/1 73 square meters)    Stage 4 Severe CKD (GFR = 15-29 mL/min/1 73 square meters)    Stage 5 End Stage CKD (GFR <15 mL/min/1 73 square meters)  Note: GFR calculation is accurate only with a steady state creatinine    CBC and differential [[id number]]  (Abnormal) Collected: 06/22/21 1947    Lab Status: Final result Specimen: Blood from Arm, Left Updated: 06/22/21 1955     WBC 5 27 Thousand/uL      RBC 3 74 Million/uL      Hemoglobin 11 6 g/dL      Hematocrit 34 2 %      MCV 91 fL      MCH 31 0 pg      MCHC 33 9 g/dL      RDW 12 2 %      MPV 9 8 fL      Platelets 662 Thousands/uL      nRBC 0 /100 WBCs      Neutrophils Relative 62 %      Immat GRANS % 0 %      Lymphocytes Relative 27 %      Monocytes Relative 8 %      Eosinophils Relative 2 %      Basophils Relative 1 %      Neutrophils Absolute 3 21 Thousands/µL      Immature Grans Absolute 0 02 Thousand/uL      Lymphocytes Absolute 1 43 Thousands/µL      Monocytes Absolute 0 43 Thousand/µL      Eosinophils Absolute 0 12 Thousand/µL      Basophils Absolute 0 06 Thousands/µL                  No orders to display              Procedures  Procedures         ED Course                                           MDM  Number of Diagnoses or Management Options  Hematoma: new and requires workup     Amount and/or Complexity of Data Reviewed  Clinical lab tests: reviewed and ordered  Independent visualization of images, tracings, or specimens: yes    Patient Progress  Patient progress: stable      Disposition  Final diagnoses:   Hematoma     Time reflects when diagnosis was documented in both MDM as applicable and the Disposition within this note     Time User Action Codes Description Comment    6/22/2021  7:48 PM Courtney Couch 39  8XXA] Hematoma       ED Disposition     ED Disposition Condition Date/Time Comment    Discharge Stable Tue Jun 22, 2021  7:48 PM Dave Chappell discharge to home/self care              Follow-up Information     Follow up With Specialties Details Why Contact Info    Kathyleen Harada, MD Internal Medicine, Palliative Care Schedule an appointment as soon as possible for a visit  For Recheck [address] [place] [address]  [phone]            Discharge Medication List as of 6/22/2021  8:25 PM      CONTINUE these medications which have NOT CHANGED    Details   aspirin (ECOTRIN LOW STRENGTH) 81 mg EC tablet Take 81 mg by mouth daily, Historical Med      FOLIC ACID PO Take by mouth daily, Historical Med      metoprolol tartrate (LOPRESSOR) 25 mg tablet Take 1 tablet (25 mg total) by mouth 2 (two) times a day, Starting Fri 6/18/2021, Normal      Prenatal Vit-Fe Fumarate-FA (PrePLUS) 27-1 MG TABS take 1 tablet by mouth daily, Normal      rizatriptan (MAXALT) 10 MG tablet TAKE 1 TABLET BY MOUTH ONCE AS NEEDED FOR MIGRAINE FOR UP TO 1 DOSE, Normal      warfarin (COUMADIN) 5 mg tablet Take 1 tablet (5 mg total) by mouth daily As directed, Starting Fri 2/12/2021, Normal           No discharge procedures on file      PDMP Review       Value Time User    PDMP Reviewed  Yes 12/24/2019  3:44 PM Sonya Henderson MD          ED Provider  Electronically Signed by           NEISHA Haines Ace  06/22/21 9561

## 2021-07-01 DIAGNOSIS — I35.9 AORTIC VALVE DISORDER: ICD-10-CM

## 2021-07-01 RX ORDER — WARFARIN SODIUM 5 MG/1
5 TABLET ORAL DAILY
Qty: 90 TABLET | Refills: 3 | Status: SHIPPED | OUTPATIENT
Start: 2021-07-01 | End: 2021-07-26 | Stop reason: SDUPTHER

## 2021-07-09 ENCOUNTER — APPOINTMENT (OUTPATIENT)
Dept: LAB | Facility: HOSPITAL | Age: 46
End: 2021-07-09
Attending: INTERNAL MEDICINE
Payer: COMMERCIAL

## 2021-07-09 ENCOUNTER — ANTICOAG VISIT (OUTPATIENT)
Dept: CARDIOLOGY CLINIC | Facility: CLINIC | Age: 46
End: 2021-07-09

## 2021-07-09 DIAGNOSIS — I35.9 AORTIC VALVE DISORDER: Primary | ICD-10-CM

## 2021-07-21 ENCOUNTER — APPOINTMENT (OUTPATIENT)
Dept: LAB | Facility: HOSPITAL | Age: 46
End: 2021-07-21
Attending: INTERNAL MEDICINE
Payer: COMMERCIAL

## 2021-07-22 ENCOUNTER — ANTICOAG VISIT (OUTPATIENT)
Dept: CARDIOLOGY CLINIC | Facility: CLINIC | Age: 46
End: 2021-07-22

## 2021-07-22 DIAGNOSIS — I35.9 AORTIC VALVE DISORDER: Primary | ICD-10-CM

## 2021-07-26 ENCOUNTER — TELEPHONE (OUTPATIENT)
Dept: CARDIOLOGY CLINIC | Facility: CLINIC | Age: 46
End: 2021-07-26

## 2021-07-26 DIAGNOSIS — I35.9 AORTIC VALVE DISORDER: ICD-10-CM

## 2021-07-26 RX ORDER — WARFARIN SODIUM 5 MG/1
TABLET ORAL
Qty: 10 TABLET | Refills: 0 | Status: SHIPPED | OUTPATIENT
Start: 2021-07-26 | End: 2021-08-06 | Stop reason: SDUPTHER

## 2021-07-26 NOTE — TELEPHONE ENCOUNTER
Patient is requesting a week supply of Warfarin due to dose changes  How many pills should I send to the pharmacy to hold her over?

## 2021-07-26 NOTE — TELEPHONE ENCOUNTER
Patient needs warfarin script phoned into [facility] for [facility] week supply  Regular script scheduled to be filled by pharmacy 9 days from now  Patient has only enough pills for today and tomorrow only left due to changes in dosage

## 2021-08-03 ENCOUNTER — TELEPHONE (OUTPATIENT)
Dept: CARDIOLOGY CLINIC | Facility: CLINIC | Age: 46
End: 2021-08-03

## 2021-08-03 NOTE — TELEPHONE ENCOUNTER
Patient was in the middle of doing something and all of a sudden she developed a tightness in her chest and it hurt for her to talk  Patient states that she is hyper and usually can work through anything but someone at work noticed that she wasn't acting right and asked her what was wrong  She didn't want to tell her manager what was wrong because she was afraid to lose her job as a   She couldn't even count her money at the end of the night because she felt so sick  Patient states this episode lasted only about 10 minutes  She has since experienced it on a lighter basis  Patient states she is going though a lot of stress in her life  She lost her  recently and is worried about losing her house, she is working a lot of hours and thinks the episodes she is experiencing could be from stress but would like some testing done to rule out any medication issues  Please advise

## 2021-08-03 NOTE — TELEPHONE ENCOUNTER
Pt called & stated that on Saturday she was experiencing chest pain that last 10 minutes & it was hurting her to speak as well   Call transferred to Ondina Guthrie

## 2021-08-03 NOTE — TELEPHONE ENCOUNTER
I would recommend this patient to be evaluated by primary care physician 1st   If PCP feels that we should see her, to let us know

## 2021-08-03 NOTE — TELEPHONE ENCOUNTER
Spoke with patient advised to discuss with PCP first and let us know if they feel she should be seen by our office     Verbally understands

## 2021-08-06 ENCOUNTER — OFFICE VISIT (OUTPATIENT)
Dept: INTERNAL MEDICINE CLINIC | Facility: CLINIC | Age: 46
End: 2021-08-06
Payer: COMMERCIAL

## 2021-08-06 ENCOUNTER — APPOINTMENT (OUTPATIENT)
Dept: LAB | Facility: CLINIC | Age: 46
End: 2021-08-06
Payer: COMMERCIAL

## 2021-08-06 ENCOUNTER — TELEPHONE (OUTPATIENT)
Dept: INTERNAL MEDICINE CLINIC | Facility: CLINIC | Age: 46
End: 2021-08-06

## 2021-08-06 ENCOUNTER — ANTICOAG VISIT (OUTPATIENT)
Dept: CARDIOLOGY CLINIC | Facility: CLINIC | Age: 46
End: 2021-08-06

## 2021-08-06 VITALS
RESPIRATION RATE: 12 BRPM | HEART RATE: 72 BPM | BODY MASS INDEX: 20.94 KG/M2 | SYSTOLIC BLOOD PRESSURE: 142 MMHG | TEMPERATURE: 97.1 F | OXYGEN SATURATION: 99 % | WEIGHT: 107.2 LBS | DIASTOLIC BLOOD PRESSURE: 80 MMHG

## 2021-08-06 DIAGNOSIS — I35.9 AORTIC VALVE DISORDER: Primary | ICD-10-CM

## 2021-08-06 DIAGNOSIS — R07.9 CHEST PAIN, UNSPECIFIED TYPE: ICD-10-CM

## 2021-08-06 DIAGNOSIS — R07.9 CHEST PAIN, UNSPECIFIED TYPE: Primary | ICD-10-CM

## 2021-08-06 DIAGNOSIS — I35.9 AORTIC VALVE DISORDER: ICD-10-CM

## 2021-08-06 LAB
ALBUMIN SERPL BCP-MCNC: 3.6 G/DL (ref 3.5–5)
ALP SERPL-CCNC: 45 U/L (ref 46–116)
ALT SERPL W P-5'-P-CCNC: 28 U/L (ref 12–78)
ANION GAP SERPL CALCULATED.3IONS-SCNC: 1 MMOL/L (ref 4–13)
AST SERPL W P-5'-P-CCNC: 20 U/L (ref 5–45)
BASOPHILS # BLD AUTO: 0.04 THOUSANDS/ΜL (ref 0–0.1)
BASOPHILS NFR BLD AUTO: 1 % (ref 0–1)
BILIRUB SERPL-MCNC: 1.29 MG/DL (ref 0.2–1)
BUN SERPL-MCNC: 12 MG/DL (ref 5–25)
CALCIUM SERPL-MCNC: 8.7 MG/DL (ref 8.3–10.1)
CHLORIDE SERPL-SCNC: 106 MMOL/L (ref 100–108)
CO2 SERPL-SCNC: 30 MMOL/L (ref 21–32)
CREAT SERPL-MCNC: 0.61 MG/DL (ref 0.6–1.3)
EOSINOPHIL # BLD AUTO: 0.15 THOUSAND/ΜL (ref 0–0.61)
EOSINOPHIL NFR BLD AUTO: 3 % (ref 0–6)
ERYTHROCYTE [DISTWIDTH] IN BLOOD BY AUTOMATED COUNT: 11.9 % (ref 11.6–15.1)
GFR SERPL CREATININE-BSD FRML MDRD: 108 ML/MIN/1.73SQ M
GLUCOSE P FAST SERPL-MCNC: 51 MG/DL (ref 65–99)
HCT VFR BLD AUTO: 39.6 % (ref 34.8–46.1)
HGB BLD-MCNC: 13.4 G/DL (ref 11.5–15.4)
IMM GRANULOCYTES # BLD AUTO: 0.01 THOUSAND/UL (ref 0–0.2)
IMM GRANULOCYTES NFR BLD AUTO: 0 % (ref 0–2)
LYMPHOCYTES # BLD AUTO: 1.02 THOUSANDS/ΜL (ref 0.6–4.47)
LYMPHOCYTES NFR BLD AUTO: 23 % (ref 14–44)
MAGNESIUM SERPL-MCNC: 2.3 MG/DL (ref 1.6–2.6)
MCH RBC QN AUTO: 31.4 PG (ref 26.8–34.3)
MCHC RBC AUTO-ENTMCNC: 33.8 G/DL (ref 31.4–37.4)
MCV RBC AUTO: 93 FL (ref 82–98)
MONOCYTES # BLD AUTO: 0.45 THOUSAND/ΜL (ref 0.17–1.22)
MONOCYTES NFR BLD AUTO: 10 % (ref 4–12)
NEUTROPHILS # BLD AUTO: 2.85 THOUSANDS/ΜL (ref 1.85–7.62)
NEUTS SEG NFR BLD AUTO: 63 % (ref 43–75)
NRBC BLD AUTO-RTO: 0 /100 WBCS
PLATELET # BLD AUTO: 234 THOUSANDS/UL (ref 149–390)
PMV BLD AUTO: 10.8 FL (ref 8.9–12.7)
POTASSIUM SERPL-SCNC: 3.3 MMOL/L (ref 3.5–5.3)
PROT SERPL-MCNC: 7.1 G/DL (ref 6.4–8.2)
RBC # BLD AUTO: 4.27 MILLION/UL (ref 3.81–5.12)
SODIUM SERPL-SCNC: 137 MMOL/L (ref 136–145)
WBC # BLD AUTO: 4.52 THOUSAND/UL (ref 4.31–10.16)

## 2021-08-06 PROCEDURE — 80053 COMPREHEN METABOLIC PANEL: CPT

## 2021-08-06 PROCEDURE — 93000 ELECTROCARDIOGRAM COMPLETE: CPT | Performed by: NURSE PRACTITIONER

## 2021-08-06 PROCEDURE — 1036F TOBACCO NON-USER: CPT | Performed by: NURSE PRACTITIONER

## 2021-08-06 PROCEDURE — 85025 COMPLETE CBC W/AUTO DIFF WBC: CPT

## 2021-08-06 PROCEDURE — 99214 OFFICE O/P EST MOD 30 MIN: CPT | Performed by: NURSE PRACTITIONER

## 2021-08-06 PROCEDURE — 3079F DIAST BP 80-89 MM HG: CPT | Performed by: NURSE PRACTITIONER

## 2021-08-06 PROCEDURE — 83735 ASSAY OF MAGNESIUM: CPT

## 2021-08-06 PROCEDURE — 3725F SCREEN DEPRESSION PERFORMED: CPT | Performed by: NURSE PRACTITIONER

## 2021-08-06 PROCEDURE — 3077F SYST BP >= 140 MM HG: CPT | Performed by: NURSE PRACTITIONER

## 2021-08-06 RX ORDER — WARFARIN SODIUM 5 MG/1
TABLET ORAL
Qty: 90 TABLET | Refills: 0 | Status: SHIPPED | OUTPATIENT
Start: 2021-08-06 | End: 2021-09-06

## 2021-08-06 RX ORDER — WARFARIN SODIUM 5 MG/1
TABLET ORAL
Qty: 10 TABLET | Refills: 0 | Status: SHIPPED | OUTPATIENT
Start: 2021-08-06 | End: 2021-08-06 | Stop reason: SDUPTHER

## 2021-08-06 NOTE — PATIENT INSTRUCTIONS
Patient had 10 minutes of substernal chest pain followed with profound fatigue and weakness  EKG in the office was completed and normal   Likely related to anxiety  In light of recent vomiting illness check labs  Stress reduction discussed

## 2021-08-06 NOTE — TELEPHONE ENCOUNTER
----- Message from Augustin Andujar, [address] sent at 8/6/2021  2:10 PM EDT -----  Let her know her potassium was low and her sugar was low- she needs to eat every 3-4 hours and eat a banana everyday
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
## 2021-08-23 ENCOUNTER — OFFICE VISIT (OUTPATIENT)
Dept: INTERNAL MEDICINE CLINIC | Facility: CLINIC | Age: 46
End: 2021-08-23
Payer: COMMERCIAL

## 2021-08-23 VITALS
SYSTOLIC BLOOD PRESSURE: 128 MMHG | RESPIRATION RATE: 16 BRPM | TEMPERATURE: 98 F | HEIGHT: 60 IN | DIASTOLIC BLOOD PRESSURE: 82 MMHG | BODY MASS INDEX: 21.28 KG/M2 | HEART RATE: 72 BPM | WEIGHT: 108.4 LBS

## 2021-08-23 DIAGNOSIS — H92.03 OTALGIA OF BOTH EARS: Primary | ICD-10-CM

## 2021-08-23 PROCEDURE — 1036F TOBACCO NON-USER: CPT | Performed by: NURSE PRACTITIONER

## 2021-08-23 PROCEDURE — 3079F DIAST BP 80-89 MM HG: CPT | Performed by: NURSE PRACTITIONER

## 2021-08-23 PROCEDURE — 3074F SYST BP LT 130 MM HG: CPT | Performed by: NURSE PRACTITIONER

## 2021-08-23 PROCEDURE — 99213 OFFICE O/P EST LOW 20 MIN: CPT | Performed by: NURSE PRACTITIONER

## 2021-08-23 PROCEDURE — 3008F BODY MASS INDEX DOCD: CPT | Performed by: NURSE PRACTITIONER
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
## 2021-09-01 ENCOUNTER — ANTICOAG VISIT (OUTPATIENT)
Dept: CARDIOLOGY CLINIC | Facility: CLINIC | Age: 46
End: 2021-09-01

## 2021-09-01 ENCOUNTER — APPOINTMENT (OUTPATIENT)
Dept: LAB | Facility: HOSPITAL | Age: 46
End: 2021-09-01
Attending: INTERNAL MEDICINE
Payer: COMMERCIAL

## 2021-09-01 DIAGNOSIS — I35.9 AORTIC VALVE DISORDER: Primary | ICD-10-CM

## 2021-09-05 DIAGNOSIS — I35.9 AORTIC VALVE DISORDER: ICD-10-CM

## 2021-09-06 RX ORDER — WARFARIN SODIUM 5 MG/1
TABLET ORAL
Qty: 90 TABLET | Refills: 0 | Status: SHIPPED | OUTPATIENT
Start: 2021-09-06 | End: 2021-11-04

## 2021-09-29 ENCOUNTER — ANTICOAG VISIT (OUTPATIENT)
Dept: CARDIOLOGY CLINIC | Facility: CLINIC | Age: 46
End: 2021-09-29

## 2021-09-29 ENCOUNTER — APPOINTMENT (OUTPATIENT)
Dept: LAB | Facility: HOSPITAL | Age: 46
End: 2021-09-29
Attending: INTERNAL MEDICINE
Payer: COMMERCIAL

## 2021-09-29 DIAGNOSIS — I35.9 AORTIC VALVE DISORDER: Primary | ICD-10-CM

## 2021-10-06 ENCOUNTER — TELEPHONE (OUTPATIENT)
Dept: INTERNAL MEDICINE CLINIC | Facility: CLINIC | Age: 46
End: 2021-10-06

## 2021-10-19 DIAGNOSIS — Z00.00 PERIODIC HEALTH ASSESSMENT, GENERAL SCREENING, ADULT: ICD-10-CM

## 2021-10-20 RX ORDER — PNV,CALCIUM 72/IRON/FOLIC ACID 27 MG-1 MG
TABLET ORAL
Qty: 90 TABLET | Refills: 1 | Status: SHIPPED | OUTPATIENT
Start: 2021-10-20 | End: 2022-04-11

## 2021-10-27 ENCOUNTER — APPOINTMENT (OUTPATIENT)
Dept: LAB | Facility: HOSPITAL | Age: 46
End: 2021-10-27
Payer: COMMERCIAL

## 2021-10-27 ENCOUNTER — ANTICOAG VISIT (OUTPATIENT)
Dept: CARDIOLOGY CLINIC | Facility: CLINIC | Age: 46
End: 2021-10-27

## 2021-10-27 DIAGNOSIS — I35.9 AORTIC VALVE DISORDER: Primary | ICD-10-CM

## 2021-11-04 DIAGNOSIS — I35.9 AORTIC VALVE DISORDER: ICD-10-CM

## 2021-11-04 RX ORDER — WARFARIN SODIUM 5 MG/1
TABLET ORAL
Qty: 90 TABLET | Refills: 0 | Status: SHIPPED | OUTPATIENT
Start: 2021-11-04 | End: 2021-12-31

## 2021-11-11 ENCOUNTER — OFFICE VISIT (OUTPATIENT)
Dept: CARDIOLOGY CLINIC | Facility: CLINIC | Age: 46
End: 2021-11-11
Payer: COMMERCIAL

## 2021-11-11 VITALS
BODY MASS INDEX: 21.2 KG/M2 | HEART RATE: 68 BPM | WEIGHT: 108 LBS | HEIGHT: 60 IN | DIASTOLIC BLOOD PRESSURE: 68 MMHG | OXYGEN SATURATION: 99 % | SYSTOLIC BLOOD PRESSURE: 126 MMHG

## 2021-11-11 DIAGNOSIS — Z79.01 ANTICOAGULATION MONITORING, INR RANGE 2-3: ICD-10-CM

## 2021-11-11 DIAGNOSIS — I35.9 AORTIC VALVE DISORDER: Primary | ICD-10-CM

## 2021-11-11 DIAGNOSIS — I10 HYPERTENSION, ESSENTIAL: ICD-10-CM

## 2021-11-11 DIAGNOSIS — Q25.1 COARCTATION OF AORTA: ICD-10-CM

## 2021-11-11 PROCEDURE — 3078F DIAST BP <80 MM HG: CPT | Performed by: INTERNAL MEDICINE

## 2021-11-11 PROCEDURE — 3008F BODY MASS INDEX DOCD: CPT | Performed by: INTERNAL MEDICINE

## 2021-11-11 PROCEDURE — 1036F TOBACCO NON-USER: CPT | Performed by: INTERNAL MEDICINE

## 2021-11-11 PROCEDURE — 99213 OFFICE O/P EST LOW 20 MIN: CPT | Performed by: INTERNAL MEDICINE

## 2021-11-11 PROCEDURE — 3074F SYST BP LT 130 MM HG: CPT | Performed by: INTERNAL MEDICINE

## 2021-11-23 ENCOUNTER — APPOINTMENT (OUTPATIENT)
Dept: LAB | Facility: HOSPITAL | Age: 46
End: 2021-11-23
Payer: COMMERCIAL

## 2021-11-24 ENCOUNTER — ANTICOAG VISIT (OUTPATIENT)
Dept: CARDIOLOGY CLINIC | Facility: CLINIC | Age: 46
End: 2021-11-24

## 2021-11-24 DIAGNOSIS — I35.9 AORTIC VALVE DISORDER: Primary | ICD-10-CM

## 2021-12-15 ENCOUNTER — TELEMEDICINE (OUTPATIENT)
Dept: INTERNAL MEDICINE CLINIC | Facility: CLINIC | Age: 46
End: 2021-12-15
Payer: COMMERCIAL

## 2021-12-15 DIAGNOSIS — B34.9 VIRAL INFECTION, UNSPECIFIED: Primary | ICD-10-CM

## 2021-12-15 PROCEDURE — 87636 SARSCOV2 & INF A&B AMP PRB: CPT | Performed by: NURSE PRACTITIONER

## 2021-12-15 PROCEDURE — 99213 OFFICE O/P EST LOW 20 MIN: CPT | Performed by: NURSE PRACTITIONER

## 2021-12-15 PROCEDURE — 1036F TOBACCO NON-USER: CPT | Performed by: NURSE PRACTITIONER

## 2021-12-16 ENCOUNTER — TELEPHONE (OUTPATIENT)
Dept: INTERNAL MEDICINE CLINIC | Facility: CLINIC | Age: 46
End: 2021-12-16

## 2021-12-29 ENCOUNTER — APPOINTMENT (OUTPATIENT)
Dept: LAB | Facility: HOSPITAL | Age: 46
End: 2021-12-29
Payer: COMMERCIAL

## 2021-12-30 ENCOUNTER — ANTICOAG VISIT (OUTPATIENT)
Dept: CARDIOLOGY CLINIC | Facility: CLINIC | Age: 46
End: 2021-12-30

## 2021-12-30 DIAGNOSIS — I35.9 AORTIC VALVE DISORDER: Primary | ICD-10-CM

## 2021-12-31 DIAGNOSIS — I35.9 AORTIC VALVE DISORDER: ICD-10-CM

## 2021-12-31 RX ORDER — WARFARIN SODIUM 5 MG/1
TABLET ORAL
Qty: 90 TABLET | Refills: 0 | Status: SHIPPED | OUTPATIENT
Start: 2021-12-31 | End: 2022-03-03

## 2022-01-05 ENCOUNTER — TELEPHONE (OUTPATIENT)
Dept: INTERNAL MEDICINE CLINIC | Facility: CLINIC | Age: 47
End: 2022-01-05

## 2022-01-05 NOTE — TELEPHONE ENCOUNTER
PT  TRYING  TO  GET   HEALTH  INSURANCE  NEEDS  A  LETTER  FROM  YUKI   SAYING  THAT  SHE  HAS  HEALTH  ISSUES  AND   NEEDS   HEALTH  INSURANCE   [address]

## 2022-01-12 ENCOUNTER — ANTICOAG VISIT (OUTPATIENT)
Dept: CARDIOLOGY CLINIC | Facility: CLINIC | Age: 47
End: 2022-01-12

## 2022-01-12 ENCOUNTER — APPOINTMENT (OUTPATIENT)
Dept: LAB | Facility: HOSPITAL | Age: 47
End: 2022-01-12
Payer: COMMERCIAL

## 2022-01-12 DIAGNOSIS — I35.9 AORTIC VALVE DISORDER: Primary | ICD-10-CM

## 2022-01-12 DIAGNOSIS — Z79.01 LONG TERM (CURRENT) USE OF ANTICOAGULANTS: ICD-10-CM

## 2022-01-12 DIAGNOSIS — Z79.01 ANTICOAGULATION MONITORING, INR RANGE 2-3: ICD-10-CM

## 2022-01-12 LAB
INR PPP: 3.09 (ref 0.84–1.19)
PROTHROMBIN TIME: 30.2 SECONDS (ref 11.6–14.5)

## 2022-01-12 PROCEDURE — 36415 COLL VENOUS BLD VENIPUNCTURE: CPT

## 2022-01-12 PROCEDURE — 85610 PROTHROMBIN TIME: CPT

## 2022-01-17 ENCOUNTER — HOSPITAL ENCOUNTER (OUTPATIENT)
Dept: NON INVASIVE DIAGNOSTICS | Facility: CLINIC | Age: 47
Discharge: HOME | End: 2022-01-17
Payer: COMMERCIAL

## 2022-01-17 VITALS
BODY MASS INDEX: 21.2 KG/M2 | HEART RATE: 72 BPM | HEIGHT: 60 IN | DIASTOLIC BLOOD PRESSURE: 68 MMHG | SYSTOLIC BLOOD PRESSURE: 126 MMHG | WEIGHT: 108 LBS

## 2022-01-17 DIAGNOSIS — I35.9 AORTIC VALVE DISORDER: ICD-10-CM

## 2022-01-17 LAB
AORTIC ROOT: 2.6 CM
AORTIC VALVE MEAN VELOCITY: 19.3 M/S
APICAL FOUR CHAMBER EJECTION FRACTION: 67 %
AV AREA BY CONTINUOUS VTI: 0.6 CM2
AV AREA PEAK VELOCITY: 0.6 CM2
AV LVOT MEAN GRADIENT: 4 MMHG
AV LVOT PEAK GRADIENT: 6 MMHG
AV MEAN GRADIENT: 17 MMHG
AV PEAK GRADIENT: 29 MMHG
AV VALVE AREA: 0.63 CM2
AV VELOCITY RATIO: 0.46
DOP CALC AO PEAK VEL: 2.71 M/S
DOP CALC AO VTI: 59.21 CM
DOP CALC LVOT AREA: 1.33 CM2
DOP CALC LVOT DIAMETER: 1.3 CM
DOP CALC LVOT PEAK VEL VTI: 28.19 CM
DOP CALC LVOT PEAK VEL: 1.24 M/S
DOP CALC LVOT STROKE INDEX: 27.1 ML/M2
DOP CALC LVOT STROKE VOLUME: 37.4 CM3
E WAVE DECELERATION TIME: 275 MS
FRACTIONAL SHORTENING: 46 % (ref 28–44)
INTERVENTRICULAR SEPTUM IN DIASTOLE (PARASTERNAL SHORT AXIS VIEW): 0.9 CM
LEFT ATRIUM AREA SYSTOLE SINGLE PLANE A4C: 12.4 CM2
LEFT ATRIUM SIZE: 2.9 CM
LEFT INTERNAL DIMENSION IN SYSTOLE: 2.6 CM (ref 2.1–4)
LEFT VENTRICULAR INTERNAL DIMENSION IN DIASTOLE: 4.8 CM (ref 3.59–5.34)
LEFT VENTRICULAR POSTERIOR WALL IN END DIASTOLE: 0.8 CM
LEFT VENTRICULAR STROKE VOLUME: 85 ML
MV E'TISSUE VEL-LAT: 15 CM/S
MV E'TISSUE VEL-SEP: 14 CM/S
MV PEAK A VEL: 0.82 M/S
MV PEAK E VEL: 118 CM/S
MV STENOSIS PRESSURE HALF TIME: 0 MS
PULMONARY REGURGITATION LATE DIASTOLIC VELOCITY: 0.02 M/S
RIGHT ATRIUM AREA SYSTOLE A4C: 11.3 CM2
RIGHT VENTRICLE ID DIMENSION: 2.6 CM
SL CV LV EF: 60
SL CV PED ECHO LEFT VENTRICLE DIASTOLIC VOLUME (MOD BIPLANE) 2D: 110 ML
SL CV PED ECHO LEFT VENTRICLE SYSTOLIC VOLUME (MOD BIPLANE) 2D: 25 ML
TR MAX PG: 22 MMHG
TRICUSPID VALVE PEAK REGURGITATION VELOCITY: 2.33 M/S
Z-SCORE OF LEFT VENTRICULAR DIMENSION IN END SYSTOLE: 0.92

## 2022-01-17 PROCEDURE — 93306 TTE W/DOPPLER COMPLETE: CPT

## 2022-01-17 PROCEDURE — 93306 TTE W/DOPPLER COMPLETE: CPT | Performed by: INTERNAL MEDICINE

## 2022-01-24 ENCOUNTER — TELEPHONE (OUTPATIENT)
Dept: INTERNAL MEDICINE CLINIC | Facility: CLINIC | Age: 47
End: 2022-01-24

## 2022-01-24 NOTE — TELEPHONE ENCOUNTER
Patient is requesting a new script for the albuterol (PROVENTIL HFA,VENTOLIN HFA) inhaler 2 puff   She states she has not used for a while and she is having some asthmatic symptoms      Send to [address] [place] [address] Drive

## 2022-02-03 ENCOUNTER — ANTICOAG VISIT (OUTPATIENT)
Dept: CARDIOLOGY CLINIC | Facility: CLINIC | Age: 47
End: 2022-02-03

## 2022-02-03 ENCOUNTER — APPOINTMENT (OUTPATIENT)
Dept: LAB | Facility: HOSPITAL | Age: 47
End: 2022-02-03
Payer: COMMERCIAL

## 2022-02-03 DIAGNOSIS — I35.9 AORTIC VALVE DISORDER: Primary | ICD-10-CM

## 2022-02-09 ENCOUNTER — TELEPHONE (OUTPATIENT)
Dept: CARDIOLOGY CLINIC | Facility: CLINIC | Age: 47
End: 2022-02-09

## 2022-02-09 NOTE — TELEPHONE ENCOUNTER
Left message asking patient to call office back  Her letter is ready, does she want it faxed to her job, pick it up or mail it to her

## 2022-02-09 NOTE — TELEPHONE ENCOUNTER
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

## 2022-02-10 NOTE — TELEPHONE ENCOUNTER
S/w the pt  She will come to [facility] today to  the letter   Printed letter and is waiting at the  for her to p/u

## 2022-03-01 ENCOUNTER — TELEPHONE (OUTPATIENT)
Dept: CARDIOLOGY CLINIC | Facility: CLINIC | Age: 47
End: 2022-03-01

## 2022-03-01 NOTE — TELEPHONE ENCOUNTER
Name of Caller: patient     Reason for call: patient called wanted to speak about her Dr note, she stated it needed to be clarified            Call back Shay Found

## 2022-03-02 ENCOUNTER — APPOINTMENT (OUTPATIENT)
Dept: LAB | Facility: HOSPITAL | Age: 47
End: 2022-03-02
Payer: COMMERCIAL

## 2022-03-02 ENCOUNTER — HOSPITAL ENCOUNTER (EMERGENCY)
Facility: HOSPITAL | Age: 47
Discharge: HOME | End: 2022-03-02
Attending: EMERGENCY MEDICINE
Payer: COMMERCIAL

## 2022-03-02 VITALS
RESPIRATION RATE: 18 BRPM | DIASTOLIC BLOOD PRESSURE: 93 MMHG | HEIGHT: 60 IN | TEMPERATURE: 98.3 F | OXYGEN SATURATION: 98 % | SYSTOLIC BLOOD PRESSURE: 138 MMHG | WEIGHT: 105 LBS | HEART RATE: 64 BPM | BODY MASS INDEX: 20.62 KG/M2

## 2022-03-02 DIAGNOSIS — S89.91XA RIGHT KNEE INJURY, INITIAL ENCOUNTER: Primary | ICD-10-CM

## 2022-03-02 PROCEDURE — 99283 EMERGENCY DEPT VISIT LOW MDM: CPT

## 2022-03-02 PROCEDURE — 99282 EMERGENCY DEPT VISIT SF MDM: CPT | Performed by: PHYSICIAN ASSISTANT

## 2022-03-02 NOTE — TELEPHONE ENCOUNTER
Pt stopped in at [facility] & said that she is req that the letter state that she cant lift anything over 15lbs

## 2022-03-02 NOTE — TELEPHONE ENCOUNTER
Letter revised from lifting 50lbs to 30lbs with positional addition  Pt stated she dropped a heavy box attempting to lift overhead,  Pt is requesting a lighter duty from employer  Letter in your folder to be signed

## 2022-03-02 NOTE — DISCHARGE INSTRUCTIONS
Continue rest, ice, elevation  May use compression with knee immobilizer and crutches for symptom relief  Continue follow up Orthopedics as scheduled and pursue MRI  Return immediately to the ED with any new or worsening symptoms

## 2022-03-02 NOTE — TELEPHONE ENCOUNTER
Patient contacting office back today to inquire if letter can be amended  Patient is requesting lifting restrictions to be 15 lbs or less in the letter  Waiting for patient to contact office back to inquire is she is going to p/u letter or are we faxing it to her employer

## 2022-03-02 NOTE — ED PROVIDER NOTES
History  Chief Complaint   Patient presents with    Leg Pain     pt states she fell 3 wks ago injuring her right leg, has MRI scheduled for friday for the same  Estela Delgado is a 68-year-old female presenting to the emergency department for evaluation with chief complaint of right knee pain  This occurs in context of a mechanical fall approximately 3 weeks ago while at work  The patient has since followed up with [facility] and has an MRI of the right knee ordered and scheduled for Friday, however, she states that she was told that she would not have these test results for about 1 week following the completion of the imaging study and the patient was hoping to have this imaging modality completed today  The patient states that her pain is located along the medial surface of the right knee which is associated with mild edema in the area  There is no appreciable ecchymosis to the knee joint  The patient is on Coumadin for history of aortic valve replacement, finding that the appearance of the right knee has been unchanged over the past 3 weeks  She denies radiation of pain along the right lower extremity  She denies extremity paresthesias, weakness, or paralysis  She denies prior injuries or surgical history to the right lower extremity  She does endorse ongoing discomfort with bearing weight on to the right lower extremity with concern that her right knee might give out  She does however deny that she has had any recurrent injuries from time of symptom onset  She offers no other complaints or concerns at this time  Prior to Admission Medications   Prescriptions Last Dose Informant Patient Reported? Taking?    Prenatal Vit-Fe Fumarate-FA (PrePLUS) 27-1 MG TABS  Self No No   Sig: take 1 tablet by mouth daily   albuterol (Ventolin HFA) 90 mcg/act inhaler   No No   Sig: Inhale 2 puffs every 6 (six) hours as needed for wheezing   aspirin (ECOTRIN LOW STRENGTH) 81 mg EC tablet  Self Yes No   Sig: Take 81 mg by mouth daily   metoprolol tartrate (LOPRESSOR) 25 mg tablet  Self No No   Sig: Take 1 tablet (25 mg total) by mouth 2 (two) times a day   rizatriptan (MAXALT) 10 MG tablet  Self No No   Sig: TAKE 1 TABLET BY MOUTH ONCE AS NEEDED FOR MIGRAINE FOR UP TO 1 DOSE   warfarin (COUMADIN) 5 mg tablet   No No   Sig: TAKE 1 TO 1 1/2 TABLETS DAILY OR AS DIRECTED      Facility-Administered Medications: None       Past Medical History:   Diagnosis Date    Abnormal Pap smear of cervix     Allergic contact dermatitis     Allergic rhinitis     resolved 2018    Aortic valve disorder     Aortic valve insufficiency     Asthma     Benign neoplasm of skin     Cardiac disease     Costochondritis     Hyperinsulinism     Inguinal lymphadenopathy     resolved 2015    Migraine     resolved 2015    Nosebleed     Varicose 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     unspecified laterality       Past Surgical History:   Procedure Laterality Date    AORTIC VALVE REPLACEMENT      complications 5833 failure of bovine valve, replaced with mechanical aortic valve  and root replacement at [facility] dr singer Lorenz [address]  2011    bovine, with redo and mechanical valve replacement and root 2012 dr sawyer at [facility] last assessed 08/15/2016    CERVICAL BIOPSY  W/ LOOP ELECTRODE EXCISION      COLPOSCOPY      INDUCED       surgically by dilation and evacuation    NASAL/SINUS ENDOSCOPY Left 2019    Procedure: ENDOSCOPIC CONTROL OF EPISTAXIS (LEFT);   Surgeon: Cristine Hansen MD;  [facility];  Service: ENT    RHINOPLASTY      SEPTOPLASTY         Family History   Problem Relation Age of Onset    Asthma Father     Coronary artery disease Father     Hypertension Father     No Known Problems Sister     Breast cancer Maternal Grandmother     No Known Problems Mother     No Known Problems Daughter     No Known Problems Son     Diabetes Maternal Grandfather     No Known Problems Paternal Grandmother     Other Paternal Grandfather         Susanne Fever    No Known Problems Sister     No Known Problems Sister     No Known Problems Daughter     No Known Problems Son     No Known Problems Son      I have reviewed and agree with the history as documented  E-Cigarette/Vaping    E-Cigarette Use Never User      E-Cigarette/Vaping Substances    Nicotine No     THC No     CBD No     Flavoring No     Other No     Unknown No      Social History     Tobacco Use    Smoking status: Never Smoker    Smokeless tobacco: Never Used   Vaping Use    Vaping Use: Never used   Substance Use Topics    Alcohol use: Yes     Comment: drinks hard liquor, social per allscripts    Drug use: No       Review of Systems   Gastrointestinal: Negative for vomiting  Musculoskeletal: Positive for arthralgias and myalgias  Skin: Negative for color change and wound  Neurological: Negative for weakness and numbness  All other systems reviewed and are negative  Physical Exam  Physical Exam  Vitals and nursing note reviewed  Constitutional:       General: She is not in acute distress  Appearance: Normal appearance  She is not ill-appearing, toxic-appearing or diaphoretic  HENT:      Head: Normocephalic and atraumatic  Right Ear: External ear normal       Left Ear: External ear normal    Eyes:      Conjunctiva/sclera: Conjunctivae normal    Cardiovascular:      Rate and Rhythm: Normal rate  Pulses: Normal pulses  Pulmonary:      Effort: Pulmonary effort is normal    Musculoskeletal:         General: Tenderness present  No deformity  Normal range of motion  Cervical back: Normal range of motion and neck supple  Comments: No obvious deformity on inspection of the right knee joint, and there is no evidence of a traumatic hematoma  There are no overlying skin changes or evidence of skin breakdown  The patient demonstrates mild tenderness to palpation along the medial aspect of the right knee joint  She is able to actively flex and extend at the right knee though seems to experience more discomfort with attempting to straighten the right leg  There is no appreciable joint laxity  Distal sensation and pulses are intact  PF/DF intact bilaterally  Skin:     General: Skin is warm and dry  Capillary Refill: Capillary refill takes less than 2 seconds  Neurological:      Mental Status: She is alert  Psychiatric:         Mood and Affect: Mood normal          Vital Signs  ED Triage Vitals [03/02/22 1601]   Temperature Pulse Respirations Blood Pressure SpO2   98 3 °F (36 8 °C) 64 18 138/93 98 %      Temp Source Heart Rate Source Patient Position - Orthostatic VS BP Location FiO2 (%)   Tympanic Monitor Sitting Left arm --      Pain Score       8           Vitals:    03/02/22 1601   BP: 138/93   Pulse: 64   Patient Position - Orthostatic VS: Sitting         Visual Acuity      ED Medications  Medications - No data to display    Diagnostic Studies  Results Reviewed     None                 No orders to display              Procedures  Procedures         ED Course                                             MDM  Number of Diagnoses or Management Options  Right knee injury, initial encounter: new and does not require workup  Diagnosis management comments: This is a 49-year-old female arriving to the emergency department for evaluation with chief complaint of right knee pain x3 weeks following a work related injury  She has been seen in follow up by [facility] with outpatient mri of the right knee scheduled for Friday  Patient states that she was told that she would have to wait upwards of one week for mri results and was hoping to get answers today  Her symptoms are largely unchanged from the patient denies any subsequent injury or trauma  She is neurovascularly intact      Differential diagnosis includes but is not limited to: fx/dislocation, sprain, strain, ligamentous injury, meniscal tear, bursitis, tendinitis, djd, oa; no clinical findings suggestive of a traumatic hematoma in setting of anticoagulant use    Initial ED plan: Advised patient that I am unable to have MRI of right knee ordered and completed today; CT of the right knee was offered and declined by the patient    Final ED Assessment: Vital signs stable on ED presentation, examination as above  The right lower extremity is neurovascularly intact  Patient made aware that an MRI of the right knee remains the diagnostic test of choice, however, due to the limitations of the emergency department this study will need to be completed as an outpatient  As the patient does report some concern with instability of the right knee joint she is amenable to receiving a knee immobilizer and crutches to provide stability with ambulation and prevent any recurrence of injuries  We reviewed supportive care including otc tylenol, rest, ice, and elevation of the right lower extremity  Encouraged continued outpatient orthopedic follow-up for ongoing management of symptoms  Parameters for emergency department return were discussed at length  The patient had verbalized understanding and she was comfortable and agreeable with disposition and care plan  She was discharged in stable condition         Amount and/or Complexity of Data Reviewed  Review and summarize past medical records: yes  Independent visualization of images, tracings, or specimens: yes    Risk of Complications, Morbidity, and/or Mortality  Presenting problems: low  Diagnostic procedures: low  Management options: low    Patient Progress  Patient progress: stable      Disposition  Final diagnoses:   Right knee injury, initial encounter     Time reflects when diagnosis was documented in both MDM as applicable and the Disposition within this note     Time User Action Codes Description Comment    3/2/2022  4:52 PM Aram Bryant Add [[id number]] Right knee injury, initial encounter       ED Disposition     ED Disposition Condition Date/Time Comment    Discharge Stable Wed Mar 2, 2022  4:52 PM Diannaivis Valdezsoto discharge to home/self care  Follow-up Information     Follow up With Specialties Details Why Contact Info Additional Information    Lilly Ovalles MD Internal Medicine, Palliative Care   [address], [address] [place] [address] Emergency Department Emergency Medicine   [address] [facility] [id number] [facility] Emergency Department, [address] [facility] [address]               Patient's Medications   Discharge Prescriptions    No medications on file       No discharge procedures on file      PDMP Review       Value Time User    PDMP Reviewed  Yes 12/24/2019  3:44 PM Bi Palomares MD          ED Provider  Electronically Signed by           Alie Singleton PA-C  03/04/22 2929

## 2022-03-03 ENCOUNTER — ANTICOAG VISIT (OUTPATIENT)
Dept: CARDIOLOGY CLINIC | Facility: CLINIC | Age: 47
End: 2022-03-03

## 2022-03-03 DIAGNOSIS — I35.9 AORTIC VALVE DISORDER: ICD-10-CM

## 2022-03-03 DIAGNOSIS — I35.9 AORTIC VALVE DISORDER: Primary | ICD-10-CM

## 2022-03-03 RX ORDER — WARFARIN SODIUM 5 MG/1
TABLET ORAL
Qty: 90 TABLET | Refills: 0 | Status: SHIPPED | OUTPATIENT
Start: 2022-03-03 | End: 2022-03-05 | Stop reason: SDUPTHER

## 2022-03-03 NOTE — TELEPHONE ENCOUNTER
Spoke with pt and pt would like the note to be adjusted to where it says that she is an established pt in our practice  Also that pt has been instructed not to lift anything over 15 pounds  Pt would like her cardiac information removed  Pt also inquired about coumadin results from yesterday  Per Stella Barragan, pt is to remain on the same dose and retest in two weeks  Pt has been notified of this

## 2022-03-03 NOTE — PROGRESS NOTES
Per Johnny Colonagle, pt is to remain on the same dose and retest in two weeks  Pt has been notified of this

## 2022-03-03 NOTE — TELEPHONE ENCOUNTER
Spoke to pt and advised letter is ready  Pt stated she will  letter in [place] location   Spoke to Maddi Olsen to have letter ready for pt

## 2022-03-05 RX ORDER — WARFARIN SODIUM 5 MG/1
TABLET ORAL
Qty: 90 TABLET | Refills: 0 | Status: SHIPPED | OUTPATIENT
Start: 2022-03-05 | End: 2022-05-05

## 2022-03-28 ENCOUNTER — APPOINTMENT (OUTPATIENT)
Dept: LAB | Facility: HOSPITAL | Age: 47
End: 2022-03-28
Payer: COMMERCIAL

## 2022-03-28 ENCOUNTER — ANTICOAG VISIT (OUTPATIENT)
Dept: CARDIOLOGY CLINIC | Facility: CLINIC | Age: 47
End: 2022-03-28

## 2022-03-28 DIAGNOSIS — I35.9 AORTIC VALVE DISORDER: Primary | ICD-10-CM

## 2022-03-28 DIAGNOSIS — Z79.01 LONG TERM (CURRENT) USE OF ANTICOAGULANTS: Primary | ICD-10-CM

## 2022-03-28 LAB
INR PPP: 2.38 (ref 0.84–1.19)
PROTHROMBIN TIME: 24.8 SECONDS (ref 11.6–14.5)

## 2022-03-28 PROCEDURE — 85610 PROTHROMBIN TIME: CPT

## 2022-03-28 PROCEDURE — 36415 COLL VENOUS BLD VENIPUNCTURE: CPT

## 2022-04-09 DIAGNOSIS — Z00.00 PERIODIC HEALTH ASSESSMENT, GENERAL SCREENING, ADULT: ICD-10-CM

## 2022-04-10 ENCOUNTER — HOSPITAL ENCOUNTER (EMERGENCY)
Facility: HOSPITAL | Age: 47
Discharge: HOME | End: 2022-04-10
Attending: EMERGENCY MEDICINE | Admitting: EMERGENCY MEDICINE
Payer: COMMERCIAL

## 2022-04-10 VITALS
TEMPERATURE: 99.2 F | WEIGHT: 105 LBS | HEART RATE: 76 BPM | RESPIRATION RATE: 18 BRPM | OXYGEN SATURATION: 98 % | HEIGHT: 60 IN | SYSTOLIC BLOOD PRESSURE: 151 MMHG | DIASTOLIC BLOOD PRESSURE: 65 MMHG | BODY MASS INDEX: 20.62 KG/M2

## 2022-04-10 DIAGNOSIS — M76.61 ACHILLES TENDINITIS OF RIGHT LOWER EXTREMITY: Primary | ICD-10-CM

## 2022-04-10 PROCEDURE — 99283 EMERGENCY DEPT VISIT LOW MDM: CPT

## 2022-04-10 PROCEDURE — 99284 EMERGENCY DEPT VISIT MOD MDM: CPT | Performed by: PHYSICIAN ASSISTANT

## 2022-04-10 NOTE — ED PROVIDER NOTES
History  Chief Complaint   Patient presents with    Leg Pain     Patient co right leg pain and swelling x 3 days  Denies chest pain/SOB  Patient reports recent travel to [place]  57-year-old female with past medical history significant for aortic valve disorder status post mechanical heart valve replacement on Coumadin presents to the emergency department with chief complaint of right ankle pain  Onset reported as 3 days ago  Location of symptoms reported as the back of the right ankle  Quality is reported as sharp shooting pain  Severity reported as moderate  Associated symptoms:  Denies calf swelling  Denies rash  Denies lower extremity paralysis paresthesia or weakness  Modifying factors:  Walking and weight-bearing exacerbates pain  Context:  Denies any recent fall injury or trauma  Patient reports she has been experiencing pain in the back of her right ankle for the past 3 days  She is concerned that she may have blood clots  She denies a prior history of blood clots  She is currently taking Coumadin for an aortic valve replacement  She reports she just recently returned from [place] where she was walking on the uneven beach sand and thinks this may have aggravated her symptoms  Her last Coumadin check was 03/28/2022 for an INR of 2 28  History provided by:  Patient   used: No    Leg Pain      Prior to Admission Medications   Prescriptions Last Dose Informant Patient Reported? Taking?    Prenatal Vit-Fe Fumarate-FA (PrePLUS) 27-1 MG TABS  Self No No   Sig: take 1 tablet by mouth daily   albuterol (Ventolin HFA) 90 mcg/act inhaler   No No   Sig: Inhale 2 puffs every 6 (six) hours as needed for wheezing   aspirin (ECOTRIN LOW STRENGTH) 81 mg EC tablet  Self Yes No   Sig: Take 81 mg by mouth daily   metoprolol tartrate (LOPRESSOR) 25 mg tablet  Self No No   Sig: Take 1 tablet (25 mg total) by mouth 2 (two) times a day   rizatriptan (MAXALT) 10 MG tablet  Self No No Sig: TAKE 1 TABLET BY MOUTH ONCE AS NEEDED FOR MIGRAINE FOR UP TO 1 DOSE   warfarin (COUMADIN) 5 mg tablet   No No   Sig: TAKE 1 TO 1 1/2 TABLETS DAILY OR AS DIRECTED      Facility-Administered Medications: None       Past Medical History:   Diagnosis Date    Abnormal Pap smear of cervix     Allergic contact dermatitis     Allergic rhinitis     resolved 2018    Aortic valve disorder     Aortic valve insufficiency     Asthma     Benign neoplasm of skin     Cardiac disease     Costochondritis     Hyperinsulinism     Inguinal lymphadenopathy     resolved 2015    Migraine     resolved 2015    Nosebleed     Varicose 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     unspecified laterality       Past Surgical History:   Procedure Laterality Date    AORTIC VALVE REPLACEMENT      complications 3249 failure of bovine valve, replaced with mechanical aortic valve  and root [facility] at [facility] dr singer Bentley Manus      enlargement   [address]  2011    bovine, with redo and mechanical valve replacement and root 2012 dr sawyer at [facility] last assessed 08/15/2016    CERVICAL BIOPSY  W/ LOOP ELECTRODE EXCISION      COLPOSCOPY      INDUCED       surgically by dilation and evacuation    NASAL/SINUS ENDOSCOPY Left 2019    Procedure: ENDOSCOPIC CONTROL OF EPISTAXIS (LEFT);   Surgeon: Costa Davila MD;  [facility];  Service: ENT    RHINOPLASTY      SEPTOPLASTY         Family History   Problem Relation Age of Onset    Asthma Father     Coronary artery disease Father     Hypertension Father     No Known Problems Sister     Breast cancer Maternal Grandmother     No Known Problems Mother     No Known Problems Daughter     No Known Problems Son     Diabetes Maternal Grandfather     No Known Problems Paternal Grandmother     Other Paternal Grandfather         Susanne Fever    No Known Problems Sister     No Known Problems Sister  No Known Problems Daughter     No Known Problems Son     No Known Problems Son      I have reviewed and agree with the history as documented  E-Cigarette/Vaping    E-Cigarette Use Never User      E-Cigarette/Vaping Substances    Nicotine No     THC No     CBD No     Flavoring No     Other No     Unknown No      Social History     Tobacco Use    Smoking status: Never Smoker    Smokeless tobacco: Never Used   Vaping Use    Vaping Use: Never used   Substance Use Topics    Alcohol use: Yes     Comment: drinks hard liquor, social per allscripts    Drug use: No       Review of Systems   Musculoskeletal: Positive for arthralgias and gait problem  Negative for myalgias  Skin: Negative for pallor, rash and wound  Neurological: Negative for tremors, weakness and numbness  All other systems reviewed and are negative  Physical Exam  Physical Exam  Vitals and nursing note reviewed  Constitutional:       General: She is not in acute distress  Appearance: Normal appearance  She is well-developed  She is not ill-appearing  Comments: /65 (BP Location: Left arm)   Pulse 76   Temp 99 2 °F (37 3 °C) (Tympanic)   Resp 18   Ht 5' (1 524 m)   Wt 47 6 kg (105 lb)   SpO2 98%   BMI 20 51 kg/m²      HENT:      Head: Normocephalic and atraumatic  Right Ear: External ear normal       Left Ear: External ear normal       Nose: Nose normal       Mouth/Throat:      Mouth: Mucous membranes are moist    Eyes:      General: No scleral icterus  Right eye: No discharge  Left eye: No discharge  Extraocular Movements: Extraocular movements intact  Conjunctiva/sclera: Conjunctivae normal    Cardiovascular:      Rate and Rhythm: Normal rate  Pulses: Normal pulses  Pulmonary:      Effort: Pulmonary effort is normal  No respiratory distress  Musculoskeletal:         General: Tenderness present  No swelling, deformity or signs of injury  Normal range of motion  Cervical back: Normal range of motion and neck supple  No rigidity or tenderness  No muscular tenderness  Right lower leg: No edema  Left lower leg: No edema  Comments: Right Ankle exam: normal inspection, no gross deformity, SILT to all surfaces, NVI, cap refill less than 3 seconds  Posterior tibial pulse 2/4 normal   Distal foot is normal to inspection, nontender to palpation with FROM  Proximal knee is normal to inspection, nontender to palpation with FROM  Proximal fibula is nontender to palpation  Ankle is tender to posterior aspect/achilles tendon  ROM of ankle is intact to flexion, extension  No calcaneus or 5th metatarsal tenderness to palpation  Marie's Test: squeeze of posterior calf produces plantar flexion of foot  No palpable cords right calf   Skin:     Capillary Refill: Capillary refill takes less than 2 seconds  Coloration: Skin is not jaundiced or pale  Findings: No erythema or rash  Neurological:      General: No focal deficit present  Mental Status: She is alert and oriented to person, place, and time  Mental status is at baseline  Cranial Nerves: No cranial nerve deficit  Sensory: No sensory deficit  Motor: No weakness  Gait: Gait normal    Psychiatric:         Mood and Affect: Mood normal          Behavior: Behavior normal          Thought Content:  Thought content normal          Judgment: Judgment normal          Vital Signs  ED Triage Vitals [04/10/22 1128]   Temperature Pulse Respirations Blood Pressure SpO2   99 2 °F (37 3 °C) 76 18 151/65 98 %      Temp Source Heart Rate Source Patient Position - Orthostatic VS BP Location FiO2 (%)   Tympanic Monitor Sitting Left arm --      Pain Score       --           Vitals:    04/10/22 1128   BP: 151/65   Pulse: 76   Patient Position - Orthostatic VS: Sitting         Visual Acuity      ED Medications  Medications - No data to display    Diagnostic Studies  Results Reviewed     None No orders to display              Procedures  Procedures         ED Course                                     Marcio' Criteria for DVT      Most Recent Value   Marcio' Criteria for DVT    Active cancer Treatment or palliation within 6 months 0 Filed at: 04/10/2022 1229   Bedridden recently >3 days or major surgery within 12 weeks 0 Filed at: 04/10/2022 1229   Calf swelling >3 cm compared to the other leg 0 Filed at: 04/10/2022 1229   Entire leg swollen 0 Filed at: 04/10/2022 1229   Collateral (nonvaricose) superficial veins present 0 Filed at: 04/10/2022 1229   Localized tenderness along the deep venous system 0 Filed at: 04/10/2022 1229   Pitting edema, confined to symptomatic leg 0 Filed at: 04/10/2022 1229   Paralysis, paresis, or recent plaster immobilization of the lower extremity 0 Filed at: 04/10/2022 1229   Previously documented DVT 0 Filed at: 04/10/2022 1229   Alternative diagnosis to DVT as likely or more likely 0 Filed at: 04/10/2022 1229   Marcio DVT Critera Score 0 Filed at: 04/10/2022 1229              MDM  Number of Diagnoses or Management Options  Achilles tendinitis of right lower extremity: new and does not require workup  Diagnosis management comments: ddx includes but is not limited to fracture, contusion, sprain, strain, nerve injury, tendon injury, vascular injury, tendinitis, bursitis, dislocation  Patient has no significant calf pain or swelling  Her pain is located quite specifically in the Achilles tendon on the right ankle  Palpation of the area exacerbates pain  There are no palpable cords or calf swelling to the right lower leg  Posterior tibial and dorsalis pedis pulses are 2+ to the right foot and intact  Patient has full range of motion of the right foot  There is no joint swelling or erythema to suggest infection  Discussed diagnosis most consistent with Achilles tendinitis  Doubt DVT  Instructed regarding use of Tylenol for pain    Add topical Voltaren for treatment of pain  F/u pcp and sports medicine in 3-5 days for recheck  I discussed diagnosis and treatment plan with patient at bedside  Extended discussion with patient regarding the diagnosis, pathophysiology, expectant coarse and treatment plan  Instructed to follow up with pcp and recommended specialist in 3-5 days  Reviewed reasons to return to ed  Patient verbalized understanding of diagnosis and agreement with discharge plan of care as well as understanding of reasons to return to ed  Amount and/or Complexity of Data Reviewed  Review and summarize past medical records: yes    Risk of Complications, Morbidity, and/or Mortality  General comments: Disclaimers:    I have reasonably determine that electronically prescribing a controlled substance would be impractical for the patient to obtain the controlled substance prescribed by electronic prescription or would cause an untimely delay resulting in an adverse impact on the patient's medical condition        Patient was seen during the outbreak of the corona virus epidemic   Resources are limited due to the severity of patient illnesses associated with virus   Testing is also limited at this time   Discussed with patient at the time of this evaluation   Due to the fact that limited resources are available -treatment options are limited  Patient Progress  Patient progress: stable      Disposition  Final diagnoses:   Achilles tendinitis of right lower extremity     Time reflects when diagnosis was documented in both MDM as applicable and the Disposition within this note     Time User Action Codes Description Comment    4/10/2022 11:46 AM Stephanie Henderson Add [M76 61] Achilles tendinitis of right lower extremity       ED Disposition     ED Disposition Condition Date/Time Comment    Discharge Stable Sun Apr 10, 2022 11:46 AM Edilberto Holloway discharge to home/self care              Follow-up Information     Follow up With Specialties Details Why Contact Info Additional Information    Nikole Martinez MD Internal Medicine, Palliative Care Call in 2 days for further evaluation of symptoms [address] [place], [address] [facility] [place] 2484 Emergency Department Emergency Medicine Go to  If symptoms worsen 34 [facility] 109 [facility] Emergency Department, 819 [facility] [place], [place], [address] [facility] [place], [address] [facility] Medicine Call in 3 days for further evaluation of symptoms [address] [facility]  [address]  [facility] [address]  [phone]             Patient's Medications   Discharge Prescriptions    DICLOFENAC SODIUM (VOLTAREN) 1 %    Apply 2 g topically 3 (three) times a day as needed (ankle pain/initial rx )       Start Date: 4/10/2022 End Date: --       Order Dose: 2 g       Quantity: 50 g    Refills: 0       No discharge procedures on file      PDMP Review       Value Time User    PDMP Reviewed  Yes 12/24/2019  3:44 PM Juan Freeman MD          ED Provider  Electronically Signed by           Becki Traylor PA-C  04/10/22 3449

## 2022-04-10 NOTE — Clinical Note
Billy Olga was seen and treated in our emergency department on 4/10/2022  No restrictions            Diagnosis:     Michael Hoffmann  may return to work on return date  She may return on this date: 04/10/2022         If you have any questions or concerns, please don't hesitate to call        Olinda Sweeney PA-C    ______________________________           _______________          _______________  Hospital Representative                              Date                                Time

## 2022-04-11 RX ORDER — PRENATAL WITH FERROUS FUM AND FOLIC ACID 3080; 920; 120; 400; 22; 1.84; 3; 20; 10; 1; 12; 200; 27; 25; 2 [IU]/1; [IU]/1; MG/1; [IU]/1; MG/1; MG/1; MG/1; MG/1; MG/1; MG/1; UG/1; MG/1; MG/1; MG/1; MG/1
TABLET ORAL
Qty: 90 TABLET | Refills: 1 | Status: SHIPPED | OUTPATIENT
Start: 2022-04-11

## 2022-05-02 DIAGNOSIS — G43.909 MIGRAINE WITHOUT STATUS MIGRAINOSUS, NOT INTRACTABLE, UNSPECIFIED MIGRAINE TYPE: ICD-10-CM

## 2022-05-03 ENCOUNTER — ANTICOAG VISIT (OUTPATIENT)
Dept: CARDIOLOGY CLINIC | Facility: CLINIC | Age: 47
End: 2022-05-03

## 2022-05-03 ENCOUNTER — APPOINTMENT (OUTPATIENT)
Dept: LAB | Facility: HOSPITAL | Age: 47
End: 2022-05-03
Payer: COMMERCIAL

## 2022-05-03 DIAGNOSIS — I35.9 AORTIC VALVE DISORDER: Primary | ICD-10-CM

## 2022-05-03 RX ORDER — RIZATRIPTAN BENZOATE 10 MG/1
TABLET ORAL
Qty: 30 TABLET | Refills: 0 | Status: SHIPPED | OUTPATIENT
Start: 2022-05-03

## 2022-05-05 DIAGNOSIS — I35.9 AORTIC VALVE DISORDER: ICD-10-CM

## 2022-05-05 RX ORDER — WARFARIN SODIUM 5 MG/1
TABLET ORAL
Qty: 90 TABLET | Refills: 0 | Status: SHIPPED | OUTPATIENT
Start: 2022-05-05 | End: 2022-06-25

## 2022-05-18 DIAGNOSIS — I10 HYPERTENSION, ESSENTIAL: Primary | ICD-10-CM

## 2022-05-26 ENCOUNTER — OFFICE VISIT (OUTPATIENT)
Dept: CARDIOLOGY CLINIC | Facility: CLINIC | Age: 47
End: 2022-05-26
Payer: COMMERCIAL

## 2022-05-26 VITALS
BODY MASS INDEX: 21.01 KG/M2 | DIASTOLIC BLOOD PRESSURE: 80 MMHG | OXYGEN SATURATION: 98 % | WEIGHT: 107 LBS | HEART RATE: 66 BPM | SYSTOLIC BLOOD PRESSURE: 130 MMHG | HEIGHT: 60 IN

## 2022-05-26 DIAGNOSIS — I35.9 AORTIC VALVE DISORDER: ICD-10-CM

## 2022-05-26 DIAGNOSIS — I10 HYPERTENSION, ESSENTIAL: Primary | ICD-10-CM

## 2022-05-26 DIAGNOSIS — Z79.01 LONG TERM (CURRENT) USE OF ANTICOAGULANTS: ICD-10-CM

## 2022-05-26 DIAGNOSIS — Q25.1 COARCTATION OF AORTA: ICD-10-CM

## 2022-05-26 PROCEDURE — 1036F TOBACCO NON-USER: CPT | Performed by: INTERNAL MEDICINE

## 2022-05-26 PROCEDURE — 3008F BODY MASS INDEX DOCD: CPT | Performed by: INTERNAL MEDICINE

## 2022-05-26 PROCEDURE — 3075F SYST BP GE 130 - 139MM HG: CPT | Performed by: INTERNAL MEDICINE

## 2022-05-26 PROCEDURE — 3079F DIAST BP 80-89 MM HG: CPT | Performed by: INTERNAL MEDICINE

## 2022-05-26 PROCEDURE — 99213 OFFICE O/P EST LOW 20 MIN: CPT | Performed by: INTERNAL MEDICINE
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
## 2022-06-01 ENCOUNTER — ANTICOAG VISIT (OUTPATIENT)
Dept: CARDIOLOGY CLINIC | Facility: CLINIC | Age: 47
End: 2022-06-01

## 2022-06-01 ENCOUNTER — APPOINTMENT (OUTPATIENT)
Dept: LAB | Facility: HOSPITAL | Age: 47
End: 2022-06-01
Payer: COMMERCIAL

## 2022-06-01 DIAGNOSIS — I35.9 AORTIC VALVE DISORDER: Primary | ICD-10-CM

## 2022-07-12 ENCOUNTER — APPOINTMENT (OUTPATIENT)
Dept: LAB | Facility: HOSPITAL | Age: 47
End: 2022-07-12
Payer: COMMERCIAL

## 2022-07-12 ENCOUNTER — ANTICOAG VISIT (OUTPATIENT)
Dept: CARDIOLOGY CLINIC | Facility: CLINIC | Age: 47
End: 2022-07-12

## 2022-07-12 DIAGNOSIS — I35.9 AORTIC VALVE DISORDER: Primary | ICD-10-CM

## 2022-08-04 ENCOUNTER — HOSPITAL ENCOUNTER (EMERGENCY)
Facility: HOSPITAL | Age: 47
Discharge: HOME | End: 2022-08-04
Admitting: EMERGENCY MEDICINE
Payer: COMMERCIAL

## 2022-08-04 ENCOUNTER — APPOINTMENT (OUTPATIENT)
Dept: LAB | Facility: HOSPITAL | Age: 47
End: 2022-08-04
Payer: COMMERCIAL

## 2022-08-04 ENCOUNTER — ANTICOAG VISIT (OUTPATIENT)
Dept: CARDIOLOGY CLINIC | Facility: CLINIC | Age: 47
End: 2022-08-04

## 2022-08-04 VITALS
DIASTOLIC BLOOD PRESSURE: 74 MMHG | HEART RATE: 67 BPM | OXYGEN SATURATION: 99 % | TEMPERATURE: 97.9 F | RESPIRATION RATE: 16 BRPM | SYSTOLIC BLOOD PRESSURE: 155 MMHG

## 2022-08-04 DIAGNOSIS — I35.9 AORTIC VALVE DISORDER: Primary | ICD-10-CM

## 2022-08-04 DIAGNOSIS — M79.89 SWELLING OF LEFT HAND: Primary | ICD-10-CM

## 2022-08-04 PROCEDURE — 99284 EMERGENCY DEPT VISIT MOD MDM: CPT | Performed by: PHYSICIAN ASSISTANT

## 2022-08-04 PROCEDURE — 99283 EMERGENCY DEPT VISIT LOW MDM: CPT

## 2022-08-04 RX ORDER — CEPHALEXIN 500 MG/1
500 CAPSULE ORAL EVERY 6 HOURS SCHEDULED
Qty: 28 CAPSULE | Refills: 0 | Status: SHIPPED | OUTPATIENT
Start: 2022-08-04 | End: 2022-08-11

## 2022-08-04 RX ORDER — METHYLPREDNISOLONE 4 MG/1
TABLET ORAL
Qty: 21 TABLET | Refills: 0 | Status: SHIPPED | OUTPATIENT
Start: 2022-08-04 | End: 2022-09-22 | Stop reason: ALTCHOICE

## 2022-08-04 RX ORDER — CEPHALEXIN 250 MG/1
500 CAPSULE ORAL ONCE
Status: COMPLETED | OUTPATIENT
Start: 2022-08-04 | End: 2022-08-04

## 2022-08-04 RX ADMIN — CEPHALEXIN 500 MG: 250 CAPSULE ORAL at 07:22

## 2022-08-04 NOTE — ED PROVIDER NOTES
History  Chief Complaint   Patient presents with    Hand Swelling     Left hand swelling since yesterday  Had small abrasion to left hand 1 week ago after weed whacking  Denies fevers/chills     Lb Dempsey is an ambidextrous 43-year-old female presenting to the emergency department for evaluation with chief complaint of swelling overlying the dorsum of the left hand  Patient states that while weed whacking last week she had endured a presumable insect bite or was poked by a tree branch through a gloved hand, as she had noticed a residual superficial abrasion at the base of the 2nd MCP  She did not visualize any direct insult to the hand  She had noticed the surrounding area had become swollen yesterday  She has no accompanying pain  She denies any paresthesias or weakness of the left hand  She has no constitutional symptoms denying fevers or chills  She offers no other complaints or concerns at this time  Her tetanus is current  Prior to Admission Medications   Prescriptions Last Dose Informant Patient Reported? Taking? Diclofenac Sodium (VOLTAREN) 1 %  Self No No   Sig: Apply 2 g topically 3 (three) times a day as needed (ankle pain/initial rx )   Prenatal 27-1 MG TABS  Self No No   Sig: take 1 tablet by mouth daily   albuterol (Ventolin HFA) 90 mcg/act inhaler  Self No No   Sig: Inhale 2 puffs every 6 (six) hours as needed for wheezing   aspirin (ECOTRIN LOW STRENGTH) 81 mg EC tablet  Self Yes No   Sig: Take 81 mg by mouth daily   metoprolol tartrate (LOPRESSOR) 25 mg tablet  Self No No   Sig: Take 1 tablet (25 mg total) by mouth in the morning and 1 tablet (25 mg total) in the evening     rizatriptan (MAXALT) 10 MG tablet  Self No No   Sig: TAKE 1 TABLET BY MOUTH ONCE AS NEEDED FOR MIGRAINE FOR UP TO 1 DOSE   warfarin (COUMADIN) 5 mg tablet   No No   Sig: TAKE 1 TO 1 AND 1/2 TABLETS DAILY OR AS DIRECTED      Facility-Administered Medications: None       Past Medical History:   Diagnosis Date  Abnormal Pap smear of cervix     Allergic contact dermatitis     Allergic rhinitis     resolved 2018    Aortic valve disorder     Aortic valve insufficiency     Asthma     Benign neoplasm of skin     Cardiac disease     Costochondritis     Hyperinsulinism     Inguinal lymphadenopathy     resolved 2015    Migraine     resolved 2015    Nosebleed     Varicose 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     unspecified laterality       Past Surgical History:   Procedure Laterality Date    AORTIC VALVE REPLACEMENT      complications 8159 failure of bovine valve, replaced with mechanical aortic valve  and root replacement at [facility] dr singer Thayer Sensing      enlargement   [address]  2011    bovine, with redo and mechanical valve replacement and root 2012 dr sawyer at [facility] last assessed 08/15/2016    CERVICAL BIOPSY  W/ LOOP ELECTRODE EXCISION      COLPOSCOPY      INDUCED       surgically by dilation and evacuation    NASAL/SINUS ENDOSCOPY Left 2019    Procedure: ENDOSCOPIC CONTROL OF EPISTAXIS (LEFT); Surgeon: Monse Allen MD;  [facility];  Service: ENT    RHINOPLASTY      SEPTOPLASTY         Family History   Problem Relation Age of Onset    Asthma Father     Coronary artery disease Father     Hypertension Father     No Known Problems Sister     Breast cancer Maternal Grandmother     No Known Problems Mother     No Known Problems Daughter     No Known Problems Son     Diabetes Maternal Grandfather     No Known Problems Paternal Grandmother     Other Paternal Grandfather         Susanne Fever    No Known Problems Sister     No Known Problems Sister     No Known Problems Daughter     No Known Problems Son     No Known Problems Son      I have reviewed and agree with the history as documented      E-Cigarette/Vaping    E-Cigarette Use Never User      E-Cigarette/Vaping Substances    Nicotine No     THC No     CBD No     Flavoring No     Other No     Unknown No      Social History     Tobacco Use    Smoking status: Never Smoker    Smokeless tobacco: Never Used   Vaping Use    Vaping Use: Never used   Substance Use Topics    Alcohol use: Yes     Comment: drinks hard liquor, social per allscripts    Drug use: No       Review of Systems   Constitutional: Negative for chills and fever  Skin: Negative for rash  Swelling, dorsum of left hand   Neurological: Negative for weakness and numbness  All other systems reviewed and are negative  Physical Exam  Physical Exam  Vitals and nursing note reviewed  Constitutional:       General: She is not in acute distress  Appearance: Normal appearance  She is not ill-appearing, toxic-appearing or diaphoretic  HENT:      Head: Normocephalic and atraumatic  Right Ear: External ear normal       Left Ear: External ear normal    Eyes:      Conjunctiva/sclera: Conjunctivae normal    Cardiovascular:      Rate and Rhythm: Normal rate  Pulmonary:      Effort: Pulmonary effort is normal    Musculoskeletal:         General: Normal range of motion  Cervical back: Normal range of motion and neck supple  Skin:     General: Skin is warm and dry  Capillary Refill: Capillary refill takes less than 2 seconds  Findings: No bruising, rash or wound  Comments: Mild edema overlying the dorsum of the left hand without tenderness upon palpation  There is a superficial abrasion proximal to the 2nd mtp joint  There is surrounding erythema without warmth to the touch  No splinter or retained foreign body visualized  No palpable fluctuance or crepitus  Distal sensation intact, capillary refill <2 seconds in all fingers  Radial pulse 2+  5/5  strength bilaterally  Full active painless rom in all joints of the left upper extremity  Neurological:      Mental Status: She is alert  Sensory: Sensation is intact        Motor: Motor function is intact  Gait: Gait is intact  Vital Signs  ED Triage Vitals [08/04/22 0712]   Temperature Pulse Respirations Blood Pressure SpO2   97 9 °F (36 6 °C) 67 16 155/74 99 %      Temp Source Heart Rate Source Patient Position - Orthostatic VS BP Location FiO2 (%)   Oral Monitor -- Right arm --      Pain Score       5           Vitals:    08/04/22 0712   BP: 155/74   Pulse: 67         Visual Acuity      ED Medications  Medications   cephalexin (KEFLEX) capsule 500 mg (500 mg Oral Given 8/4/22 8016)       Diagnostic Studies  Results Reviewed     None                 No orders to display              Procedures  Procedures         ED Course                               SBIRT 20yo+    Flowsheet Row Most Recent Value   SBIRT (25 yo +)    In order to provide better care to our patients, we are screening all of our patients for alcohol and drug use  Would it be okay to ask you these screening questions? No Filed at: 08/04/2022 0713                    MDM  Number of Diagnoses or Management Options  Swelling of left hand: new and requires workup  Diagnosis management comments: This is an ambidextrous 50yo female presenting with swelling localized to the dorsum of the left hand first noticed yesterday  The patient states that she was weed whacking last week and thought a tree branch had poked her left hand through the glove  She had noticed a small abrasion overlying the hand near the 2nd digit  She has no extremity paresthesias or weakness  She has no accompanying constitutional symptoms  Differential diagnosis includes but is not limited to: insect bite, wasp sting, allergic reaction, cellulitis; compartment syndrome unlikely, septic joint unlikely, fracture/dislocation unlikely in absence of trauma    Initial ED plan:  Begin antibiotic in coverage of possible superimposed cellulitis; offered x-ray which patient had declined    Final ED Assessment: Vital signs reviewed on ED presentation, examination as above  The left upper extremity is neurovascularly intact on assessment  Patient advised that presentation appears most consistent with an allergic reaction to possible insect bite/wasp sting to the left hand  We reviewed supportive care to include rest, icing, compression with consideration of applying an Ace bandage or wrist brace, Tylenol for pain control, and Benadryl  Will start on Keflex for coverage of possible superimposed cellulitis with first dose given in the emergency department today  Outpatient ortho/hand follow-up included in the patient's discharge paperwork for follow-up if symptoms persist   We reviewed parameters for ED return including but not limited to progressive erythema or onset of work from the skin, streaking, fevers or chills, extremity paresthesias or weakness, or any other new or worrisome symptoms  The patient had verbalized understanding and was comfortable and agreeable with disposition and care plan  She was discharged in stable condition and had ambulated independently from the emergency department today without issue  Amount and/or Complexity of Data Reviewed  Review and summarize past medical records: yes  Independent visualization of images, tracings, or specimens: yes    Risk of Complications, Morbidity, and/or Mortality  Presenting problems: low  Diagnostic procedures: low  Management options: low    Patient Progress  Patient progress: stable      Disposition  Final diagnoses:   Swelling of left hand     Time reflects when diagnosis was documented in both MDM as applicable and the Disposition within this note     Time User Action Codes Description Comment    8/4/2022  7:14 AM Raymundo Ty Add [[id number]] Swelling of left hand       ED Disposition     ED Disposition   Discharge    Condition   Stable    Date/Time   Thu Aug 4, 2022 Gaetano discharge to home/self care                 Follow-up Information     Follow up With Specialties Details Why Contact Info Additional [address] Lauri Camarillo MD Internal Medicine, [facility], [address]   [address]  [place], [address] [facility] [address], MD Orthopedic Surgery, Hand Surgery   [address] [facility]  [address] [facility] Emergency Department Emergency Medicine  If symptoms worsen [address] [facility] [id number] [facility] Emergency Department, [address], [place], [facility]          Discharge Medication List as of 8/4/2022  7:16 AM      START taking these medications    Details   cephalexin (KEFLEX) 500 mg capsule Take 1 capsule (500 mg total) by mouth every 6 (six) hours for 7 days, Starting Thu 8/4/2022, Until Thu 8/11/2022, Normal      methylPREDNISolone 4 MG tablet therapy pack Use as directed on package, Normal         CONTINUE these medications which have NOT CHANGED    Details   albuterol (Ventolin HFA) 90 mcg/act inhaler Inhale 2 puffs every 6 (six) hours as needed for wheezing, Starting Mon 1/24/2022, Normal      aspirin (ECOTRIN LOW STRENGTH) 81 mg EC tablet Take 81 mg by mouth daily, Historical Med      Diclofenac Sodium (VOLTAREN) 1 % Apply 2 g topically 3 (three) times a day as needed (ankle pain/initial rx ), Starting Sun 4/10/2022, Normal      metoprolol tartrate (LOPRESSOR) 25 mg tablet Take 1 tablet (25 mg total) by mouth in the morning and 1 tablet (25 mg total) in the evening , Starting Wed 5/18/2022, Normal      Prenatal 27-1 MG TABS take 1 tablet by mouth daily, Normal      rizatriptan (MAXALT) 10 MG tablet TAKE 1 TABLET BY MOUTH ONCE AS NEEDED FOR MIGRAINE FOR UP TO 1 DOSE, Normal      warfarin (COUMADIN) 5 mg tablet TAKE 1 TO 1 AND 1/2 TABLETS DAILY OR AS DIRECTED, Normal             No discharge procedures on file      PDMP Review       Value Time User    PDMP Reviewed  Yes 12/24/2019  3:44 PM Ishmael Lu MD          ED Provider  Electronically Signed by           Venkatesh Anand PA-C  08/04/22 2826

## 2022-08-04 NOTE — DISCHARGE INSTRUCTIONS
Rest, ice, compression, elevation  Benadryl, tylenol, and medrol dose pain for pain control  Keflex for coverage of cellulitis  Please return to the ED if you have any new or worsening symptoms as discussed including extensive redness or swelling with warmth to the left hand, streaking, onset of fevers or chills, onset of tingling or weakness to the left hand, or any other worrisome symptoms

## 2022-08-25 DIAGNOSIS — I35.9 AORTIC VALVE DISORDER: ICD-10-CM

## 2022-08-25 RX ORDER — WARFARIN SODIUM 5 MG/1
TABLET ORAL
Qty: 90 TABLET | Refills: 0 | Status: SHIPPED | OUTPATIENT
Start: 2022-08-25

## 2022-09-12 ENCOUNTER — APPOINTMENT (OUTPATIENT)
Dept: LAB | Facility: HOSPITAL | Age: 47
End: 2022-09-12
Payer: COMMERCIAL

## 2022-09-12 ENCOUNTER — ANTICOAG VISIT (OUTPATIENT)
Dept: CARDIOLOGY CLINIC | Facility: CLINIC | Age: 47
End: 2022-09-12

## 2022-09-12 DIAGNOSIS — I35.9 AORTIC VALVE DISORDER: Primary | ICD-10-CM

## 2022-09-20 NOTE — PROGRESS NOTES
Diagnoses and all orders for this visit:    Irregular menstrual bleeding  -     medroxyPROGESTERone (DEPO-PROVERA) 150 mg/mL injection; Inject 1 mL (150 mg total) into a muscle every 3 (three) months  Discussed possible causes of irregular cycles  Advised one irregular episode of bleeding may be related to increased stress and recent infection  Discussed watchful waiting vs resuming depo-provera injections  She would like to just resume her injections  Directions of use, side effects, risks and benefits were discussed  Advised to call with any issues, all concerns & questions addressed  See provided information in your after visit summary     Encounter for initial prescription of injectable contraceptive  -     medroxyPROGESTERone (DEPO-PROVERA) 150 mg/mL injection; Inject 1 mL (150 mg total) into a muscle every 3 (three) months    Return for depo-provera injection and annual     Subjective    CC: Irregular menses    Sharona Ontiveros is a 50 y o  female [id number] here for irregular menses  She reports regular menstrual cycles her whole life up til last month  She had her regular cycle in the beginning of the month, period lasted approximately 5 days then had another episode of menstrual bleeding about a week later that also lasted 5 days  Flow was her normal, menstrual flow  Her last sexual encounter was over 3 months ago  She is not currently on any contraception but would like to resume her depo-provera injections  She was very happy with this method previously, she states she just forgot about her injections due to increased stressors in her life  She has been under a lot of stress recently  She states over the last 2 months her ex- passed away and she had been evicted from her home that he owned  She is currently trying to fight this and is living with a friend  Her children relocated to [place] but were living with her prior to this   She also reports being treated for an infected insect bite last month and completed her antibiotic and medrol dose pack a few weeks ago  She denies abnormal vaginal discharge, vaginal itching, odor, irritation, pelvic pain, or dyspareunia today  Patient's last menstrual period was 2022 (approximate)  Past Medical History:   Diagnosis Date    Abnormal Pap smear of cervix     Allergic contact dermatitis     Allergic rhinitis     resolved 2018    Aortic valve disorder     Aortic valve insufficiency     Asthma     Benign neoplasm of skin     Cardiac disease     Costochondritis     Hyperinsulinism     Inguinal lymphadenopathy     resolved 2015    Migraine     resolved 2015    Nosebleed     Varicose 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     unspecified laterality     Past Surgical History:   Procedure Laterality Date    AORTIC VALVE REPLACEMENT      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, replaced with mechanical aortic valve  and root replacement at [facility] dr singer Steven Duverney      enlargement   [address]  2011    bovine, with redo and mechanical valve replacement and root 2012 dr sawyer at [facility] last assessed 08/15/2016    CERVICAL BIOPSY  W/ LOOP ELECTRODE EXCISION      COLPOSCOPY      INDUCED       surgically by dilation and evacuation    NASAL/SINUS ENDOSCOPY Left 2019    Procedure: ENDOSCOPIC CONTROL OF EPISTAXIS (LEFT);   Surgeon: Jerri Lara MD;  [facility];  Service: ENT    RHINOPLASTY      SEPTOPLASTY         Immunization History   Administered Date(s) Administered    Influenza, seasonal, injectable 1973    Tdap 2015    Tuberculin Skin Test-PPD Intradermal 2016, 2018       Family History   Problem Relation Age of Onset    Asthma Father     Coronary artery disease Father     Hypertension Father     No Known Problems Sister     Breast cancer Maternal Grandmother     No Known Problems Mother     No Known Problems Daughter  No Known Problems Son     Diabetes Maternal Grandfather     No Known Problems Paternal Grandmother     Other Paternal Grandfather         Susanne Fever    No Known Problems Sister     No Known Problems Sister     No Known Problems Daughter     No Known Problems Son     No Known Problems Son      Social History     Tobacco Use    Smoking status: Never Smoker    Smokeless tobacco: Never Used   Vaping Use    Vaping Use: Never used   Substance Use Topics    Alcohol use: Yes     Comment: drinks hard liquor, social per allscripts    Drug use: Never       Current Outpatient Medications:     aspirin (ECOTRIN LOW STRENGTH) 81 mg EC tablet, Take 81 mg by mouth daily, Disp: , Rfl:     medroxyPROGESTERone (DEPO-PROVERA) 150 mg/mL injection, Inject 1 mL (150 mg total) into a muscle every 3 (three) months, Disp: 1 mL, Rfl: 3    metoprolol tartrate (LOPRESSOR) 25 mg tablet, Take 1 tablet (25 mg total) by mouth in the morning and 1 tablet (25 mg total) in the evening , Disp: 180 tablet, Rfl: 3    Prenatal 27-1 MG TABS, take 1 tablet by mouth daily, Disp: 90 tablet, Rfl: 1    warfarin (COUMADIN) 5 mg tablet, TAKE 1 TO 1 AND 1/2 TABLETS DAILY OR AS DIRECTED, Disp: 90 tablet, Rfl: 0    albuterol (Ventolin HFA) 90 mcg/act inhaler, Inhale 2 puffs every 6 (six) hours as needed for wheezing (Patient not taking: Reported on 9/22/2022), Disp: 18 g, Rfl: 5    rizatriptan (MAXALT) 10 MG tablet, TAKE 1 TABLET BY MOUTH ONCE AS NEEDED FOR MIGRAINE FOR UP TO 1 DOSE (Patient not taking: Reported on 9/22/2022), Disp: 30 tablet, Rfl: 0  Patient Active Problem List    Diagnosis Date Noted    Left non-suppurative otitis media 05/11/2021    Vulvar cyst 06/11/2020    Screening for STD (sexually transmitted disease) 06/11/2020    History of mechanical aortic valve replacement 12/25/2019    Nasal obstruction 07/16/2019    Nasal septal deviation 07/16/2019    Nasal turbinate hypertrophy 07/16/2019    Recurrent epistaxis 2019    Encounter for gynecological examination (general) (routine) without abnormal findings 2019    Surveillance for Depo-Provera contraception 2019    Capsular contracture of breast implant 2018    Varicose veins of lower extremity with pain, bilateral 07/10/2018    Aortic valve disorder 2018    Anticoagulated 2018       Allergies   Allergen Reactions    Dog Epithelium Allergy Skin Test Rash     Only certain dogs    Pollen Extract Other (See Comments)     Test showed allergy but rarely has s/s       OB History    Para Term  AB Living   6 5 5 0 1 5   SAB IAB Ectopic Multiple Live Births   0 1 0 0 5      # Outcome Date GA Lbr Fritz/2nd Weight Sex Delivery Anes PTL Lv   6 Term      Vag-Spont   BETHANIE   5 Term      Vag-Spont   BETHANIE   4 Term      Vag-Spont   BETHANIE   3 Term      Vag-Spont   BETHANIE   2 Term      Vag-Spont   BETHANIE   1 IAB                Vitals:    22 1051   BP: 126/74   Weight: 48 1 kg (106 lb)   Height: 4' 11 5" (1 511 m)     Body mass index is 21 05 kg/m²  Review of Systems   Constitutional: Negative for chills, fatigue, fever and unexpected weight change  Gastrointestinal: Negative for abdominal pain, constipation, diarrhea, nausea and vomiting  Endocrine: Negative  Genitourinary: Positive for menstrual problem  Negative for dyspareunia, pelvic pain, vaginal discharge and vaginal pain  Musculoskeletal: Negative for back pain and myalgias  Skin: Negative for pallor and rash  Neurological: Negative for headaches  Psychiatric/Behavioral: Negative for dysphoric mood  All other systems reviewed and are negative  Physical Exam  Constitutional:       General: She is not in acute distress  Appearance: Normal appearance  She is not ill-appearing  HENT:      Head: Normocephalic and atraumatic     Eyes:      Conjunctiva/sclera: Conjunctivae normal    Pulmonary:      Effort: Pulmonary effort is normal    Abdominal:      General: There is no distension  Palpations: Abdomen is soft  Tenderness: There is no abdominal tenderness  Musculoskeletal:         General: Normal range of motion  Cervical back: Neck supple  Neurological:      Mental Status: She is alert and oriented to person, place, and time  Skin:     General: Skin is warm and dry  Psychiatric:         Mood and Affect: Mood and affect normal          Behavior: Behavior normal    Vitals and nursing note reviewed

## 2022-09-22 ENCOUNTER — OFFICE VISIT (OUTPATIENT)
Dept: OBGYN CLINIC | Age: 47
End: 2022-09-22

## 2022-09-22 VITALS
BODY MASS INDEX: 20.81 KG/M2 | DIASTOLIC BLOOD PRESSURE: 74 MMHG | WEIGHT: 106 LBS | SYSTOLIC BLOOD PRESSURE: 126 MMHG | HEIGHT: 60 IN

## 2022-09-22 DIAGNOSIS — N92.6 IRREGULAR MENSTRUAL BLEEDING: Primary | ICD-10-CM

## 2022-09-22 DIAGNOSIS — Z30.013 ENCOUNTER FOR INITIAL PRESCRIPTION OF INJECTABLE CONTRACEPTIVE: ICD-10-CM

## 2022-09-22 RX ORDER — MEDROXYPROGESTERONE ACETATE 150 MG/ML
150 INJECTION, SUSPENSION INTRAMUSCULAR
Qty: 1 ML | Refills: 3 | Status: SHIPPED | OUTPATIENT
Start: 2022-09-22

## 2022-09-22 NOTE — PROGRESS NOTES
Here today because this month 2 periods both 5 days long only 7 days apart from each other  This has never happen

## 2022-09-22 NOTE — PATIENT INSTRUCTIONS
Injectable Contraception   WHAT YOU NEED TO KNOW:   What is injectable contraception? Injectable contraception is birth control medicine that is given as a shot  This medicine helps prevent pregnancy  The shot is usually given once every 3 months on day 1 to 5 of your menstrual cycle  The medicine may decrease blood loss and pain during your period  It also decreases your risk for anemia (low red blood cell count)  When can I start to use injectable contraception? Tell your healthcare provider about any medical condition you have  Also tell him or her if you are currently breastfeeding  Your provider will tell you when you can start injectable contraception  You may need to use a different method of contraception for the first 7 days after you get the shot  You may need blood or urine tests before you start this medicine  You may use this method in any of the following situations:  During your menstrual cycle,  you can start to use injectable contraception  You should get your first shot within 5 days after your cycle starts, if you have regular menstrual cycles  If you have irregular bleeding or no periods you may get the shot any time  When you switch methods of contraception,  you may need injectable contraception until your new method is working  This may decrease your risk of becoming pregnant  After you give birth,  your healthcare provider will tell you when to get the shot if you are breastfeeding  If you are not breastfeeding, you may have the shot any time  What are the risks of injectable contraception? Injectable contraception does not protect against sexually transmitted diseases  You may have headaches or changes in your mood  You may have heavy periods or periods that last longer than normal for you  Your periods may stop completely  You may have an upset stomach  You can develop brittle bones and be at higher risk for a fracture  You may gain weight   Certain medicines can prevent injectable contraception from working correctly  How can I care for myself while I use injectable contraception? Do not smoke  Nicotine and other chemicals in cigarettes and cigars increase your risk for a blood clot while you use injectable contraception  Ask your healthcare provider for information if you currently smoke and need help to quit  E-cigarettes or smokeless tobacco still contain nicotine  Talk to your healthcare provider before you use these products  Increase your daily intake of calcium and vitamin D as directed  This will help make your bones stronger and prevent fractures  Foods rich in calcium include milk, yogurt, and cheese  You may need to take to take vitamins with calcium and vitamin D  You should get 1,300 mg of calcium and 400 IU of vitamin D per day when using injectable contraception  Talk to your healthcare provider before you take vitamins  Exercise regularly  Weight-bearing exercise will help you build bone and muscle strength  Walking is an example of a weight-bearing exercise  Ask your healthcare provider about exercises that are right for you  Try to get at least 30 minutes of exercise on most days of the week  Your provider can tell you if you need to exercise more than this  When should I contact my gynecologist or doctor? You have heavy bleeding from your vagina  You have yellow eyes or skin  You have severe pain in your stomach or abdomen  Your period lasts longer than is normal for you  You do not get a period  You have unprotected sex before you have your shots  You have changes in your mood  You have questions or concerns about injectable contraceptives  CARE AGREEMENT:   You have the right to help plan your care  Learn about your health condition and how it may be treated  Discuss treatment options with your healthcare providers to decide what care you want to receive  You always have the right to refuse treatment   The above information is an  only  It is not intended as medical advice for individual conditions or treatments  Talk to your doctor, nurse or pharmacist before following any medical regimen to see if it is safe and effective for you  © Copyright [facility] 2022 Information is for End User's use only and may not be sold, redistributed or otherwise used for commercial purposes   All illustrations and images included in CareNotes® are the copyrighted property of [employer]  or [address] [place] [address]

## 2022-09-23 DIAGNOSIS — Z00.00 PERIODIC HEALTH ASSESSMENT, GENERAL SCREENING, ADULT: ICD-10-CM

## 2022-09-23 RX ORDER — PRENATAL WITH FERROUS FUM AND FOLIC ACID 3080; 920; 120; 400; 22; 1.84; 3; 20; 10; 1; 12; 200; 27; 25; 2 [IU]/1; [IU]/1; MG/1; [IU]/1; MG/1; MG/1; MG/1; MG/1; MG/1; MG/1; UG/1; MG/1; MG/1; MG/1; MG/1
TABLET ORAL
Qty: 90 TABLET | Refills: 1 | Status: SHIPPED | OUTPATIENT
Start: 2022-09-23

## 2022-09-27 ENCOUNTER — TELEPHONE (OUTPATIENT)
Dept: CARDIOLOGY CLINIC | Facility: CLINIC | Age: 47
End: 2022-09-27

## 2022-09-27 DIAGNOSIS — I35.9 AORTIC VALVE DISORDER: Primary | ICD-10-CM

## 2022-09-27 RX ORDER — AMOXICILLIN 500 MG/1
TABLET, FILM COATED ORAL
Qty: 4 TABLET | Refills: 4 | Status: SHIPPED | OUTPATIENT
Start: 2022-09-27 | End: 2022-09-27

## 2022-09-27 NOTE — TELEPHONE ENCOUNTER
Prescription for amoxicillin 500 mg 4 tablets to be taken 1 hour prior to dental procedure sent to her pharmacy

## 2022-09-27 NOTE — TELEPHONE ENCOUNTER
Pt lm on [place] vm stating that she has a dental cleaning on 9/29/22 & would like Amoxicillin called into [facility]

## 2022-09-27 NOTE — TELEPHONE ENCOUNTER
LM for pt relaying Dr Tom De La Cruz response, informed pt to contact office if any further questions/ concerns

## 2022-10-13 ENCOUNTER — APPOINTMENT (OUTPATIENT)
Dept: LAB | Facility: HOSPITAL | Age: 47
End: 2022-10-13
Payer: COMMERCIAL

## 2022-10-13 ENCOUNTER — ANTICOAG VISIT (OUTPATIENT)
Dept: CARDIOLOGY CLINIC | Facility: CLINIC | Age: 47
End: 2022-10-13

## 2022-10-13 DIAGNOSIS — I35.9 AORTIC VALVE DISORDER: Primary | ICD-10-CM

## 2022-11-07 ENCOUNTER — APPOINTMENT (OUTPATIENT)
Dept: LAB | Facility: MEDICAL CENTER | Age: 47
End: 2022-11-07

## 2022-11-08 ENCOUNTER — OFFICE VISIT (OUTPATIENT)
Dept: INTERNAL MEDICINE CLINIC | Facility: CLINIC | Age: 47
End: 2022-11-08

## 2022-11-08 ENCOUNTER — ANTICOAG VISIT (OUTPATIENT)
Dept: CARDIOLOGY CLINIC | Facility: CLINIC | Age: 47
End: 2022-11-08

## 2022-11-08 ENCOUNTER — CLINICAL SUPPORT (OUTPATIENT)
Dept: OBGYN CLINIC | Age: 47
End: 2022-11-08

## 2022-11-08 VITALS
HEART RATE: 78 BPM | HEIGHT: 60 IN | BODY MASS INDEX: 20.89 KG/M2 | SYSTOLIC BLOOD PRESSURE: 122 MMHG | RESPIRATION RATE: 16 BRPM | DIASTOLIC BLOOD PRESSURE: 72 MMHG | WEIGHT: 106.4 LBS

## 2022-11-08 VITALS — BODY MASS INDEX: 20.89 KG/M2 | WEIGHT: 106.4 LBS | HEIGHT: 60 IN

## 2022-11-08 DIAGNOSIS — Z12.31 BREAST CANCER SCREENING BY MAMMOGRAM: Primary | ICD-10-CM

## 2022-11-08 DIAGNOSIS — Z79.01 ANTICOAGULATED: ICD-10-CM

## 2022-11-08 DIAGNOSIS — Z00.00 PHYSICAL EXAM: ICD-10-CM

## 2022-11-08 DIAGNOSIS — I35.9 AORTIC VALVE DISORDER: Primary | ICD-10-CM

## 2022-11-08 DIAGNOSIS — Z13.29 SCREENING FOR THYROID DISORDER: ICD-10-CM

## 2022-11-08 DIAGNOSIS — Z13.6 SCREENING FOR CARDIOVASCULAR CONDITION: ICD-10-CM

## 2022-11-08 DIAGNOSIS — Z30.42 SURVEILLANCE FOR DEPO-PROVERA CONTRACEPTION: Primary | ICD-10-CM

## 2022-11-08 DIAGNOSIS — I35.9 AORTIC VALVE DISORDER: ICD-10-CM

## 2022-11-08 PROBLEM — Z01.419 ENCOUNTER FOR GYNECOLOGICAL EXAMINATION (GENERAL) (ROUTINE) WITHOUT ABNORMAL FINDINGS: Status: RESOLVED | Noted: 2019-02-14 | Resolved: 2022-11-08

## 2022-11-08 PROBLEM — Z11.3 SCREENING FOR STD (SEXUALLY TRANSMITTED DISEASE): Status: RESOLVED | Noted: 2020-06-11 | Resolved: 2022-11-08

## 2022-11-08 LAB — SL AMB POCT URINE HCG: NORMAL

## 2022-11-08 RX ORDER — WARFARIN SODIUM 5 MG/1
TABLET ORAL
Qty: 45 TABLET | Refills: 5
Start: 2022-11-08

## 2022-11-08 RX ORDER — MEDROXYPROGESTERONE ACETATE 150 MG/ML
150 INJECTION, SUSPENSION INTRAMUSCULAR ONCE
Status: COMPLETED | OUTPATIENT
Start: 2022-11-08 | End: 2022-11-08

## 2022-11-08 RX ADMIN — MEDROXYPROGESTERONE ACETATE 150 MG: 150 INJECTION, SUSPENSION INTRAMUSCULAR at 09:30

## 2022-11-08 NOTE — PROGRESS NOTES
INTERNAL MEDICINE FOLLOW-UP VISIT  [facility] [address] [place] [address]    NAME: Narcisa Durand  AGE: 50 y o  SEX: female  : 1975     DATE: 2022     Assessment and Plan:   1  Breast cancer screening by mammogram  - Mammo screening bilateral w 3d & cad; Future    2  Aortic valve disorder  - Comprehensive metabolic panel; Future  - warfarin (COUMADIN) 5 mg tablet; Take 5mg, 5mg then 7 5  Dispense: 45 tablet; Refill: 5    3  Anticoagulated  - CBC and differential; Future    4  Screening for cardiovascular condition  - Lipid panel; Future    5  Screening for thyroid disorder  - TSH, 3rd generation with Free T4 reflex; Future    6  Physical exam  utd w/ colonscopy and gyn  Starting Depo Provera today  Declines all vaccinations        Depression Screening and Follow-up Plan: Patient was screened for depression during today's encounter  They screened negative with a PHQ-2 score of 0  No follow-ups on file  Chief Complaint:     No chief complaint on file  History of Present Illness:     Here for annual  No concerns   Is in new relationship having sex now  Starting depo today as well  On coumadin  Following w/ cardiology occasional nose bleeds    The following portions of the patient's history were reviewed and updated as appropriate: allergies, current medications, past family history, past medical history, past social history, past surgical history and problem list      Review of Systems:     Review of Systems   Constitutional: Negative for appetite change, chills, diaphoresis, fatigue, fever and unexpected weight change  HENT: Negative for postnasal drip and sneezing  Eyes: Negative for visual disturbance  Respiratory: Negative for chest tightness and shortness of breath  Cardiovascular: Negative for chest pain, palpitations and leg swelling  Gastrointestinal: Negative for abdominal pain and blood in stool     Endocrine: Negative for cold intolerance, heat intolerance, polydipsia, polyphagia and polyuria  Genitourinary: Negative for difficulty urinating, dysuria, frequency and urgency  Musculoskeletal: Negative for arthralgias and myalgias  Skin: Negative for rash and wound  Neurological: Negative for dizziness, weakness, light-headedness and headaches  Hematological: Negative for adenopathy  Psychiatric/Behavioral: Negative for confusion, dysphoric mood and sleep disturbance  The patient is not nervous/anxious  Past Medical History:     Past Medical History:   Diagnosis Date   • Abnormal Pap smear of cervix    • Allergic contact dermatitis    • Allergic rhinitis     resolved 01/17/2018   • Aortic valve disorder    • Aortic valve insufficiency    • Asthma    • Benign neoplasm of skin    • Cardiac disease    • Costochondritis    • Hyperinsulinism    • Inguinal lymphadenopathy     resolved 08/27/2015   • Migraine     resolved 08/27/2015   • Nosebleed    • Varicose 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     unspecified laterality        Current Medications:     Current Outpatient Medications:   •  albuterol (Ventolin HFA) 90 mcg/act inhaler, Inhale 2 puffs every 6 (six) hours as needed for wheezing, Disp: 18 g, Rfl: 5  •  aspirin (ECOTRIN LOW STRENGTH) 81 mg EC tablet, Take 81 mg by mouth daily, Disp: , Rfl:   •  metoprolol tartrate (LOPRESSOR) 25 mg tablet, Take 1 tablet (25 mg total) by mouth in the morning and 1 tablet (25 mg total) in the evening , Disp: 180 tablet, Rfl: 3  •  Prenatal 27-1 MG TABS, take 1 tablet by mouth daily, Disp: 90 tablet, Rfl: 1  •  warfarin (COUMADIN) 5 mg tablet, TAKE 1 TO 1 AND 1/2 TABLETS DAILY OR AS DIRECTED, Disp: 45 tablet, Rfl: 0  •  rizatriptan (MAXALT) 10 MG tablet, TAKE 1 TABLET BY MOUTH ONCE AS NEEDED FOR MIGRAINE FOR UP TO 1 DOSE (Patient not taking: No sig reported), Disp: 30 tablet, Rfl: 0     Allergies:      Allergies   Allergen Reactions   • Dog Epithelium Allergy Skin Test Rash     Only certain dogs   • Pollen Extract Other (See Comments)     Test showed allergy but rarely has s/s        Physical Exam:     /72 (BP Location: Left arm, Patient Position: Sitting, Cuff Size: Standard)   Pulse 78   Resp 16   Ht 4' 11 5" (1 511 m)   Wt 48 3 kg (106 lb 6 4 oz)   BMI 21 13 kg/m²     Physical Exam  Constitutional:       Appearance: She is well-developed  HENT:      Head: Normocephalic and atraumatic  Eyes:      Pupils: Pupils are equal, round, and reactive to light  Neck:      Thyroid: No thyromegaly  Cardiovascular:      Rate and Rhythm: Normal rate and regular rhythm  Heart sounds: Murmur heard  Pulmonary:      Effort: Pulmonary effort is normal       Breath sounds: Normal breath sounds  Abdominal:      General: Bowel sounds are normal       Palpations: Abdomen is soft  Musculoskeletal:         General: Normal range of motion  Cervical back: Normal range of motion and neck supple  Lymphadenopathy:      Cervical: No cervical adenopathy  Skin:     General: Skin is warm and dry  Neurological:      Mental Status: She is alert and oriented to person, place, and time  Data:     Laboratory Results: I have personally reviewed the pertinent laboratory results/reports   Radiology/Other Diagnostic Testing Results: I have personally reviewed pertinent reports        [address]

## 2022-11-08 NOTE — PROGRESS NOTES
Pt is here for Depo Provera injection  Her last dose was 1st depo   Her annual exam was on 02/25/21  Urine pregnancy test done: Y   Result: Neg  Depo given in R Buttock  Tolerated well    Lot [id number] Exp 04/2024  Next dose due 02/22   Refill needed none

## 2022-11-08 NOTE — PATIENT INSTRUCTIONS
Medroxyprogesterone (By injection)   Medroxyprogesterone (ki-llzt-dz-proe-ANDRES-ter-one)  Prevents pregnancy  Also treats endometriosis and is used with other medicines to help relieve symptoms of cancer, including uterine or kidney cancer  Brand Name(s): Depo-Provera, Depo-Provera Contraceptive, Depo-SubQ Provera 104, medroxyPROGESTERone acetate Novaplus   There may be other brand names for this medicine  When This Medicine Should Not Be Used: This medicine is not right for everyone  You should not receive it if you had an allergic reaction to medroxyprogesterone or if you have a history of breast cancer or blood clots (including heart attack or stroke)  In most cases, you should not use this medicine while you are pregnant  How to Use This Medicine:   Injectable  A nurse or other health provider will give you this medicine  This medicine is given as a shot into a muscle (usually in the buttocks or upper arm) or just under the skin  Your exact treatment schedule depends on the reason you are using this medicine  You doctor will explain your personal schedule  For treatment of cancer symptoms, you may start with a shot once per week  You may need fewer shots as your treatment goes forward  For birth control or endometriosis, you will need a shot every 3 months (13 weeks)  You might need to have the first shot during the first 5 days of your normal menstrual period, to make sure you are not pregnant  If you have just had a baby, you may receive a shot 5 days after birth if you are not breastfeeding or 6 weeks after birth if you are breastfeeding  Read and follow the patient instructions that come with this medicine  Talk to your doctor or pharmacist if you have any questions  Missed dose: You must receive a shot every 3 months if you want to prevent pregnancy  Talk to your doctor or pharmacist if you do not receive your medicine on time, because you may need another form of birth control    Drugs and Foods to Avoid:   Ask your doctor or pharmacist before using any other medicine, including over-the-counter medicines, vitamins, and herbal products  Some medicines can affect how medroxyprogesterone works  Tell your doctor if you are using any of the following:  Aminoglutethimide, bosentan, carbamazepine, felbamate, griseofulvin, mitotane, modafinil, nefazodone, oxcarbazepine, phenobarbital, phenytoin, rifabutin, rifampin, rifapentine, Jennifer's wort, topiramate  Medicine to treat an infection (including clarithromycin, itraconazole, ketoconazole, telithromycin, voriconazole)  Medicine to treat HIV/AIDS (including atazanavir, efavirenz, indinavir, nelfinavir, ritonavir, saquinavir)  Warnings While Using This Medicine:   Tell your doctor right away if you think you have become pregnant  Tell your doctor if you are breastfeeding, or if you have kidney disease, liver disease, asthma, diabetes, heart disease, seizures, migraine headaches, an eating disorder, osteoporosis, or a history of depression  Tell your doctor if you smoke  This medicine may cause the following problems:  Weak or thin bones, especially with long-term use  Blood clots, which could lead to stroke, heart attack, eye or vision problems, or other serious problems  Possible increased risk of breast cancer  Injection site reactions  Liver problems  Changes in menstrual periods  Fluid retention (edema) and weight gain  You should not use this medicine for long-term birth control unless you cannot use any other form of birth control  This medicine will not protect you from HIV/AIDS or other sexually transmitted diseases  Tell any doctor or dentist who treats you that you are using this medicine  This medicine may affect certain medical test results  Your doctor will do lab tests at regular visits to check on the effects of this medicine  Keep all appointments    Possible Side Effects While Using This Medicine:   Call your doctor right away if you notice any of these side effects: Allergic reaction: Itching or hives, swelling in your face or hands, swelling or tingling in your mouth or throat, chest tightness, trouble breathing  Chest pain, trouble breathing, or coughing up blood  Dark urine or pale stools, nausea, vomiting, loss of appetite, stomach pain, yellow skin or eyes  Heavy or nonstop vaginal bleeding  Loss of vision, double vision  Numbness or weakness on one side of your body, sudden or severe headache, problems with vision, speech, or walking  Rapid weight gain, swelling in your hands, ankles, or feet  Seizures  If you notice these less serious side effects, talk with your doctor:   Light or missed monthly periods, spotting between periods  Nervousness or dizziness  Pain, redness, burning, swelling, or a lump under your skin where the shot was given  If you notice other side effects that you think are caused by this medicine, tell your doctor  Call your doctor for medical advice about side effects  You may report side effects to FDA at 5-082-FDA-4204    © Copyright [employer] 2022 Information is for End User's use only and may not be sold, redistributed or otherwise used for commercial purposes  The above information is an  only  It is not intended as medical advice for individual conditions or treatments  Talk to your doctor, nurse or pharmacist before following any medical regimen to see if it is safe and effective for you

## 2022-11-16 ENCOUNTER — LAB (OUTPATIENT)
Dept: LAB | Facility: HOSPITAL | Age: 47
End: 2022-11-16

## 2022-11-16 ENCOUNTER — ANTICOAG VISIT (OUTPATIENT)
Dept: CARDIOLOGY CLINIC | Facility: CLINIC | Age: 47
End: 2022-11-16

## 2022-11-16 DIAGNOSIS — Z79.01 LONG TERM (CURRENT) USE OF ANTICOAGULANTS: ICD-10-CM

## 2022-11-16 DIAGNOSIS — I35.9 AORTIC VALVE DISORDER: Primary | ICD-10-CM

## 2022-11-16 LAB
INR PPP: 1.89 (ref 0.84–1.19)
PROTHROMBIN TIME: 21.3 SECONDS (ref 11.6–14.5)

## 2022-11-25 ENCOUNTER — APPOINTMENT (OUTPATIENT)
Dept: LAB | Facility: HOSPITAL | Age: 47
End: 2022-11-25

## 2022-11-25 ENCOUNTER — TELEPHONE (OUTPATIENT)
Dept: OBGYN CLINIC | Facility: CLINIC | Age: 47
End: 2022-11-25

## 2022-11-25 ENCOUNTER — ANTICOAG VISIT (OUTPATIENT)
Dept: CARDIOLOGY CLINIC | Facility: CLINIC | Age: 47
End: 2022-11-25

## 2022-11-25 DIAGNOSIS — Z11.3 SCREEN FOR STD (SEXUALLY TRANSMITTED DISEASE): ICD-10-CM

## 2022-11-25 DIAGNOSIS — Z11.3 SCREEN FOR STD (SEXUALLY TRANSMITTED DISEASE): Primary | ICD-10-CM

## 2022-11-25 DIAGNOSIS — Z79.01 LONG TERM (CURRENT) USE OF ANTICOAGULANTS: ICD-10-CM

## 2022-11-25 DIAGNOSIS — I35.9 AORTIC VALVE DISORDER: Primary | ICD-10-CM

## 2022-11-25 LAB
HBV SURFACE AG SER QL: NORMAL
HCV AB SER QL: NORMAL
INR PPP: 2.07 (ref 0.84–1.19)
PROTHROMBIN TIME: 22.8 SECONDS (ref 11.6–14.5)

## 2022-11-25 NOTE — TELEPHONE ENCOUNTER
----- Message from Devorah Salcido sent at 11/25/2022  9:45 AM EST -----  Regarding: lab request  Contact: [phone]  A tiny bit  Not a lot  As far as me  Not even sure  He said something’s off  Whatever we can test on would be great    I just want to find our first   So I’m prepared for the backlash

## 2022-11-26 LAB
C TRACH DNA SPEC QL NAA+PROBE: NEGATIVE
N GONORRHOEA DNA SPEC QL NAA+PROBE: NEGATIVE
RPR SER QL: NORMAL

## 2022-11-28 LAB — HIV 1+2 AB+HIV1 P24 AG SERPL QL IA: NORMAL

## 2022-11-29 DIAGNOSIS — I10 HYPERTENSION, ESSENTIAL: ICD-10-CM

## 2022-11-29 NOTE — TELEPHONE ENCOUNTER
MG confirmed  80 Day  [facility]    Pt is also asking for Amoxicillin b/c she has a dentist appt on 12/03/22

## 2022-12-02 ENCOUNTER — OFFICE VISIT (OUTPATIENT)
Dept: OBGYN CLINIC | Facility: CLINIC | Age: 47
End: 2022-12-02

## 2022-12-02 ENCOUNTER — ANTICOAG VISIT (OUTPATIENT)
Dept: CARDIOLOGY CLINIC | Facility: CLINIC | Age: 47
End: 2022-12-02

## 2022-12-02 ENCOUNTER — LAB (OUTPATIENT)
Dept: LAB | Facility: HOSPITAL | Age: 47
End: 2022-12-02

## 2022-12-02 VITALS
SYSTOLIC BLOOD PRESSURE: 132 MMHG | BODY MASS INDEX: 20.22 KG/M2 | DIASTOLIC BLOOD PRESSURE: 84 MMHG | HEIGHT: 60 IN | WEIGHT: 103 LBS

## 2022-12-02 DIAGNOSIS — Z79.01 LONG TERM (CURRENT) USE OF ANTICOAGULANTS: ICD-10-CM

## 2022-12-02 DIAGNOSIS — Z11.3 SCREENING FOR STD (SEXUALLY TRANSMITTED DISEASE): Primary | ICD-10-CM

## 2022-12-02 DIAGNOSIS — I35.9 AORTIC VALVE DISORDER: ICD-10-CM

## 2022-12-02 DIAGNOSIS — Z13.6 SCREENING FOR CARDIOVASCULAR CONDITION: ICD-10-CM

## 2022-12-02 DIAGNOSIS — Z79.01 ANTICOAGULATED: ICD-10-CM

## 2022-12-02 DIAGNOSIS — Z13.29 SCREENING FOR THYROID DISORDER: ICD-10-CM

## 2022-12-02 DIAGNOSIS — I35.9 AORTIC VALVE DISORDER: Primary | ICD-10-CM

## 2022-12-02 LAB
ALBUMIN SERPL BCP-MCNC: 3.9 G/DL (ref 3.5–5)
ALP SERPL-CCNC: 45 U/L (ref 46–116)
ALT SERPL W P-5'-P-CCNC: 31 U/L (ref 12–78)
ANION GAP SERPL CALCULATED.3IONS-SCNC: 4 MMOL/L (ref 4–13)
AST SERPL W P-5'-P-CCNC: 24 U/L (ref 5–45)
BASOPHILS # BLD AUTO: 0.05 THOUSANDS/ÂΜL (ref 0–0.1)
BASOPHILS NFR BLD AUTO: 1 % (ref 0–1)
BILIRUB SERPL-MCNC: 0.96 MG/DL (ref 0.2–1)
BUN SERPL-MCNC: 17 MG/DL (ref 5–25)
CALCIUM SERPL-MCNC: 9.1 MG/DL (ref 8.3–10.1)
CHLORIDE SERPL-SCNC: 109 MMOL/L (ref 96–108)
CHOLEST SERPL-MCNC: 130 MG/DL
CO2 SERPL-SCNC: 26 MMOL/L (ref 21–32)
CREAT SERPL-MCNC: 0.75 MG/DL (ref 0.6–1.3)
EOSINOPHIL # BLD AUTO: 0.06 THOUSAND/ÂΜL (ref 0–0.61)
EOSINOPHIL NFR BLD AUTO: 1 % (ref 0–6)
ERYTHROCYTE [DISTWIDTH] IN BLOOD BY AUTOMATED COUNT: 11.8 % (ref 11.6–15.1)
GFR SERPL CREATININE-BSD FRML MDRD: 93 ML/MIN/1.73SQ M
GLUCOSE SERPL-MCNC: 87 MG/DL (ref 65–140)
HCT VFR BLD AUTO: 40.3 % (ref 34.8–46.1)
HDLC SERPL-MCNC: 68 MG/DL
HGB BLD-MCNC: 13.4 G/DL (ref 11.5–15.4)
IMM GRANULOCYTES # BLD AUTO: 0.01 THOUSAND/UL (ref 0–0.2)
IMM GRANULOCYTES NFR BLD AUTO: 0 % (ref 0–2)
INR PPP: 2.67 (ref 0.84–1.19)
LDLC SERPL CALC-MCNC: 46 MG/DL (ref 0–100)
LYMPHOCYTES # BLD AUTO: 1.5 THOUSANDS/ÂΜL (ref 0.6–4.47)
LYMPHOCYTES NFR BLD AUTO: 29 % (ref 14–44)
MCH RBC QN AUTO: 31.2 PG (ref 26.8–34.3)
MCHC RBC AUTO-ENTMCNC: 33.3 G/DL (ref 31.4–37.4)
MCV RBC AUTO: 94 FL (ref 82–98)
MONOCYTES # BLD AUTO: 0.53 THOUSAND/ÂΜL (ref 0.17–1.22)
MONOCYTES NFR BLD AUTO: 10 % (ref 4–12)
NEUTROPHILS # BLD AUTO: 3.02 THOUSANDS/ÂΜL (ref 1.85–7.62)
NEUTS SEG NFR BLD AUTO: 59 % (ref 43–75)
NONHDLC SERPL-MCNC: 62 MG/DL
NRBC BLD AUTO-RTO: 0 /100 WBCS
PLATELET # BLD AUTO: 268 THOUSANDS/UL (ref 149–390)
PMV BLD AUTO: 9.6 FL (ref 8.9–12.7)
POTASSIUM SERPL-SCNC: 3.7 MMOL/L (ref 3.5–5.3)
PROT SERPL-MCNC: 7.7 G/DL (ref 6.4–8.4)
PROTHROMBIN TIME: 27.8 SECONDS (ref 11.6–14.5)
RBC # BLD AUTO: 4.3 MILLION/UL (ref 3.81–5.12)
SODIUM SERPL-SCNC: 139 MMOL/L (ref 135–147)
TRIGL SERPL-MCNC: 78 MG/DL
TSH SERPL DL<=0.05 MIU/L-ACNC: 0.71 UIU/ML (ref 0.45–4.5)
WBC # BLD AUTO: 5.17 THOUSAND/UL (ref 4.31–10.16)

## 2022-12-02 NOTE — PROGRESS NOTES
Assessment/Plan:    No problem-specific Assessment & Plan notes found for this encounter     -had lab work done 11/25 - STD screening negative  Not sexually active since testing done    -advised condom use with all sexual activity     Diagnoses and all orders for this visit:    Screening for STD (sexually transmitted disease)          Subjective:      Patient ID: Lazaro Levine is a 50 y o  female here for STD checks  She was sexually active with a new partner in early November  She states a few weeks after the encounter he told her she should get checked for STDs  She called for lab work to be done  She states his STD panel was negative  She denies any symptoms  Currently on depo-provera injections  Denies fevers, chills, abdominal/pelvic pain, dyspareunia, vaginal pain, discharge, itching, odor, or irritation  Denies urinary symptoms  The following portions of the patient's history were reviewed and updated as appropriate:   She  has a past medical history of Abnormal Pap smear of cervix, Allergic contact dermatitis, Allergic rhinitis, Aortic valve disorder, Aortic valve insufficiency, Asthma, Benign neoplasm of skin, Cardiac disease, Costochondritis, Hyperinsulinism, Inguinal lymphadenopathy, Migraine, Nosebleed, and Varicose 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    She   Patient Active Problem List    Diagnosis Date Noted   • Left non-suppurative otitis media 05/11/2021   • Vulvar cyst 06/11/2020   • History of mechanical aortic valve replacement 12/25/2019   • Nasal obstruction 07/16/2019   • Nasal septal deviation 07/16/2019   • Nasal turbinate hypertrophy 07/16/2019   • Recurrent epistaxis 07/16/2019   • Surveillance for Depo-Provera contraception 02/14/2019   • Capsular contracture of breast implant 11/27/2018   • Varicose veins of lower extremity with pain, bilateral 07/10/2018   • Aortic valve disorder 02/09/2018   • Anticoagulated 02/09/2018     She  has a past surgical history that includes Aortic valve replacement; AUGMENTATION BREAST; Cervical biopsy w/ loop electrode excision; Cardiac valve replacement (2011); Rhinoplasty; Induced ; Colposcopy; Septoplasty; and Nasal/sinus endoscopy (Left, 2019)  Her family history includes Asthma in her father; Breast cancer in her maternal grandmother; Coronary artery disease in her father; Diabetes in her maternal grandfather; Hypertension in her father; No Known Problems in her daughter, daughter, mother, paternal grandmother, sister, sister, sister, son, son, and son; Other in her paternal grandfather  She  reports that she has never smoked  She has never used smokeless tobacco  She reports current alcohol use  She reports that she does not use drugs  Current Outpatient Medications   Medication Sig Dispense Refill   • albuterol (Ventolin HFA) 90 mcg/act inhaler Inhale 2 puffs every 6 (six) hours as needed for wheezing 18 g 5   • aspirin (ECOTRIN LOW STRENGTH) 81 mg EC tablet Take 81 mg by mouth daily     • metoprolol tartrate (LOPRESSOR) 25 mg tablet Take 1 tablet (25 mg total) by mouth 2 (two) times a day 180 tablet 3   • Prenatal 27-1 MG TABS take 1 tablet by mouth daily 90 tablet 1   • rizatriptan (MAXALT) 10 MG tablet TAKE 1 TABLET BY MOUTH ONCE AS NEEDED FOR MIGRAINE FOR UP TO 1 DOSE 30 tablet 0   • warfarin (COUMADIN) 5 mg tablet Take 5mg, 5mg then 7 5 45 tablet 5     No current facility-administered medications for this visit  She is allergic to dog epithelium allergy skin test and pollen extract       Review of Systems   Constitutional: Negative for chills and fever  Respiratory: Negative for shortness of breath  Cardiovascular: Negative for chest pain  Gastrointestinal: Negative for abdominal pain, constipation, diarrhea, nausea and vomiting  Genitourinary: Negative for dyspareunia, pelvic pain, vaginal bleeding, vaginal discharge and vaginal pain  Musculoskeletal: Negative for back pain and myalgias     Skin: Negative for pallor and rash  Neurological: Negative for dizziness, light-headedness and headaches  Hematological: Negative for adenopathy  Psychiatric/Behavioral: Negative for dysphoric mood  Objective:      /84 (BP Location: Left arm, Patient Position: Sitting, Cuff Size: Standard)   Ht 4' 11 75" (1 518 m)   Wt 46 7 kg (103 lb)   BMI 20 28 kg/m²          Physical Exam  Vitals and nursing note reviewed  Constitutional:       General: She is not in acute distress  Appearance: Normal appearance  She is not ill-appearing  HENT:      Head: Normocephalic and atraumatic  Eyes:      Conjunctiva/sclera: Conjunctivae normal    Pulmonary:      Effort: Pulmonary effort is normal    Abdominal:      Palpations: Abdomen is soft  Tenderness: There is no abdominal tenderness  Hernia: There is no hernia in the left inguinal area or right inguinal area  Genitourinary:     General: Normal vulva  Exam position: Lithotomy position  Labia:         Right: No rash, tenderness, lesion or injury  Left: No rash, tenderness, lesion or injury  Urethra: No prolapse, urethral pain, urethral swelling or urethral lesion  Vagina: No signs of injury and foreign body  No vaginal discharge, erythema, tenderness, bleeding, lesions or prolapsed vaginal walls  Cervix: No cervical motion tenderness, discharge, friability, lesion, erythema, cervical bleeding or eversion  Uterus: Normal  Not deviated, not enlarged, not fixed, not tender and no uterine prolapse  Adnexa:         Right: No mass, tenderness or fullness  Left: No mass, tenderness or fullness  Musculoskeletal:         General: Normal range of motion  Cervical back: Neck supple  Lymphadenopathy:      Lower Body: No right inguinal adenopathy  No left inguinal adenopathy  Skin:     General: Skin is warm and dry  Neurological:      General: No focal deficit present        Mental Status: She is alert    Psychiatric:         Mood and Affect: Mood normal          Behavior: Behavior normal

## 2022-12-02 NOTE — PATIENT INSTRUCTIONS
Sexually Transmitted Diseases   WHAT YOU NEED TO KNOW:   What is a sexually transmitted disease (STD)? An STD means signs or symptoms of a sexually transmitted infection (STI) have developed  An STI happens when bacteria or a virus are spread through oral, genital, or anal sex  Some examples of STDs are HIV, chlamydia, syphilis, and gonorrhea  What are the signs and symptoms of an STD? You may have one or more of the following, depending on the STD:  Blisters, warts, sores, or a rash on your skin that may be painful    Discharge from the penis, vagina, or anus that may have a foul smell    Fever, muscle pain, or swollen lymph nodes in the groin    Inflammation and itching    Pelvic or abdominal pain, or pain during sex or when urinating    Sore throat, mouth ulcers, or trouble swallowing    Vaginal bleeding or spotting after sex (women)    What increases my risk for an STD? Unprotected sex    Being female    Alcohol or illegal drug use    More than 1 sex partner    Not being vaccinated against certain STIs    A weak immune system    Open sores or cuts, such as body piercings    How is an STD diagnosed? Your healthcare provider will examine you and ask about your symptoms  He or she may ask you about your sex history or other medical conditions  He or she will ask if you have had an STD before  Blood and urine tests  may show an infection and what kind it is  A sample of discharge  may show what is causing your STD  A pelvic exam  may be used if you are a woman  A pelvic exam is used to check your vagina, cervix, and other organs  How is an STD treated? Treatment depends on the STD you have  Antibiotics may be given for a bacterial infection  Antivirals may be given for a viral infection  Antifungals may be given for a fungal infection, such as a yeast infection  Early treatment may decrease the risk for certain cancers  Early treatment can also help prevent infertility    How can I help prevent the spread of an STI? Ask your healthcare provider for more information about the following safe sex practices:  Use a male or female condom during sex  This includes oral, genital, or anal sex  Use a new condom each time  Condoms help prevent pregnancy and STIs  Use latex condoms, if possible  Lambskin (also called sheepskin or natural membrane) condoms do not protect against STIs  A polyurethane condom can be used if you or your partner is allergic to latex  Condoms should be used with a second form of birth control to help prevent pregnancy and STIs  Do not use male and female condoms together  Do not have sex with someone who has an STI or STD  This includes oral and anal sex  Limit sex partners  Ask about your partner's sex history before you have sex  Get screened regularly if you are sexually active  Common tests include chlamydia, gonorrhea, HIV, hepatitis, and syphilis  Tell your sex partners if you have an STI  Your partners may need to be tested and treated  Do not have sex while you are being treated for an STI  Do not have sex with a partner who is being treated  Ask about medicines to lower your risk for some STIs:      Vaccines  can help protect you from hepatitis A, hepatitis B, and the human papillomavirus (HPV)  The HPV vaccine is usually given at 11 years, but it may be given through 26 years to both females and males  Your provider can give you more information on vaccines to prevent STIs  Pre-exposure prophylaxis (PrEP)  may be given if you are at high risk for HIV  PrEP is taken every day to prevent the virus from fully infecting the body  If you are a woman, do not douche  Douching upsets the normal balance of bacteria found in your vagina  It does not prevent or clear up vaginal infections  When should I seek immediate care? You have genital swelling or pain, or unusual bleeding  You have joint pain, a rash, swollen lymph nodes, or night sweats      You have severe abdominal pain  When should I call my doctor? You have a fever  Your symptoms do not go away or get worse, even after treatment  You have bleeding or pain during sex  You or your female partner may be pregnant  You have questions or concerns about your condition or care  CARE AGREEMENT:   You have the right to help plan your care  Learn about your health condition and how it may be treated  Discuss treatment options with your healthcare providers to decide what care you want to receive  You always have the right to refuse treatment  The above information is an  only  It is not intended as medical advice for individual conditions or treatments  Talk to your doctor, nurse or pharmacist before following any medical regimen to see if it is safe and effective for you  © Copyright [employer] 2022 Information is for End User's use only and may not be sold, redistributed or otherwise used for commercial purposes   All illustrations and images included in CareNotes® are the copyrighted property of [employer]  or [address] [place] [address]

## 2022-12-07 ENCOUNTER — HOSPITAL ENCOUNTER (EMERGENCY)
Facility: HOSPITAL | Age: 47
Discharge: HOME | End: 2022-12-08
Attending: EMERGENCY MEDICINE

## 2022-12-07 DIAGNOSIS — M79.661 PAIN IN RIGHT LOWER LEG: Primary | ICD-10-CM

## 2022-12-07 DIAGNOSIS — R20.2 PARESTHESIAS: ICD-10-CM

## 2022-12-08 VITALS
HEART RATE: 70 BPM | RESPIRATION RATE: 18 BRPM | OXYGEN SATURATION: 98 % | TEMPERATURE: 98 F | SYSTOLIC BLOOD PRESSURE: 138 MMHG | DIASTOLIC BLOOD PRESSURE: 68 MMHG

## 2022-12-08 NOTE — ED NOTES
Provider requested a doppler search for pedal pulse and BP on the right arm and R leg  Results as follows:    Patient right pedal pulse found with doppler      Patient Right arm: /82 HR 70    Patient Right Leg: /81 HR 66 Judith Ow[address]  12/08/22 1630

## 2022-12-08 NOTE — ED PROVIDER NOTES
History  Chief Complaint   Patient presents with   • Numbness     Pt arrives c/o R leg numbness being behind the knee radiating down to toes  Pt states this has been going on for months  Pt on warfarin due to having heart surgery  States it is uncomfortable to walk  20-year-old female presents with right lower leg pain that has been ongoing for several months along with what she describes as numbness of her fourth and fifth digits of the right foot  Patient denies any sensory loss but states she feels as though "a string is tied around" her toes when asked to describe the sensation  Patient states the sensation has been present for at least several weeks  Patient denies any acute change in them though she has difficulty following up with her primary care due to her job as a [employer]  and the time of the year as they are quite busy  Patient states that sitting for long periods worsens the pain and she denies any alleviating factors  Notably patient has a history of an aortic valve replacement for which she takes warfarin daily  Patient affirms compliance with the medication  Patient denies any back pain  Patient denies any loss of bowel or bladder  Patient denies any sensory loss or any focal weakness  Patient also notes a history of a right knee fracture in February for which she saw orthopedics  Patient denies any recent trauma or any clear inciting event immediately prior to the onset of the symptoms  Patient states she was supposed to be immobile for an extended period of time but due to her job was unable to however she denies any knee pain  Patient does note that the pain is limited to the posterior aspect of the lower leg, radiating into the fourth and fifth digits      Impression and plan: Right lower leg pain with a broad differential   Based on patient's history and physical, concerns this could be secondary to neuropraxia from patient's prior surgery and associated continued excessive use as she is a , using that leg for extended periods  Patient states that she has difficulty reaching the gas pedal due to her size  The patient leg is without any signs of trauma, compartment soft with no signs of compartment syndrome and patient denies any trauma  While patient is anticoagulated, no signs of hematoma as a source of patient's symptoms  Could represent atypical lumbar pathology but this seems less likely based on history and distribution  Fortunately patient denies any signs or symptoms of cauda equina  No signs or symptoms that this could be consistent with a central process and patient would be well outside the window for any intervention  Patient concern for potential venous thrombosis though this seems less likely based on history and patient is anticoagulated, affirming compliance  Unfortunately duplex is unavailable overnight but I will provide patient with a prescription and instructions on how to obtain the study  Patient's limb appears well-perfused with no signs of ischemia  Patient has sensory in all dermatomes of the leg and has normal motor function  Patient has sensory over the fourth and fifth digits though she states it does feel unusual as noted  Patient has a dopplerable pulse, likely physiologic at baseline for her  KIKO obtained which was normal at 0 97  I discussed diagnostic uncertainty with the patient and the need for continued follow-up  Will refer patient to primary care for continued management and evaluation of her ongoing symptoms, possible referral to physical therapy  I discussed symptomatic management with the patient  Discussed and emphasized return precautions  History provided by:  Patient  Leg Pain  Location:  Leg  Leg location:  R leg  Pain details:     Quality:  Aching    Radiates to: Right toes      Severity:  Mild    Onset quality:  Gradual    Timing:  Constant    Progression:  Waxing and waning  Associated symptoms: numbness    Associated symptoms: no back pain, no decreased ROM, no fatigue, no fever, no itching, no muscle weakness, no neck pain, no stiffness, no swelling and no tingling    Risk factors: no concern for non-accidental trauma        Prior to Admission Medications   Prescriptions Last Dose Informant Patient Reported? Taking?    Prenatal 27-1 MG TABS   No No   Sig: take 1 tablet by mouth daily   albuterol (Ventolin HFA) 90 mcg/act inhaler   No No   Sig: Inhale 2 puffs every 6 (six) hours as needed for wheezing   aspirin (ECOTRIN LOW STRENGTH) 81 mg EC tablet   Yes No   Sig: Take 81 mg by mouth daily   metoprolol tartrate (LOPRESSOR) 25 mg tablet   No No   Sig: Take 1 tablet (25 mg total) by mouth 2 (two) times a day   rizatriptan (MAXALT) 10 MG tablet   No No   Sig: TAKE 1 TABLET BY MOUTH ONCE AS NEEDED FOR MIGRAINE FOR UP TO 1 DOSE   warfarin (COUMADIN) 5 mg tablet   No No   Sig: Take 5mg, 5mg then 7 5      Facility-Administered Medications: None       Past Medical History:   Diagnosis Date   • Abnormal Pap smear of cervix    • Allergic contact dermatitis    • Allergic rhinitis     resolved 01/17/2018   • Aortic valve disorder    • Aortic valve insufficiency    • Asthma    • Benign neoplasm of skin    • Cardiac disease    • Costochondritis    • Hyperinsulinism    • Inguinal lymphadenopathy     resolved 08/27/2015   • Migraine     resolved 08/27/2015   • Nosebleed    • Varicose 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     unspecified laterality       Past Surgical History:   Procedure Laterality Date   • AORTIC VALVE REPLACEMENT      complications 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, replaced with mechanical aortic valve  and root replacement at [facility] dr sawyer   • AUGMENTATION BREAST      enlargement   • CARDIAC VALVE REPLACEMENT  03/2011    bovine, with redo and mechanical valve replacement and root 2012 dr sawyer at [address] [facility] last assessed 08/15/2016   • CERVICAL BIOPSY  W/ LOOP ELECTRODE EXCISION     • COLPOSCOPY     • INDUCED       surgically by dilation and evacuation   • NASAL/SINUS ENDOSCOPY Left 2019    Procedure: ENDOSCOPIC CONTROL OF EPISTAXIS (LEFT); Surgeon: Cristal Chino MD;  Location: [facility];  Service: ENT   • RHINOPLASTY     • SEPTOPLASTY         Family History   Problem Relation Age of Onset   • Asthma Father    • Coronary artery disease Father    • Hypertension Father    • No Known Problems Sister    • Breast cancer Maternal Grandmother    • No Known Problems Mother    • No Known Problems Daughter    • No Known Problems Son    • Diabetes Maternal Grandfather    • No Known Problems Paternal Grandmother    • Other Paternal Grandfather         Susanne Fever   • No Known Problems Sister    • No Known Problems Sister    • No Known Problems Daughter    • No Known Problems Son    • No Known Problems Son      I have reviewed and agree with the history as documented  E-Cigarette/Vaping   • E-Cigarette Use Never User      E-Cigarette/Vaping Substances   • Nicotine No    • THC No    • CBD No    • Flavoring No    • Other No    • Unknown No      Social History     Tobacco Use   • Smoking status: Never   • Smokeless tobacco: Never   Vaping Use   • Vaping Use: Never used   Substance Use Topics   • Alcohol use: Yes     Comment: drinks hard liquor, social per allscripts   • Drug use: Never       Review of Systems   Constitutional: Negative for fatigue and fever  Musculoskeletal: Negative for back pain, neck pain and stiffness  Skin: Negative for itching  All other systems reviewed and are negative  Physical Exam  Physical Exam  Vitals reviewed  HENT:      Head: Atraumatic  Eyes:      Pupils: Pupils are equal, round, and reactive to light  Cardiovascular:      Rate and Rhythm: Normal rate  Pulmonary:      Effort: Pulmonary effort is normal    Abdominal:      General: There is no distension     Musculoskeletal:         General: No swelling, tenderness, deformity or signs of injury  Cervical back: Neck supple  Right lower leg: No edema  Left lower leg: No edema  Comments: Normal inspection with no signs of trauma  No hematoma, abrasions, or lacerations  No erythema or clinical signs of cellulitis  Right and left limbs are symmetric with no swelling  No clinical signs of DVT  No discrete tenderness to palpation  The compartments themselves are soft with no signs of compartment syndrome  There is a dopplerable pulse  The foot itself appears well perfused with normal capillary refill  Sensory is intact in all dermatomes of the foot and the lower leg  Normal motor including dorsiflexion and plantarflexion of the great toe  Skin:     General: Skin is warm and dry  Neurological:      General: No focal deficit present  Mental Status: She is alert     Psychiatric:         Mood and Affect: Mood normal          Vital Signs  ED Triage Vitals   Temperature Pulse Respirations Blood Pressure SpO2   12/07/22 2341 12/07/22 2339 12/07/22 2339 12/07/22 2339 12/07/22 2339   98 4 °F (36 9 °C) 73 18 158/71 98 %      Temp Source Heart Rate Source Patient Position - Orthostatic VS BP Location FiO2 (%)   12/07/22 2341 12/07/22 2339 12/07/22 2339 12/07/22 2339 --   Oral Monitor Sitting Left arm       Pain Score       12/07/22 2339       8           Vitals:    12/07/22 2339 12/08/22 0005   BP: 158/71 146/70   Pulse: 73 70   Patient Position - Orthostatic VS: Sitting Sitting         Visual Acuity  Visual Acuity    Flowsheet Row Most Recent Value   L Pupil Size (mm) 4   R Pupil Size (mm) 4          ED Medications  Medications - No data to display    Diagnostic Studies  Results Reviewed     None                 VAS lower limb venous duplex study, unilateral/limited    (Results Pending)              Procedures  Procedures         ED Course                               SBIRT 20yo+    Flowsheet Row Most Recent Value   SBIRT (23 yo +)    In order to provide better care to our patients, we are screening all of our patients for alcohol and drug use  Would it be okay to ask you these screening questions? Yes Filed at: 12/08/2022 0004   Initial Alcohol Screen: US AUDIT-C     1  How often do you have a drink containing alcohol? 1 Filed at: 12/08/2022 0004   2  How many drinks containing alcohol do you have on a typical day you are drinking? 1 Filed at: 12/08/2022 0004   3b  FEMALE Any Age, or MALE 65+: How often do you have 4 or more drinks on one occassion? 0 Filed at: 12/08/2022 0004   Audit-C Score 2 Filed at: 12/08/2022 0004   ARACELI: How many times in the past year have you    Used an illegal drug or used a prescription medication for non-medical reasons? Never Filed at: 12/08/2022 0004                    MDM    Disposition  Final diagnoses:   Pain in right lower leg   Paresthesias     Time reflects when diagnosis was documented in both MDM as applicable and the Disposition within this note     Time User Action Codes Description Comment    12/8/2022 12:37 AM Simona Navas [Z22 798] Pain in right lower leg     12/8/2022 12:37 AM Simona Navas [R20 2] Paresthesias       ED Disposition     ED Disposition   Discharge    Condition   Stable    Date/Time   Thu Dec 8, 2022 12:37 AM    Comment   Osman Guallpa discharge to home/self care  Follow-up Information     Follow up With Specialties Details Why Contact Info Additional [address] Lauri Camarillo MD Internal Medicine, Hospice Services, Palliative Care Schedule an appointment as soon as possible for a visit in 3 days Follow-up and reassessment   [address] [place] [address],  [place] [address] Emergency Department Emergency Medicine Go to  If symptoms worsen Asif Krystyna [address] [facility] [id number] [facility] Emergency Department, [address], [place], [address] Patient's Medications   Discharge Prescriptions    No medications on file       Outpatient Discharge Orders   VAS lower limb venous duplex study, unilateral/limited   Standing Status: Future Standing Exp   Date: 12/08/26       PDMP Review       Value Time User    PDMP Reviewed  Yes 12/24/2019  3:44 PM Komal Calderon MD          ED Provider  Electronically Signed by           Carin Jha MD  12/08/22 0215

## 2022-12-16 ENCOUNTER — LAB (OUTPATIENT)
Dept: LAB | Facility: HOSPITAL | Age: 47
End: 2022-12-16

## 2022-12-16 ENCOUNTER — ANTICOAG VISIT (OUTPATIENT)
Dept: CARDIOLOGY CLINIC | Facility: CLINIC | Age: 47
End: 2022-12-16

## 2022-12-16 DIAGNOSIS — I35.9 AORTIC VALVE DISORDER: Primary | ICD-10-CM

## 2022-12-16 DIAGNOSIS — Z79.01 LONG TERM (CURRENT) USE OF ANTICOAGULANTS: ICD-10-CM

## 2022-12-16 LAB
INR PPP: 2.69 (ref 0.84–1.19)
PROTHROMBIN TIME: 28 SECONDS (ref 11.6–14.5)

## 2022-12-26 DIAGNOSIS — I35.9 AORTIC VALVE DISORDER: ICD-10-CM

## 2022-12-26 RX ORDER — WARFARIN SODIUM 5 MG/1
TABLET ORAL
Qty: 45 TABLET | Refills: 5 | Status: SHIPPED | OUTPATIENT
Start: 2022-12-26

## 2022-12-27 ENCOUNTER — LAB (OUTPATIENT)
Dept: LAB | Facility: HOSPITAL | Age: 47
End: 2022-12-27

## 2022-12-27 ENCOUNTER — ANTICOAG VISIT (OUTPATIENT)
Dept: CARDIOLOGY CLINIC | Facility: CLINIC | Age: 47
End: 2022-12-27

## 2022-12-27 DIAGNOSIS — I35.9 AORTIC VALVE DISORDER: Primary | ICD-10-CM

## 2022-12-27 DIAGNOSIS — Z79.01 LONG TERM (CURRENT) USE OF ANTICOAGULANTS: ICD-10-CM

## 2022-12-27 LAB
INR PPP: 2.3 (ref 0.84–1.19)
PROTHROMBIN TIME: 24.8 SECONDS (ref 11.6–14.5)

## 2022-12-28 ENCOUNTER — ANTICOAG VISIT (OUTPATIENT)
Dept: CARDIOLOGY CLINIC | Facility: CLINIC | Age: 47
End: 2022-12-28

## 2022-12-28 DIAGNOSIS — I35.9 AORTIC VALVE DISORDER: Primary | ICD-10-CM

## 2022-12-28 LAB — INR PPP: 2.3 (ref 0.84–1.19)

## 2023-01-17 ENCOUNTER — ANTICOAG VISIT (OUTPATIENT)
Dept: CARDIOLOGY CLINIC | Facility: CLINIC | Age: 48
End: 2023-01-17

## 2023-01-17 ENCOUNTER — LAB (OUTPATIENT)
Dept: LAB | Facility: HOSPITAL | Age: 48
End: 2023-01-17

## 2023-01-17 DIAGNOSIS — I35.9 AORTIC VALVE DISORDER: Primary | ICD-10-CM

## 2023-01-17 DIAGNOSIS — Z79.01 LONG TERM (CURRENT) USE OF ANTICOAGULANTS: ICD-10-CM

## 2023-01-17 LAB
INR PPP: 2.73 (ref 0.84–1.19)
PROTHROMBIN TIME: 28.3 SECONDS (ref 11.6–14.5)

## 2023-01-17 NOTE — RESULT ENCOUNTER NOTE
S/w pt  Advised sd retest in 1 month  Pt states that she only tested early was because she woke up with blood in her mouth and lips were dry  She is a mouth breather when she sleeps  I explained that may be the reason for the dry cracked lips/mouth  Advised to drink water throughout the night and try applying chapstick before beds  She also c/o her gums bleeding  I told her she can take 1/2 her dose tonight and go back on her reg dose thereafter   I don't want to lower her weekly regimen any more because her actual goal is 2 5-3 5 but we lowered it to 2-3 due to frequent nose bleeds in the past

## 2023-01-24 ENCOUNTER — ANNUAL EXAM (OUTPATIENT)
Dept: OBGYN CLINIC | Age: 48
End: 2023-01-24

## 2023-01-24 VITALS
WEIGHT: 111 LBS | HEIGHT: 59 IN | DIASTOLIC BLOOD PRESSURE: 80 MMHG | BODY MASS INDEX: 22.38 KG/M2 | SYSTOLIC BLOOD PRESSURE: 124 MMHG

## 2023-01-24 DIAGNOSIS — Z12.31 ENCOUNTER FOR SCREENING MAMMOGRAM FOR MALIGNANT NEOPLASM OF BREAST: ICD-10-CM

## 2023-01-24 DIAGNOSIS — Z01.419 ENCOUNTER FOR GYNECOLOGICAL EXAMINATION (GENERAL) (ROUTINE) WITHOUT ABNORMAL FINDINGS: Primary | ICD-10-CM

## 2023-01-24 DIAGNOSIS — N92.6 IRREGULAR MENSTRUAL BLEEDING: ICD-10-CM

## 2023-01-24 DIAGNOSIS — Z30.42 SURVEILLANCE FOR DEPO-PROVERA CONTRACEPTION: ICD-10-CM

## 2023-01-24 RX ORDER — MEDROXYPROGESTERONE ACETATE 150 MG/ML
150 INJECTION, SUSPENSION INTRAMUSCULAR
Qty: 1 ML | Refills: 3 | Status: SHIPPED | OUTPATIENT
Start: 2023-01-24

## 2023-01-24 NOTE — PATIENT INSTRUCTIONS
Breast Self Exam for Women   AMBULATORY CARE:   A breast self-exam (BSE)  is a way to check your breasts for lumps and other changes  Regular BSEs can help you know how your breasts normally look and feel  Most breast lumps or changes are not cancer, but you should always have them checked by a healthcare provider  Why you should do a BSE:  Breast cancer is the most common type of cancer in women  Even if you have mammograms, you may still want to do a BSE regularly  If you know how your breasts normally feel and look, it may help you know when to contact your healthcare provider  Mammograms can miss some cancers  You may find a lump during a BSE that did not show up on a mammogram   When you should do a BSE:  If you have periods, you may want to do your BSE 1 week after your period ends  This is the time when your breasts may be the least swollen, lumpy, or tender  You can do regular BSEs even if you are breastfeeding or have breast implants  Call your doctor if:   You find any lumps or changes in your breasts  You have breast pain or fluid coming from your nipples  You have questions or concerns about your condition or care  How to do a BSE:       Look at your breasts in a mirror  Look at the size and shape of each breast and nipple  Check for swelling, lumps, dimpling, scaly skin, or other skin changes  Look for nipple changes, such as a nipple that is painful or beginning to pull inward  Gently squeeze both nipples and check to see if fluid (that is not breast milk) comes out of them  If you find any of these or other breast changes, contact your healthcare provider  Check your breasts while you sit or  the following 3 positions:    [place] your arms down at your sides  Raise your hands and join them behind your head  Put firm pressure with your hands on your hips  Bend slightly forward while you look at your breasts in the mirror  Lie down and feel your breasts    When you lie down, your breast tissue spreads out evenly over your chest  This makes it easier for you to feel for lumps and anything that may not be normal for your breasts  Do a BSE on one breast at a time  Place a small pillow or towel under your left shoulder  Put your left arm behind your head  Use the 3 middle fingers of your right hand  Use your fingertip pads, on the top of your fingers  Your fingertip pad is the most sensitive part of your finger  Use small circles to feel your breast tissue  Use your fingertip pads to make dime-sized, overlapping circles on your breast and armpits  Use light, medium, and firm pressure  First, press lightly  Second, press with medium pressure to feel a little deeper into the breast  Last, use firm pressure to feel deep within your breast     Examine your entire breast area  Examine the breast area from above the breast to below the breast where you feel only ribs  Make small circles with your fingertips, starting in the middle of your armpit  Make circles going up and down the breast area  Continue toward your breast and all the way across it  Examine the area from your armpit all the way over to the middle of your chest (breastbone)  Stop at the middle of your chest     Move the pillow or towel to your right shoulder, and put your right arm behind your head  Use the 3 fingertip pads of your left hand, and repeat the above steps to do a BSE on your right breast     What else you can do to check for breast problems or cancer:  Talk to your healthcare provider about mammograms  A mammogram is an x-ray of your breasts to screen for breast cancer or other problems  Your provider can tell you the benefits and risks of mammograms  The first mammogram is usually at age 39 or 48  Your provider may recommend you start at 36 or younger if your risk for breast cancer is high  Mammograms usually continue every 1 to 2 years until age 76         Follow up with your doctor as directed:  Write down your questions so you remember to ask them during your visits  © Copyright [employer] 2022 Information is for End User's use only and may not be sold, redistributed or otherwise used for commercial purposes  All illustrations and images included in CareNotes® are the copyrighted property of A [employer] , Inc  or Olga Rojas  The above information is an  only  It is not intended as medical advice for individual conditions or treatments  Talk to your doctor, nurse or pharmacist before following any medical regimen to see if it is safe and effective for you

## 2023-01-24 NOTE — PROGRESS NOTES
Caio Kramer was seen today for gynecologic exam     Diagnoses and all orders for this visit:    Encounter for gynecological examination (general) (routine) without abnormal findings    Irregular menstrual bleeding  -     US pelvis complete w transvaginal; Future    Surveillance for Depo-Provera contraception  -     medroxyPROGESTERone (DEPO-PROVERA) 150 mg/mL injection; Inject 1 mL (150 mg total) into a muscle every 3 (three) months    Encounter for screening mammogram for malignant neoplasm of breast      Calcium/vit d inclusion in the diet discussed, call with any issues, SBE reinforced, all concerns addressed  May consider EMB if bleeding issues continue  Pleasant 50 y o  possibly perimenopausal female here for annual exam  She reports regular monthly cycles up until September 2022 when she decided to go back on DepoProvera  She is due for her second Depo today but forgot to bring it  She had been fine and had used it in the past  She is on Coumadin currently for valvular history  +history of abnormal pap smears  Last Pap and HPV tests were done on 02/14/2019 negative/negative, pap NOT done today  She denies vaginal issues  She denies pelvic pain  She denies dyspareunia  She is sexually active without any concerns  STD screen recently negative  Last mammogram was done on 05/13/2020 negative  Colonoscopy 03/15/2021, due in 10           Past Medical History:   Diagnosis Date   • Abnormal Pap smear of cervix    • Allergic contact dermatitis    • Allergic rhinitis     resolved 01/17/2018   • Aortic valve disorder    • Aortic valve insufficiency    • Asthma    • Benign neoplasm of skin    • Cardiac disease    • Costochondritis    • Hyperinsulinism    • Inguinal lymphadenopathy     resolved 08/27/2015   • Migraine     resolved 08/27/2015   • Nosebleed    • Varicose 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     unspecified laterality     Past Surgical History:   Procedure Laterality Date   • AORTIC VALVE REPLACEMENT      complications 0063 failure of bovine valve, replaced with mechanical aortic valve  and root replacement at [facility] dr sawyer   • AUGMENTATION BREAST      enlargement   • CARDIAC VALVE REPLACEMENT  2011    bovine, with redo and mechanical valve replacement and root 2012 dr sawyer at [address] last assessed 08/15/2016   • CERVICAL BIOPSY  W/ LOOP ELECTRODE EXCISION     • COLPOSCOPY     • INDUCED       surgically by dilation and evacuation   • NASAL/SINUS ENDOSCOPY Left 2019    Procedure: ENDOSCOPIC CONTROL OF EPISTAXIS (LEFT);   Surgeon: Kady Patterson MD;  Location: [facility];  Service: ENT   • RHINOPLASTY     • SEPTOPLASTY       Family History   Problem Relation Age of Onset   • Asthma Father    • Coronary artery disease Father    • Hypertension Father    • No Known Problems Sister    • Breast cancer Maternal Grandmother    • No Known Problems Mother    • No Known Problems Daughter    • No Known Problems Son    • Diabetes Maternal Grandfather    • No Known Problems Paternal Grandmother    • Other Paternal Grandfather         Susanne Fever   • No Known Problems Sister    • No Known Problems Sister    • No Known Problems Daughter    • No Known Problems Son    • No Known Problems Son      Social History     Tobacco Use   • Smoking status: Never   • Smokeless tobacco: Never   Vaping Use   • Vaping Use: Never used   Substance Use Topics   • Alcohol use: Yes     Comment: drinks hard liquor, social per allscripts   • Drug use: Never       Current Outpatient Medications:   •  albuterol (Ventolin HFA) 90 mcg/act inhaler, Inhale 2 puffs every 6 (six) hours as needed for wheezing, Disp: 18 g, Rfl: 5  •  aspirin (ECOTRIN LOW STRENGTH) 81 mg EC tablet, Take 81 mg by mouth daily, Disp: , Rfl:   •  medroxyPROGESTERone (DEPO-PROVERA) 150 mg/mL injection, Inject 1 mL (150 mg total) into a muscle every 3 (three) months, Disp: 1 mL, Rfl: 3  •  metoprolol tartrate (LOPRESSOR) 25 mg tablet, Take 1 tablet (25 mg total) by mouth 2 (two) times a day, Disp: 180 tablet, Rfl: 3  •  Prenatal 27-1 MG TABS, take 1 tablet by mouth daily, Disp: 90 tablet, Rfl: 1  •  rizatriptan (MAXALT) 10 MG tablet, TAKE 1 TABLET BY MOUTH ONCE AS NEEDED FOR MIGRAINE FOR UP TO 1 DOSE, Disp: 30 tablet, Rfl: 0  •  warfarin (COUMADIN) 5 mg tablet, TAKE 1 TO 1 AND 1/2 TABLETS DAILY OR AS DIRECTED, Disp: 45 tablet, Rfl: 5  Patient Active Problem List    Diagnosis Date Noted   • Left non-suppurative otitis media 2021   • Vulvar cyst 2020   • History of mechanical aortic valve replacement 2019   • Nasal obstruction 2019   • Nasal septal deviation 2019   • Nasal turbinate hypertrophy 2019   • Recurrent epistaxis 2019   • Surveillance for Depo-Provera contraception 2019   • Capsular contracture of breast implant 2018   • Varicose veins of lower extremity with pain, bilateral 07/10/2018   • Aortic valve disorder 2018   • Anticoagulated 2018       Allergies   Allergen Reactions   • Dog Epithelium Allergy Skin Test Rash     Only certain dogs   • Pollen Extract Other (See Comments)     Test showed allergy but rarely has s/s       OB History    Para Term  AB Living   6 5 5 0 1 5   SAB IAB Ectopic Multiple Live Births   0 1 0 0 5      # Outcome Date GA Lbr Fritz/2nd Weight Sex Delivery Anes PTL Lv   6 Term 2004    F Vag-Spont   BETHANIE   5 Term     F Vag-Spont   BETHANIE   4 IAB 2000           3 Term     M Vag-Spont   BETHANIE   2 Term     M Vag-Spont   BETHANIE   1 Term 12    M Vag-Spont   BETHANIE     5 children, all grown  No grands  Vitals:    23 1036   BP: 124/80   Weight: 50 3 kg (111 lb)   Height: 4' 11" (1 499 m)     Body mass index is 22 42 kg/m²  No LMP recorded  (Menstrual status: Birth Control)  Review of Systems   Constitutional: Negative for chills, fatigue, fever and unexpected weight change  Respiratory: Negative for shortness of breath      Gastrointestinal: Negative for anal bleeding, blood in stool, constipation and diarrhea  Genitourinary: Negative for difficulty urinating, dysuria and hematuria  Physical Exam   Constitutional: She appears well-developed and well-nourished  No distress  HENT: atraumatic  Head: Normocephalic  Neck: Normal range of motion  Neck supple  Pulmonary: Effort normal   Breasts: bilateral without masses, skin changes or nipple discharge  Bilaterally soft and warm to touch  No areas of erythema or pain  +BILATERAL FIRM IMPLANTS NOTED  Abdominal: Soft  Pelvic exam was performed with patient supine  No labial fusion  There is no rash, tenderness, lesion or injury on the right labia  There is no rash, tenderness, lesion or injury on the left labia  Urethral meatus does not show any tenderness, inflammation or discharge  Palpation of midline bladder without pain or discomfort  Uterus is not deviated, not enlarged, not fixed and not tender  Cervix exhibits no motion tenderness, no discharge and no friability  Right adnexum displays no mass, no tenderness and no fullness  Left adnexum displays no mass, no tenderness and no fullness  No erythema or tenderness in the vagina  No foreign body in the vagina  No signs of injury around the vagina  BLEEDING TODAY  No signs of injury around the vagina or anus  Perineum without lesions, signs of injury, erythema or swelling  Lymphadenopathy:        Right: No inguinal adenopathy present  Left: No inguinal adenopathy present

## 2023-01-26 ENCOUNTER — CLINICAL SUPPORT (OUTPATIENT)
Dept: OBGYN CLINIC | Age: 48
End: 2023-01-26

## 2023-01-26 DIAGNOSIS — Z30.42 SURVEILLANCE FOR DEPO-PROVERA CONTRACEPTION: Primary | ICD-10-CM

## 2023-01-26 RX ORDER — MEDROXYPROGESTERONE ACETATE 150 MG/ML
150 INJECTION, SUSPENSION INTRAMUSCULAR ONCE
Status: COMPLETED | OUTPATIENT
Start: 2023-01-26 | End: 2023-01-26

## 2023-01-26 RX ADMIN — MEDROXYPROGESTERONE ACETATE 150 MG: 150 INJECTION, SUSPENSION INTRAMUSCULAR at 09:44

## 2023-01-26 NOTE — PATIENT INSTRUCTIONS
Medroxyprogesterone (By injection)   Medroxyprogesterone (do-jpoc-rj-proe-ANDRES-ter-one)  Prevents pregnancy  Also treats endometriosis and is used with other medicines to help relieve symptoms of cancer, including uterine or kidney cancer  Brand Name(s): Depo-Provera, Depo-Provera Contraceptive, Depo-SubQ Provera 104, medroxyPROGESTERone acetate Novaplus   There may be other brand names for this medicine  When This Medicine Should Not Be Used: This medicine is not right for everyone  You should not receive it if you had an allergic reaction to medroxyprogesterone or if you have a history of breast cancer or blood clots (including heart attack or stroke)  In most cases, you should not use this medicine while you are pregnant  How to Use This Medicine:   Injectable  A nurse or other health provider will give you this medicine  This medicine is given as a shot into a muscle (usually in the buttocks or upper arm) or just under the skin  Your exact treatment schedule depends on the reason you are using this medicine  You doctor will explain your personal schedule  For treatment of cancer symptoms, you may start with a shot once per week  You may need fewer shots as your treatment goes forward  For birth control or endometriosis, you will need a shot every 3 months (13 weeks)  You might need to have the first shot during the first 5 days of your normal menstrual period, to make sure you are not pregnant  If you have just had a baby, you may receive a shot 5 days after birth if you are not breastfeeding or 6 weeks after birth if you are breastfeeding  Read and follow the patient instructions that come with this medicine  Talk to your doctor or pharmacist if you have any questions  Missed dose: You must receive a shot every 3 months if you want to prevent pregnancy  Talk to your doctor or pharmacist if you do not receive your medicine on time, because you may need another form of birth control    Drugs and Foods to Avoid:   Ask your doctor or pharmacist before using any other medicine, including over-the-counter medicines, vitamins, and herbal products  Some medicines can affect how medroxyprogesterone works  Tell your doctor if you are using any of the following:  Aminoglutethimide, bosentan, carbamazepine, felbamate, griseofulvin, mitotane, modafinil, nefazodone, oxcarbazepine, phenobarbital, phenytoin, rifabutin, rifampin, rifapentine, Jennifer's wort, topiramate  Medicine to treat an infection (including clarithromycin, itraconazole, ketoconazole, telithromycin, voriconazole)  Medicine to treat HIV/AIDS (including atazanavir, efavirenz, indinavir, nelfinavir, ritonavir, saquinavir)  Warnings While Using This Medicine:   Tell your doctor right away if you think you have become pregnant  Tell your doctor if you are breastfeeding, or if you have kidney disease, liver disease, asthma, diabetes, heart disease, seizures, migraine headaches, an eating disorder, osteoporosis, or a history of depression  Tell your doctor if you smoke  This medicine may cause the following problems:  Weak or thin bones, especially with long-term use  Blood clots, which could lead to stroke, heart attack, eye or vision problems, or other serious problems  Possible increased risk of breast cancer  Injection site reactions  Liver problems  Changes in menstrual periods  Fluid retention (edema) and weight gain  You should not use this medicine for long-term birth control unless you cannot use any other form of birth control  This medicine will not protect you from HIV/AIDS or other sexually transmitted diseases  Tell any doctor or dentist who treats you that you are using this medicine  This medicine may affect certain medical test results  Your doctor will do lab tests at regular visits to check on the effects of this medicine  Keep all appointments    Possible Side Effects While Using This Medicine:   Call your doctor right away if you notice any of these side effects: Allergic reaction: Itching or hives, swelling in your face or hands, swelling or tingling in your mouth or throat, chest tightness, trouble breathing  Chest pain, trouble breathing, or coughing up blood  Dark urine or pale stools, nausea, vomiting, loss of appetite, stomach pain, yellow skin or eyes  Heavy or nonstop vaginal bleeding  Loss of vision, double vision  Numbness or weakness on one side of your body, sudden or severe headache, problems with vision, speech, or walking  Rapid weight gain, swelling in your hands, ankles, or feet  Seizures  If you notice these less serious side effects, talk with your doctor:   Light or missed monthly periods, spotting between periods  Nervousness or dizziness  Pain, redness, burning, swelling, or a lump under your skin where the shot was given  If you notice other side effects that you think are caused by this medicine, tell your doctor  Call your doctor for medical advice about side effects  You may report side effects to FDA at 1-404-FDA-0528    © Copyright [employer] 2022 Information is for End User's use only and may not be sold, redistributed or otherwise used for commercial purposes  The above information is an  only  It is not intended as medical advice for individual conditions or treatments  Talk to your doctor, nurse or pharmacist before following any medical regimen to see if it is safe and effective for you

## 2023-01-26 NOTE — PROGRESS NOTES
Patient is here today for her Depo Provera injection  Her last dose was 11/3/22  Her annual exam was on 1/24/22  Urine pregnancy test done: No    Depo was given in Left Buttock  Tolerated well  Lot [id number] Exp 11/2024  Next dose due 4/13/23 - 4/27/23     Refill needed No

## 2023-01-31 ENCOUNTER — TELEPHONE (OUTPATIENT)
Dept: OBGYN CLINIC | Facility: CLINIC | Age: 48
End: 2023-01-31

## 2023-01-31 ENCOUNTER — HOSPITAL ENCOUNTER (OUTPATIENT)
Dept: ULTRASOUND IMAGING | Facility: CLINIC | Age: 48
Discharge: HOME | End: 2023-01-31

## 2023-01-31 DIAGNOSIS — N92.6 IRREGULAR MENSTRUAL BLEEDING: ICD-10-CM

## 2023-01-31 NOTE — TELEPHONE ENCOUNTER
----- Message from Nathalie Barajas, [address] sent at 1/31/2023  1:11 PM EST -----  Please notify patient her endometrial lining was mildly thickened on ultrasound and with her hx of irregular bleeding I would like her to come in for an office EMB  Thanks

## 2023-01-31 NOTE — TELEPHONE ENCOUNTER
Just mirna Dillon from radiology called with significant findings in the ultrasound  Please contact regarding findings

## 2023-02-01 NOTE — TELEPHONE ENCOUNTER
Pt given appt for EMB on 2/22  Pt cannot take advil - on coumadin  Will take tylenol  Aye, pt is on coumadin  Should she stop the coumadin prior to emb?   Thx

## 2023-02-03 ENCOUNTER — HOSPITAL ENCOUNTER (OUTPATIENT)
Dept: RADIOLOGY | Facility: MEDICAL CENTER | Age: 48
Discharge: HOME | End: 2023-02-03

## 2023-02-03 DIAGNOSIS — M79.661 PAIN IN RIGHT LOWER LEG: ICD-10-CM

## 2023-02-09 ENCOUNTER — OFFICE VISIT (OUTPATIENT)
Dept: INTERNAL MEDICINE CLINIC | Facility: CLINIC | Age: 48
End: 2023-02-09

## 2023-02-09 VITALS
HEART RATE: 70 BPM | BODY MASS INDEX: 22.18 KG/M2 | RESPIRATION RATE: 16 BRPM | HEIGHT: 59 IN | TEMPERATURE: 98.8 F | DIASTOLIC BLOOD PRESSURE: 76 MMHG | OXYGEN SATURATION: 98 % | WEIGHT: 110 LBS | SYSTOLIC BLOOD PRESSURE: 140 MMHG

## 2023-02-09 DIAGNOSIS — M67.912 DISORDER OF LEFT ROTATOR CUFF: Primary | ICD-10-CM

## 2023-02-09 RX ORDER — PREDNISONE 10 MG/1
TABLET ORAL
Qty: 18 TABLET | Refills: 0 | Status: SHIPPED | OUTPATIENT
Start: 2023-02-09

## 2023-02-09 NOTE — PROGRESS NOTES
INTERNAL MEDICINE FOLLOW-UP VISIT  [facility] [address] [place] [address]    NAME: Tony Avery  AGE: 50 y o  SEX: female  : 1975     DATE: 2023     Assessment and Plan:   1  Disorder of left rotator cuff  Cannot take nsaids b/c of coumadin  Will start prednisone check inr Monday  No work until Monday as well  Warm compresses and rest  - predniSONE 10 mg tablet; Take 3 tablets for 3 days then 2 tablets for 3 days then 1 tablet for 3 days  Dispense: 18 tablet; Refill: 0        No follow-ups on file  Chief Complaint:     Chief Complaint   Patient presents with   • Shoulder Pain     Left onset two days ago pulling seatbelt          History of Present Illness:     She is working in the [employer] a lot of pushing and pulling  Then 2 days ago she went to grab her seatbelt w/ her left arm and heard an odd sound  Couldn't sleep 10/10 pain   Cant take nsaids b/c coumadin     The following portions of the patient's history were reviewed and updated as appropriate: allergies, current medications, past family history, past medical history, past social history, past surgical history and problem list      Review of Systems:     Review of Systems   Constitutional: Negative for appetite change, chills, diaphoresis, fatigue, fever and unexpected weight change  HENT: Negative for postnasal drip and sneezing  Eyes: Negative for visual disturbance  Respiratory: Negative for chest tightness and shortness of breath  Cardiovascular: Negative for chest pain, palpitations and leg swelling  Gastrointestinal: Negative for abdominal pain and blood in stool  Endocrine: Negative for cold intolerance, heat intolerance, polydipsia, polyphagia and polyuria  Genitourinary: Negative for difficulty urinating, dysuria, frequency and urgency  Musculoskeletal: Negative for arthralgias and myalgias  Left shoulder pain    Skin: Negative for rash and wound     Neurological: Negative for dizziness, weakness, light-headedness and headaches  Hematological: Negative for adenopathy  Psychiatric/Behavioral: Negative for confusion, dysphoric mood and sleep disturbance  The patient is not nervous/anxious  Past Medical History:     Past Medical History:   Diagnosis Date   • Abnormal Pap smear of cervix    • Allergic contact dermatitis    • Allergic rhinitis     resolved 01/17/2018   • Aortic valve disorder    • Aortic valve insufficiency    • Asthma    • Benign neoplasm of skin    • Cardiac disease    • Costochondritis    • Hyperinsulinism    • Inguinal lymphadenopathy     resolved 08/27/2015   • Migraine     resolved 08/27/2015   • Nosebleed    • Varicose 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     unspecified laterality        Current Medications:     Current Outpatient Medications:   •  albuterol (Ventolin HFA) 90 mcg/act inhaler, Inhale 2 puffs every 6 (six) hours as needed for wheezing, Disp: 18 g, Rfl: 5  •  aspirin (ECOTRIN LOW STRENGTH) 81 mg EC tablet, Take 81 mg by mouth daily, Disp: , Rfl:   •  metoprolol tartrate (LOPRESSOR) 25 mg tablet, Take 1 tablet (25 mg total) by mouth 2 (two) times a day, Disp: 180 tablet, Rfl: 3  •  predniSONE 10 mg tablet, Take 3 tablets for 3 days then 2 tablets for 3 days then 1 tablet for 3 days, Disp: 18 tablet, Rfl: 0  •  Prenatal 27-1 MG TABS, take 1 tablet by mouth daily, Disp: 90 tablet, Rfl: 1  •  rizatriptan (MAXALT) 10 MG tablet, TAKE 1 TABLET BY MOUTH ONCE AS NEEDED FOR MIGRAINE FOR UP TO 1 DOSE, Disp: 30 tablet, Rfl: 0  •  warfarin (COUMADIN) 5 mg tablet, TAKE 1 TO 1 AND 1/2 TABLETS DAILY OR AS DIRECTED, Disp: 45 tablet, Rfl: 5  •  medroxyPROGESTERone (DEPO-PROVERA) 150 mg/mL injection, Inject 1 mL (150 mg total) into a muscle every 3 (three) months, Disp: 1 mL, Rfl: 3     Allergies:      Allergies   Allergen Reactions   • Dog Epithelium Allergy Skin Test Rash     Only certain dogs   • Pollen Extract Other (See Comments) Test showed allergy but rarely has s/s        Physical Exam:     /76 (BP Location: Left arm, Patient Position: Sitting, Cuff Size: Standard)   Pulse 70   Temp 98 8 °F (37 1 °C) (Tympanic)   Resp 16   Ht 4' 11" (1 499 m)   Wt 49 9 kg (110 lb)   SpO2 98%   BMI 22 22 kg/m²     Physical Exam  Constitutional:       Appearance: She is well-developed  HENT:      Head: Normocephalic and atraumatic  Eyes:      Pupils: Pupils are equal, round, and reactive to light  Pulmonary:      Effort: Pulmonary effort is normal    Musculoskeletal:      Comments: Point tenderness to left bicep groove, limited rom left shoulder extending to back    Neurological:      Mental Status: She is alert and oriented to person, place, and time  Data:     Laboratory Results: I have personally reviewed the pertinent laboratory results/reports   Radiology/Other Diagnostic Testing Results: I have personally reviewed pertinent reports        NEISHA Escalera  [facility]

## 2023-02-09 NOTE — LETTER
February 9, 2023     Patient: Marvin Ramirez  YOB: 1975  Date of Visit: 2/9/2023      To Whom it May Concern:    Terrell Ann is under my professional care  Lexis Castrocammie was seen in my office on 2/9/2023  Lexisswati Patel may return to work on 2/13  If you have any questions or concerns, please don't hesitate to call           Sincerely,          NEISHA Samuel        CC: No Recipients

## 2023-02-13 ENCOUNTER — APPOINTMENT (OUTPATIENT)
Dept: LAB | Facility: HOSPITAL | Age: 48
End: 2023-02-13

## 2023-02-13 ENCOUNTER — TELEPHONE (OUTPATIENT)
Dept: CARDIOLOGY CLINIC | Facility: CLINIC | Age: 48
End: 2023-02-13

## 2023-02-13 ENCOUNTER — ANTICOAG VISIT (OUTPATIENT)
Dept: CARDIOLOGY CLINIC | Facility: CLINIC | Age: 48
End: 2023-02-13

## 2023-02-13 DIAGNOSIS — I35.9 AORTIC VALVE DISORDER: Primary | ICD-10-CM

## 2023-02-13 NOTE — PROGRESS NOTES
Pt is on prednisone   Advised to hold today, take 5mg tomorrow then resume reg dose , retest in 1 week

## 2023-02-14 NOTE — TELEPHONE ENCOUNTER
S/w the pt  She did not get my message yesterday so she took her Warfarin  States she has a few more days of Prednisone as she just started last Thursday  Advised to hold today then take 5mg Wed-Fri then resume reg dose and retest on Tues

## 2023-02-14 NOTE — TELEPHONE ENCOUNTER
Patient called and stated her INR is elevated She received the results on my chart  She states it was 3 34 and wants to know what she should do with her medication dosage

## 2023-02-21 ENCOUNTER — LAB (OUTPATIENT)
Dept: LAB | Facility: HOSPITAL | Age: 48
End: 2023-02-21

## 2023-02-21 ENCOUNTER — ANTICOAG VISIT (OUTPATIENT)
Dept: CARDIOLOGY CLINIC | Facility: CLINIC | Age: 48
End: 2023-02-21

## 2023-02-21 DIAGNOSIS — Z79.01 LONG TERM (CURRENT) USE OF ANTICOAGULANTS: ICD-10-CM

## 2023-02-21 DIAGNOSIS — I35.9 AORTIC VALVE DISORDER: Primary | ICD-10-CM

## 2023-02-21 LAB
INR PPP: 3.44 (ref 0.84–1.19)
PROTHROMBIN TIME: 33.9 SECONDS (ref 11.6–14.5)

## 2023-02-21 NOTE — RESULT ENCOUNTER NOTE
S/w the pt  States she has been of the Prednisone x 4 days   Advised to hold today then take 5mg M/W/F/Sat, 7 5mg the rest and retest in 1 week

## 2023-02-22 ENCOUNTER — PROCEDURE VISIT (OUTPATIENT)
Dept: OBGYN CLINIC | Facility: CLINIC | Age: 48
End: 2023-02-22

## 2023-02-22 VITALS
WEIGHT: 111 LBS | DIASTOLIC BLOOD PRESSURE: 82 MMHG | HEIGHT: 59 IN | SYSTOLIC BLOOD PRESSURE: 140 MMHG | BODY MASS INDEX: 22.38 KG/M2

## 2023-02-22 DIAGNOSIS — N92.4 EXCESSIVE BLEEDING IN PREMENOPAUSAL PERIOD: Primary | ICD-10-CM

## 2023-02-22 DIAGNOSIS — Z30.42 SURVEILLANCE FOR DEPO-PROVERA CONTRACEPTION: ICD-10-CM

## 2023-02-22 NOTE — PROGRESS NOTES
Endometrial biopsy    Date/Time: 2/22/2023 3:41 PM  Performed by: NEISHA Mathis  Authorized by: NEISHA Mathis   Universal Protocol:  Consent: Verbal consent obtained  Risks and benefits: risks, benefits and alternatives were discussed  Consent given by: patient  Timeout called at: 2/22/2023 3:31 PM   Patient understanding: patient states understanding of the procedure being performed  Patient identity confirmed: verbally with patient      Indication:     Indications: Other disorder of menstruation and other abnormal bleeding from female genital tract    Procedure:     Procedure: endometrial biopsy with Pipelle      A bivalve speculum was placed in the vagina: yes      Cervix cleaned and prepped: yes      A paracervical block was performed: no      An intracervical block was performed: no      The cervix was dilated: no      Uterus sounded: yes      Uterus sound depth (cm):  7    Specimen collected: specimen collected and sent to pathology    Findings:     Uterus size:  Non-gravid    Cervix: normal    Comments:     Procedure comments:  All questions answered  Plan to be discussed pending results but she is doing VERY well on Depo and would like to continue  She denies any bleeding since her last depo shot in January 2023

## 2023-02-27 ENCOUNTER — LAB (OUTPATIENT)
Dept: LAB | Facility: HOSPITAL | Age: 48
End: 2023-02-27

## 2023-02-27 ENCOUNTER — ANTICOAG VISIT (OUTPATIENT)
Dept: CARDIOLOGY CLINIC | Facility: CLINIC | Age: 48
End: 2023-02-27

## 2023-02-27 DIAGNOSIS — Z79.01 LONG TERM (CURRENT) USE OF ANTICOAGULANTS: ICD-10-CM

## 2023-02-27 DIAGNOSIS — I35.9 AORTIC VALVE DISORDER: Primary | ICD-10-CM

## 2023-02-27 LAB
INR PPP: 2.8 (ref 0.84–1.19)
PROTHROMBIN TIME: 28.9 SECONDS (ref 11.6–14.5)

## 2023-03-06 ENCOUNTER — TELEPHONE (OUTPATIENT)
Dept: CARDIOLOGY CLINIC | Facility: CLINIC | Age: 48
End: 2023-03-06

## 2023-03-06 DIAGNOSIS — Z79.01 LONG TERM (CURRENT) USE OF ANTICOAGULANTS: Primary | ICD-10-CM

## 2023-03-07 ENCOUNTER — APPOINTMENT (OUTPATIENT)
Dept: LAB | Facility: HOSPITAL | Age: 48
End: 2023-03-07

## 2023-03-07 ENCOUNTER — ANTICOAG VISIT (OUTPATIENT)
Dept: CARDIOLOGY CLINIC | Facility: CLINIC | Age: 48
End: 2023-03-07

## 2023-03-07 ENCOUNTER — HOSPITAL ENCOUNTER (OUTPATIENT)
Dept: RADIOLOGY | Facility: MEDICAL CENTER | Age: 48
Discharge: HOME | End: 2023-03-07

## 2023-03-07 VITALS — HEIGHT: 59 IN | WEIGHT: 111 LBS | BODY MASS INDEX: 22.38 KG/M2

## 2023-03-07 DIAGNOSIS — I35.9 AORTIC VALVE DISORDER: Primary | ICD-10-CM

## 2023-03-07 DIAGNOSIS — Z12.31 BREAST CANCER SCREENING BY MAMMOGRAM: ICD-10-CM

## 2023-03-07 DIAGNOSIS — Z12.31 VISIT FOR SCREENING MAMMOGRAM: ICD-10-CM

## 2023-03-07 LAB
INR PPP: 2.15 (ref 0.84–1.19)
PROTHROMBIN TIME: 23.5 SECONDS (ref 11.6–14.5)

## 2023-03-08 ENCOUNTER — TELEPHONE (OUTPATIENT)
Dept: INTERNAL MEDICINE CLINIC | Facility: CLINIC | Age: 48
End: 2023-03-08

## 2023-03-08 NOTE — TELEPHONE ENCOUNTER
----- Message from [id number] sent at 3/8/2023  2:11 PM EST -----  Normal mammogram, repeat in 1 year

## 2023-03-08 NOTE — TELEPHONE ENCOUNTER
Re:   mammo results  Provider's message/results/instructions relayed in full detail  LVM asking pt to return call w/any questions

## 2023-03-22 ENCOUNTER — ANTICOAG VISIT (OUTPATIENT)
Dept: CARDIOLOGY CLINIC | Facility: CLINIC | Age: 48
End: 2023-03-22

## 2023-03-22 ENCOUNTER — APPOINTMENT (OUTPATIENT)
Dept: LAB | Facility: HOSPITAL | Age: 48
End: 2023-03-22

## 2023-03-22 DIAGNOSIS — I35.9 AORTIC VALVE DISORDER: Primary | ICD-10-CM

## 2023-04-04 ENCOUNTER — TELEPHONE (OUTPATIENT)
Dept: OBGYN CLINIC | Facility: MEDICAL CENTER | Age: 48
End: 2023-04-04

## 2023-04-04 DIAGNOSIS — N39.0 URINARY TRACT INFECTION WITHOUT HEMATURIA, SITE UNSPECIFIED: Primary | ICD-10-CM

## 2023-04-04 NOTE — TELEPHONE ENCOUNTER
"Pt denies D/C, foul order, no burning when she pees, or frequency, but has been holding her urine  She states burning sensation is more constant and not just when she pees, feels like an irritation   \"like she wants to walk in a stradle\"   "

## 2023-04-04 NOTE — TELEPHONE ENCOUNTER
Pt called in because she is experiencing a burning sensation in vaginal area  She has been tested for STD's so she believes she might have a UTI  Last seen by Yasmin Melara in February  Please advise

## 2023-04-06 ENCOUNTER — APPOINTMENT (OUTPATIENT)
Dept: LAB | Facility: HOSPITAL | Age: 48
End: 2023-04-06

## 2023-04-06 ENCOUNTER — TELEPHONE (OUTPATIENT)
Dept: OBGYN CLINIC | Facility: CLINIC | Age: 48
End: 2023-04-06

## 2023-04-06 ENCOUNTER — TELEPHONE (OUTPATIENT)
Dept: CARDIOLOGY CLINIC | Facility: CLINIC | Age: 48
End: 2023-04-06

## 2023-04-06 DIAGNOSIS — N39.0 URINARY TRACT INFECTION WITHOUT HEMATURIA, SITE UNSPECIFIED: ICD-10-CM

## 2023-04-06 DIAGNOSIS — R39.9 URINARY SYMPTOM OR SIGN: Primary | ICD-10-CM

## 2023-04-06 LAB
BACTERIA UR QL AUTO: ABNORMAL /HPF
BILIRUB UR QL STRIP: NEGATIVE
CLARITY UR: ABNORMAL
COLOR UR: YELLOW
GLUCOSE UR STRIP-MCNC: NEGATIVE MG/DL
HGB UR QL STRIP.AUTO: ABNORMAL
KETONES UR STRIP-MCNC: NEGATIVE MG/DL
LEUKOCYTE ESTERASE UR QL STRIP: ABNORMAL
MUCOUS THREADS UR QL AUTO: ABNORMAL
NITRITE UR QL STRIP: NEGATIVE
NON-SQ EPI CELLS URNS QL MICRO: ABNORMAL /HPF
PH UR STRIP.AUTO: 5.5 [PH]
PROT UR STRIP-MCNC: ABNORMAL MG/DL
RBC #/AREA URNS AUTO: ABNORMAL /HPF
SP GR UR STRIP.AUTO: 1.02 (ref 1–1.03)
UROBILINOGEN UR STRIP-ACNC: <2 MG/DL
WBC #/AREA URNS AUTO: ABNORMAL /HPF

## 2023-04-06 RX ORDER — NITROFURANTOIN 25; 75 MG/1; MG/1
100 CAPSULE ORAL 2 TIMES DAILY
Qty: 10 CAPSULE | Refills: 0 | Status: SHIPPED | OUTPATIENT
Start: 2023-04-06 | End: 2023-04-11

## 2023-04-06 NOTE — TELEPHONE ENCOUNTER
Patient would like to know if she able to take collagen for her skin & nails       Please call pt back at [phone]

## 2023-04-06 NOTE — TELEPHONE ENCOUNTER
----- Message from Spencer Davis, [address] sent at 4/6/2023  4:03 PM EDT -----  Pls notify her urine cx is showing signs of an infection so I will send abx to her pharmacy since I am not here over the weekend  She may get a yeast infection from these antibiotics so she should use a monistat 7 treatment if this happens   x

## 2023-04-07 LAB — BACTERIA UR CULT: NORMAL

## 2023-04-21 ENCOUNTER — APPOINTMENT (OUTPATIENT)
Dept: LAB | Facility: HOSPITAL | Age: 48
End: 2023-04-21
Attending: INTERNAL MEDICINE

## 2023-04-25 ENCOUNTER — CLINICAL SUPPORT (OUTPATIENT)
Dept: OBGYN CLINIC | Facility: MEDICAL CENTER | Age: 48
End: 2023-04-25

## 2023-04-25 VITALS — DIASTOLIC BLOOD PRESSURE: 88 MMHG | BODY MASS INDEX: 22.62 KG/M2 | WEIGHT: 112 LBS | SYSTOLIC BLOOD PRESSURE: 130 MMHG

## 2023-04-25 DIAGNOSIS — Z30.42 SURVEILLANCE FOR DEPO-PROVERA CONTRACEPTION: Primary | ICD-10-CM

## 2023-04-25 RX ORDER — MULTIVIT WITH MINERALS/LUTEIN
1000 TABLET ORAL DAILY
COMMUNITY

## 2023-04-25 RX ORDER — MEDROXYPROGESTERONE ACETATE 150 MG/ML
150 INJECTION, SUSPENSION INTRAMUSCULAR ONCE
Status: COMPLETED | OUTPATIENT
Start: 2023-04-25 | End: 2023-04-25

## 2023-04-25 RX ADMIN — MEDROXYPROGESTERONE ACETATE 150 MG: 150 INJECTION, SUSPENSION INTRAMUSCULAR at 12:55

## 2023-04-25 NOTE — PROGRESS NOTES
Pt is here for Depo Provera injection  Her last dose was 1/26/23  Her annual exam was on 1/24/23  Urine pregnancy test done: no   Result: na  Depo given in R Buttock  Tolerated well  Lot [id number] 01/2025  Next dose due 3 months     Refill needed no

## 2023-05-09 ENCOUNTER — ANTICOAG VISIT (OUTPATIENT)
Dept: CARDIOLOGY CLINIC | Facility: CLINIC | Age: 48
End: 2023-05-09

## 2023-05-09 ENCOUNTER — APPOINTMENT (OUTPATIENT)
Dept: LAB | Facility: HOSPITAL | Age: 48
End: 2023-05-09
Attending: INTERNAL MEDICINE

## 2023-05-09 DIAGNOSIS — I35.9 AORTIC VALVE DISORDER: Primary | ICD-10-CM

## 2023-05-10 ENCOUNTER — APPOINTMENT (EMERGENCY)
Dept: VASCULAR ULTRASOUND | Facility: HOSPITAL | Age: 48
End: 2023-05-10

## 2023-05-10 ENCOUNTER — HOSPITAL ENCOUNTER (EMERGENCY)
Facility: HOSPITAL | Age: 48
Discharge: HOME | End: 2023-05-10
Attending: EMERGENCY MEDICINE | Admitting: EMERGENCY MEDICINE

## 2023-05-10 ENCOUNTER — OFFICE VISIT (OUTPATIENT)
Dept: URGENT CARE | Facility: CLINIC | Age: 48
End: 2023-05-10

## 2023-05-10 VITALS
BODY MASS INDEX: 21.9 KG/M2 | RESPIRATION RATE: 16 BRPM | DIASTOLIC BLOOD PRESSURE: 61 MMHG | OXYGEN SATURATION: 99 % | HEIGHT: 60 IN | HEART RATE: 87 BPM | SYSTOLIC BLOOD PRESSURE: 139 MMHG | TEMPERATURE: 98.5 F

## 2023-05-10 VITALS
BODY MASS INDEX: 22.65 KG/M2 | RESPIRATION RATE: 16 BRPM | SYSTOLIC BLOOD PRESSURE: 120 MMHG | DIASTOLIC BLOOD PRESSURE: 78 MMHG | WEIGHT: 112.13 LBS | TEMPERATURE: 98.2 F | OXYGEN SATURATION: 98 % | HEART RATE: 94 BPM

## 2023-05-10 DIAGNOSIS — M79.604 RIGHT LEG PAIN: Primary | ICD-10-CM

## 2023-05-10 DIAGNOSIS — M79.661 RIGHT CALF PAIN: Primary | ICD-10-CM

## 2023-05-10 NOTE — PROGRESS NOTES
[address]        NAME: Ibeth Carty is a 50 y o  female  : 1975    MRN: [id number]  DATE: May 10, 2023  TIME: 10:17 AM    Assessment and Plan   Right calf pain [M79 661]  1  Right calf pain  Transfer to other facility            Patient Instructions     Proceed to ER for further evaluation  Chief Complaint     Chief Complaint   Patient presents with   • Leg Pain     Started 4 days ago, right leg calf  Very painful  Pt states she was in a car driving for 10 hours on Saturday  Pt is a   History of Present Illness       The patient presents today with complaints of R calf pain x 4 days  She works as a  and sits for long periods of time, and states on Sat she was driving for 10 hours  The pain is hard to describe but states that she was unable to sleep last night due to the pain  She denies falling/injury and states the pain does not feel muscular in nature  She has a PMH significant for aortic valve surgery and is on coumadin  Her last INR was yesterday and was 2 0  She called her PCP this morning and was told to go to urgent care for an x ray  Review of Systems   Review of Systems   Constitutional: Negative for chills, fatigue and fever  HENT: Negative for congestion  Eyes: Negative  Respiratory: Negative for cough and shortness of breath  Cardiovascular: Negative for chest pain and palpitations  Gastrointestinal: Negative for abdominal pain, diarrhea, nausea and vomiting  Genitourinary: Negative for difficulty urinating  Musculoskeletal: Positive for myalgias (R calf)  Skin: Negative for rash  Allergic/Immunologic: Negative for environmental allergies  Neurological: Negative for dizziness and headaches  Psychiatric/Behavioral: Negative            Current Medications       Current Outpatient Medications:   •  albuterol (Ventolin HFA) 90 mcg/act inhaler, Inhale 2 puffs every 6 (six) hours as needed for wheezing, Disp: 18 g, Rfl: 5  •  Ascorbic Acid (vitamin C) 1000 MG tablet, Take 1,000 mg by mouth daily, Disp: , Rfl:   •  aspirin (ECOTRIN LOW STRENGTH) 81 mg EC tablet, Take 81 mg by mouth daily, Disp: , Rfl:   •  Biotin w/ Vitamins C & E (HAIR/SKIN/NAILS PO), Take by mouth, Disp: , Rfl:   •  Calcium Carb-Cholecalciferol (CALCIUM/VITAMIN D PO), Take by mouth, Disp: , Rfl:   •  medroxyPROGESTERone (DEPO-PROVERA) 150 mg/mL injection, Inject 1 mL (150 mg total) into a muscle every 3 (three) months, Disp: 1 mL, Rfl: 3  •  metoprolol tartrate (LOPRESSOR) 25 mg tablet, Take 1 tablet (25 mg total) by mouth 2 (two) times a day, Disp: 180 tablet, Rfl: 3  •  Prenatal 27-1 MG TABS, take 1 tablet by mouth daily, Disp: 90 tablet, Rfl: 1  •  rizatriptan (MAXALT) 10 MG tablet, TAKE 1 TABLET BY MOUTH ONCE AS NEEDED FOR MIGRAINE FOR UP TO 1 DOSE, Disp: 30 tablet, Rfl: 0  •  warfarin (COUMADIN) 5 mg tablet, TAKE 1 TO 1 AND 1/2 TABLETS DAILY OR AS DIRECTED, Disp: 45 tablet, Rfl: 5    Current Allergies     Allergies as of 05/10/2023 - Reviewed 05/10/2023   Allergen Reaction Noted   • Dog epithelium allergy skin test Rash    • Pollen extract Other (See Comments) 05/14/2014            The following portions of the patient's history were reviewed and updated as appropriate: allergies, current medications, past family history, past medical history, past social history, past surgical history and problem list      Past Medical History:   Diagnosis Date   • Abnormal Pap smear of cervix    • Allergic contact dermatitis    • Allergic rhinitis     resolved 01/17/2018   • Aortic valve disorder    • Aortic valve insufficiency    • Asthma    • Benign neoplasm of skin    • Cardiac disease    • Costochondritis    • Hyperinsulinism    • Inguinal lymphadenopathy     resolved 08/27/2015   • Migraine     resolved 08/27/2015   • Nosebleed    • Varicose 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     unspecified laterality       Past Surgical History:   Procedure Laterality Date   • AORTIC VALVE REPLACEMENT      complications  failure of bovine valve, replaced with mechanical aortic valve  and root replacement at [facility] dr sawyer   • AUGMENTATION BREAST      enlargement   • AUGMENTATION MAMMAPLASTY Bilateral        • CARDIAC VALVE REPLACEMENT  2011    bovine, with redo and mechanical valve replacement and root 2012 dr sawyer at [address] [place] last assessed 08/15/2016   • CERVICAL BIOPSY  W/ LOOP ELECTRODE EXCISION     • COLPOSCOPY     • INDUCED       surgically by dilation and evacuation   • NASAL/SINUS ENDOSCOPY Left 2019    Procedure: ENDOSCOPIC CONTROL OF EPISTAXIS (LEFT); Surgeon: Michael Love MD;  Location[facility];  Service: ENT   • RHINOPLASTY     • SEPTOPLASTY         Family History   Problem Relation Age of Onset   • No Known Problems Mother    • Asthma Father    • Coronary artery disease Father    • Hypertension Father    • No Known Problems Sister    • No Known Problems Sister    • No Known Problems Sister    • No Known Problems Daughter    • No Known Problems Daughter    • Breast cancer Maternal Grandmother    • Diabetes Maternal Grandfather    • No Known Problems Paternal Grandmother    • Other Paternal Grandfather         Susanne Fever   • No Known Problems Son    • No Known Problems Son    • No Known Problems Son          Medications have been verified  Objective   /78   Pulse 94   Temp 98 2 °F (36 8 °C)   Resp 16   Wt 50 9 kg (112 lb 2 oz)   SpO2 98%   BMI 22 65 kg/m²        Physical Exam     Physical Exam  Vitals and nursing note reviewed  Constitutional:       General: She is not in acute distress  Appearance: Normal appearance  She is not ill-appearing  HENT:      Head: Normocephalic and atraumatic  Right Ear: External ear normal       Left Ear: External ear normal       Nose: Nose normal       Mouth/Throat:      Lips: Pink  Mouth: Mucous membranes are moist    Eyes:      General: Vision grossly intact  Extraocular Movements: Extraocular movements intact  Pupils: Pupils are equal, round, and reactive to light  Cardiovascular:      Rate and Rhythm: Normal rate  Pulmonary:      Effort: Pulmonary effort is normal    Musculoskeletal:         General: Normal range of motion  Cervical back: Normal range of motion  Right lower leg: No swelling or tenderness  No edema  Left lower leg: Normal       Comments: R calf no increased pain with palpation and states pain is constant  R calf measuring 33 5 cm  L calf measuring 33 cm   Skin:     General: Skin is warm  Findings: No rash  Comments: R calf no erythema, warmth, open wounds, or skin color changes noted  Neurological:      Mental Status: She is alert and oriented to person, place, and time  Motor: Motor function is intact     Psychiatric:         Attention and Perception: Attention normal          Mood and Affect: Mood normal

## 2023-05-10 NOTE — ED PROVIDER NOTES
History  Chief Complaint   Patient presents with   • Leg Pain     C/o RL leg pain, states her pcp wants to r/o dvt  Candance Pert is a very pleasant 39-year-old female who is here for evaluation of right lower leg pain  She states that symptoms started a few days ago  She has also noticed some very mild swelling of the right lower leg  She describes pain in her posterior calf that does radiate up through her knee into her mid posterior thigh  No pain in the hip or buttocks  Seems to be somewhat worse with ambulation  Otherwise no exacerbating or remitting factors  She does have a history of a mechanical aortic valve and takes Coumadin but her primary physician still wants her to have venous duplex to rule out DVT  History provided by:  Patient  Leg Pain  Associated symptoms: no back pain and no fever        Prior to Admission Medications   Prescriptions Last Dose Informant Patient Reported? Taking?    Ascorbic Acid (vitamin C) 1000 MG tablet   Yes No   Sig: Take 1,000 mg by mouth daily   Biotin w/ Vitamins C & E (HAIR/SKIN/NAILS PO)   Yes No   Sig: Take by mouth   Calcium Carb-Cholecalciferol (CALCIUM/VITAMIN D PO)   Yes No   Sig: Take by mouth   Prenatal 27-1 MG TABS   No No   Sig: take 1 tablet by mouth daily   albuterol (Ventolin HFA) 90 mcg/act inhaler   No No   Sig: Inhale 2 puffs every 6 (six) hours as needed for wheezing   aspirin (ECOTRIN LOW STRENGTH) 81 mg EC tablet   Yes No   Sig: Take 81 mg by mouth daily   medroxyPROGESTERone (DEPO-PROVERA) 150 mg/mL injection   No No   Sig: Inject 1 mL (150 mg total) into a muscle every 3 (three) months   metoprolol tartrate (LOPRESSOR) 25 mg tablet   No No   Sig: Take 1 tablet (25 mg total) by mouth 2 (two) times a day   rizatriptan (MAXALT) 10 MG tablet   No No   Sig: TAKE 1 TABLET BY MOUTH ONCE AS NEEDED FOR MIGRAINE FOR UP TO 1 DOSE   warfarin (COUMADIN) 5 mg tablet   No No   Sig: TAKE 1 TO 1 AND 1/2 TABLETS DAILY OR AS DIRECTED      Facility-Administered Medications: None       Past Medical History:   Diagnosis Date   • Abnormal Pap smear of cervix    • Allergic contact dermatitis    • Allergic rhinitis     resolved 2018   • Aortic valve disorder    • Aortic valve insufficiency    • Asthma    • Benign neoplasm of skin    • Cardiac disease    • Costochondritis    • Hyperinsulinism    • Inguinal lymphadenopathy     resolved 2015   • Migraine     resolved 2015   • Nosebleed    • Varicose 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     unspecified laterality       Past Surgical History:   Procedure Laterality Date   • AORTIC VALVE REPLACEMENT      complications 8065 failure of bovine valve, replaced with mechanical aortic valve  and root replacement at [facility] dr sawyer   • AUGMENTATION BREAST      enlargement   • AUGMENTATION MAMMAPLASTY Bilateral        • CARDIAC VALVE REPLACEMENT  2011    bovine, with redo and mechanical valve replacement and root 2012 dr sawyer at [address] [facility] last assessed 08/15/2016   • CERVICAL BIOPSY  W/ LOOP ELECTRODE EXCISION     • COLPOSCOPY     • INDUCED       surgically by dilation and evacuation   • NASAL/SINUS ENDOSCOPY Left 2019    Procedure: ENDOSCOPIC CONTROL OF EPISTAXIS (LEFT);   Surgeon: Jasiel Pena MD;  [facility];  Service: ENT   • RHINOPLASTY     • SEPTOPLASTY         Family History   Problem Relation Age of Onset   • No Known Problems Mother    • Asthma Father    • Coronary artery disease Father    • Hypertension Father    • No Known Problems Sister    • No Known Problems Sister    • No Known Problems Sister    • No Known Problems Daughter    • No Known Problems Daughter    • Breast cancer Maternal Grandmother    • Diabetes Maternal Grandfather    • No Known Problems Paternal Grandmother    • Other Paternal Grandfather         Susanne Fever   • No Known Problems Son    • No Known Problems Son    • No Known Problems Son      I have reviewed and agree with the history as documented  E-Cigarette/Vaping   • E-Cigarette Use Never User      E-Cigarette/Vaping Substances   • Nicotine No    • THC No    • CBD No    • Flavoring No    • Other No    • Unknown No      Social History     Tobacco Use   • Smoking status: Never   • Smokeless tobacco: Never   Vaping Use   • Vaping Use: Never used   Substance Use Topics   • Alcohol use: Not Currently     Comment: drinks hard liquor, social per allscripts   • Drug use: Never       Review of Systems   Constitutional: Negative for fever  HENT: Negative for ear pain and sore throat  Respiratory: Negative for cough and shortness of breath  Cardiovascular: Negative for chest pain and palpitations  Gastrointestinal: Negative for abdominal pain and vomiting  Genitourinary: Negative for dysuria and hematuria  Musculoskeletal: Positive for arthralgias  Negative for back pain and gait problem  Skin: Negative for color change and rash  Neurological: Negative for seizures and syncope  All other systems reviewed and are negative  Physical Exam  Physical Exam  Vitals and nursing note reviewed  Constitutional:       General: She is not in acute distress  Appearance: She is well-developed  HENT:      Head: Normocephalic and atraumatic  Eyes:      Conjunctiva/sclera: Conjunctivae normal    Cardiovascular:      Rate and Rhythm: Normal rate and regular rhythm  Heart sounds: No murmur heard  Pulmonary:      Effort: Pulmonary effort is normal  No respiratory distress  Breath sounds: Normal breath sounds  Abdominal:      Palpations: Abdomen is soft  Tenderness: There is no abdominal tenderness  Musculoskeletal:         General: No swelling  Cervical back: Neck supple  Comments: Very mild swelling of the right calf, measuring about 1/2 inch to an inch larger than the left  A few smaller superficial varicosities are noted  There are no palpable cords  Normal distal sensation and cap refill     Skin: General: Skin is warm and dry  Capillary Refill: Capillary refill takes less than 2 seconds  Neurological:      General: No focal deficit present  Mental Status: She is alert and oriented to person, place, and time  Psychiatric:         Mood and Affect: Mood normal          Vital Signs  ED Triage Vitals   Temperature Pulse Respirations Blood Pressure SpO2   05/10/23 1044 05/10/23 1044 05/10/23 1044 05/10/23 1044 05/10/23 1044   98 5 °F (36 9 °C) 87 16 139/61 99 %      Temp Source Heart Rate Source Patient Position - Orthostatic VS BP Location FiO2 (%)   05/10/23 1044 05/10/23 1044 05/10/23 1044 05/10/23 1044 --   Tympanic Monitor Sitting Left arm       Pain Score       05/10/23 1104       8           Vitals:    05/10/23 1044   BP: 139/61   Pulse: 87   Patient Position - Orthostatic VS: Sitting         Visual Acuity      ED Medications  Medications - No data to display    Diagnostic Studies  Results Reviewed     None                 VAS lower limb venous duplex study, unilateral/limited    (Results Pending)              Procedures  Procedures         ED Course                               SBIRT 22yo+    Flowsheet Row Most Recent Value   Initial Alcohol Screen: US AUDIT-C     1  How often do you have a drink containing alcohol? 0 Filed at: 05/10/2023 1105   2  How many drinks containing alcohol do you have on a typical day you are drinking? 0 Filed at: 05/10/2023 1105   3b  FEMALE Any Age, or MALE 65+: How often do you have 4 or more drinks on one occassion? 0 Filed at: 05/10/2023 1105   Audit-C Score 0 Filed at: 05/10/2023 1105   ARACELI: How many times in the past year have you    Used an illegal drug or used a prescription medication for non-medical reasons? Never Filed at: 05/10/2023 1105                    Medical Decision Making  78-year-old female sent in by PCP to rule out DVT  Fortunately venous duplex was performed and is negative    We will plan to discharge with outpatient follow-up and return precautions    Right leg pain: acute illness or injury  Amount and/or Complexity of Data Reviewed  Radiology: ordered  Decision-making details documented in ED Course  Disposition  Final diagnoses:   None     ED Disposition     None      Follow-up Information    None         Patient's Medications   Discharge Prescriptions    No medications on file       No discharge procedures on file      PDMP Review       Value Time User    PDMP Reviewed  Yes 12/24/2019  3:44 PM Michael Love MD          ED Provider  Electronically Signed by           Kristy Schultz MD  05/10/23 5871

## 2023-05-10 NOTE — LETTER
May 10, 2023     Patient: Maura Linares   YOB: 1975   Date of Visit: 5/10/2023       To Whom it May Concern:    Grant Caba was seen in my clinic on 5/10/2023 and sent to the ER for further evaluation  If you have any questions or concerns, please don't hesitate to call           Sincerely,          NEISHA Sampson        CC: No Recipients

## 2023-05-10 NOTE — Clinical Note
Abdi Watson was seen and treated in our emergency department on 5/10/2023  Diagnosis:     Abhishek Gabriel  may return to work on return date  She may return on this date: 05/11/2023         If you have any questions or concerns, please don't hesitate to call        Valentin Dupree MD    ______________________________           _______________          _______________  Hospital Representative                              Date                                Time

## 2023-06-01 DIAGNOSIS — I35.9 AORTIC VALVE DISORDER: ICD-10-CM

## 2023-06-01 RX ORDER — WARFARIN SODIUM 5 MG/1
TABLET ORAL
Qty: 45 TABLET | Refills: 5 | Status: SHIPPED | OUTPATIENT
Start: 2023-06-01

## 2023-06-05 ENCOUNTER — ANTICOAG VISIT (OUTPATIENT)
Dept: CARDIOLOGY CLINIC | Facility: CLINIC | Age: 48
End: 2023-06-05

## 2023-06-05 ENCOUNTER — APPOINTMENT (OUTPATIENT)
Dept: LAB | Facility: HOSPITAL | Age: 48
End: 2023-06-05
Attending: INTERNAL MEDICINE
Payer: COMMERCIAL

## 2023-06-05 DIAGNOSIS — I35.9 AORTIC VALVE DISORDER: Primary | ICD-10-CM

## 2023-06-27 ENCOUNTER — APPOINTMENT (OUTPATIENT)
Dept: LAB | Facility: HOSPITAL | Age: 48
End: 2023-06-27
Attending: INTERNAL MEDICINE
Payer: COMMERCIAL

## 2023-06-27 ENCOUNTER — ANTICOAG VISIT (OUTPATIENT)
Dept: CARDIOLOGY CLINIC | Facility: CLINIC | Age: 48
End: 2023-06-27

## 2023-06-27 DIAGNOSIS — I35.9 AORTIC VALVE DISORDER: Primary | ICD-10-CM

## 2023-06-27 NOTE — PROGRESS NOTES
S/w pt  Advised to stay on same dose and retest again in 2 weeks  States she had spinach this past week

## 2023-07-31 ENCOUNTER — ANTICOAG VISIT (OUTPATIENT)
Dept: CARDIOLOGY CLINIC | Facility: CLINIC | Age: 48
End: 2023-07-31

## 2023-07-31 ENCOUNTER — APPOINTMENT (OUTPATIENT)
Dept: LAB | Facility: HOSPITAL | Age: 48
End: 2023-07-31
Attending: INTERNAL MEDICINE
Payer: COMMERCIAL

## 2023-07-31 DIAGNOSIS — I35.9 AORTIC VALVE DISORDER: Primary | ICD-10-CM

## 2023-08-08 ENCOUNTER — CLINICAL SUPPORT (OUTPATIENT)
Dept: OBGYN CLINIC | Facility: MEDICAL CENTER | Age: 48
End: 2023-08-08
Payer: COMMERCIAL

## 2023-08-08 VITALS — DIASTOLIC BLOOD PRESSURE: 82 MMHG | SYSTOLIC BLOOD PRESSURE: 130 MMHG | BODY MASS INDEX: 21.48 KG/M2 | WEIGHT: 110 LBS

## 2023-08-08 DIAGNOSIS — Z32.02 NEGATIVE PREGNANCY TEST: ICD-10-CM

## 2023-08-08 DIAGNOSIS — Z30.42 SURVEILLANCE FOR DEPO-PROVERA CONTRACEPTION: Primary | ICD-10-CM

## 2023-08-08 LAB — SL AMB POCT URINE HCG: NEGATIVE

## 2023-08-08 PROCEDURE — 96372 THER/PROPH/DIAG INJ SC/IM: CPT

## 2023-08-08 PROCEDURE — 81025 URINE PREGNANCY TEST: CPT

## 2023-08-08 RX ORDER — MEDROXYPROGESTERONE ACETATE 150 MG/ML
150 INJECTION, SUSPENSION INTRAMUSCULAR ONCE
Status: COMPLETED | OUTPATIENT
Start: 2023-08-08 | End: 2023-08-08

## 2023-08-08 RX ADMIN — MEDROXYPROGESTERONE ACETATE 150 MG: 150 INJECTION, SUSPENSION INTRAMUSCULAR at 11:05

## 2023-08-08 NOTE — PROGRESS NOTES
Pt is here for Depo Provera injection. Her last dose was 4/25/23. Her annual exam was on 1/24/23. Urine pregnancy test done: yes   Result: neg  Depo given in L buttock   Tolerated well. Lot [id number] Exp 09/2025  Next dose due 3 mon.    Refill needed no

## 2023-09-05 DIAGNOSIS — Z00.00 PERIODIC HEALTH ASSESSMENT, GENERAL SCREENING, ADULT: ICD-10-CM

## 2023-09-05 RX ORDER — PRENATAL WITH FERROUS FUM AND FOLIC ACID 3080; 920; 120; 400; 22; 1.84; 3; 20; 10; 1; 12; 200; 27; 25; 2 [IU]/1; [IU]/1; MG/1; [IU]/1; MG/1; MG/1; MG/1; MG/1; MG/1; MG/1; UG/1; MG/1; MG/1; MG/1; MG/1
TABLET ORAL
Qty: 90 TABLET | Refills: 1 | Status: SHIPPED | OUTPATIENT
Start: 2023-09-05

## 2023-09-07 ENCOUNTER — APPOINTMENT (OUTPATIENT)
Dept: LAB | Facility: HOSPITAL | Age: 48
End: 2023-09-07
Attending: INTERNAL MEDICINE
Payer: COMMERCIAL

## 2023-09-07 ENCOUNTER — ANTICOAG VISIT (OUTPATIENT)
Dept: CARDIOLOGY CLINIC | Facility: CLINIC | Age: 48
End: 2023-09-07

## 2023-09-07 DIAGNOSIS — I35.9 AORTIC VALVE DISORDER: Primary | ICD-10-CM

## 2023-10-06 ENCOUNTER — TELEPHONE (OUTPATIENT)
Dept: OBGYN CLINIC | Facility: MEDICAL CENTER | Age: 48
End: 2023-10-06

## 2023-10-06 NOTE — TELEPHONE ENCOUNTER
Patient states she has had a reaction to  An always pad and has a rash that is burning. Happened in the past but was given a steroid  rec hc cr tid or urgent care which she refused.

## 2023-10-06 NOTE — TELEPHONE ENCOUNTER
Adolph Jarvis used a certain sanitary product and is having another reaction to it, it's got a rash that is burning. This happened recently - can we order the same medications? She said if she doesn't answer, to please leave her a voicemail as she can't bring her phone into work.

## 2023-10-18 ENCOUNTER — ANTICOAG VISIT (OUTPATIENT)
Dept: CARDIOLOGY CLINIC | Facility: CLINIC | Age: 48
End: 2023-10-18

## 2023-10-18 ENCOUNTER — APPOINTMENT (OUTPATIENT)
Dept: LAB | Facility: HOSPITAL | Age: 48
End: 2023-10-18
Attending: INTERNAL MEDICINE
Payer: COMMERCIAL

## 2023-10-18 DIAGNOSIS — I35.9 AORTIC VALVE DISORDER: Primary | ICD-10-CM

## 2023-10-26 ENCOUNTER — CLINICAL SUPPORT (OUTPATIENT)
Dept: OBGYN CLINIC | Facility: MEDICAL CENTER | Age: 48
End: 2023-10-26
Payer: COMMERCIAL

## 2023-10-26 VITALS — SYSTOLIC BLOOD PRESSURE: 138 MMHG | WEIGHT: 114.6 LBS | BODY MASS INDEX: 22.38 KG/M2 | DIASTOLIC BLOOD PRESSURE: 82 MMHG

## 2023-10-26 DIAGNOSIS — Z30.42 ENCOUNTER FOR MANAGEMENT AND INJECTION OF DEPO-PROVERA: Primary | ICD-10-CM

## 2023-10-26 PROCEDURE — 96372 THER/PROPH/DIAG INJ SC/IM: CPT

## 2023-10-26 RX ORDER — MEDROXYPROGESTERONE ACETATE 150 MG/ML
150 INJECTION, SUSPENSION INTRAMUSCULAR ONCE
Status: COMPLETED | OUTPATIENT
Start: 2023-10-26 | End: 2023-10-26

## 2023-10-26 RX ADMIN — MEDROXYPROGESTERONE ACETATE 150 MG: 150 INJECTION, SUSPENSION INTRAMUSCULAR at 11:04

## 2023-10-26 NOTE — PROGRESS NOTES
Pt is here for Depo Provera injection. Her last dose was 8/8/23  Her annual exam was on 1/24/23. Depo given in R buttock   Tolerated well.   Lot  Exp   Next dose due 1/5/24- 1/18/24  Refill needed no

## 2023-10-26 NOTE — PROGRESS NOTES
Pt is here for Depo Provera injection. Her last dose was 8/8/23  Her annual exam was on 1/24/23. Depo given in R buttock   Tolerated well.   Lot: [id number] Exp: 9/30/2024  Next dose due 1/5/24- 1/18/24  Refill needed no

## 2023-11-14 DIAGNOSIS — Z95.2 S/P AVR (AORTIC VALVE REPLACEMENT): Primary | ICD-10-CM

## 2023-11-14 RX ORDER — AMOXICILLIN 500 MG/1
CAPSULE ORAL
Qty: 4 CAPSULE | Refills: 0 | Status: SHIPPED | OUTPATIENT
Start: 2023-11-14 | End: 2023-11-24

## 2023-11-16 ENCOUNTER — APPOINTMENT (OUTPATIENT)
Dept: LAB | Facility: HOSPITAL | Age: 48
End: 2023-11-16
Attending: INTERNAL MEDICINE
Payer: COMMERCIAL

## 2023-11-16 ENCOUNTER — ANTICOAG VISIT (OUTPATIENT)
Dept: CARDIOLOGY CLINIC | Facility: CLINIC | Age: 48
End: 2023-11-16

## 2023-11-16 DIAGNOSIS — I35.9 AORTIC VALVE DISORDER: Primary | ICD-10-CM

## 2023-11-21 ENCOUNTER — TELEPHONE (OUTPATIENT)
Dept: CARDIOLOGY CLINIC | Facility: CLINIC | Age: 48
End: 2023-11-21

## 2023-11-21 DIAGNOSIS — Z95.2 S/P AVR (AORTIC VALVE REPLACEMENT): ICD-10-CM

## 2023-11-21 RX ORDER — AMOXICILLIN 500 MG/1
CAPSULE ORAL
Qty: 4 CAPSULE | Refills: 0 | OUTPATIENT
Start: 2023-11-21 | End: 2023-12-01

## 2023-11-21 NOTE — TELEPHONE ENCOUNTER
Name of Caller: Jocelyne Coburn) Pharmacy     Call back Number:  [phone]    Medication(s):      amoxicillin (AMOXIL) 500 mg capsule      Are we prescribing provider?:    30 or 90 day supply:    Pharmacy name/number:  [id number] - [place] - [address]      Last or Next appt?:

## 2023-12-09 DIAGNOSIS — I10 HYPERTENSION, ESSENTIAL: ICD-10-CM

## 2023-12-27 ENCOUNTER — ANTICOAG VISIT (OUTPATIENT)
Dept: CARDIOLOGY CLINIC | Facility: CLINIC | Age: 48
End: 2023-12-27

## 2023-12-27 ENCOUNTER — APPOINTMENT (OUTPATIENT)
Dept: LAB | Facility: HOSPITAL | Age: 48
End: 2023-12-27
Payer: COMMERCIAL

## 2023-12-27 DIAGNOSIS — I35.9 AORTIC VALVE DISORDER: Primary | ICD-10-CM

## 2024-01-04 ENCOUNTER — ANTICOAG VISIT (OUTPATIENT)
Dept: CARDIOLOGY CLINIC | Facility: CLINIC | Age: 49
End: 2024-01-04

## 2024-01-04 ENCOUNTER — LAB (OUTPATIENT)
Dept: LAB | Facility: HOSPITAL | Age: 49
End: 2024-01-04
Payer: COMMERCIAL

## 2024-01-04 DIAGNOSIS — Z79.01 LONG TERM (CURRENT) USE OF ANTICOAGULANTS: ICD-10-CM

## 2024-01-04 DIAGNOSIS — I35.9 AORTIC VALVE DISORDER: Primary | ICD-10-CM

## 2024-01-04 LAB
INR PPP: 1.6 (ref 0.84–1.19)
PROTHROMBIN TIME: 19.8 SECONDS (ref 11.6–14.5)

## 2024-01-04 PROCEDURE — 85610 PROTHROMBIN TIME: CPT

## 2024-01-04 PROCEDURE — 36415 COLL VENOUS BLD VENIPUNCTURE: CPT

## 2024-01-08 ENCOUNTER — TELEPHONE (OUTPATIENT)
Dept: CARDIOLOGY CLINIC | Facility: CLINIC | Age: 49
End: 2024-01-08

## 2024-01-08 NOTE — TELEPHONE ENCOUNTER
Lmom advising pt she can take her missed dose today but to make sure she keeps on time with current doses and retest as usual.

## 2024-01-15 ENCOUNTER — LAB (OUTPATIENT)
Dept: LAB | Facility: HOSPITAL | Age: 49
End: 2024-01-15
Payer: COMMERCIAL

## 2024-01-15 ENCOUNTER — TELEPHONE (OUTPATIENT)
Dept: OBGYN CLINIC | Facility: CLINIC | Age: 49
End: 2024-01-15

## 2024-01-15 ENCOUNTER — ANTICOAG VISIT (OUTPATIENT)
Dept: CARDIOLOGY CLINIC | Facility: CLINIC | Age: 49
End: 2024-01-15

## 2024-01-15 DIAGNOSIS — I35.9 AORTIC VALVE DISORDER: Primary | ICD-10-CM

## 2024-01-15 DIAGNOSIS — Z30.42 SURVEILLANCE FOR DEPO-PROVERA CONTRACEPTION: ICD-10-CM

## 2024-01-15 DIAGNOSIS — Z79.01 LONG TERM (CURRENT) USE OF ANTICOAGULANTS: ICD-10-CM

## 2024-01-15 LAB
INR PPP: 1.95 (ref 0.84–1.19)
PROTHROMBIN TIME: 23.1 SECONDS (ref 11.6–14.5)

## 2024-01-15 PROCEDURE — 85610 PROTHROMBIN TIME: CPT

## 2024-01-15 PROCEDURE — 36415 COLL VENOUS BLD VENIPUNCTURE: CPT

## 2024-01-16 ENCOUNTER — CLINICAL SUPPORT (OUTPATIENT)
Dept: OBGYN CLINIC | Facility: MEDICAL CENTER | Age: 49
End: 2024-01-16
Payer: COMMERCIAL

## 2024-01-16 VITALS — BODY MASS INDEX: 23.44 KG/M2 | SYSTOLIC BLOOD PRESSURE: 122 MMHG | DIASTOLIC BLOOD PRESSURE: 74 MMHG | WEIGHT: 120 LBS

## 2024-01-16 DIAGNOSIS — Z30.42 ENCOUNTER FOR DEPO-PROVERA CONTRACEPTION: Primary | ICD-10-CM

## 2024-01-16 PROCEDURE — 96372 THER/PROPH/DIAG INJ SC/IM: CPT

## 2024-01-16 RX ORDER — MEDROXYPROGESTERONE ACETATE 150 MG/ML
150 INJECTION, SUSPENSION INTRAMUSCULAR ONCE
Status: COMPLETED | OUTPATIENT
Start: 2024-01-16 | End: 2024-01-16

## 2024-01-16 RX ORDER — MEDROXYPROGESTERONE ACETATE 150 MG/ML
150 INJECTION, SUSPENSION INTRAMUSCULAR
Qty: 1 ML | Refills: 0 | Status: SHIPPED | OUTPATIENT
Start: 2024-01-16

## 2024-01-16 RX ADMIN — MEDROXYPROGESTERONE ACETATE 150 MG: 150 INJECTION, SUSPENSION INTRAMUSCULAR at 11:44

## 2024-01-16 NOTE — TELEPHONE ENCOUNTER
In regards to previous msg,  pt calling to see if depo can be filled,  she has an appt at 9:00 today,  PLEASE advise pt,  Thanks

## 2024-01-16 NOTE — PROGRESS NOTES
Pt is here for Depo Provera injection. Her last dose was 10/26/23.  Her annual exam was on 1/24/23.  Urine pregnancy test done: N   Result: N/A  Depo given in L Buttock  Tolerated well.  Lot [id number] Exp 5/2025  Next dose due April 2- 16, 2024.   Refill needed Yes

## 2024-01-24 ENCOUNTER — LAB (OUTPATIENT)
Dept: LAB | Facility: HOSPITAL | Age: 49
End: 2024-01-24
Payer: COMMERCIAL

## 2024-01-24 ENCOUNTER — ANTICOAG VISIT (OUTPATIENT)
Dept: CARDIOLOGY CLINIC | Facility: CLINIC | Age: 49
End: 2024-01-24

## 2024-01-24 DIAGNOSIS — I35.9 AORTIC VALVE DISORDER: Primary | ICD-10-CM

## 2024-01-24 DIAGNOSIS — I35.9 AORTIC VALVE DISORDER: ICD-10-CM

## 2024-01-24 DIAGNOSIS — Z79.01 LONG TERM (CURRENT) USE OF ANTICOAGULANTS: ICD-10-CM

## 2024-01-24 LAB
INR PPP: 2.05 (ref 0.84–1.19)
PROTHROMBIN TIME: 24 SECONDS (ref 11.6–14.5)

## 2024-01-24 PROCEDURE — 85610 PROTHROMBIN TIME: CPT

## 2024-01-24 PROCEDURE — 36415 COLL VENOUS BLD VENIPUNCTURE: CPT

## 2024-01-24 RX ORDER — WARFARIN SODIUM 5 MG/1
TABLET ORAL
Qty: 45 TABLET | Refills: 5 | Status: SHIPPED | OUTPATIENT
Start: 2024-01-24

## 2024-02-27 ENCOUNTER — APPOINTMENT (OUTPATIENT)
Dept: LAB | Facility: HOSPITAL | Age: 49
End: 2024-02-27
Attending: INTERNAL MEDICINE
Payer: COMMERCIAL

## 2024-02-27 ENCOUNTER — ANTICOAG VISIT (OUTPATIENT)
Dept: CARDIOLOGY CLINIC | Facility: CLINIC | Age: 49
End: 2024-02-27

## 2024-02-27 DIAGNOSIS — I35.9 AORTIC VALVE DISORDER: Primary | ICD-10-CM

## 2024-03-04 DIAGNOSIS — Z00.00 PERIODIC HEALTH ASSESSMENT, GENERAL SCREENING, ADULT: ICD-10-CM

## 2024-03-04 RX ORDER — PNV,CALCIUM 72/IRON/FOLIC ACID 27 MG-1 MG
TABLET ORAL
Qty: 90 TABLET | Refills: 1 | Status: SHIPPED | OUTPATIENT
Start: 2024-03-04

## 2024-03-06 ENCOUNTER — APPOINTMENT (OUTPATIENT)
Dept: LAB | Facility: HOSPITAL | Age: 49
End: 2024-03-06
Attending: INTERNAL MEDICINE
Payer: COMMERCIAL

## 2024-03-07 ENCOUNTER — ANTICOAG VISIT (OUTPATIENT)
Dept: CARDIOLOGY CLINIC | Facility: CLINIC | Age: 49
End: 2024-03-07

## 2024-03-07 DIAGNOSIS — I35.9 AORTIC VALVE DISORDER: Primary | ICD-10-CM

## 2024-03-20 ENCOUNTER — TELEPHONE (OUTPATIENT)
Dept: CARDIOLOGY CLINIC | Facility: CLINIC | Age: 49
End: 2024-03-20

## 2024-03-20 DIAGNOSIS — Z95.2 S/P AVR (AORTIC VALVE REPLACEMENT): ICD-10-CM

## 2024-03-20 RX ORDER — AMOXICILLIN 500 MG/1
CAPSULE ORAL
Qty: 4 CAPSULE | Refills: 4 | Status: SHIPPED | OUTPATIENT
Start: 2024-03-20 | End: 2024-03-20

## 2024-03-20 NOTE — TELEPHONE ENCOUNTER
Patient is having dental work done tomorrow and would like an antibiotic called into pharmacy.    Pharmacy [facility].

## 2024-03-20 NOTE — TELEPHONE ENCOUNTER
Prescription for amoxicillin 500 mg 4 capsules 1 hour prior to dental procedure sent to her pharmacy.

## 2024-03-20 NOTE — TELEPHONE ENCOUNTER
Called and left message on pt's vm in regards to Dr. DEMPSEY's message / instructions. And to call the office with further questions.

## 2024-04-10 ENCOUNTER — NURSE TRIAGE (OUTPATIENT)
Age: 49
End: 2024-04-10

## 2024-04-10 DIAGNOSIS — Z79.01 LONG TERM (CURRENT) USE OF ANTICOAGULANTS: Primary | ICD-10-CM

## 2024-04-10 DIAGNOSIS — Z30.42 SURVEILLANCE FOR DEPO-PROVERA CONTRACEPTION: ICD-10-CM

## 2024-04-10 RX ORDER — MEDROXYPROGESTERONE ACETATE 150 MG/ML
150 INJECTION, SUSPENSION INTRAMUSCULAR
Qty: 1 ML | Refills: 0 | Status: SHIPPED | OUTPATIENT
Start: 2024-04-10

## 2024-04-10 NOTE — TELEPHONE ENCOUNTER
Regarding: INR ORDER ASAP  ----- Message from Anne Mehta sent at 4/10/2024 11:26 AM EDT -----  Patient called in needing INR BW script entered in ASAP, she is currently at the lab and there is no order in. Thank you!

## 2024-04-11 ENCOUNTER — ANTICOAG VISIT (OUTPATIENT)
Dept: CARDIOLOGY CLINIC | Facility: CLINIC | Age: 49
End: 2024-04-11

## 2024-04-11 ENCOUNTER — APPOINTMENT (OUTPATIENT)
Dept: LAB | Facility: HOSPITAL | Age: 49
End: 2024-04-11

## 2024-04-11 ENCOUNTER — LAB (OUTPATIENT)
Dept: LAB | Facility: MEDICAL CENTER | Age: 49
End: 2024-04-11
Payer: COMMERCIAL

## 2024-04-11 DIAGNOSIS — I35.9 AORTIC VALVE DISORDER: Primary | ICD-10-CM

## 2024-04-11 DIAGNOSIS — Z79.01 LONG TERM (CURRENT) USE OF ANTICOAGULANTS: ICD-10-CM

## 2024-04-11 LAB
INR PPP: 2.26 (ref 0.84–1.19)
PROTHROMBIN TIME: 25.8 SECONDS (ref 11.6–14.5)

## 2024-04-11 PROCEDURE — 85610 PROTHROMBIN TIME: CPT

## 2024-04-11 PROCEDURE — 36415 COLL VENOUS BLD VENIPUNCTURE: CPT

## 2024-04-16 ENCOUNTER — ANNUAL EXAM (OUTPATIENT)
Dept: OBGYN CLINIC | Facility: MEDICAL CENTER | Age: 49
End: 2024-04-16

## 2024-04-16 ENCOUNTER — APPOINTMENT (OUTPATIENT)
Dept: LAB | Facility: MEDICAL CENTER | Age: 49
End: 2024-04-16
Payer: COMMERCIAL

## 2024-04-16 VITALS
WEIGHT: 113.2 LBS | DIASTOLIC BLOOD PRESSURE: 72 MMHG | SYSTOLIC BLOOD PRESSURE: 120 MMHG | HEIGHT: 60 IN | BODY MASS INDEX: 22.23 KG/M2

## 2024-04-16 DIAGNOSIS — Z12.31 ENCOUNTER FOR SCREENING MAMMOGRAM FOR MALIGNANT NEOPLASM OF BREAST: ICD-10-CM

## 2024-04-16 DIAGNOSIS — N84.1 CERVICAL POLYP: ICD-10-CM

## 2024-04-16 DIAGNOSIS — N92.6 IRREGULAR BLEEDING: ICD-10-CM

## 2024-04-16 DIAGNOSIS — Z11.51 SCREENING FOR HPV (HUMAN PAPILLOMAVIRUS): ICD-10-CM

## 2024-04-16 DIAGNOSIS — Z01.419 ROUTINE GYNECOLOGICAL EXAMINATION: Primary | ICD-10-CM

## 2024-04-16 DIAGNOSIS — Z30.42 ENCOUNTER FOR DEPO-PROVERA CONTRACEPTION: ICD-10-CM

## 2024-04-16 DIAGNOSIS — Z30.42 SURVEILLANCE FOR DEPO-PROVERA CONTRACEPTION: ICD-10-CM

## 2024-04-16 LAB
BASOPHILS # BLD AUTO: 0.03 THOUSANDS/ÂΜL (ref 0–0.1)
BASOPHILS NFR BLD AUTO: 1 % (ref 0–1)
EOSINOPHIL # BLD AUTO: 0.11 THOUSAND/ÂΜL (ref 0–0.61)
EOSINOPHIL NFR BLD AUTO: 2 % (ref 0–6)
ERYTHROCYTE [DISTWIDTH] IN BLOOD BY AUTOMATED COUNT: 11.9 % (ref 11.6–15.1)
HCT VFR BLD AUTO: 40.8 % (ref 34.8–46.1)
HGB BLD-MCNC: 13.5 G/DL (ref 11.5–15.4)
IMM GRANULOCYTES # BLD AUTO: 0.01 THOUSAND/UL (ref 0–0.2)
IMM GRANULOCYTES NFR BLD AUTO: 0 % (ref 0–2)
LYMPHOCYTES # BLD AUTO: 1.29 THOUSANDS/ÂΜL (ref 0.6–4.47)
LYMPHOCYTES NFR BLD AUTO: 23 % (ref 14–44)
MCH RBC QN AUTO: 30.7 PG (ref 26.8–34.3)
MCHC RBC AUTO-ENTMCNC: 33.1 G/DL (ref 31.4–37.4)
MCV RBC AUTO: 93 FL (ref 82–98)
MONOCYTES # BLD AUTO: 0.51 THOUSAND/ÂΜL (ref 0.17–1.22)
MONOCYTES NFR BLD AUTO: 9 % (ref 4–12)
NEUTROPHILS # BLD AUTO: 3.74 THOUSANDS/ÂΜL (ref 1.85–7.62)
NEUTS SEG NFR BLD AUTO: 65 % (ref 43–75)
NRBC BLD AUTO-RTO: 0 /100 WBCS
PLATELET # BLD AUTO: 270 THOUSANDS/UL (ref 149–390)
PMV BLD AUTO: 10.6 FL (ref 8.9–12.7)
RBC # BLD AUTO: 4.4 MILLION/UL (ref 3.81–5.12)
TSH SERPL DL<=0.05 MIU/L-ACNC: 0.79 UIU/ML (ref 0.45–4.5)
WBC # BLD AUTO: 5.69 THOUSAND/UL (ref 4.31–10.16)

## 2024-04-16 PROCEDURE — G0476 HPV COMBO ASSAY CA SCREEN: HCPCS | Performed by: PHYSICIAN ASSISTANT

## 2024-04-16 PROCEDURE — 85025 COMPLETE CBC W/AUTO DIFF WBC: CPT

## 2024-04-16 PROCEDURE — G0145 SCR C/V CYTO,THINLAYER,RESCR: HCPCS | Performed by: PHYSICIAN ASSISTANT

## 2024-04-16 PROCEDURE — 84443 ASSAY THYROID STIM HORMONE: CPT

## 2024-04-16 PROCEDURE — 36415 COLL VENOUS BLD VENIPUNCTURE: CPT

## 2024-04-16 RX ORDER — MEDROXYPROGESTERONE ACETATE 150 MG/ML
150 INJECTION, SUSPENSION INTRAMUSCULAR ONCE
Status: COMPLETED | OUTPATIENT
Start: 2024-04-16 | End: 2024-04-16

## 2024-04-16 RX ORDER — MEDROXYPROGESTERONE ACETATE 150 MG/ML
150 INJECTION, SUSPENSION INTRAMUSCULAR
Qty: 1 ML | Refills: 4 | Status: SHIPPED | OUTPATIENT
Start: 2024-04-16

## 2024-04-16 RX ADMIN — MEDROXYPROGESTERONE ACETATE 150 MG: 150 INJECTION, SUSPENSION INTRAMUSCULAR at 11:53
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
## 2024-04-16 NOTE — PROGRESS NOTES
Pt is here for Depo Provera injection. Her last dose was 10/26/23.  Her annual exam was on 1/24/23.  Urine pregnancy test done: N   Result: N/A  Depo given in [place]  Tolerated well.  Lot [id number] Exp 12/2025  Next dose due 7/2-7/16/24.   Refill needed Yes

## 2024-04-22 LAB
LAB AP GYN PRIMARY INTERPRETATION: NORMAL
Lab: NORMAL

## 2024-04-25 ENCOUNTER — OFFICE VISIT (OUTPATIENT)
Age: 49
End: 2024-04-25
Payer: COMMERCIAL

## 2024-04-25 VITALS
HEART RATE: 78 BPM | HEIGHT: 60 IN | BODY MASS INDEX: 22.38 KG/M2 | OXYGEN SATURATION: 97 % | RESPIRATION RATE: 18 BRPM | WEIGHT: 114 LBS | TEMPERATURE: 98.4 F | SYSTOLIC BLOOD PRESSURE: 100 MMHG | DIASTOLIC BLOOD PRESSURE: 70 MMHG

## 2024-04-25 DIAGNOSIS — Z00.00 ANNUAL PHYSICAL EXAM: Primary | ICD-10-CM

## 2024-04-25 DIAGNOSIS — G43.909 MIGRAINE WITHOUT STATUS MIGRAINOSUS, NOT INTRACTABLE, UNSPECIFIED MIGRAINE TYPE: ICD-10-CM

## 2024-04-25 DIAGNOSIS — Z59.00 HOMELESSNESS: ICD-10-CM

## 2024-04-25 DIAGNOSIS — I35.9 AORTIC VALVE DISORDER: ICD-10-CM

## 2024-04-25 DIAGNOSIS — R04.0 RECURRENT EPISTAXIS: ICD-10-CM

## 2024-04-25 PROBLEM — H65.92 LEFT NON-SUPPURATIVE OTITIS MEDIA: Status: RESOLVED | Noted: 2021-05-11 | Resolved: 2024-04-25

## 2024-04-25 PROCEDURE — 99386 PREV VISIT NEW AGE 40-64: CPT

## 2024-04-25 RX ORDER — RIZATRIPTAN BENZOATE 10 MG/1
10 TABLET ORAL AS NEEDED
Qty: 30 TABLET | Refills: 0 | Status: SHIPPED | OUTPATIENT
Start: 2024-04-25

## 2024-04-25 SDOH — ECONOMIC STABILITY - HOUSING INSECURITY: HOMELESSNESS UNSPECIFIED: Z59.00

## 2024-04-25 NOTE — ASSESSMENT & PLAN NOTE
Her INR is therapeutic.  Recommend she follow up with ENT again as her source could be further back in her nasal passage.  I was not able to see a source today.  Go to ER if unable to stop bleeding within an hour.  Stay current with checking her INR.  Given information about Coumadin and diet/medication precautions that could alter her INR.  No active bleeding noted on today's exam.

## 2024-04-25 NOTE — ASSESSMENT & PLAN NOTE
Follows with cardiology.  She denies symptoms.  Has a mechanical valve and is on warfarin.  She has her INR checked at least monthly.
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
## 2024-04-25 NOTE — ASSESSMENT & PLAN NOTE
"Offered referral to social work to assist with food and housing, but pt declines stating \"I want to do this on my own.  I don't trust anyone so I can't live in close proximity with others.\"  Her children are in [place] in a home.  She has her sister's place in [place] to go to at times.  She states she is doing well.  She will reach out if she changes her mind.  "

## 2024-05-09 ENCOUNTER — OFFICE VISIT (OUTPATIENT)
Dept: CARDIOLOGY CLINIC | Facility: CLINIC | Age: 49
End: 2024-05-09
Payer: COMMERCIAL

## 2024-05-09 VITALS
HEART RATE: 67 BPM | DIASTOLIC BLOOD PRESSURE: 76 MMHG | HEIGHT: 60 IN | SYSTOLIC BLOOD PRESSURE: 136 MMHG | OXYGEN SATURATION: 97 % | BODY MASS INDEX: 22.38 KG/M2 | WEIGHT: 114 LBS | RESPIRATION RATE: 16 BRPM

## 2024-05-09 DIAGNOSIS — Q25.1 COARCTATION OF AORTA: ICD-10-CM

## 2024-05-09 DIAGNOSIS — R09.89 LEFT CAROTID BRUIT: ICD-10-CM

## 2024-05-09 DIAGNOSIS — I35.9 AORTIC VALVE DISORDER: ICD-10-CM

## 2024-05-09 DIAGNOSIS — Z79.01 LONG TERM (CURRENT) USE OF ANTICOAGULANTS: ICD-10-CM

## 2024-05-09 DIAGNOSIS — I10 HYPERTENSION, ESSENTIAL: Primary | ICD-10-CM

## 2024-05-09 PROCEDURE — 99213 OFFICE O/P EST LOW 20 MIN: CPT | Performed by: INTERNAL MEDICINE
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
## 2024-05-17 ENCOUNTER — TELEPHONE (OUTPATIENT)
Age: 49
End: 2024-05-17

## 2024-05-17 NOTE — TELEPHONE ENCOUNTER
Patient is scheduled for a D & C for 6/6. She is no longer bleeding and does not know if she really needs to do the procedure. Please call patient back to discuss

## 2024-06-07 ENCOUNTER — LAB (OUTPATIENT)
Dept: LAB | Facility: HOSPITAL | Age: 49
End: 2024-06-07
Payer: COMMERCIAL

## 2024-06-07 ENCOUNTER — ANTICOAG VISIT (OUTPATIENT)
Dept: CARDIOLOGY CLINIC | Facility: CLINIC | Age: 49
End: 2024-06-07

## 2024-06-07 DIAGNOSIS — Z79.01 LONG TERM (CURRENT) USE OF ANTICOAGULANTS: ICD-10-CM

## 2024-06-07 DIAGNOSIS — I35.9 AORTIC VALVE DISORDER: Primary | ICD-10-CM

## 2024-06-07 LAB
INR PPP: 2.17 (ref 0.84–1.19)
PROTHROMBIN TIME: 25 SECONDS (ref 11.6–14.5)

## 2024-06-07 PROCEDURE — 36415 COLL VENOUS BLD VENIPUNCTURE: CPT

## 2024-06-07 PROCEDURE — 85610 PROTHROMBIN TIME: CPT

## 2024-06-10 ENCOUNTER — NURSE TRIAGE (OUTPATIENT)
Age: 49
End: 2024-06-10

## 2024-06-10 ENCOUNTER — OFFICE VISIT (OUTPATIENT)
Dept: URGENT CARE | Facility: CLINIC | Age: 49
End: 2024-06-10
Payer: COMMERCIAL

## 2024-06-10 VITALS
OXYGEN SATURATION: 96 % | TEMPERATURE: 98.9 F | SYSTOLIC BLOOD PRESSURE: 136 MMHG | HEART RATE: 75 BPM | RESPIRATION RATE: 18 BRPM | DIASTOLIC BLOOD PRESSURE: 70 MMHG

## 2024-06-10 DIAGNOSIS — L24.5 IRRITANT CONTACT DERMATITIS DUE TO OTHER CHEMICAL PRODUCTS: Primary | ICD-10-CM

## 2024-06-10 PROCEDURE — 99213 OFFICE O/P EST LOW 20 MIN: CPT | Performed by: STUDENT IN AN ORGANIZED HEALTH CARE EDUCATION/TRAINING PROGRAM

## 2024-06-10 RX ORDER — TRIAMCINOLONE ACETONIDE 1 MG/G
CREAM TOPICAL 2 TIMES DAILY
Qty: 15 G | Refills: 0 | Status: SHIPPED | OUTPATIENT
Start: 2024-06-10
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
## 2024-06-10 NOTE — TELEPHONE ENCOUNTER
Regarding: vaginal swelling  ----- Message from Christina MITTAL sent at 6/10/2024  2:50 PM EDT -----  Established pt called, reported vaginal swelling on one side. Pt describes having bad sunburn last week, developed rash on their stomach & then woke up to vaginal swelling. Pt unsure if symptoms related or not. Prescribed steroid triamcinolone (KENALOG) 0.1 % cream, inquired if it can be used on vaginal area? Additionally, pt mentioned they also have a 'croupy' cough & leg pain/ weakness. Seen in Urgent Care today for non-GYN symptoms. Attempted warm transfer.

## 2024-06-17 NOTE — PROGRESS NOTES
"Assessment/Plan:  Return to office for annual exam .  HSV outbreak. Avoid touching (discussed skin to skin transmission)  Valtrex rx sent to pharmacy on file. Instructions provided.  Call office with any worsening of symptoms.       1. Herpes simplex vulvovaginitis  -     valACYclovir (VALTREX) 500 mg tablet; Take one tablet twice daily x 3 days with an outbreak.             Subjective:      Patient ID: Victorina Garcia is a 48 y.o. female.    HPI    Victorina Garcia is a 48 y.o.  female who is here today for a problem visit .   Here today with right labial swelling for one week. This occurred after being ill (?URI).   Tony itching and burning.   Admits to new laundry detergent and use of aloe.   Uses a steroid cream a couple times with some possible improvement.   She does have a remote history of genital HSV.     Not currently sexually active.     The following portions of the patient's history were reviewed and updated as appropriate: allergies, current medications, past family history, past medical history, past social history, past surgical history, and problem list.    Review of Systems   Constitutional: Negative.    Genitourinary:  Positive for genital sores. Negative for dysuria, frequency, pelvic pain, urgency, vaginal bleeding, vaginal discharge and vaginal pain.   Musculoskeletal: Negative.    Skin: Negative.    Neurological:  Negative for headaches.   Hematological:  Negative for adenopathy.   Psychiatric/Behavioral: Negative.           Objective:      /80 (BP Location: Left arm, Patient Position: Sitting, Cuff Size: Standard)   Ht 4' 11.5\" (1.511 m)   Wt 51 kg (112 lb 6.4 oz)   BMI 22.32 kg/m²          Physical Exam  Vitals and nursing note reviewed.   Constitutional:       Appearance: Normal appearance. She is well-developed.   Genitourinary:     Exam position: Lithotomy position.      Labia:         Right: Lesion (multiple small ulcers noted on upper right labia majora) present. No " rash, tenderness or injury.         Left: No rash, tenderness, lesion or injury.       Urethra: No prolapse, urethral pain, urethral swelling or urethral lesion.       Lymphadenopathy:      Lower Body: Right inguinal adenopathy (< 1 cm) present. No left inguinal adenopathy.   Skin:     General: Skin is warm and dry.   Neurological:      Mental Status: She is alert and oriented to person, place, and time.   Psychiatric:         Mood and Affect: Mood normal.         Behavior: Behavior normal.

## 2024-06-18 ENCOUNTER — OFFICE VISIT (OUTPATIENT)
Dept: OBGYN CLINIC | Facility: MEDICAL CENTER | Age: 49
End: 2024-06-18
Payer: COMMERCIAL

## 2024-06-18 VITALS
SYSTOLIC BLOOD PRESSURE: 130 MMHG | WEIGHT: 112.4 LBS | HEIGHT: 60 IN | DIASTOLIC BLOOD PRESSURE: 80 MMHG | BODY MASS INDEX: 22.07 KG/M2

## 2024-06-18 DIAGNOSIS — A60.04 HERPES SIMPLEX VULVOVAGINITIS: Primary | ICD-10-CM

## 2024-06-18 PROCEDURE — 99213 OFFICE O/P EST LOW 20 MIN: CPT | Performed by: NURSE PRACTITIONER

## 2024-06-18 RX ORDER — VALACYCLOVIR HYDROCHLORIDE 500 MG/1
TABLET, FILM COATED ORAL
Qty: 6 TABLET | Refills: 5 | Status: SHIPPED | OUTPATIENT
Start: 2024-06-18 | End: 2025-06-16

## 2024-06-18 NOTE — PATIENT INSTRUCTIONS
Return to office for annual exam 4/25.  HSV outbreak. Avoid touching (discussed skin to skin transmission)  Valtrex rx sent to pharmacy on file. Instructions provided.   Call office with any worsening of symptoms.

## 2024-06-24 ENCOUNTER — ANTICOAG VISIT (OUTPATIENT)
Dept: CARDIOLOGY CLINIC | Facility: CLINIC | Age: 49
End: 2024-06-24

## 2024-06-24 ENCOUNTER — LAB (OUTPATIENT)
Dept: LAB | Facility: HOSPITAL | Age: 49
End: 2024-06-24
Payer: COMMERCIAL

## 2024-06-24 ENCOUNTER — TELEPHONE (OUTPATIENT)
Age: 49
End: 2024-06-24

## 2024-06-24 DIAGNOSIS — Z79.01 LONG TERM (CURRENT) USE OF ANTICOAGULANTS: ICD-10-CM

## 2024-06-24 DIAGNOSIS — I35.9 AORTIC VALVE DISORDER: Primary | ICD-10-CM

## 2024-06-24 LAB
INR PPP: 3.58 (ref 0.84–1.19)
PROTHROMBIN TIME: 36.8 SECONDS (ref 11.6–14.5)

## 2024-06-24 PROCEDURE — 36415 COLL VENOUS BLD VENIPUNCTURE: CPT

## 2024-06-24 PROCEDURE — 85610 PROTHROMBIN TIME: CPT

## 2024-06-24 NOTE — TELEPHONE ENCOUNTER
Caller: Victorina     Doctor: Dr. Marks     Reason for call: patient called and advised that she had been having a lot of SOB, chest pain, soughing which has been keeping her up at night. She went to urgent care and dismissed listening to her lungs. She is still having the same issues and was wondering if a nurse could give her a daniel back     Call back#: [phone]

## 2024-06-24 NOTE — TELEPHONE ENCOUNTER
Second attempt to call patient, no answer, left voice message to call back if assistance still needed.

## 2024-06-24 NOTE — TELEPHONE ENCOUNTER
First attempt to call patient, no answer, left voice message to call back if still in need of assistance.

## 2024-06-26 ENCOUNTER — HOSPITAL ENCOUNTER (OUTPATIENT)
Dept: VASCULAR ULTRASOUND | Facility: HOSPITAL | Age: 49
Discharge: HOME | End: 2024-06-26
Payer: COMMERCIAL

## 2024-06-26 ENCOUNTER — HOSPITAL ENCOUNTER (OUTPATIENT)
Dept: NON INVASIVE DIAGNOSTICS | Facility: HOSPITAL | Age: 49
Discharge: HOME | End: 2024-06-26
Payer: COMMERCIAL

## 2024-06-26 VITALS
BODY MASS INDEX: 21.99 KG/M2 | HEIGHT: 60 IN | WEIGHT: 112 LBS | HEART RATE: 75 BPM | DIASTOLIC BLOOD PRESSURE: 80 MMHG | SYSTOLIC BLOOD PRESSURE: 130 MMHG

## 2024-06-26 DIAGNOSIS — I35.9 AORTIC VALVE DISORDER: ICD-10-CM

## 2024-06-26 DIAGNOSIS — R09.89 LEFT CAROTID BRUIT: ICD-10-CM

## 2024-06-26 LAB
AORTIC ROOT: 2.5 CM
AORTIC VALVE MEAN VELOCITY: 17.7 M/S
APICAL FOUR CHAMBER EJECTION FRACTION: 65 %
ASCENDING AORTA: 2.2 CM
AV AREA BY CONTINUOUS VTI: 0.6 CM2
AV AREA PEAK VELOCITY: 0.7 CM2
AV LVOT MEAN GRADIENT: 4 MMHG
AV LVOT PEAK GRADIENT: 6 MMHG
AV MEAN GRADIENT: 14 MMHG
AV PEAK GRADIENT: 23 MMHG
AV VALVE AREA: 0.64 CM2
AV VELOCITY RATIO: 0.51
BSA FOR ECHO PROCEDURE: 1.45 M2
DOP CALC AO PEAK VEL: 2.38 M/S
DOP CALC AO VTI: 57.53 CM
DOP CALC LVOT AREA: 1.33 CM2
DOP CALC LVOT CARDIAC INDEX: 1.66 L/MIN/M2
DOP CALC LVOT CARDIAC OUTPUT: 2.41 L/MIN
DOP CALC LVOT DIAMETER: 1.3 CM
DOP CALC LVOT PEAK VEL VTI: 27.71 CM
DOP CALC LVOT PEAK VEL: 1.22 M/S
DOP CALC LVOT STROKE INDEX: 25.5 ML/M2
DOP CALC LVOT STROKE VOLUME: 36.76
E WAVE DECELERATION TIME: 245 MS
E/A RATIO: 1.51
FRACTIONAL SHORTENING: 33 (ref 28–44)
INTERVENTRICULAR SEPTUM IN DIASTOLE (PARASTERNAL SHORT AXIS VIEW): 0.9 CM
INTERVENTRICULAR SEPTUM: 0.9 CM (ref 0.6–1.1)
LAAS-AP2: 20.2 CM2
LAAS-AP4: 17.2 CM2
LEFT ATRIUM SIZE: 2.9 CM
LEFT ATRIUM VOLUME (MOD BIPLANE): 53 ML
LEFT ATRIUM VOLUME INDEX (MOD BIPLANE): 36.6 ML/M2
LEFT INTERNAL DIMENSION IN SYSTOLE: 3.2 CM (ref 2.1–4)
LEFT VENTRICULAR INTERNAL DIMENSION IN DIASTOLE: 4.8 CM (ref 3.5–6)
LEFT VENTRICULAR POSTERIOR WALL IN END DIASTOLE: 0.9 CM
LEFT VENTRICULAR STROKE VOLUME: 68 ML
LVSV (TEICH): 68 ML
MV E'TISSUE VEL-SEP: 12 CM/S
MV PEAK A VEL: 0.88 M/S
MV PEAK E VEL: 133 CM/S
MV STENOSIS PRESSURE HALF TIME: 71 MS
MV VALVE AREA P 1/2 METHOD: 3.1
RIGHT ATRIAL 2D VOLUME: 42 ML
RIGHT ATRIUM AREA SYSTOLE A4C: 15.8 CM2
RIGHT VENTRICLE ID DIMENSION: 3.8 CM
SL CV LEFT ATRIUM LENGTH A2C: 5.6 CM
SL CV LV EF: 60
SL CV PED ECHO LEFT VENTRICLE DIASTOLIC VOLUME (MOD BIPLANE) 2D: 110 ML
SL CV PED ECHO LEFT VENTRICLE SYSTOLIC VOLUME (MOD BIPLANE) 2D: 42 ML
TRICUSPID ANNULAR PLANE SYSTOLIC EXCURSION: 1.9 CM

## 2024-06-26 PROCEDURE — 93880 EXTRACRANIAL BILAT STUDY: CPT | Performed by: INTERNAL MEDICINE

## 2024-06-26 PROCEDURE — 93306 TTE W/DOPPLER COMPLETE: CPT | Performed by: INTERNAL MEDICINE

## 2024-06-26 PROCEDURE — 93306 TTE W/DOPPLER COMPLETE: CPT

## 2024-06-26 PROCEDURE — 93880 EXTRACRANIAL BILAT STUDY: CPT

## 2024-06-27 ENCOUNTER — TELEPHONE (OUTPATIENT)
Dept: CARDIOLOGY CLINIC | Facility: CLINIC | Age: 49
End: 2024-06-27

## 2024-06-27 NOTE — TELEPHONE ENCOUNTER
Called and left message on pt's vm with Dr. Rowe's recommendations. Also, to call the office with further questions.

## 2024-06-27 NOTE — TELEPHONE ENCOUNTER
Spoke with pt. Patient verbally understood the result of her Echo complete w/ contrast if indicated / Dr. DEMPSEY's message.    Pt would like to know if there is anything she can do to help prevent her left carotid stenosis from getting worse.

## 2024-06-27 NOTE — TELEPHONE ENCOUNTER
----- Message from Mary Marks MD sent at 6/26/2024  6:05 PM EDT -----  Please call the patient.  Echocardiogram shows normal ejection fraction at 60%.  Prosthetic mechanical aortic valve functioning normally.  There is coarctation of aorta which has been noted in the past.  Overall no major change from previous echocardiogram.    Carotid ultrasound showed 50 to 69% left carotid stenosis.  Patient will need repeat carotid ultrasound in 6 to 12 months.

## 2024-07-10 ENCOUNTER — CLINICAL SUPPORT (OUTPATIENT)
Dept: OBGYN CLINIC | Facility: MEDICAL CENTER | Age: 49
End: 2024-07-10
Payer: COMMERCIAL

## 2024-07-10 ENCOUNTER — NURSE TRIAGE (OUTPATIENT)
Age: 49
End: 2024-07-10

## 2024-07-10 VITALS — WEIGHT: 114 LBS | SYSTOLIC BLOOD PRESSURE: 142 MMHG | BODY MASS INDEX: 22.64 KG/M2 | DIASTOLIC BLOOD PRESSURE: 80 MMHG

## 2024-07-10 DIAGNOSIS — Z30.42 ENCOUNTER FOR DEPO-PROVERA CONTRACEPTION: Primary | ICD-10-CM

## 2024-07-10 PROCEDURE — 96372 THER/PROPH/DIAG INJ SC/IM: CPT

## 2024-07-10 RX ORDER — MEDROXYPROGESTERONE ACETATE 150 MG/ML
150 INJECTION, SUSPENSION INTRAMUSCULAR ONCE
Status: COMPLETED | OUTPATIENT
Start: 2024-07-10 | End: 2024-07-10

## 2024-07-10 RX ADMIN — MEDROXYPROGESTERONE ACETATE 150 MG: 150 INJECTION, SUSPENSION INTRAMUSCULAR at 09:20

## 2024-07-10 NOTE — TELEPHONE ENCOUNTER
"Pt called stating she has recently had BP's taken at different appt's and concerned of 130/80, 142/80 are too high especially with the carotid results that has had her very concerned.     She felt better after I discussed the results with her and she understands to repeat testing in 6 months. If >75% then she will be referred to a vascular surgeon.     She is reporting a chronic persistent dry cough for several weeks, months, and will reach out to the PCP to assess.   Reason for Disposition  • Systolic BP < 130 with Diastolic < 80, and taking BP medications    Answer Assessment - Initial Assessment Questions  1. BLOOD PRESSURE: \"What is the blood pressure?\" \"Did you take at least two measurements 5 minutes apart?\"      142/80  2. ONSET: \"When did you take your blood pressure?\"      This morning at an appt  3. HOW: \"How did you obtain the blood pressure?\" (e.g., visiting nurse, automatic home BP monitor)      Dr office  4. HISTORY: \"Do you have a history of high blood pressure?\"      yes  5. MEDICATIONS: \"Are you taking any medications for blood pressure?\" \"Have you missed any doses recently?\"      yes  6. OTHER SYMPTOMS: \"Do you have any symptoms?\" (e.g., headache, chest pain, blurred vision, difficulty breathing, weakness)      Dry cough    Protocols used: Blood Pressure - High-ADULT-[place]    "

## 2024-07-10 NOTE — PROGRESS NOTES
Pt is here for Depo Provera injection. Her last dose was 4/16/24.  Her annual exam was on 4/16/2024.  Urine pregnancy test done: no   Result: na  Depo given in RIGHT buttock  Tolerated well.  Lot [id number] Exp 02/2026  Next dose due sept.   Refill needed no

## 2024-07-10 NOTE — TELEPHONE ENCOUNTER
Patient can continue to monitor her blood pressures and report any high readings.    Patient to check her blood pressures midmorning 1 to 2 hours after taking her medications.    Systolic blood pressure more than 140 and diastolic blood pressure 90 or higher would be of concern.

## 2024-07-17 ENCOUNTER — APPOINTMENT (OUTPATIENT)
Dept: LAB | Facility: HOSPITAL | Age: 49
End: 2024-07-17
Payer: COMMERCIAL

## 2024-07-17 ENCOUNTER — ANTICOAG VISIT (OUTPATIENT)
Dept: CARDIOLOGY CLINIC | Facility: CLINIC | Age: 49
End: 2024-07-17

## 2024-07-17 DIAGNOSIS — Z79.01 LONG TERM (CURRENT) USE OF ANTICOAGULANTS: ICD-10-CM

## 2024-07-17 DIAGNOSIS — I35.9 AORTIC VALVE DISORDER: Primary | ICD-10-CM

## 2024-07-17 LAB
INR PPP: 3.49 (ref 0.84–1.19)
PROTHROMBIN TIME: 36.1 SECONDS (ref 11.6–14.5)

## 2024-07-17 PROCEDURE — 36415 COLL VENOUS BLD VENIPUNCTURE: CPT

## 2024-07-17 PROCEDURE — 85610 PROTHROMBIN TIME: CPT

## 2024-07-17 NOTE — PROGRESS NOTES
S/w pt. Advised to hold today, 5 mg, then 5 mg, then 7.5 mg repeating every 3 days  and retest in 1 week per Dr. DEMPSEY

## 2024-07-24 ENCOUNTER — LAB (OUTPATIENT)
Dept: LAB | Facility: HOSPITAL | Age: 49
End: 2024-07-24
Attending: INTERNAL MEDICINE
Payer: COMMERCIAL

## 2024-07-24 ENCOUNTER — ANTICOAG VISIT (OUTPATIENT)
Dept: CARDIOLOGY CLINIC | Facility: CLINIC | Age: 49
End: 2024-07-24

## 2024-07-24 DIAGNOSIS — Z79.01 LONG TERM (CURRENT) USE OF ANTICOAGULANTS: ICD-10-CM

## 2024-07-24 DIAGNOSIS — I35.9 AORTIC VALVE DISORDER: Primary | ICD-10-CM

## 2024-07-24 LAB
INR PPP: 1.88 (ref 0.84–1.19)
PROTHROMBIN TIME: 22.4 SECONDS (ref 11.6–14.5)

## 2024-07-24 PROCEDURE — 36415 COLL VENOUS BLD VENIPUNCTURE: CPT

## 2024-07-24 PROCEDURE — 85610 PROTHROMBIN TIME: CPT

## 2024-07-25 ENCOUNTER — TELEPHONE (OUTPATIENT)
Dept: CARDIOLOGY CLINIC | Facility: CLINIC | Age: 49
End: 2024-07-25

## 2024-07-25 NOTE — TELEPHONE ENCOUNTER
Pt called to report her BP readings.   7//80 @ 6:37pm  7//83 @ 1:40pm  7//66@3:40pm  7//76 @7:30am  7/-66 @ 10pm    She is not on BP meds. Pt has been stressed as she has been living in her car and recently got fired from her job a few days ago. She is wondering if these readings are concerning?

## 2024-08-20 ENCOUNTER — TELEPHONE (OUTPATIENT)
Dept: CARDIOLOGY CLINIC | Facility: CLINIC | Age: 49
End: 2024-08-20

## 2024-08-20 DIAGNOSIS — Z79.01 LONG TERM (CURRENT) USE OF ANTICOAGULANTS: Primary | ICD-10-CM

## 2024-08-20 NOTE — TELEPHONE ENCOUNTER
Gout    Gout is painful swelling of your joints. Gout is a type of arthritis. It is caused by having too much uric acid in your body. Uric acid is a chemical that is made when your body breaks down substances called purines. If your body has too much uric acid, sharp crystals can form and build up in your joints. This causes pain and swelling.  Gout attacks can happen quickly and be very painful (acute gout). Over time, the attacks can affect more joints and happen more often (chronic gout).  What are the causes?  Gout is caused by too much uric acid in your blood. This can happen because:  Your kidneys do not remove enough uric acid from your blood.  Your body makes too much uric acid.  You eat too many foods that are high in purines. These foods include organ meats, some seafood, and beer.  Trauma or stress can bring on an attack.  What increases the risk?  Having a family history of gout.  Being male and middle-aged.  Being female and having gone through menopause.  Having an organ transplant.  Taking certain medicines.  Having certain conditions, such as:  Being very overweight (obese).  Lead poisoning.  Kidney disease.  A skin condition called psoriasis.  Other risks include:  Losing weight too quickly.  Not having enough water in the body (being dehydrated).  Drinking alcohol, especially beer.  Drinking beverages that are sweetened with a type of sugar called fructose.  What are the signs or symptoms?    An attack of acute gout often starts at night and usually happens in just one joint. The most common place is the big toe. Other joints that may be affected include joints of the feet, ankle, knee, fingers, wrist, or elbow. Symptoms may include:  Very bad pain.  Warmth.  Swelling.  Stiffness.  Tenderness. The affected joint may be very painful to touch.  Shiny, red, or purple skin.  Chills and fever.  Chronic gout may cause symptoms more often. More joints may be involved. You may also have white or yellow  Pt called to report her PT INR results taken yesterday reported at a 2 9  lumps (tophi) on your hands or feet or in other areas near your joints.  How is this treated?  Treatment for an acute attack may include medicines for pain and swelling, such as:  NSAIDs, such as ibuprofen.  Steroids taken by mouth or injected into a joint.  Colchicine. This can be given by mouth or through an IV tube.  Treatment to prevent future attacks may include:  Taking small doses of NSAIDs or colchicine daily.  Using a medicine that reduces uric acid levels in your blood, such as allopurinol.  Making changes to your diet. You may need to see a food expert (dietitian) about what to eat and drink to prevent gout.  Follow these instructions at home:  During a gout attack    If told, put ice on the painful area. To do this:  Put ice in a plastic bag.  Place a towel between your skin and the bag.  Leave the ice on for 20 minutes, 2-3 times a day.  Take off the ice if your skin turns bright red. This is very important. If you cannot feel pain, heat, or cold, you have a greater risk of damage to the area.  Raise the painful joint above the level of your heart as often as you can.  Rest the joint as much as possible. If the joint is in your leg, you may be given crutches.  Follow instructions from your doctor about what you cannot eat or drink.  Avoiding future gout attacks  Eat a low-purine diet. Avoid foods and drinks such as:  Liver.  Kidney.  Anchovies.  Asparagus.  Herring.  Mushrooms.  Mussels.  Beer.  Stay at a healthy weight. If you want to lose weight, talk with your doctor. Do not lose weight too fast.  Start or continue an exercise plan as told by your doctor.  Eating and drinking  Avoid drinks sweetened by fructose.  Drink enough fluids to keep your pee (urine) pale yellow.  If you drink alcohol:  Limit how much you have to:  0-1 drink a day for women who are not pregnant.  0-2 drinks a day for men.  Know how much alcohol is in a drink. In the [place] one drink equals one 12 oz bottle of beer (355 mL), one  5 oz glass of wine (148 mL), or one 1½ oz glass of hard liquor (44 mL).  General instructions  Take over-the-counter and prescription medicines only as told by your doctor.  Ask your doctor if you should avoid driving or using machines while you are taking your medicine.  Return to your normal activities when your doctor says that it is safe.  Keep all follow-up visits.  Where to find more information  National Institutes of Health: www.niams.nih.gov  Contact a doctor if:  You have another gout attack.  You still have symptoms of a gout attack after 10 days of treatment.  You have problems (side effects) because of your medicines.  You have chills or a fever.  You have burning pain when you pee (urinate).  You have pain in your lower back or belly.  Get help right away if:  You have very bad pain.  Your pain cannot be controlled.  You cannot pee.  Summary  Gout is painful swelling of the joints.  The most common site of pain is the big toe, but it can affect other joints.  Medicines and avoiding some foods can help to prevent and treat gout attacks.  This information is not intended to replace advice given to you by your health care provider. Make sure you discuss any questions you have with your health care provider.  Document Revised: 09/21/2022 Document Reviewed: 09/21/2022  Elsevier Patient Education © 2024 [employer]

## 2024-08-20 NOTE — TELEPHONE ENCOUNTER
Caller: Victorina Garcia     Doctor: Mary Marks MD     Reason for call: Needs INR lab Work order faxed to [facility] in [place] for 12pm appt today   Fax: [phone]  Ph:[phone]    Call back#: [phone]

## 2024-08-21 ENCOUNTER — ANTICOAG VISIT (OUTPATIENT)
Dept: CARDIOLOGY CLINIC | Facility: CLINIC | Age: 49
End: 2024-08-21

## 2024-08-21 DIAGNOSIS — I35.9 AORTIC VALVE DISORDER: Primary | ICD-10-CM

## 2024-08-21 LAB — INR PPP: 1.7 (ref 0.85–1.19)

## 2024-08-29 DIAGNOSIS — Z00.00 PERIODIC HEALTH ASSESSMENT, GENERAL SCREENING, ADULT: ICD-10-CM

## 2024-08-29 RX ORDER — PNV,CALCIUM 72/IRON/FOLIC ACID 27 MG-1 MG
TABLET ORAL
Qty: 90 TABLET | Refills: 1 | Status: SHIPPED | OUTPATIENT
Start: 2024-08-29

## 2024-09-09 ENCOUNTER — TELEPHONE (OUTPATIENT)
Dept: CARDIOLOGY CLINIC | Facility: CLINIC | Age: 49
End: 2024-09-09

## 2024-09-09 NOTE — TELEPHONE ENCOUNTER
S/w the pt. She wanted to report she is on [place] temp now and does not have ins so she can't test for awhile until she gets insurance. Once she gets insurance she will be interested in the home machine. I will start that process once she gets the machine.

## 2024-09-13 DIAGNOSIS — I35.9 AORTIC VALVE DISORDER: ICD-10-CM

## 2024-09-13 RX ORDER — WARFARIN SODIUM 5 MG/1
TABLET ORAL
Qty: 135 TABLET | Refills: 3 | Status: SHIPPED | OUTPATIENT
Start: 2024-09-13

## 2024-10-03 ENCOUNTER — TELEPHONE (OUTPATIENT)
Facility: HOSPITAL | Age: 49
End: 2024-10-03

## 2024-10-07 ENCOUNTER — PATIENT OUTREACH (OUTPATIENT)
Facility: HOSPITAL | Age: 49
End: 2024-10-07

## 2024-10-09 ENCOUNTER — LAB (OUTPATIENT)
Dept: LAB | Facility: HOSPITAL | Age: 49
End: 2024-10-09
Payer: COMMERCIAL

## 2024-10-09 DIAGNOSIS — Z79.01 LONG TERM (CURRENT) USE OF ANTICOAGULANTS: ICD-10-CM

## 2024-10-09 LAB
INR PPP: 3.05 (ref 0.85–1.19)
PROTHROMBIN TIME: 32.2 SECONDS (ref 12.3–15)

## 2024-10-09 PROCEDURE — 85610 PROTHROMBIN TIME: CPT

## 2024-10-09 PROCEDURE — 36415 COLL VENOUS BLD VENIPUNCTURE: CPT

## 2024-10-10 ENCOUNTER — ANTICOAG VISIT (OUTPATIENT)
Dept: CARDIOLOGY CLINIC | Facility: CLINIC | Age: 49
End: 2024-10-10

## 2024-10-10 ENCOUNTER — CLINICAL SUPPORT (OUTPATIENT)
Dept: OBGYN CLINIC | Facility: MEDICAL CENTER | Age: 49
End: 2024-10-10

## 2024-10-10 DIAGNOSIS — I35.9 AORTIC VALVE DISORDER: Primary | ICD-10-CM

## 2024-10-10 DIAGNOSIS — Z30.42 ENCOUNTER FOR MANAGEMENT AND INJECTION OF DEPO-PROVERA: Primary | ICD-10-CM

## 2024-10-10 PROCEDURE — 96372 THER/PROPH/DIAG INJ SC/IM: CPT

## 2024-10-10 NOTE — PROGRESS NOTES
Pt is here for Depo Provera injection. Her last dose was 7/10/24.  Her annual exam was on 4/16/24.  Urine pregnancy test done: No  Depo given in L buttock  Tolerated well.  Lot [id number] Exp 04/2026  Next dose due 12/26-1/9.   Refill needed no

## 2024-10-14 ENCOUNTER — HOSPITAL ENCOUNTER (OUTPATIENT)
Age: 49
Discharge: HOME | End: 2024-10-14
Payer: OTHER GOVERNMENT

## 2024-10-14 DIAGNOSIS — Z12.31 ENCOUNTER FOR SCREENING MAMMOGRAM FOR MALIGNANT NEOPLASM OF BREAST: ICD-10-CM

## 2024-10-14 PROCEDURE — 77067 SCR MAMMO BI INCL CAD: CPT

## 2024-10-14 PROCEDURE — 77063 BREAST TOMOSYNTHESIS BI: CPT

## 2024-10-15 RX ORDER — MEDROXYPROGESTERONE ACETATE 150 MG/ML
150 INJECTION, SUSPENSION INTRAMUSCULAR ONCE
Status: COMPLETED | OUTPATIENT
Start: 2024-10-15 | End: 2024-10-15

## 2024-10-15 RX ADMIN — MEDROXYPROGESTERONE ACETATE 150 MG: 150 INJECTION, SUSPENSION INTRAMUSCULAR at 10:16

## 2024-10-16 ENCOUNTER — HOSPITAL ENCOUNTER (EMERGENCY)
Facility: HOSPITAL | Age: 49
Discharge: HOME | End: 2024-10-16
Attending: EMERGENCY MEDICINE

## 2024-10-16 ENCOUNTER — APPOINTMENT (EMERGENCY)
Dept: VASCULAR ULTRASOUND | Facility: HOSPITAL | Age: 49
End: 2024-10-16

## 2024-10-16 ENCOUNTER — NURSE TRIAGE (OUTPATIENT)
Age: 49
End: 2024-10-16

## 2024-10-16 VITALS
RESPIRATION RATE: 18 BRPM | TEMPERATURE: 98.5 F | DIASTOLIC BLOOD PRESSURE: 77 MMHG | HEART RATE: 75 BPM | OXYGEN SATURATION: 99 % | SYSTOLIC BLOOD PRESSURE: 164 MMHG

## 2024-10-16 DIAGNOSIS — S83.206A: ICD-10-CM

## 2024-10-16 DIAGNOSIS — M79.604 RIGHT LEG PAIN: Primary | ICD-10-CM

## 2024-10-16 LAB
ANION GAP SERPL CALCULATED.3IONS-SCNC: 8 MMOL/L (ref 4–13)
ATRIAL RATE: 89 BPM
BASOPHILS # BLD AUTO: 0.04 THOUSANDS/ΜL (ref 0–0.1)
BASOPHILS NFR BLD AUTO: 1 % (ref 0–1)
BUN SERPL-MCNC: 13 MG/DL (ref 5–25)
CALCIUM SERPL-MCNC: 8.8 MG/DL (ref 8.4–10.2)
CHLORIDE SERPL-SCNC: 108 MMOL/L (ref 96–108)
CO2 SERPL-SCNC: 23 MMOL/L (ref 21–32)
CREAT SERPL-MCNC: 0.73 MG/DL (ref 0.6–1.3)
EOSINOPHIL # BLD AUTO: 0.07 THOUSAND/ΜL (ref 0–0.61)
EOSINOPHIL NFR BLD AUTO: 1 % (ref 0–6)
ERYTHROCYTE [DISTWIDTH] IN BLOOD BY AUTOMATED COUNT: 11.7 % (ref 11.6–15.1)
GFR SERPL CREATININE-BSD FRML MDRD: 95 ML/MIN/1.73SQ M
GLUCOSE SERPL-MCNC: 98 MG/DL (ref 65–140)
HCT VFR BLD AUTO: 37.2 % (ref 34.8–46.1)
HGB BLD-MCNC: 12.7 G/DL (ref 11.5–15.4)
IMM GRANULOCYTES # BLD AUTO: 0.01 THOUSAND/UL (ref 0–0.2)
IMM GRANULOCYTES NFR BLD AUTO: 0 % (ref 0–2)
INR PPP: 2.82 (ref 0.85–1.19)
LYMPHOCYTES # BLD AUTO: 1.29 THOUSANDS/ΜL (ref 0.6–4.47)
LYMPHOCYTES NFR BLD AUTO: 21 % (ref 14–44)
MCH RBC QN AUTO: 30.3 PG (ref 26.8–34.3)
MCHC RBC AUTO-ENTMCNC: 34.1 G/DL (ref 31.4–37.4)
MCV RBC AUTO: 89 FL (ref 82–98)
MONOCYTES # BLD AUTO: 0.47 THOUSAND/ΜL (ref 0.17–1.22)
MONOCYTES NFR BLD AUTO: 8 % (ref 4–12)
NEUTROPHILS # BLD AUTO: 4.25 THOUSANDS/ΜL (ref 1.85–7.62)
NEUTS SEG NFR BLD AUTO: 69 % (ref 43–75)
NRBC BLD AUTO-RTO: 0 /100 WBCS
PLATELET # BLD AUTO: 255 THOUSANDS/UL (ref 149–390)
PMV BLD AUTO: 9.5 FL (ref 8.9–12.7)
POTASSIUM SERPL-SCNC: 3.7 MMOL/L (ref 3.5–5.3)
PROTHROMBIN TIME: 30.3 SECONDS (ref 12.3–15)
QRS AXIS: 67 DEGREES
QRSD INTERVAL: 74 MS
QT INTERVAL: 406 MS
QTC INTERVAL: 447 MS
RBC # BLD AUTO: 4.19 MILLION/UL (ref 3.81–5.12)
SODIUM SERPL-SCNC: 139 MMOL/L (ref 135–147)
T WAVE AXIS: 56 DEGREES
VENTRICULAR RATE: 73 BPM
WBC # BLD AUTO: 6.13 THOUSAND/UL (ref 4.31–10.16)

## 2024-10-16 PROCEDURE — 99285 EMERGENCY DEPT VISIT HI MDM: CPT | Performed by: EMERGENCY MEDICINE

## 2024-10-16 PROCEDURE — 93005 ELECTROCARDIOGRAM TRACING: CPT

## 2024-10-16 PROCEDURE — 85025 COMPLETE CBC W/AUTO DIFF WBC: CPT | Performed by: EMERGENCY MEDICINE

## 2024-10-16 PROCEDURE — 93971 EXTREMITY STUDY: CPT

## 2024-10-16 PROCEDURE — 36415 COLL VENOUS BLD VENIPUNCTURE: CPT | Performed by: EMERGENCY MEDICINE

## 2024-10-16 PROCEDURE — 93971 EXTREMITY STUDY: CPT | Performed by: SURGERY

## 2024-10-16 PROCEDURE — 80048 BASIC METABOLIC PNL TOTAL CA: CPT | Performed by: EMERGENCY MEDICINE

## 2024-10-16 PROCEDURE — 85610 PROTHROMBIN TIME: CPT | Performed by: EMERGENCY MEDICINE

## 2024-10-16 PROCEDURE — 99284 EMERGENCY DEPT VISIT MOD MDM: CPT

## 2024-10-16 PROCEDURE — 93010 ELECTROCARDIOGRAM REPORT: CPT | Performed by: INTERNAL MEDICINE

## 2024-10-16 NOTE — TELEPHONE ENCOUNTER
"Chief Complaint: right leg pain    History of Present Illness (HPI): Patient states for last 3wks has been having right leg pain, knee down; also noticed ecchymosis and swelling. Having difficulty walking, is limping. Currently on Coumadin, last INR >3. Also recent drives to [place] and back.     Patient leaving Saturday for [place], no appointments prior to that. Advised contacting PCP but she feels she will be useless. So advised either UC or ED. Patient will go to the ED for evaluation at this time.    VS/Weight: NA    Pain: No not chest pain; right leg pain    Risk Factors: Hypertension and Other valve replacement    Recent Testing: Echo and Labs    Medicine: Coumadin    Upcoming Office Visit: No    Last Office Visit: 5-9-24    Additional Comments: Will be seen in ED.           Reason for Disposition   MODERATE pain (e.g., interferes with normal activities, limping) and present > 3 days    Answer Assessment - Initial Assessment Questions  1. ONSET: \"When did the pain start?\"       3wks ago  2. LOCATION: \"Where is the pain located?\"       Behind the knee going down to the calf  3. PAIN: \"How bad is the pain?\"    (Scale 1-10; or mild, moderate, severe)    -  MILD (1-3): doesn't interfere with normal activities     -  MODERATE (4-7): interferes with normal activities (e.g., work or school) or awakens from sleep, limping     -  SEVERE (8-10): excruciating pain, unable to do any normal activities, unable to walk      5/10 at rest, 8/10 with activity  4. WORK OR EXERCISE: \"Has there been any recent work or exercise that involved this part of the body?\"       denies  5. CAUSE: \"What do you think is causing the leg pain?\"      unsure  6. OTHER SYMPTOMS: \"Do you have any other symptoms?\" (e.g., chest pain, back pain, breathing difficulty, swelling, rash, fever, numbness, weakness)      Ecchymotic areas noted behind knee; swelling bottom back of knee to calf; just doesn't feel right  7. PREGNANCY: \"Is there any chance you " "are pregnant?\" \"When was your last menstrual period?\"      denies    Protocols used: Leg Pain-ADULT-[place]    "

## 2024-10-16 NOTE — DISCHARGE INSTRUCTIONS
Normal care  Continue with the same Coumadin  Follow-up with your provider or sports medicine for persistent knee pain  Return if problems

## 2024-10-16 NOTE — ED PROVIDER NOTES
Time reflects when diagnosis was documented in both MDM as applicable and the Disposition within this note       Time User Action Codes Description Comment    10/16/2024  3:19 PM Joshua Prado Add [M79.604] Right leg pain     10/16/2024  3:19 PM Joshua Prado Add [S83.206A] Tear of meniscus of right knee as current injury, initial encounter           ED Disposition       ED Disposition   Discharge    Condition   Stable    Date/Time   Wed Oct 16, 2024  3:19 PM    Comment   Victorina Garcia discharge to home/self care.                   Assessment & Plan       Medical Decision Making  Amount and/or Complexity of Data Reviewed  Labs: ordered.    Medical decision making 49-year-old female presents emergency department with right knee pain and some swelling.  Reports a prior knee problem and unable to get her knee into full extension.  I believe this is a meniscus injury and she has some joint swelling from the injury.  She has no obvious calf tenderness she had a normal ultrasound there was no DVT.  She has a normal INR for her mechanical valve at 2.82.  Discussed with patient advised continue medications no indication to change her anticoagulation.  We discussed follow-up with sports medicine for her knee which is more likely a meniscus issue.  We discussed indications to return.  Patient reports issues with insurance and difficulty with insurance coverage at this time.         Medications - No data to display    ED Risk Strat Scores       Diagnostic testing, electrolytes were within normal limits, patient was concerned her urine was concentrated so BUN/creatinine were important to rule out dehydration they were normal.  White count is normal at 6.1 no sign of inflammation hemoglobin was 12 no sign of anemia.  No sign of bleeding.  Patient apparently had an elevated INR recently today her INR is 2.8 which is appropriate for her valve.  Ultrasound showed no DVT.                    SBIRT 20yo+      Flowsheet Row Most  Recent Value   Initial Alcohol Screen: US AUDIT-C     1. How often do you have a drink containing alcohol? 0 Filed at: 10/16/2024 140   2. How many drinks containing alcohol do you have on a typical day you are drinking?  0 Filed at: 10/16/2024 1402   3a. Male UNDER 65: How often do you have five or more drinks on one occasion? 0 Filed at: 10/16/2024 1401   3b. FEMALE Any Age, or MALE 65+: How often do you have 4 or more drinks on one occassion? 0 Filed at: 10/16/2024 1405   Audit-C Score 0 Filed at: 10/16/2024 1406   ARACELI: How many times in the past year have you...    Used an illegal drug or used a prescription medication for non-medical reasons? Never Filed at: 10/16/2024 1404                            History of Present Illness       Chief Complaint   Patient presents with    Leg Swelling     Referred from cardiologist due to right leg swelling x 2 days        Past Medical History:   Diagnosis Date    Abnormal Pap smear of cervix     Allergic contact dermatitis     Allergic rhinitis     resolved 01/17/2018    Aortic valve disorder     Aortic valve insufficiency     Asthma     Benign neoplasm of skin     Cardiac disease     Costochondritis     Hyperinsulinism     Inguinal lymphadenopathy     resolved 08/27/2015    Left non-suppurative otitis media 05/11/2021    Migraine     resolved 08/27/2015    Nosebleed     Varicose 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     unspecified laterality      Past Surgical History:   Procedure Laterality Date    AORTIC VALVE REPLACEMENT      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, replaced with mechanical aortic valve  and root replacement at [facility] dr sawyer    AUGMENTATION BREAST      enlargement    AUGMENTATION MAMMAPLASTY Bilateral 01/01/2010    2010    CARDIAC VALVE REPLACEMENT  03/2011    bovine, with redo and mechanical valve replacement and root 2012 dr sawyer at [facility] last assessed 08/15/2016    CERVICAL BIOPSY  W/ LOOP ELECTRODE EXCISION      COLPOSCOPY       INDUCED       surgically by dilation and evacuation    NASAL/SINUS ENDOSCOPY Left 2019    Procedure: ENDOSCOPIC CONTROL OF EPISTAXIS (LEFT);  Surgeon: Elan Govea MD;  Location: [facility];  Service: ENT    RHINOPLASTY      SEPTOPLASTY        Family History   Problem Relation Age of Onset    No Known Problems Mother     Asthma Father     Coronary artery disease Father     Hypertension Father     No Known Problems Sister     No Known Problems Sister     No Known Problems Sister     No Known Problems Daughter     No Known Problems Daughter     Breast cancer Maternal Grandmother         unkown age    Diabetes Maternal Grandfather     No Known Problems Paternal Grandmother     Other Paternal Grandfather         Susanne Fever    No Known Problems Son     No Known Problems Son     No Known Problems Son     No Known Problems Paternal Aunt       Social History     Tobacco Use    Smoking status: Never    Smokeless tobacco: Never   Vaping Use    Vaping status: Never Used   Substance Use Topics    Alcohol use: Yes    Drug use: Never      E-Cigarette/Vaping    E-Cigarette Use Never User       E-Cigarette/Vaping Substances    Nicotine No     THC No     CBD No     Flavoring No     Other No     Unknown No       I have reviewed and agree with the history as documented.     HPI patient is a 49-year-old female history of mechanical heart valve on Coumadin presents emergency department with some ecchymosis around her right knee apparently had an elevated INR in the past.  Apparently called cardiology today and was triaged to the emergency department for some calf swelling and tenderness with some ecchymosis concern for DVT also apparently had an INR on  of 3.05.  Patient reports she ate a salad today and reports that her urine seems somewhat concentrated.  Patient denies any trauma.  She reports she cannot normally get her right knee into full extension for significant period of time probably greater  than months.  Past medical history of mechanical valve on Coumadin  Family's noncontributory   social history, non-smoker no history of drug abuse    Review of Systems   Constitutional:  Negative for fever.   HENT:  Negative for congestion.    Eyes:  Negative for pain and redness.   Respiratory:  Negative for cough and shortness of breath.    Cardiovascular:  Positive for leg swelling. Negative for chest pain.   Gastrointestinal:  Negative for abdominal pain and vomiting.   Right leg swelling, bruise around the right knee        Objective       ED Triage Vitals [10/16/24 1357]   Temperature Pulse Blood Pressure Respirations SpO2 Patient Position - Orthostatic VS   98.5 °F (36.9 °C) 75 164/77 18 99 % Sitting      Temp Source Heart Rate Source BP Location FiO2 (%) Pain Score    Temporal Monitor Left arm -- --      Vitals      Date and Time Temp Pulse SpO2 Resp BP Pain Score FACES Pain Rating User   10/16/24 1357 98.5 °F (36.9 °C) 75 99 % 18 164/77 -- -- GP            Physical Exam  Vitals and nursing note reviewed.   Constitutional:       Appearance: She is well-developed.   HENT:      Head: Normocephalic.      Right Ear: External ear normal.      Left Ear: External ear normal.      Nose: Nose normal.      Mouth/Throat:      Mouth: Mucous membranes are moist.      Pharynx: Oropharynx is clear.   Eyes:      General: Lids are normal.      Extraocular Movements: Extraocular movements intact.      Pupils: Pupils are equal, round, and reactive to light.   Pulmonary:      Effort: Pulmonary effort is normal. No respiratory distress.   Musculoskeletal:         General: No deformity.      Cervical back: Normal range of motion and neck supple.      Comments: Patient's right knee will not go into full extension, there is an ecchymosis over the medial condyle of the femur, no large hematoma, no skin breakdown, distal neurovascular tendon intact good distal pulses dorsalis pedis posterior tibial less than 2-second capillary  refill.   Skin:     General: Skin is warm and dry.   Neurological:      Mental Status: She is alert and oriented to person, place, and time.   Psychiatric:         Mood and Affect: Mood normal.         Results Reviewed       Procedure Component Value Units Date/Time    Protime-INR [[id number]]  (Abnormal) Collected: 10/16/24 1435    Lab Status: Final result Specimen: Blood from Arm, Right Updated: 10/16/24 1503     Protime 30.3 seconds      INR 2.82    Narrative:      INR Therapeutic Range    Indication                                             INR Range      Atrial Fibrillation                                               2.0-3.0  Hypercoagulable State                                    2.0.2.3  Left Ventricular Asist Device                            2.0-3.0  Mechanical Heart Valve                                  -    Aortic(with afib, MI, embolism, HF, LA enlargement,    and/or coagulopathy)                                     2.0-3.0 (2.5-3.5)     Mitral                                                             2.5-3.5  Prosthetic/Bioprosthetic Heart Valve               2.0-3.0  Venous thromboembolism (VTE: VT, PE        2.0-3.0    Basic metabolic panel [[id number]] Collected: 10/16/24 1435    Lab Status: Final result Specimen: Blood from Arm, Right Updated: 10/16/24 1503     Sodium 139 mmol/L      Potassium 3.7 mmol/L      Chloride 108 mmol/L      CO2 23 mmol/L      ANION GAP 8 mmol/L      BUN 13 mg/dL      Creatinine 0.73 mg/dL      Glucose 98 mg/dL      Calcium 8.8 mg/dL      eGFR 95 ml/min/1.73sq m     Narrative:      National Kidney Disease Foundation guidelines for Chronic Kidney Disease (CKD):     Stage 1 with normal or high GFR (GFR > 90 mL/min/1.73 square meters)    Stage 2 Mild CKD (GFR = 60-89 mL/min/1.73 square meters)    Stage 3A Moderate CKD (GFR = 45-59 mL/min/1.73 square meters)    Stage 3B Moderate CKD (GFR = 30-44 mL/min/1.73 square meters)    Stage 4 Severe CKD (GFR = 15-29 mL/min/1.73  square meters)    Stage 5 End Stage CKD (GFR <15 mL/min/1.73 square meters)  Note: GFR calculation is accurate only with a steady state creatinine    CBC and differential [[id number]] Collected: 10/16/24 1435    Lab Status: Final result Specimen: Blood from Arm, Right Updated: 10/16/24 1446     WBC 6.13 Thousand/uL      RBC 4.19 Million/uL      Hemoglobin 12.7 g/dL      Hematocrit 37.2 %      MCV 89 fL      MCH 30.3 pg      MCHC 34.1 g/dL      RDW 11.7 %      MPV 9.5 fL      Platelets 255 Thousands/uL      nRBC 0 /100 WBCs      Segmented % 69 %      Immature Grans % 0 %      Lymphocytes % 21 %      Monocytes % 8 %      Eosinophils Relative 1 %      Basophils Relative 1 %      Absolute Neutrophils 4.25 Thousands/µL      Absolute Immature Grans 0.01 Thousand/uL      Absolute Lymphocytes 1.29 Thousands/µL      Absolute Monocytes 0.47 Thousand/µL      Eosinophils Absolute 0.07 Thousand/µL      Basophils Absolute 0.04 Thousands/µL             VAS VENOUS DUPLEX -LOWER LIMB UNILATERAL    (Results Pending)       ECG 12 Lead Documentation Only    Date/Time: 10/16/2024 3:24 PM    Performed by: Joshua Prado MD  Authorized by: Joshua Prado MD    Indications / Diagnosis:  Leg swelling  ECG reviewed by me, the ED Provider: yes    Patient location:  ED  Previous ECG:     Previous ECG:  Unavailable  Interpretation:     Interpretation: non-specific    Rate:     ECG rate:  73    ECG rate assessment: normal    Rhythm:     Rhythm: sinus rhythm    Comments:      Normal sinus rhythm right bundle branch block no acute ST-T wave changes      ED Medication and Procedure Management   Prior to Admission Medications   Prescriptions Last Dose Informant Patient Reported? Taking?   Ascorbic Acid (vitamin C) 1000 MG tablet  Self Yes No   Sig: Take 1,000 mg by mouth daily   Calcium Carb-Cholecalciferol (CALCIUM/VITAMIN D PO)  Self Yes No   Sig: Take by mouth   Prenatal Vit-Fe Fumarate-FA (WesTab Plus) 27-1 MG TABS   No No   Sig: take 1 tablet by  mouth once daily   aspirin (ECOTRIN LOW STRENGTH) 81 mg EC tablet  Self Yes No   Sig: Take 81 mg by mouth daily   medroxyPROGESTERone (DEPO-PROVERA) 150 mg/mL injection  Self No No   Sig: Inject 1 mL (150 mg total) into a muscle every 3 (three) months   metoprolol tartrate (LOPRESSOR) 25 mg tablet  Self No No   Sig: take 1 tablet by mouth twice a day   rizatriptan (MAXALT) 10 mg tablet  Self No No   Sig: Take 1 tablet (10 mg total) by mouth as needed for migraine for up to 30 doses Take at the onset of migraine; if symptoms continue or return, may take another dose at least 2 hours after first dose. Take no more than 2 doses in a day.   triamcinolone (KENALOG) 0.1 % cream  Self No No   Sig: Apply topically 2 (two) times a day   valACYclovir (VALTREX) 500 mg tablet   No No   Sig: Take one tablet twice daily x 3 days with an outbreak.   warfarin (COUMADIN) 5 mg tablet   No No   Sig: TAKE 1 TO 1 AND 1/2 TABLETS DAILY BY MOUTH OR AS DIRECTED      Facility-Administered Medications: None     Discharge Medication List as of 10/16/2024  3:20 PM        CONTINUE these medications which have NOT CHANGED    Details   Ascorbic Acid (vitamin C) 1000 MG tablet Take 1,000 mg by mouth daily, Historical Med      aspirin (ECOTRIN LOW STRENGTH) 81 mg EC tablet Take 81 mg by mouth daily, Historical Med      Calcium Carb-Cholecalciferol (CALCIUM/VITAMIN D PO) Take by mouth, Historical Med      medroxyPROGESTERone (DEPO-PROVERA) 150 mg/mL injection Inject 1 mL (150 mg total) into a muscle every 3 (three) months, Starting Tue 4/16/2024, Normal      metoprolol tartrate (LOPRESSOR) 25 mg tablet take 1 tablet by mouth twice a day, Starting Mon 12/11/2023, Normal      Prenatal Vit-Fe Fumarate-FA (WesTab Plus) 27-1 MG TABS take 1 tablet by mouth once daily, Normal      rizatriptan (MAXALT) 10 mg tablet Take 1 tablet (10 mg total) by mouth as needed for migraine for up to 30 doses Take at the onset of migraine; if symptoms continue or return,  may take another dose at least 2 hours after first dose. Take no more than 2 doses in a day., Starting Thu 4/25/ 2024, Normal      triamcinolone (KENALOG) 0.1 % cream Apply topically 2 (two) times a day, Starting Mon 6/10/2024, Normal      valACYclovir (VALTREX) 500 mg tablet Take one tablet twice daily x 3 days with an outbreak., Normal      warfarin (COUMADIN) 5 mg tablet TAKE 1 TO 1 AND 1/2 TABLETS DAILY BY MOUTH OR AS DIRECTED, Normal           No discharge procedures on file.  ED SEPSIS DOCUMENTATION   Time reflects when diagnosis was documented in both MDM as applicable and the Disposition within this note       Time User Action Codes Description Comment    10/16/2024  3:19 PM Joshua Prado [M79.604] Right leg pain     10/16/2024  3:19 PM Joshua Prado [S83.206A] Tear of meniscus of right knee as current injury, initial encounter                  Joshua Prado MD  10/16/24 8150

## 2024-10-23 ENCOUNTER — TELEPHONE (OUTPATIENT)
Age: 49
End: 2024-10-23

## 2024-10-23 NOTE — TELEPHONE ENCOUNTER
Called and left Dr. DEMPSEY's message on pt's vm and to call the office with any further questions or concerns

## 2024-10-23 NOTE — TELEPHONE ENCOUNTER
Spoke with patient, regarding attending [employer] school.    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    Advised the school may have parameters regarding health history and medications taken, to inquire at the school.    She would also like Dr DEMPSEY's opinion from her heart history.    Please advise.

## 2024-10-23 NOTE — TELEPHONE ENCOUNTER
No specific contraindication from cardiac standpoint.  However, she should check with them regarding heart valve replacement and being on blood thinners.

## 2024-12-04 DIAGNOSIS — I10 HYPERTENSION, ESSENTIAL: ICD-10-CM

## 2024-12-05 ENCOUNTER — PATIENT MESSAGE (OUTPATIENT)
Age: 49
End: 2024-12-05

## 2024-12-05 RX ORDER — METOPROLOL TARTRATE 25 MG/1
25 TABLET, FILM COATED ORAL 2 TIMES DAILY
Qty: 180 TABLET | Refills: 1 | Status: SHIPPED | OUTPATIENT
Start: 2024-12-05

## 2025-01-06 ENCOUNTER — APPOINTMENT (OUTPATIENT)
Dept: LAB | Facility: HOSPITAL | Age: 50
End: 2025-01-06
Payer: COMMERCIAL

## 2025-01-06 DIAGNOSIS — Z79.01 LONG TERM (CURRENT) USE OF ANTICOAGULANTS: ICD-10-CM

## 2025-01-06 LAB
INR PPP: 2.13 (ref 0.85–1.19)
PROTHROMBIN TIME: 24.5 SECONDS (ref 12.3–15)

## 2025-01-06 PROCEDURE — 36415 COLL VENOUS BLD VENIPUNCTURE: CPT

## 2025-01-06 PROCEDURE — 85610 PROTHROMBIN TIME: CPT

## 2025-01-07 ENCOUNTER — RESULTS FOLLOW-UP (OUTPATIENT)
Dept: CARDIOLOGY CLINIC | Facility: CLINIC | Age: 50
End: 2025-01-07

## 2025-01-07 ENCOUNTER — ANTICOAG VISIT (OUTPATIENT)
Dept: CARDIOLOGY CLINIC | Facility: CLINIC | Age: 50
End: 2025-01-07

## 2025-01-07 ENCOUNTER — CLINICAL SUPPORT (OUTPATIENT)
Dept: OBGYN CLINIC | Facility: MEDICAL CENTER | Age: 50
End: 2025-01-07

## 2025-01-07 VITALS — DIASTOLIC BLOOD PRESSURE: 80 MMHG | BODY MASS INDEX: 23.83 KG/M2 | WEIGHT: 120 LBS | SYSTOLIC BLOOD PRESSURE: 132 MMHG

## 2025-01-07 DIAGNOSIS — I35.9 AORTIC VALVE DISORDER: Primary | ICD-10-CM

## 2025-01-07 DIAGNOSIS — Z30.42 ENCOUNTER FOR MANAGEMENT AND INJECTION OF DEPO-PROVERA: Primary | ICD-10-CM

## 2025-01-07 PROCEDURE — 96372 THER/PROPH/DIAG INJ SC/IM: CPT

## 2025-01-07 RX ORDER — MEDROXYPROGESTERONE ACETATE 150 MG/ML
150 INJECTION, SUSPENSION INTRAMUSCULAR ONCE
Status: COMPLETED | OUTPATIENT
Start: 2025-01-07 | End: 2025-01-07

## 2025-01-07 RX ADMIN — MEDROXYPROGESTERONE ACETATE 150 MG: 150 INJECTION, SUSPENSION INTRAMUSCULAR at 13:38

## 2025-01-07 NOTE — PROGRESS NOTES
Pt is here for Depo Provera injection. Her last dose was 10/10/24  Her annual exam was on 4/16/24.  Urine pregnancy test done: no   Result:   Depo given in  rt buttock  Tolerated well.  Lot [id number] Exp 05/2026  Next dose due 3/21-4/4.   Refill needed no

## 2025-02-25 ENCOUNTER — TELEPHONE (OUTPATIENT)
Dept: CARDIOLOGY CLINIC | Facility: CLINIC | Age: 50
End: 2025-02-25

## 2025-02-25 NOTE — TELEPHONE ENCOUNTER
Pt has dental appt on 4/7 and states she needs Amoxicillin called into the pharmacy prior to her appt.    Any questions, please contact pt to further discuss at [phone] ok to leave vm    Reason for call:   [x] Refill   [] Prior Auth  [] Other:     Office:   [] PCP/Provider -   [x] Specialty/Provider - [facility] / Mary Marks MD     Medication:   Amoxicillin    Pharmacy:   [id number] - [place] - [facility] [address]     Does the patient have enough for 3 days?   [] Yes   [x] No - Send as HP to POD (Appt isn't until 4/7 so will send regular priority)

## 2025-02-26 DIAGNOSIS — I35.9 AORTIC VALVE DISORDER: Primary | ICD-10-CM

## 2025-02-26 RX ORDER — AMOXICILLIN 500 MG/1
TABLET, FILM COATED ORAL
Qty: 4 TABLET | Refills: 5 | Status: SHIPPED | OUTPATIENT
Start: 2025-02-26 | End: 2025-02-26

## 2025-02-26 NOTE — TELEPHONE ENCOUNTER
Prescription for amoxicillin 500 mg 4 tablet 1 hour prior to dental procedure sent to her pharmacy.

## 2025-03-12 DIAGNOSIS — Z00.00 PERIODIC HEALTH ASSESSMENT, GENERAL SCREENING, ADULT: ICD-10-CM

## 2025-03-12 RX ORDER — PNV,CALCIUM 72/IRON/FOLIC ACID 27 MG-1 MG
TABLET ORAL
Qty: 90 TABLET | Refills: 1 | OUTPATIENT
Start: 2025-03-12

## 2025-03-12 NOTE — TELEPHONE ENCOUNTER
This patient is not under our care. This medication was prescribed by a provider at the [facility]

## 2025-03-12 NOTE — TELEPHONE ENCOUNTER
Patient would like this medication sent to the [facility] pharmacy in [place], [place]. Patient only has two left

## 2025-04-07 ENCOUNTER — ANTICOAG VISIT (OUTPATIENT)
Dept: CARDIOLOGY CLINIC | Facility: CLINIC | Age: 50
End: 2025-04-07

## 2025-04-07 ENCOUNTER — APPOINTMENT (OUTPATIENT)
Dept: LAB | Facility: HOSPITAL | Age: 50
End: 2025-04-07
Attending: INTERNAL MEDICINE

## 2025-04-07 ENCOUNTER — CLINICAL SUPPORT (OUTPATIENT)
Dept: OBGYN CLINIC | Facility: CLINIC | Age: 50
End: 2025-04-07

## 2025-04-07 DIAGNOSIS — Z79.01 LONG TERM (CURRENT) USE OF ANTICOAGULANTS: ICD-10-CM

## 2025-04-07 DIAGNOSIS — I35.9 AORTIC VALVE DISORDER: Primary | ICD-10-CM

## 2025-04-07 DIAGNOSIS — Z30.42 ENCOUNTER FOR DEPO-PROVERA CONTRACEPTION: Primary | ICD-10-CM

## 2025-04-07 LAB
INR PPP: 2.71 (ref 0.85–1.19)
PROTHROMBIN TIME: 29.4 SECONDS (ref 12.3–15)

## 2025-04-07 PROCEDURE — 85610 PROTHROMBIN TIME: CPT

## 2025-04-07 PROCEDURE — 36415 COLL VENOUS BLD VENIPUNCTURE: CPT

## 2025-04-07 PROCEDURE — 96372 THER/PROPH/DIAG INJ SC/IM: CPT

## 2025-04-07 RX ORDER — MEDROXYPROGESTERONE ACETATE 150 MG/ML
150 INJECTION, SUSPENSION INTRAMUSCULAR ONCE
Status: COMPLETED | OUTPATIENT
Start: 2025-04-07 | End: 2025-04-07

## 2025-04-07 RX ADMIN — MEDROXYPROGESTERONE ACETATE 150 MG: 150 INJECTION, SUSPENSION INTRAMUSCULAR at 13:06

## 2025-04-07 NOTE — PROGRESS NOTES
Pt is here for Depo Provera injection. Her last dose was 1/7/25.  Her annual exam is scheduled for this month    Urine pregnancy test done: no   Result: n/a  Depo given in L buttock  Tolerated well.  Lot [id number] Exp 08/31/2026  Next dose due 6/23-7/7.   Refill needed no, 1 left

## 2025-05-12 DIAGNOSIS — I35.9 AORTIC VALVE DISORDER: ICD-10-CM

## 2025-05-12 DIAGNOSIS — Z00.00 PERIODIC HEALTH ASSESSMENT, GENERAL SCREENING, ADULT: ICD-10-CM

## 2025-05-12 DIAGNOSIS — I10 HYPERTENSION, ESSENTIAL: ICD-10-CM

## 2025-05-12 RX ORDER — PNV,CALCIUM 72/IRON/FOLIC ACID 27 MG-1 MG
TABLET ORAL
Qty: 90 TABLET | Refills: 0 | OUTPATIENT
Start: 2025-05-12

## 2025-05-13 DIAGNOSIS — Z00.00 PERIODIC HEALTH ASSESSMENT, GENERAL SCREENING, ADULT: ICD-10-CM

## 2025-05-13 DIAGNOSIS — G43.909 MIGRAINE WITHOUT STATUS MIGRAINOSUS, NOT INTRACTABLE, UNSPECIFIED MIGRAINE TYPE: ICD-10-CM

## 2025-05-13 DIAGNOSIS — I10 HYPERTENSION, ESSENTIAL: ICD-10-CM

## 2025-05-13 RX ORDER — RIZATRIPTAN BENZOATE 10 MG/1
10 TABLET ORAL AS NEEDED
Qty: 30 TABLET | Refills: 0 | OUTPATIENT
Start: 2025-05-13

## 2025-05-13 RX ORDER — PNV,CALCIUM 72/IRON/FOLIC ACID 27 MG-1 MG
TABLET ORAL
Qty: 90 TABLET | Refills: 0 | OUTPATIENT
Start: 2025-05-13

## 2025-05-13 RX ORDER — WARFARIN SODIUM 5 MG/1
TABLET ORAL
Qty: 135 TABLET | Refills: 0 | Status: SHIPPED | OUTPATIENT
Start: 2025-05-13

## 2025-05-13 RX ORDER — METOPROLOL TARTRATE 25 MG/1
25 TABLET, FILM COATED ORAL 2 TIMES DAILY
Qty: 180 TABLET | Refills: 3 | Status: SHIPPED | OUTPATIENT
Start: 2025-05-13

## 2025-05-13 NOTE — TELEPHONE ENCOUNTER
Reason for call:   [x] Refill   [] Prior Auth  [] Other:     Office:   [x] PCP/Provider - Elizabeth Palomares   [] Specialty/Provider -     Medication:   rizatriptan (MAXALT) 10 mg tablet     Dose/Frequency: Take 1 tablet (10 mg total) by mouth as needed for migraine for up to 30 doses Take at the onset of migraine; if symptoms continue or return, may take another dose at least 2 hours after first dose. Take no more than 2 doses in a day     Quantity: 30    Medication:   Prenatal Vit-Fe Fumarate-FA (WesTab Plus) 27-1 MG TABS     Dose/Frequency:  take 1 tablet by mouth once daily     Quantity: 90    Pharmacy: [facility]   Does the patient have enough for 3 days?   [x] Yes   [] No - Send as HP to POD

## 2025-05-20 DIAGNOSIS — G43.909 MIGRAINE WITHOUT STATUS MIGRAINOSUS, NOT INTRACTABLE, UNSPECIFIED MIGRAINE TYPE: ICD-10-CM

## 2025-05-21 RX ORDER — RIZATRIPTAN BENZOATE 10 MG/1
10 TABLET ORAL AS NEEDED
Qty: 30 TABLET | Refills: 0 | OUTPATIENT
Start: 2025-05-21

## (undated) DEVICE — FLOSEAL HEMOSTATIC MATRIX, 5 ML: Brand: FLOSEAL

## (undated) DEVICE — SHEATH 1912000 5PK 4MM/0DEG STORZ XOMED: Brand: ENDO-SCRUB®

## (undated) DEVICE — PROXIMATE SKIN STAPLERS (35 WIDE) CONTAINS 35 STAINLESS STEEL STAPLES (FIXED HEAD): Brand: PROXIMATE

## (undated) DEVICE — ARISTA AH ABSORBABLE HEMOSTATIC PARTICLES: Brand: ARISTA™ AH

## (undated) DEVICE — DRAPE SURGIKIT SADDLE BAG

## (undated) DEVICE — TUBING SUCTION 5MM X 12 FT

## (undated) DEVICE — NEEDLE SPINAL 22G X 3.5IN  QUINCKE

## (undated) DEVICE — STERILE NASAL PACK: Brand: CARDINAL HEALTH

## (undated) DEVICE — GLOVE SRG BIOGEL 7.5

## (undated) DEVICE — MAGNETIC INSTRUMENT PAD 16" X 20"; LARGE; DISPOSABLE: Brand: CARDINAL HEALTH

## (undated) DEVICE — SPECIMEN CONTAINER STERILE PEEL PACK

## (undated) DEVICE — NEEDLE 25G X 1 1/2

## (undated) DEVICE — LIGHT GLOVE GREEN

## (undated) DEVICE — SHEATH 1912010 5PK 4MM/30DEG STORZ XOMED: Brand: ENDO-SCRUB®

## (undated) DEVICE — BLADE 1884004HR TRICUT 5PK M4 4MM ROTATE: Brand: TRICUT

## (undated) DEVICE — ANTI-FOG SOLUTION WITH FOAM PAD: Brand: DEVON

## (undated) DEVICE — NEURO PATTIES 1/2 X 3

## (undated) DEVICE — MAYO STAND COVER: Brand: CONVERTORS

## (undated) DEVICE — SUCTION BOVIE ENT

## (undated) DEVICE — SYRINGE 3ML LL

## (undated) DEVICE — SPLINT 1524050 5PK PAIR DOYLE II AIRWAY: Brand: DOYLE II ™

## (undated) DEVICE — ARISTA AH FLEXITIP XL APPLICATOR: Brand: ARISTA™ AH FLEXITIP™ XL